# Patient Record
Sex: FEMALE | Race: WHITE | NOT HISPANIC OR LATINO
[De-identification: names, ages, dates, MRNs, and addresses within clinical notes are randomized per-mention and may not be internally consistent; named-entity substitution may affect disease eponyms.]

---

## 2016-02-01 RX ORDER — LABETALOL HCL 100 MG
1 TABLET ORAL
Qty: 0 | Refills: 0 | COMMUNITY
Start: 2016-02-01

## 2016-02-01 RX ORDER — LABETALOL HCL 100 MG
1 TABLET ORAL
Qty: 0 | Refills: 0 | DISCHARGE
Start: 2016-02-01

## 2016-02-02 RX ORDER — INSULIN LISPRO 100/ML
10 VIAL (ML) SUBCUTANEOUS
Qty: 0 | Refills: 0 | COMMUNITY
Start: 2016-02-02

## 2016-02-02 RX ORDER — INSULIN GLARGINE 100 [IU]/ML
30 INJECTION, SOLUTION SUBCUTANEOUS
Qty: 0 | Refills: 0 | COMMUNITY
Start: 2016-02-02

## 2017-01-31 ENCOUNTER — APPOINTMENT (OUTPATIENT)
Dept: HOME HEALTH SERVICES | Facility: HOME HEALTH | Age: 77
End: 2017-01-31

## 2017-01-31 VITALS
TEMPERATURE: 97 F | HEART RATE: 89 BPM | SYSTOLIC BLOOD PRESSURE: 122 MMHG | WEIGHT: 240 LBS | HEIGHT: 64 IN | OXYGEN SATURATION: 98 % | BODY MASS INDEX: 40.97 KG/M2 | RESPIRATION RATE: 18 BRPM | DIASTOLIC BLOOD PRESSURE: 68 MMHG

## 2017-02-02 DIAGNOSIS — Z60.2 PROBLEMS RELATED TO LIVING ALONE: ICD-10-CM

## 2017-02-02 SDOH — SOCIAL STABILITY - SOCIAL INSECURITY: PROBLEMS RELATED TO LIVING ALONE: Z60.2

## 2017-02-14 ENCOUNTER — APPOINTMENT (OUTPATIENT)
Dept: HOME HEALTH SERVICES | Facility: HOME HEALTH | Age: 77
End: 2017-02-14

## 2017-02-14 DIAGNOSIS — Z00.00 ENCOUNTER FOR GENERAL ADULT MEDICAL EXAMINATION W/OUT ABNORMAL FINDINGS: ICD-10-CM

## 2017-02-17 ENCOUNTER — LABORATORY RESULT (OUTPATIENT)
Age: 77
End: 2017-02-17

## 2017-02-17 LAB
25(OH)D3 SERPL-MCNC: 43.9 NG/ML
BASOPHILS # BLD AUTO: 0.02 K/UL
BASOPHILS NFR BLD AUTO: 0.2 %
CHOLEST SERPL-MCNC: 135 MG/DL
CHOLEST/HDLC SERPL: 2.9 RATIO
EOSINOPHIL # BLD AUTO: 0.16 K/UL
EOSINOPHIL NFR BLD AUTO: 1.9 %
HBA1C MFR BLD HPLC: 8.4 %
HCT VFR BLD CALC: 34.8 %
HDLC SERPL-MCNC: 46 MG/DL
HGB BLD-MCNC: 10.4 G/DL
IMM GRANULOCYTES NFR BLD AUTO: 0.1 %
LDLC SERPL CALC-MCNC: 65 MG/DL
LYMPHOCYTES # BLD AUTO: 2.12 K/UL
LYMPHOCYTES NFR BLD AUTO: 24.7 %
MAN DIFF?: NORMAL
MCHC RBC-ENTMCNC: 26.2 PG
MCHC RBC-ENTMCNC: 29.9 GM/DL
MCV RBC AUTO: 87.7 FL
MONOCYTES # BLD AUTO: 0.55 K/UL
MONOCYTES NFR BLD AUTO: 6.4 %
NEUTROPHILS # BLD AUTO: 5.71 K/UL
NEUTROPHILS NFR BLD AUTO: 66.7 %
PLATELET # BLD AUTO: 165 K/UL
RBC # BLD: 3.97 M/UL
RBC # FLD: 16 %
TRIGL SERPL-MCNC: 119 MG/DL
WBC # FLD AUTO: 8.57 K/UL

## 2017-02-24 LAB
FOLATE SERPL-MCNC: >20 NG/ML
VIT B12 SERPL-MCNC: 514 PG/ML

## 2017-03-26 ENCOUNTER — INPATIENT (INPATIENT)
Facility: HOSPITAL | Age: 77
LOS: 7 days | Discharge: NURSING, REHAB OR LONG-TERM CARE | DRG: 871 | End: 2017-04-03
Attending: HOSPITALIST | Admitting: HOSPITALIST
Payer: MEDICARE

## 2017-03-26 VITALS
DIASTOLIC BLOOD PRESSURE: 63 MMHG | SYSTOLIC BLOOD PRESSURE: 170 MMHG | RESPIRATION RATE: 20 BRPM | TEMPERATURE: 101 F | HEART RATE: 115 BPM | OXYGEN SATURATION: 97 %

## 2017-03-26 DIAGNOSIS — L03.031 CELLULITIS OF RIGHT TOE: ICD-10-CM

## 2017-03-26 DIAGNOSIS — Z98.49 CATARACT EXTRACTION STATUS, UNSPECIFIED EYE: Chronic | ICD-10-CM

## 2017-03-26 DIAGNOSIS — E11.42 TYPE 2 DIABETES MELLITUS WITH DIABETIC POLYNEUROPATHY: ICD-10-CM

## 2017-03-26 DIAGNOSIS — L03.115 CELLULITIS OF RIGHT LOWER LIMB: ICD-10-CM

## 2017-03-26 DIAGNOSIS — I10 ESSENTIAL (PRIMARY) HYPERTENSION: ICD-10-CM

## 2017-03-26 DIAGNOSIS — R50.9 FEVER, UNSPECIFIED: ICD-10-CM

## 2017-03-26 DIAGNOSIS — Z89.429 ACQUIRED ABSENCE OF OTHER TOE(S), UNSPECIFIED SIDE: Chronic | ICD-10-CM

## 2017-03-26 DIAGNOSIS — Z41.8 ENCOUNTER FOR OTHER PROCEDURES FOR PURPOSES OTHER THAN REMEDYING HEALTH STATE: ICD-10-CM

## 2017-03-26 LAB
ALBUMIN SERPL ELPH-MCNC: 3.4 G/DL — SIGNIFICANT CHANGE UP (ref 3.3–5)
ALP SERPL-CCNC: 91 U/L — SIGNIFICANT CHANGE UP (ref 40–120)
ALT FLD-CCNC: 17 U/L RC — SIGNIFICANT CHANGE UP (ref 10–45)
ANION GAP SERPL CALC-SCNC: 17 MMOL/L — SIGNIFICANT CHANGE UP (ref 5–17)
AST SERPL-CCNC: 26 U/L — SIGNIFICANT CHANGE UP (ref 10–40)
BASOPHILS # BLD AUTO: 0 K/UL — SIGNIFICANT CHANGE UP (ref 0–0.2)
BASOPHILS NFR BLD AUTO: 0.2 % — SIGNIFICANT CHANGE UP (ref 0–2)
BILIRUB SERPL-MCNC: 0.5 MG/DL — SIGNIFICANT CHANGE UP (ref 0.2–1.2)
BUN SERPL-MCNC: 23 MG/DL — SIGNIFICANT CHANGE UP (ref 7–23)
CALCIUM SERPL-MCNC: 10 MG/DL — SIGNIFICANT CHANGE UP (ref 8.4–10.5)
CHLORIDE SERPL-SCNC: 101 MMOL/L — SIGNIFICANT CHANGE UP (ref 96–108)
CO2 SERPL-SCNC: 20 MMOL/L — LOW (ref 22–31)
CREAT SERPL-MCNC: 1.09 MG/DL — SIGNIFICANT CHANGE UP (ref 0.5–1.3)
EOSINOPHIL # BLD AUTO: 0 K/UL — SIGNIFICANT CHANGE UP (ref 0–0.5)
EOSINOPHIL NFR BLD AUTO: 0.1 % — SIGNIFICANT CHANGE UP (ref 0–6)
GAS PNL BLDV: SIGNIFICANT CHANGE UP
GLUCOSE SERPL-MCNC: 197 MG/DL — HIGH (ref 70–99)
HCT VFR BLD CALC: 31.2 % — LOW (ref 34.5–45)
HGB BLD-MCNC: 10.2 G/DL — LOW (ref 11.5–15.5)
LYMPHOCYTES # BLD AUTO: 1 K/UL — SIGNIFICANT CHANGE UP (ref 1–3.3)
LYMPHOCYTES # BLD AUTO: 6.7 % — LOW (ref 13–44)
MCHC RBC-ENTMCNC: 27.4 PG — SIGNIFICANT CHANGE UP (ref 27–34)
MCHC RBC-ENTMCNC: 32.6 GM/DL — SIGNIFICANT CHANGE UP (ref 32–36)
MCV RBC AUTO: 84.1 FL — SIGNIFICANT CHANGE UP (ref 80–100)
MONOCYTES # BLD AUTO: 1.1 K/UL — HIGH (ref 0–0.9)
MONOCYTES NFR BLD AUTO: 7.4 % — SIGNIFICANT CHANGE UP (ref 2–14)
NEUTROPHILS # BLD AUTO: 12.4 K/UL — HIGH (ref 1.8–7.4)
NEUTROPHILS NFR BLD AUTO: 85.5 % — HIGH (ref 43–77)
PLATELET # BLD AUTO: 94 K/UL — LOW (ref 150–400)
POTASSIUM SERPL-MCNC: 4.8 MMOL/L — SIGNIFICANT CHANGE UP (ref 3.5–5.3)
POTASSIUM SERPL-SCNC: 4.8 MMOL/L — SIGNIFICANT CHANGE UP (ref 3.5–5.3)
PROT SERPL-MCNC: 7.5 G/DL — SIGNIFICANT CHANGE UP (ref 6–8.3)
RAPID RVP RESULT: SIGNIFICANT CHANGE UP
RBC # BLD: 3.72 M/UL — LOW (ref 3.8–5.2)
RBC # FLD: 15.1 % — HIGH (ref 10.3–14.5)
SODIUM SERPL-SCNC: 138 MMOL/L — SIGNIFICANT CHANGE UP (ref 135–145)
WBC # BLD: 14.4 K/UL — HIGH (ref 3.8–10.5)
WBC # FLD AUTO: 14.4 K/UL — HIGH (ref 3.8–10.5)

## 2017-03-26 PROCEDURE — 99285 EMERGENCY DEPT VISIT HI MDM: CPT | Mod: 25,GC

## 2017-03-26 PROCEDURE — 93971 EXTREMITY STUDY: CPT | Mod: 26

## 2017-03-26 PROCEDURE — 93010 ELECTROCARDIOGRAM REPORT: CPT

## 2017-03-26 PROCEDURE — 73620 X-RAY EXAM OF FOOT: CPT | Mod: 26,RT

## 2017-03-26 PROCEDURE — 71010: CPT | Mod: 26

## 2017-03-26 PROCEDURE — 99223 1ST HOSP IP/OBS HIGH 75: CPT | Mod: AI,GC

## 2017-03-26 RX ORDER — DEXTROSE 50 % IN WATER 50 %
25 SYRINGE (ML) INTRAVENOUS ONCE
Qty: 0 | Refills: 0 | Status: DISCONTINUED | OUTPATIENT
Start: 2017-03-26 | End: 2017-04-03

## 2017-03-26 RX ORDER — INSULIN LISPRO 100/ML
VIAL (ML) SUBCUTANEOUS
Qty: 0 | Refills: 0 | Status: DISCONTINUED | OUTPATIENT
Start: 2017-03-26 | End: 2017-04-01

## 2017-03-26 RX ORDER — ATORVASTATIN CALCIUM 80 MG/1
10 TABLET, FILM COATED ORAL AT BEDTIME
Qty: 0 | Refills: 0 | Status: DISCONTINUED | OUTPATIENT
Start: 2017-03-26 | End: 2017-04-03

## 2017-03-26 RX ORDER — SODIUM CHLORIDE 9 MG/ML
1000 INJECTION, SOLUTION INTRAVENOUS
Qty: 0 | Refills: 0 | Status: DISCONTINUED | OUTPATIENT
Start: 2017-03-26 | End: 2017-04-03

## 2017-03-26 RX ORDER — ACETAMINOPHEN 500 MG
1000 TABLET ORAL ONCE
Qty: 0 | Refills: 0 | Status: COMPLETED | OUTPATIENT
Start: 2017-03-26 | End: 2017-03-26

## 2017-03-26 RX ORDER — INSULIN LISPRO 100/ML
5 VIAL (ML) SUBCUTANEOUS
Qty: 0 | Refills: 0 | Status: DISCONTINUED | OUTPATIENT
Start: 2017-03-26 | End: 2017-03-28

## 2017-03-26 RX ORDER — PIPERACILLIN AND TAZOBACTAM 4; .5 G/20ML; G/20ML
3.38 INJECTION, POWDER, LYOPHILIZED, FOR SOLUTION INTRAVENOUS EVERY 8 HOURS
Qty: 0 | Refills: 0 | Status: DISCONTINUED | OUTPATIENT
Start: 2017-03-26 | End: 2017-03-31

## 2017-03-26 RX ORDER — GLUCAGON INJECTION, SOLUTION 0.5 MG/.1ML
1 INJECTION, SOLUTION SUBCUTANEOUS ONCE
Qty: 0 | Refills: 0 | Status: DISCONTINUED | OUTPATIENT
Start: 2017-03-26 | End: 2017-04-03

## 2017-03-26 RX ORDER — ACETAMINOPHEN 500 MG
650 TABLET ORAL EVERY 6 HOURS
Qty: 0 | Refills: 0 | Status: DISCONTINUED | OUTPATIENT
Start: 2017-03-26 | End: 2017-04-03

## 2017-03-26 RX ORDER — SODIUM CHLORIDE 9 MG/ML
1000 INJECTION INTRAMUSCULAR; INTRAVENOUS; SUBCUTANEOUS ONCE
Qty: 0 | Refills: 0 | Status: COMPLETED | OUTPATIENT
Start: 2017-03-26 | End: 2017-03-26

## 2017-03-26 RX ORDER — HEPARIN SODIUM 5000 [USP'U]/ML
5000 INJECTION INTRAVENOUS; SUBCUTANEOUS EVERY 8 HOURS
Qty: 0 | Refills: 0 | Status: DISCONTINUED | OUTPATIENT
Start: 2017-03-26 | End: 2017-04-03

## 2017-03-26 RX ORDER — DEXTROSE 50 % IN WATER 50 %
12.5 SYRINGE (ML) INTRAVENOUS ONCE
Qty: 0 | Refills: 0 | Status: DISCONTINUED | OUTPATIENT
Start: 2017-03-26 | End: 2017-04-03

## 2017-03-26 RX ORDER — INSULIN LISPRO 100/ML
VIAL (ML) SUBCUTANEOUS AT BEDTIME
Qty: 0 | Refills: 0 | Status: DISCONTINUED | OUTPATIENT
Start: 2017-03-26 | End: 2017-04-01

## 2017-03-26 RX ORDER — VANCOMYCIN HCL 1 G
1000 VIAL (EA) INTRAVENOUS ONCE
Qty: 0 | Refills: 0 | Status: COMPLETED | OUTPATIENT
Start: 2017-03-26 | End: 2017-03-26

## 2017-03-26 RX ORDER — PIPERACILLIN AND TAZOBACTAM 4; .5 G/20ML; G/20ML
3.38 INJECTION, POWDER, LYOPHILIZED, FOR SOLUTION INTRAVENOUS ONCE
Qty: 0 | Refills: 0 | Status: COMPLETED | OUTPATIENT
Start: 2017-03-26 | End: 2017-03-26

## 2017-03-26 RX ORDER — LABETALOL HCL 100 MG
200 TABLET ORAL AT BEDTIME
Qty: 0 | Refills: 0 | Status: DISCONTINUED | OUTPATIENT
Start: 2017-03-26 | End: 2017-04-01

## 2017-03-26 RX ORDER — INSULIN GLARGINE 100 [IU]/ML
15 INJECTION, SOLUTION SUBCUTANEOUS AT BEDTIME
Qty: 0 | Refills: 0 | Status: DISCONTINUED | OUTPATIENT
Start: 2017-03-26 | End: 2017-03-27

## 2017-03-26 RX ORDER — DEXTROSE 50 % IN WATER 50 %
1 SYRINGE (ML) INTRAVENOUS ONCE
Qty: 0 | Refills: 0 | Status: DISCONTINUED | OUTPATIENT
Start: 2017-03-26 | End: 2017-04-03

## 2017-03-26 RX ORDER — TIMOLOL 0.5 %
1 DROPS OPHTHALMIC (EYE) DAILY
Qty: 0 | Refills: 0 | Status: DISCONTINUED | OUTPATIENT
Start: 2017-03-26 | End: 2017-04-03

## 2017-03-26 RX ORDER — VANCOMYCIN HCL 1 G
1000 VIAL (EA) INTRAVENOUS ONCE
Qty: 0 | Refills: 0 | Status: COMPLETED | OUTPATIENT
Start: 2017-03-26 | End: 2017-03-28

## 2017-03-26 RX ADMIN — Medication 400 MILLIGRAM(S): at 18:50

## 2017-03-26 RX ADMIN — PIPERACILLIN AND TAZOBACTAM 200 GRAM(S): 4; .5 INJECTION, POWDER, LYOPHILIZED, FOR SOLUTION INTRAVENOUS at 18:50

## 2017-03-26 RX ADMIN — Medication 250 MILLIGRAM(S): at 22:25

## 2017-03-26 RX ADMIN — SODIUM CHLORIDE 1 MILLILITER(S): 9 INJECTION INTRAMUSCULAR; INTRAVENOUS; SUBCUTANEOUS at 18:51

## 2017-03-26 RX ADMIN — SODIUM CHLORIDE 1000 MILLILITER(S): 9 INJECTION INTRAMUSCULAR; INTRAVENOUS; SUBCUTANEOUS at 19:20

## 2017-03-26 NOTE — H&P ADULT. - PROBLEM SELECTOR PLAN 2
Right extremity is more swollen then the left, with more erythema and warmth.   - Duplexes ordered to rule out DVT  - Continue antibiotics as listed above

## 2017-03-26 NOTE — H&P ADULT. - NEGATIVE ENMT SYMPTOMS
no sinus symptoms/no throat pain/no nasal discharge/no hearing difficulty/no nasal congestion/no vertigo/no dry mouth/no dysphagia

## 2017-03-26 NOTE — ED ADULT NURSE NOTE - OBJECTIVE STATEMENT
Patient  is  alert  and  oriented x3.  Color is  good and  skin warm to touch.  She is  c/o  weakness and  fell.  Patient  is  febrile.  She  is  c/o  bodyaches.    Rt  foot  infection noted.

## 2017-03-26 NOTE — H&P ADULT. - NEGATIVE CARDIOVASCULAR SYMPTOMS
no chest pain/no palpitations/no dyspnea on exertion/no peripheral edema no chest pain/no dyspnea on exertion/no palpitations

## 2017-03-26 NOTE — H&P ADULT. - PROBLEM SELECTOR PLAN 3
Diabetes regimen cut in half while in patient  - Lantus 15 u QHS  - Meal coverage 5u TID  - Checking Hgb A1C  - Sliding scale    Patient has severe polyneuropathy. No LE pain  - Physical therapy consult

## 2017-03-26 NOTE — ED ADULT NURSE REASSESSMENT NOTE - NS ED NURSE REASSESS COMMENT FT1
Recvd pt at 1930 awake, alert and responsive to all stimuli.  no sob or respiratory distress noted.  pt denies any pain and is afebrile at this time.  pt admitted to medicine and is awaiting admitting bed.  will continue to monitor.

## 2017-03-26 NOTE — H&P ADULT. - RADIOLOGY RESULTS AND INTERPRETATION
Personally reviewed by me. Patient has a sub talar dislocation, not sure if old or new. There is extensive soft tissue swelling.  CXR is clear. CXR film personally reviewed by me - clear lung    Patient has a sub talar dislocation, not sure if old or new. There is extensive soft tissue swelling.

## 2017-03-26 NOTE — H&P ADULT. - HISTORY OF PRESENT ILLNESS
77 yo F with pmhx HTN, diabetes, and Diabetic neuropathy who presents after two falls and weakness. Patient had a fall yesterday and another today. Her only injury was elbow bruise. She did not hit her head. She usually walks with a walker due to severe diabetic neuropathy. She was trying to climb two steps in her home, and was unable to lift her leg high enough to make it to the next step and slipped and fell backwards both times.  She usually has good upper extremity strength, and was not able to hold the railing and bring herself up. They had to call the paramedics to help her up.      Patient does not 75 yo F with pmhx HTN, diabetes, and Diabetic neuropathy who presents after two falls and weakness. Patient had a fall yesterday and another today. Her only injury was elbow bruise. She did not hit her head. She usually walks with a walker due to severe diabetic neuropathy. She was trying to climb two steps in her home, and was unable to lift her leg high enough to make it to the next step and slipped and fell backwards both times. She denies any lightheadedness, dizziness, palpitations or flushing during these falls.  She usually has good upper extremity strength, and was not able to hold the railing and bring herself up. They had to call the paramedics to help her up.      Patient does not recall any fevers or chills at home, but has been feeling generalized weakness. She was told by her nephew she has a wound on her right foot second toe, and she was not aware of this. She has no feeling in her leg. Her toes were all 'normal' in February when the podiatrist did her nails.      Patient denies any recent URI symptoms. She denies chest pain, sob, abdominal pain, nausea, vomiting, diarrhea or constipation. She says her Iron pills make her constipated but her metformin causes her diarrhea.  Patient also denies any recent rash or swelling, however her legs are currently swollen and she was not aware of this.  Patient has poor eye sight from her glaucoma and doesn't really pay attention to her legs.     In the ED, patient was febrile, and vanc zosyn were prescribed.

## 2017-03-26 NOTE — ED PROVIDER NOTE - CONSTITUTIONAL, MLM
normal... Well appearing, well nourished, awake, alert, oriented to person, place, time/situation and in slight distress.

## 2017-03-26 NOTE — ED PROVIDER NOTE - OBJECTIVE STATEMENT
MD Nadeen,Attending:Pt seen and examined, agree with above HPI/ROS/PE. Pt with DM--no prior known DKA. R mid-toe infection. 2-3 days of fever weakness of legs--fell going up steps--no trauma. Denies other localizing sxs. Presume DKA/sepsis. Full septic workup -initiate IVFs /IVabx. xray foot. Admit medicine once labs back MD Nadeen,Attending: Pt seen and examined, agree with above HPI/ROS/PE. Pt with DM--no prior known DKA. R mid-toe infection. 2-3 days of fever weakness of legs--fell going up steps--no trauma. Denies other localizing sxs. Presume DKA/sepsis. Full septic workup -initiate IVFs /IV abx/ xray foot. Admit medicine once labs back 75 yo female with DM chronic foot wound presenting with 2 day hx of fever and weakness and inability to stand up or walk.  patient also endorses fevers, has not taken anything for her symptoms.  denies any pain.  no n/v/d.  has recently had multiple falls secondary to her weakness although denies any injuries.    PCP- Bam Senior MD,Attending: Pt seen and examined, agree with above HPI/ROS/PE. Pt with DM--no prior known DKA. R mid-toe infection. 2-3 days of fever weakness of legs--fell going up steps--no trauma. Denies other localizing sxs. Presume DKA/sepsis. Full septic workup -initiate IVFs /IV abx/ xray foot. Admit medicine once labs back

## 2017-03-26 NOTE — H&P ADULT. - MUSCULOSKELETAL
details… detailed exam ROM intact/joint warmth/joint erythema/joint swelling/normal strength/no calf tenderness no calf tenderness/joint erythema/joint swelling/ROM intact/joint warmth

## 2017-03-26 NOTE — H&P ADULT. - PROBLEM SELECTOR PLAN 4
Starting back her Labetalol for now, will add on Losartan tomorrow if BP remains stable and patient doesn't decompensate.

## 2017-03-26 NOTE — H&P ADULT. - RS GEN PE MLT RESP DETAILS PC
breath sounds equal/normal/no chest wall tenderness/good air movement/airway patent/clear to auscultation bilaterally/respirations non-labored clear to auscultation bilaterally/respirations non-labored

## 2017-03-26 NOTE — H&P ADULT. - ASSESSMENT
75 yo F with HTN, DM2, and Diabetic neuropathy, with Sepsis 2/2 cellulitis, and diabetic foot ulcer concerning for underlying osteomyelitis 75 yo F with HTN, DM2, and Diabetic neuropathy p/w fall admitted with sepsis due to cellulitis of diabetic foot ulcer concerning for underlying osteomyelitis

## 2017-03-26 NOTE — ED PROVIDER NOTE - MEDICAL DECISION MAKING DETAILS
MD Nadeen,Attending: MD Nadeen,Attending:Pt seen and examined, agree with above HPI/ROS/PE. Pt with DM--no prior known DKA. R mid-toe infection. 2-3 days of fever weakness of legs--fell going up steps--no trauma. Denies other localizing sxs. Presume DKA/sepsis. Full septic workup -initiate IVFs /IVabx. xray foot. Admit medicine once labs back

## 2017-03-26 NOTE — H&P ADULT. - GASTROINTESTINAL DETAILS
obese/soft/nontender/no masses palpable/normal/bowel sounds normal/no distention obese/soft/bowel sounds normal/no distention/nontender

## 2017-03-26 NOTE — H&P ADULT. - NEGATIVE NEUROLOGICAL SYMPTOMS
no hemiparesis/no generalized seizures/no paresthesias/no loss of sensation/no transient paralysis/no syncope/no loss of consciousness/no confusion/no focal seizures no confusion/no syncope/no loss of consciousness

## 2017-03-26 NOTE — H&P ADULT. - EXTREMITIES COMMENTS
Right leg more swollen then left, ROM intact by ankle, erythema present with some skin changes. One wound on second right toe, no drainage, no bleed.

## 2017-03-26 NOTE — H&P ADULT. - PMH
Diabetes  type 2  Diabetic ulcer of heel    Glaucoma    Hypertension    Neuropathy    PVD (peripheral vascular disease)

## 2017-03-26 NOTE — H&P ADULT. - NEUROLOGICAL DETAILS
responds to pain/alert and oriented x 3/normal strength alert and oriented x 3/normal strength/responds to verbal commands

## 2017-03-26 NOTE — H&P ADULT. - NEGATIVE MUSCULOSKELETAL SYMPTOMS
no arthritis/no myalgia/no muscle cramps/no arthralgia/no muscle weakness/no joint swelling no muscle cramps/no muscle weakness

## 2017-03-26 NOTE — H&P ADULT. - LAB RESULTS AND INTERPRETATION
Personally reviewed by me.  Leukocytosis present, with anemia, thrombocytopenia (not chronic), Negative RVP.  Bicarb is 20 and cr is at baseline.

## 2017-03-26 NOTE — H&P ADULT. - PROBLEM SELECTOR PLAN 1
Patient has history of diabetic foot ulcers, and previous amputations. Concern for osteomyelitis  - Continuing Vancomycin for MRSA coverage and Zosyn to cover anaerobes and pseudomonas  - Follow up blood cultures  - Tylenol for fevers Patient has history of diabetic foot ulcers, and previous amputations. Concern for osteomyelitis  - Continuing Vancomycin for MRSA coverage and Zosyn to cover anaerobes and pseudomonas  - Follow up blood cultures  - Checking ESR and CRP  - Tylenol for fevers  - Podiatry Consulted - will see patient with sepsis - fever and tachycardia   - Patient has history of diabetic foot ulcers, and previous amputations. Concern for osteomyelitis,  - will treat with Vancomycin for MRSA coverage and Zosyn to cover anaerobes and pseudomonas  - Follow up blood cultures  - Checking ESR and CRP  - Tylenol for fevers  - Podiatry Consulted - will see patient

## 2017-03-26 NOTE — H&P ADULT. - LYMPHATIC
posterior cervical L/supraclavicular R/supraclavicular L/anterior cervical L/posterior cervical R/anterior cervical R

## 2017-03-26 NOTE — H&P ADULT. - NEGATIVE GASTROINTESTINAL SYMPTOMS
no constipation/no abdominal pain/no vomiting/no change in bowel habits/no diarrhea/no nausea no nausea/no vomiting/no abdominal pain

## 2017-03-27 ENCOUNTER — APPOINTMENT (OUTPATIENT)
Dept: HOME HEALTH SERVICES | Facility: HOME HEALTH | Age: 77
End: 2017-03-27

## 2017-03-27 DIAGNOSIS — R60.0 LOCALIZED EDEMA: ICD-10-CM

## 2017-03-27 LAB
ANION GAP SERPL CALC-SCNC: 18 MMOL/L — HIGH (ref 5–17)
BUN SERPL-MCNC: 20 MG/DL — SIGNIFICANT CHANGE UP (ref 7–23)
CALCIUM SERPL-MCNC: 9.4 MG/DL — SIGNIFICANT CHANGE UP (ref 8.4–10.5)
CHLORIDE SERPL-SCNC: 104 MMOL/L — SIGNIFICANT CHANGE UP (ref 96–108)
CHOLEST SERPL-MCNC: 128 MG/DL — SIGNIFICANT CHANGE UP (ref 10–199)
CO2 SERPL-SCNC: 21 MMOL/L — LOW (ref 22–31)
CREAT SERPL-MCNC: 1.11 MG/DL — SIGNIFICANT CHANGE UP (ref 0.5–1.3)
CRP SERPL-MCNC: 31.77 MG/DL — HIGH (ref 0–0.4)
ERYTHROCYTE [SEDIMENTATION RATE] IN BLOOD: 108 MM/HR — HIGH (ref 0–20)
GLUCOSE SERPL-MCNC: 225 MG/DL — HIGH (ref 70–99)
GRAM STN FLD: SIGNIFICANT CHANGE UP
HCT VFR BLD CALC: 31.9 % — LOW (ref 34.5–45)
HDLC SERPL-MCNC: 42 MG/DL — SIGNIFICANT CHANGE UP (ref 40–125)
HGB BLD-MCNC: 10.2 G/DL — LOW (ref 11.5–15.5)
LIPID PNL WITH DIRECT LDL SERPL: 61 MG/DL — SIGNIFICANT CHANGE UP
MAGNESIUM SERPL-MCNC: 1.5 MG/DL — LOW (ref 1.6–2.6)
MCHC RBC-ENTMCNC: 27 PG — SIGNIFICANT CHANGE UP (ref 27–34)
MCHC RBC-ENTMCNC: 32 GM/DL — SIGNIFICANT CHANGE UP (ref 32–36)
MCV RBC AUTO: 84.4 FL — SIGNIFICANT CHANGE UP (ref 80–100)
PHOSPHATE SERPL-MCNC: 3.3 MG/DL — SIGNIFICANT CHANGE UP (ref 2.5–4.5)
PLATELET # BLD AUTO: 115 K/UL — LOW (ref 150–400)
POTASSIUM SERPL-MCNC: 4.4 MMOL/L — SIGNIFICANT CHANGE UP (ref 3.5–5.3)
POTASSIUM SERPL-SCNC: 4.4 MMOL/L — SIGNIFICANT CHANGE UP (ref 3.5–5.3)
RBC # BLD: 3.77 M/UL — LOW (ref 3.8–5.2)
RBC # FLD: 15.1 % — HIGH (ref 10.3–14.5)
SODIUM SERPL-SCNC: 143 MMOL/L — SIGNIFICANT CHANGE UP (ref 135–145)
SPECIMEN SOURCE: SIGNIFICANT CHANGE UP
TOTAL CHOLESTEROL/HDL RATIO MEASUREMENT: 3 RATIO — LOW (ref 3.3–7.1)
TRIGL SERPL-MCNC: 124 MG/DL — SIGNIFICANT CHANGE UP (ref 10–149)
TSH SERPL-MCNC: 1.01 UIU/ML — SIGNIFICANT CHANGE UP (ref 0.27–4.2)
WBC # BLD: 13.4 K/UL — HIGH (ref 3.8–10.5)
WBC # FLD AUTO: 13.4 K/UL — HIGH (ref 3.8–10.5)

## 2017-03-27 PROCEDURE — 99233 SBSQ HOSP IP/OBS HIGH 50: CPT | Mod: GC

## 2017-03-27 RX ORDER — MAGNESIUM OXIDE 400 MG ORAL TABLET 241.3 MG
400 TABLET ORAL
Qty: 0 | Refills: 0 | Status: DISCONTINUED | OUTPATIENT
Start: 2017-03-27 | End: 2017-03-27

## 2017-03-27 RX ORDER — SODIUM CHLORIDE 9 MG/ML
1000 INJECTION INTRAMUSCULAR; INTRAVENOUS; SUBCUTANEOUS
Qty: 0 | Refills: 0 | Status: DISCONTINUED | OUTPATIENT
Start: 2017-03-27 | End: 2017-03-29

## 2017-03-27 RX ORDER — POLYETHYLENE GLYCOL 3350 17 G/17G
17 POWDER, FOR SOLUTION ORAL DAILY
Qty: 0 | Refills: 0 | Status: DISCONTINUED | OUTPATIENT
Start: 2017-03-27 | End: 2017-04-03

## 2017-03-27 RX ORDER — INSULIN GLARGINE 100 [IU]/ML
20 INJECTION, SOLUTION SUBCUTANEOUS AT BEDTIME
Qty: 0 | Refills: 0 | Status: DISCONTINUED | OUTPATIENT
Start: 2017-03-27 | End: 2017-03-29

## 2017-03-27 RX ORDER — DOCUSATE SODIUM 100 MG
100 CAPSULE ORAL
Qty: 0 | Refills: 0 | Status: DISCONTINUED | OUTPATIENT
Start: 2017-03-27 | End: 2017-04-03

## 2017-03-27 RX ORDER — SENNA PLUS 8.6 MG/1
2 TABLET ORAL AT BEDTIME
Qty: 0 | Refills: 0 | Status: DISCONTINUED | OUTPATIENT
Start: 2017-03-27 | End: 2017-04-03

## 2017-03-27 RX ORDER — MAGNESIUM SULFATE 500 MG/ML
2 VIAL (ML) INJECTION ONCE
Qty: 0 | Refills: 0 | Status: COMPLETED | OUTPATIENT
Start: 2017-03-27 | End: 2017-03-27

## 2017-03-27 RX ADMIN — HEPARIN SODIUM 5000 UNIT(S): 5000 INJECTION INTRAVENOUS; SUBCUTANEOUS at 14:39

## 2017-03-27 RX ADMIN — INSULIN GLARGINE 20 UNIT(S): 100 INJECTION, SOLUTION SUBCUTANEOUS at 23:09

## 2017-03-27 RX ADMIN — Medication 5 UNIT(S): at 14:35

## 2017-03-27 RX ADMIN — SENNA PLUS 2 TABLET(S): 8.6 TABLET ORAL at 23:09

## 2017-03-27 RX ADMIN — Medication 2: at 14:35

## 2017-03-27 RX ADMIN — HEPARIN SODIUM 5000 UNIT(S): 5000 INJECTION INTRAVENOUS; SUBCUTANEOUS at 23:09

## 2017-03-27 RX ADMIN — Medication 200 MILLIGRAM(S): at 01:15

## 2017-03-27 RX ADMIN — Medication 50 GRAM(S): at 02:15

## 2017-03-27 RX ADMIN — PIPERACILLIN AND TAZOBACTAM 25 GRAM(S): 4; .5 INJECTION, POWDER, LYOPHILIZED, FOR SOLUTION INTRAVENOUS at 06:11

## 2017-03-27 RX ADMIN — Medication 650 MILLIGRAM(S): at 06:22

## 2017-03-27 RX ADMIN — HEPARIN SODIUM 5000 UNIT(S): 5000 INJECTION INTRAVENOUS; SUBCUTANEOUS at 06:11

## 2017-03-27 RX ADMIN — ATORVASTATIN CALCIUM 10 MILLIGRAM(S): 80 TABLET, FILM COATED ORAL at 23:09

## 2017-03-27 RX ADMIN — Medication 2: at 18:24

## 2017-03-27 RX ADMIN — Medication 5 UNIT(S): at 09:48

## 2017-03-27 RX ADMIN — PIPERACILLIN AND TAZOBACTAM 25 GRAM(S): 4; .5 INJECTION, POWDER, LYOPHILIZED, FOR SOLUTION INTRAVENOUS at 14:50

## 2017-03-27 RX ADMIN — ATORVASTATIN CALCIUM 10 MILLIGRAM(S): 80 TABLET, FILM COATED ORAL at 02:16

## 2017-03-27 RX ADMIN — Medication 200 MILLIGRAM(S): at 23:09

## 2017-03-27 RX ADMIN — PIPERACILLIN AND TAZOBACTAM 25 GRAM(S): 4; .5 INJECTION, POWDER, LYOPHILIZED, FOR SOLUTION INTRAVENOUS at 23:09

## 2017-03-27 RX ADMIN — Medication 5 UNIT(S): at 18:25

## 2017-03-27 RX ADMIN — Medication 650 MILLIGRAM(S): at 19:05

## 2017-03-27 RX ADMIN — Medication 1 DROP(S): at 14:39

## 2017-03-27 RX ADMIN — Medication 2: at 09:48

## 2017-03-27 RX ADMIN — SODIUM CHLORIDE 80 MILLILITER(S): 9 INJECTION INTRAMUSCULAR; INTRAVENOUS; SUBCUTANEOUS at 15:00

## 2017-03-27 RX ADMIN — Medication 1 DROP(S): at 02:15

## 2017-03-27 RX ADMIN — Medication 100 MILLIGRAM(S): at 18:24

## 2017-03-27 RX ADMIN — POLYETHYLENE GLYCOL 3350 17 GRAM(S): 17 POWDER, FOR SOLUTION ORAL at 14:39

## 2017-03-28 ENCOUNTER — TRANSCRIPTION ENCOUNTER (OUTPATIENT)
Age: 77
End: 2017-03-28

## 2017-03-28 LAB
ANION GAP SERPL CALC-SCNC: 19 MMOL/L — HIGH (ref 5–17)
BASOPHILS # BLD AUTO: 0 K/UL — SIGNIFICANT CHANGE UP (ref 0–0.2)
BASOPHILS # BLD AUTO: 0.02 K/UL — SIGNIFICANT CHANGE UP (ref 0–0.2)
BASOPHILS NFR BLD AUTO: 0.2 % — SIGNIFICANT CHANGE UP (ref 0–2)
BASOPHILS NFR BLD AUTO: 0.4 % — SIGNIFICANT CHANGE UP (ref 0–2)
BUN SERPL-MCNC: 21 MG/DL — SIGNIFICANT CHANGE UP (ref 7–23)
CALCIUM SERPL-MCNC: 8.8 MG/DL — SIGNIFICANT CHANGE UP (ref 8.4–10.5)
CHLORIDE SERPL-SCNC: 103 MMOL/L — SIGNIFICANT CHANGE UP (ref 96–108)
CO2 SERPL-SCNC: 19 MMOL/L — LOW (ref 22–31)
CREAT SERPL-MCNC: 1.28 MG/DL — SIGNIFICANT CHANGE UP (ref 0.5–1.3)
CULTURE RESULTS: NO GROWTH — SIGNIFICANT CHANGE UP
EOSINOPHIL # BLD AUTO: 0 K/UL — SIGNIFICANT CHANGE UP (ref 0–0.5)
EOSINOPHIL # BLD AUTO: 0 K/UL — SIGNIFICANT CHANGE UP (ref 0–0.5)
EOSINOPHIL NFR BLD AUTO: 0 % — SIGNIFICANT CHANGE UP (ref 0–6)
EOSINOPHIL NFR BLD AUTO: 0.4 % — SIGNIFICANT CHANGE UP (ref 0–6)
FERRITIN SERPL-MCNC: 499.1 NG/ML — HIGH (ref 15–150)
FOLATE SERPL-MCNC: >20 NG/ML — SIGNIFICANT CHANGE UP (ref 4.8–24.2)
GLUCOSE SERPL-MCNC: 247 MG/DL — HIGH (ref 70–99)
GRAM STN FLD: SIGNIFICANT CHANGE UP
HCT VFR BLD CALC: 26.7 % — LOW (ref 34.5–45)
HCT VFR BLD CALC: 27.5 % — LOW (ref 34.5–45)
HGB BLD-MCNC: 8.2 G/DL — LOW (ref 11.5–15.5)
HGB BLD-MCNC: 9 G/DL — LOW (ref 11.5–15.5)
IMM GRANULOCYTES NFR BLD AUTO: 0.1 % — SIGNIFICANT CHANGE UP (ref 0–1.5)
IRON SATN MFR SERPL: 15 UG/DL — LOW (ref 30–160)
IRON SATN MFR SERPL: 7 % — LOW (ref 14–50)
LYMPHOCYTES # BLD AUTO: 1.2 K/UL — SIGNIFICANT CHANGE UP (ref 1–3.3)
LYMPHOCYTES # BLD AUTO: 1.43 K/UL — SIGNIFICANT CHANGE UP (ref 1–3.3)
LYMPHOCYTES # BLD AUTO: 13.4 % — SIGNIFICANT CHANGE UP (ref 13–44)
LYMPHOCYTES # BLD AUTO: 14.4 % — SIGNIFICANT CHANGE UP (ref 13–44)
MAGNESIUM SERPL-MCNC: 1.7 MG/DL — SIGNIFICANT CHANGE UP (ref 1.6–2.6)
MCHC RBC-ENTMCNC: 25.6 PG — LOW (ref 27–34)
MCHC RBC-ENTMCNC: 27.6 PG — SIGNIFICANT CHANGE UP (ref 27–34)
MCHC RBC-ENTMCNC: 30.7 GM/DL — LOW (ref 32–36)
MCHC RBC-ENTMCNC: 32.6 GM/DL — SIGNIFICANT CHANGE UP (ref 32–36)
MCV RBC AUTO: 83.4 FL — SIGNIFICANT CHANGE UP (ref 80–100)
MCV RBC AUTO: 84.5 FL — SIGNIFICANT CHANGE UP (ref 80–100)
MONOCYTES # BLD AUTO: 0.6 K/UL — SIGNIFICANT CHANGE UP (ref 0–0.9)
MONOCYTES # BLD AUTO: 1 K/UL — HIGH (ref 0–0.9)
MONOCYTES NFR BLD AUTO: 10.1 % — SIGNIFICANT CHANGE UP (ref 2–14)
MONOCYTES NFR BLD AUTO: 6.7 % — SIGNIFICANT CHANGE UP (ref 2–14)
NEUTROPHILS # BLD AUTO: 6.8 K/UL — SIGNIFICANT CHANGE UP (ref 1.8–7.4)
NEUTROPHILS # BLD AUTO: 7.48 K/UL — HIGH (ref 1.8–7.4)
NEUTROPHILS NFR BLD AUTO: 75.2 % — SIGNIFICANT CHANGE UP (ref 43–77)
NEUTROPHILS NFR BLD AUTO: 79.1 % — HIGH (ref 43–77)
PHOSPHATE SERPL-MCNC: 3.3 MG/DL — SIGNIFICANT CHANGE UP (ref 2.5–4.5)
PLATELET # BLD AUTO: 147 K/UL — LOW (ref 150–400)
PLATELET # BLD AUTO: 155 K/UL — SIGNIFICANT CHANGE UP (ref 150–400)
POTASSIUM SERPL-MCNC: 4.3 MMOL/L — SIGNIFICANT CHANGE UP (ref 3.5–5.3)
POTASSIUM SERPL-SCNC: 4.3 MMOL/L — SIGNIFICANT CHANGE UP (ref 3.5–5.3)
RBC # BLD: 3.2 M/UL — LOW (ref 3.8–5.2)
RBC # BLD: 3.26 M/UL — LOW (ref 3.8–5.2)
RBC # FLD: 14.8 % — HIGH (ref 10.3–14.5)
RBC # FLD: 16.3 % — HIGH (ref 10.3–14.5)
SODIUM SERPL-SCNC: 141 MMOL/L — SIGNIFICANT CHANGE UP (ref 135–145)
SPECIMEN SOURCE: SIGNIFICANT CHANGE UP
TIBC SERPL-MCNC: 215 UG/DL — LOW (ref 220–430)
UIBC SERPL-MCNC: 200 UG/DL — SIGNIFICANT CHANGE UP (ref 110–370)
VIT B12 SERPL-MCNC: 438 PG/ML — SIGNIFICANT CHANGE UP (ref 243–894)
WBC # BLD: 8.6 K/UL — SIGNIFICANT CHANGE UP (ref 3.8–10.5)
WBC # BLD: 9.94 K/UL — SIGNIFICANT CHANGE UP (ref 3.8–10.5)
WBC # FLD AUTO: 8.6 K/UL — SIGNIFICANT CHANGE UP (ref 3.8–10.5)
WBC # FLD AUTO: 9.94 K/UL — SIGNIFICANT CHANGE UP (ref 3.8–10.5)

## 2017-03-28 PROCEDURE — 99232 SBSQ HOSP IP/OBS MODERATE 35: CPT

## 2017-03-28 PROCEDURE — 73702 CT LWR EXTREMITY W/O&W/DYE: CPT | Mod: 26,RT

## 2017-03-28 PROCEDURE — 99222 1ST HOSP IP/OBS MODERATE 55: CPT

## 2017-03-28 PROCEDURE — 99233 SBSQ HOSP IP/OBS HIGH 50: CPT | Mod: GC

## 2017-03-28 RX ORDER — VANCOMYCIN HCL 1 G
1000 VIAL (EA) INTRAVENOUS EVERY 12 HOURS
Qty: 0 | Refills: 0 | Status: DISCONTINUED | OUTPATIENT
Start: 2017-03-28 | End: 2017-03-31

## 2017-03-28 RX ORDER — LOSARTAN POTASSIUM 100 MG/1
100 TABLET, FILM COATED ORAL DAILY
Qty: 0 | Refills: 0 | Status: DISCONTINUED | OUTPATIENT
Start: 2017-03-28 | End: 2017-04-03

## 2017-03-28 RX ORDER — ACETAMINOPHEN 500 MG
650 TABLET ORAL EVERY 6 HOURS
Qty: 0 | Refills: 0 | Status: DISCONTINUED | OUTPATIENT
Start: 2017-03-28 | End: 2017-04-03

## 2017-03-28 RX ORDER — ACETAMINOPHEN 500 MG
650 TABLET ORAL ONCE
Qty: 0 | Refills: 0 | Status: COMPLETED | OUTPATIENT
Start: 2017-03-28 | End: 2017-03-28

## 2017-03-28 RX ORDER — INSULIN LISPRO 100/ML
8 VIAL (ML) SUBCUTANEOUS
Qty: 0 | Refills: 0 | Status: DISCONTINUED | OUTPATIENT
Start: 2017-03-28 | End: 2017-03-29

## 2017-03-28 RX ADMIN — SENNA PLUS 2 TABLET(S): 8.6 TABLET ORAL at 22:23

## 2017-03-28 RX ADMIN — Medication 1: at 23:59

## 2017-03-28 RX ADMIN — Medication 8 UNIT(S): at 13:18

## 2017-03-28 RX ADMIN — LOSARTAN POTASSIUM 100 MILLIGRAM(S): 100 TABLET, FILM COATED ORAL at 16:01

## 2017-03-28 RX ADMIN — SODIUM CHLORIDE 80 MILLILITER(S): 9 INJECTION INTRAMUSCULAR; INTRAVENOUS; SUBCUTANEOUS at 07:50

## 2017-03-28 RX ADMIN — Medication 200 MILLIGRAM(S): at 22:23

## 2017-03-28 RX ADMIN — PIPERACILLIN AND TAZOBACTAM 25 GRAM(S): 4; .5 INJECTION, POWDER, LYOPHILIZED, FOR SOLUTION INTRAVENOUS at 05:23

## 2017-03-28 RX ADMIN — INSULIN GLARGINE 20 UNIT(S): 100 INJECTION, SOLUTION SUBCUTANEOUS at 23:59

## 2017-03-28 RX ADMIN — Medication 3: at 13:18

## 2017-03-28 RX ADMIN — HEPARIN SODIUM 5000 UNIT(S): 5000 INJECTION INTRAVENOUS; SUBCUTANEOUS at 22:23

## 2017-03-28 RX ADMIN — Medication 250 MILLIGRAM(S): at 11:49

## 2017-03-28 RX ADMIN — Medication 5 UNIT(S): at 08:55

## 2017-03-28 RX ADMIN — Medication 650 MILLIGRAM(S): at 07:50

## 2017-03-28 RX ADMIN — Medication 650 MILLIGRAM(S): at 08:25

## 2017-03-28 RX ADMIN — Medication 650 MILLIGRAM(S): at 17:25

## 2017-03-28 RX ADMIN — HEPARIN SODIUM 5000 UNIT(S): 5000 INJECTION INTRAVENOUS; SUBCUTANEOUS at 13:19

## 2017-03-28 RX ADMIN — Medication 100 MILLIGRAM(S): at 05:23

## 2017-03-28 RX ADMIN — HEPARIN SODIUM 5000 UNIT(S): 5000 INJECTION INTRAVENOUS; SUBCUTANEOUS at 05:23

## 2017-03-28 RX ADMIN — ATORVASTATIN CALCIUM 10 MILLIGRAM(S): 80 TABLET, FILM COATED ORAL at 22:23

## 2017-03-28 RX ADMIN — Medication 3: at 08:54

## 2017-03-28 RX ADMIN — Medication 2: at 17:52

## 2017-03-28 RX ADMIN — Medication 1 DROP(S): at 11:50

## 2017-03-28 RX ADMIN — PIPERACILLIN AND TAZOBACTAM 25 GRAM(S): 4; .5 INJECTION, POWDER, LYOPHILIZED, FOR SOLUTION INTRAVENOUS at 14:26

## 2017-03-28 RX ADMIN — Medication 650 MILLIGRAM(S): at 18:00

## 2017-03-28 RX ADMIN — PIPERACILLIN AND TAZOBACTAM 25 GRAM(S): 4; .5 INJECTION, POWDER, LYOPHILIZED, FOR SOLUTION INTRAVENOUS at 22:24

## 2017-03-28 NOTE — DISCHARGE NOTE ADULT - MEDICATION SUMMARY - MEDICATIONS TO STOP TAKING
I will STOP taking the medications listed below when I get home from the hospital:    metFORMIN 1000 mg oral tablet  -- 1 tab(s) by mouth 2 times a day

## 2017-03-28 NOTE — PROVIDER CONTACT NOTE (CRITICAL VALUE NOTIFICATION) - TEST AND RESULT REPORTED:
+ Blood cultures in Aerobic bottle: Gram + Cocci in clusters
3/26 Blood cx: growth in anerobic bottle, gram positive cocci in clusters

## 2017-03-28 NOTE — PHYSICAL THERAPY INITIAL EVALUATION ADULT - TRANSFER SAFETY CONCERNS NOTED: SIT/STAND, REHAB EVAL
decreased sequencing ability/losing balance/decreased balance during turns/decreased step length/decreased weight-shifting ability

## 2017-03-28 NOTE — DISCHARGE NOTE ADULT - MEDICATION SUMMARY - MEDICATIONS TO CHANGE
I will SWITCH the dose or number of times a day I take the medications listed below when I get home from the hospital:    insulin glargine  -- 30 unit(s) subcutaneous once a day (at bedtime)    insulin lispro 100 units/mL subcutaneous solution  -- 10 unit(s) subcutaneous 3 times a day (before meals) I will SWITCH the dose or number of times a day I take the medications listed below when I get home from the hospital:    insulin glargine  -- 30 unit(s) subcutaneous once a day (at bedtime)    insulin lispro 100 units/mL subcutaneous solution  -- 10 unit(s) subcutaneous 3 times a day (before meals)    labetalol 200 mg oral tablet  -- 1 tab(s) by mouth once a day (at bedtime)

## 2017-03-28 NOTE — PHYSICAL THERAPY INITIAL EVALUATION ADULT - ADDITIONAL COMMENTS
Pt lives alone Pt lives alone in a private house with 2 steps to enter, 1 flight of stairs inside house. PTA, pt independent with all ADLs and functional activities with RW. Pt owns cane, rolling walker, shower chair, and transport wheelchair.

## 2017-03-28 NOTE — PHYSICAL THERAPY INITIAL EVALUATION ADULT - GENERAL OBSERVATIONS, REHAB EVAL
pt received semi-supine in bed, NAD, agreeable to PT session pt received semi-supine in bed, NAD, agreeable to PT session, +IV,

## 2017-03-28 NOTE — DISCHARGE NOTE ADULT - CARE PROVIDERS DIRECT ADDRESSES
[email or web address],[email or web address],[email or web address] [email or web address],DirectAddress_Unknown,DirectAddress_Unknown,DirectAddress_Unknown

## 2017-03-28 NOTE — DISCHARGE NOTE ADULT - MEDICATION SUMMARY - MEDICATIONS TO TAKE
I will START or STAY ON the medications listed below when I get home from the hospital:    acetaminophen 325 mg oral tablet  -- 2 tab(s) by mouth every 6 hours, As needed, Moderate Pain (4 - 6)  -- Indication: For Pain    losartan 100 mg oral tablet  -- 1 tab(s) by mouth once a day  -- Indication: For Essential hypertension    heparin  -- 5000 unit(s) subcutaneous every 8 hours  -- Indication: For DVT prophylaxis    insulin lispro 100 units/mL subcutaneous solution  --  subcutaneous 3 times a day (before meals); 2 Unit(s) if Glucose 151 - 200  4 Unit(s) if Glucose 201 - 250  6 Unit(s) if Glucose 251 - 300  8 Unit(s) if Glucose 301 - 350  10 Unit(s) if Glucose 351 - 400  12 Unit(s) if Glucose GREATER THAN 400  -- Indication: For Diabetic polyneuropathy associated with type 2 diabetes mellitus    insulin lispro 100 units/mL subcutaneous solution  --  subcutaneous once a day (at bedtime); 2 Unit(s) if Glucose 251 - 300  4 Unit(s) if Glucose 301 - 350  6 Unit(s) if Glucose 351 - 400  8 Unit(s) if Glucose GRAETER THAN 400  -- Indication: For Diabetic polyneuropathy associated with type 2 diabetes mellitus    insulin lispro 100 units/mL subcutaneous solution  -- 16 unit(s) subcutaneous 3 times a day (before meals)  -- adjust dose as per finger stick-  follow up by MD at rehab  -- Indication: For Diabetic polyneuropathy associated with type 2 diabetes mellitus    insulin glargine  -- 48 unit(s) subcutaneous once a day (at bedtime)  -- adjust dose as per finger stick  follow up by MD at rehab  -- Indication: For Diabetic polyneuropathy associated with type 2 diabetes mellitus    Lipitor 10 mg oral tablet  -- 1 tab(s) by mouth once a day (at bedtime)  -- Indication: For lipid lowering    benzonatate 100 mg oral capsule  -- 1 cap(s) by mouth 3 times a day, As needed, Cough  -- Indication: For Cough    labetalol 200 mg oral tablet  -- 1 tab(s) by mouth once a day (at bedtime)  -- Indication: For Essential hypertension    albuterol-ipratropium 2.5 mg-0.5 mg/3 mL inhalation solution  -- 3 milliliter(s) inhaled every 6 hours  -- Indication: For Bronchodilator    ceFAZolin  -- 2 gram(s) injectable  IVSS every 8 hours  -- through 5/8/17  -- Indication: For Bacteremia due to Gram-positive bacteria    ferrous sulfate 325 mg (65 mg elemental iron) oral tablet  -- 1 tab(s) by mouth once a day  -- Indication: For iron supplement    senna oral tablet  -- 2 tab(s) by mouth once a day (at bedtime)  -- Indication: For laxative    docusate sodium 100 mg oral capsule  -- 1 cap(s) by mouth 2 times a day  -- Indication: For stool softner    polyethylene glycol 3350 oral powder for reconstitution  -- 17 gram(s) by mouth once a day  -- Indication: For laxative    timolol hemihydrate 0.5% ophthalmic solution  -- 1 drop(s) to each affected eye once a day  -- Indication: For Eye drops-glaucoma    lactobacillus acidophilus oral capsule  -- 1 cap(s) by mouth 2 times a day  -- Indication: For Probiotic I will START or STAY ON the medications listed below when I get home from the hospital:    acetaminophen 325 mg oral tablet  -- 2 tab(s) by mouth every 6 hours, As needed, Moderate Pain (4 - 6)  -- Indication: For Pain    losartan 100 mg oral tablet  -- 1 tab(s) by mouth once a day  -- Indication: For Essential hypertension    heparin  -- 5000 unit(s) subcutaneous every 8 hours  -- Indication: For DVT prophylaxis    insulin lispro 100 units/mL subcutaneous solution  --  subcutaneous 3 times a day (before meals); 2 Unit(s) if Glucose 151 - 200  4 Unit(s) if Glucose 201 - 250  6 Unit(s) if Glucose 251 - 300  8 Unit(s) if Glucose 301 - 350  10 Unit(s) if Glucose 351 - 400  12 Unit(s) if Glucose GREATER THAN 400  -- Indication: For Diabetic polyneuropathy associated with type 2 diabetes mellitus    insulin lispro 100 units/mL subcutaneous solution  --  subcutaneous once a day (at bedtime); 2 Unit(s) if Glucose 251 - 300  4 Unit(s) if Glucose 301 - 350  6 Unit(s) if Glucose 351 - 400  8 Unit(s) if Glucose GRAETER THAN 400  -- Indication: For Diabetic polyneuropathy associated with type 2 diabetes mellitus    insulin lispro 100 units/mL subcutaneous solution  -- 16 unit(s) subcutaneous 3 times a day (before meals)  -- adjust dose as per finger stick-  follow up by MD at rehab  -- Indication: For Diabetic polyneuropathy associated with type 2 diabetes mellitus    insulin glargine  -- 48 unit(s) subcutaneous once a day (at bedtime)  -- adjust dose as per finger stick  follow up by MD at rehab  -- Indication: For Diabetic polyneuropathy associated with type 2 diabetes mellitus    Lipitor 10 mg oral tablet  -- 1 tab(s) by mouth once a day (at bedtime)  -- Indication: For lipid lowering    benzonatate 100 mg oral capsule  -- 1 cap(s) by mouth 3 times a day, As needed, Cough  -- Indication: For Cough    labetalol 200 mg oral tablet  -- 1 tab(s) by mouth 2 times a day  -- every 12 hours  -- Indication: For Essential hypertension    albuterol-ipratropium 2.5 mg-0.5 mg/3 mL inhalation solution  -- 3 milliliter(s) inhaled every 6 hours  -- Indication: For Bronchodilator    ceFAZolin  -- 2 gram(s) injectable  IVSS every 8 hours  -- through 5/8/17  -- Indication: For Bacteremia due to Gram-positive bacteria    ferrous sulfate 325 mg (65 mg elemental iron) oral tablet  -- 1 tab(s) by mouth once a day  -- Indication: For iron supplement    senna oral tablet  -- 2 tab(s) by mouth once a day (at bedtime)  -- Indication: For laxative    docusate sodium 100 mg oral capsule  -- 1 cap(s) by mouth 2 times a day  -- Indication: For stool softner    polyethylene glycol 3350 oral powder for reconstitution  -- 17 gram(s) by mouth once a day  -- Indication: For laxative    timolol hemihydrate 0.5% ophthalmic solution  -- 1 drop(s) to each affected eye once a day  -- Indication: For Eye drops-glaucoma    lactobacillus acidophilus oral capsule  -- 1 cap(s) by mouth 2 times a day  -- Indication: For Probiotic

## 2017-03-28 NOTE — PHYSICAL THERAPY INITIAL EVALUATION ADULT - GAIT DEVIATIONS NOTED, PT EVAL
anterior trunk lean, unsteady/decreased stride length/decreased gianna/decreased velocity of limb motion/decreased step length/increased time in double stance

## 2017-03-28 NOTE — PROVIDER CONTACT NOTE (CRITICAL VALUE NOTIFICATION) - RECOMMENDATIONS
SAMUEL machuca. Ordered to continue regimen of abx zosyn & vanco. Will f/u w/ ID. Will Continue to monitor.

## 2017-03-28 NOTE — PHYSICAL THERAPY INITIAL EVALUATION ADULT - PERTINENT HX OF CURRENT PROBLEM, REHAB EVAL
76yoF, pmhx below, presents after two falls and weakness. Pt reporting attempting to negotiate 2 steps at home, was unable to lift leg high enough, then pt slipped at fell backwards (both falls). As per pt, nephew noticed wound on R foot, which pt was not aware of. RLE US (-) for DVT. CXR (-). xray R foot Questionable cortical irregularity involving the distal phalanx of the second digit with soft tissue swelling. Correlate with site of ulcer/infection. 76yoF, pmhx below, presents after two falls and weakness. Pt reporting attempting to negotiate 2 steps at home, was unable to lift leg high enough, then pt slipped and fell backwards (both falls). As per pt, nephew noticed wound on R foot, which pt was not aware of. RLE US (-) for DVT. CXR (-). xray R foot Questionable cortical irregularity involving the distal phalanx of the second digit with soft tissue swelling. Correlate with site of ulcer/infection.

## 2017-03-28 NOTE — DISCHARGE NOTE ADULT - PATIENT PORTAL LINK FT
“You can access the Follow[facility] Patient Portal, offered by [facility], by registering with the following website: [email or web address]”

## 2017-03-28 NOTE — DISCHARGE NOTE ADULT - SECONDARY DIAGNOSIS.
Type 2 diabetes mellitus with other skin complication Essential hypertension PVD (peripheral vascular disease) Bacteremia due to Gram-positive bacteria Adrenal nodule

## 2017-03-28 NOTE — DISCHARGE NOTE ADULT - PROVIDER TOKENS
TOKEN:'[id number]',TOKEN:'[id number]',FREE:[LAST:[podiatry/],PHONE:[(   )    -],FAX:[(   )    -],ADDRESS:[[phone]]] TOKEN:'[id number]:'[id number]',FREE:[LAST:[podiatry/],PHONE:[(   )    -],FAX:[(   )    -],ADDRESS:[[phone]]],FREE:[LAST:[Pulmonary Clinic],PHONE:[phone]],FAX:[(   )    -]]

## 2017-03-28 NOTE — DISCHARGE NOTE ADULT - HOSPITAL COURSE
75 y/o Woman w/ PMHx of T2DM w/ neuropathy, HTN, HLD, morbid obesity, who p/w fall and weakness, and was admitted for Sepsis duet to R foot diabetic food infection. Started on Vanco and Zosyn.     Diabetic R Foot Ulcer infection  - Afebrile throughout admission, Leukocytosis resolved   - Started on Abx - Vanco/Zosyn  - Foot abscess drained and growing Corynebacterium  - R Foot Xray; IMPRESSION: Questionable cortical irregularity involving the distal phalanx of the second digit with soft tissue swelling, per radiologist.   - Seen by House ID  - Seen by podiatry, planned for tenotomy inpatient  - MRI unable to complete d/t R ankle hardware for h/o ankle Fx; CT R foot w/ and w/o Ctx ordered     GPC Bacteremia  - Maintained on Vanco IV, repeat blood cultures  - TTE ordered  - ID on board    PVD  - MUSHTAQ studies done b/l  - RLE venous duplex NEG for DVT     HTN   - resume ACEi if renal function at baseline     Worsened Normocytic Anemia - likely mixed etiologiy   - Hgb drop from 10.2 --> 8.2 on 3/28, STAT CBC and iron studies ordered 77 y/o Woman w/ PMHx of T2DM w/ neuropathy, HTN, HLD, morbid obesity, who p/w fall and weakness, and was admitted for Sepsis duet to R foot diabetic food infection.     Sepsis due to Diabetic R Foot Ulcer infection with suspicion for underlying OM  - Afebrile throughout admission, Leukocytosis resolved   - Started on Abx - Zosyn  - Foot abscess drained and growing Corynebacterium  - Blood cultures positive for MSSA  - R Foot Xray; IMPRESSION: Questionable cortical irregularity involving the distal phalanx of the second digit with soft tissue swelling, per radiologist.   - Seen by podiatry, s/p right 2-5 digit tenotomy  - MRI unable to complete d/t R ankle hardware for h/o ankle Fx; CT R foot w/ and w/o with low suspicion for underlying osteo  - To complete IV ancef till 5/8    GPC Bacteremia  - Maintained on Vanco IV, repeat blood cultures growing mssa  - TTE negative for vegetations     PVD  - MUSHTAQ studies done b/l negative  - RLE venous duplex NEG for DVT     HTN   - BP uncontrolled, optimized on regimen of norvasc and labetalol prior to discharge     Acute hypoxemic respiratory failure likely due to obstructive sleep apnea/ obesity hypoventilation syndrome  - CTA negative for PE  - LE dopplers negative for DVT  - RVP negative  - Imaging negative for pneumonia  - Encourage incentive spirometry  - Oxygenation stable off supplementation   - Will need follow up with pulmonary for outpt sleep studies, PFT's    DM2 Uncontrolled FS  - Endocrine consulted, fs optimized prior to discharge   - Pt with evidence of adrenal nodule on imaging as well, to follow up with endocrine as outpt for pending workup     Stable for dc to [facility]

## 2017-03-28 NOTE — DISCHARGE NOTE ADULT - CARE PLAN
Principal Discharge DX:	Cellulitis of right lower extremity  Secondary Diagnosis:	Type 2 diabetes mellitus with other skin complication  Secondary Diagnosis:	Essential hypertension  Secondary Diagnosis:	PVD (peripheral vascular disease)  Secondary Diagnosis:	Bacteremia due to Gram-positive bacteria Principal Discharge DX:	Cellulitis of right lower extremity  Goal:	resolution of infection  Instructions for follow-up, activity and diet:	wound care: no dressing required. apply socks bilaterally and ambulate in surgical shoes  abx: finish course of abx  weight bear: in surgical shoes as tolerated  follow up; f/u with Dr. Moura/Sonu at wound care center within 1 wk of discharge. Call [phone] to make an appointment.  Secondary Diagnosis:	Type 2 diabetes mellitus with other skin complication  Secondary Diagnosis:	Essential hypertension  Secondary Diagnosis:	PVD (peripheral vascular disease)  Secondary Diagnosis:	Bacteremia due to Gram-positive bacteria Principal Discharge DX:	Cellulitis of right lower extremity  Goal:	resolution of infection  Instructions for follow-up, activity and diet:	wound care: no dressing required. apply socks bilaterally and ambulate in surgical shoes  abx: finish course of abx  weight bear: in surgical shoes as tolerated  follow up; f/u with Dr. Moura/Sonu at wound care center within 1 wk of discharge. Call [phone] to make an appointment.  Secondary Diagnosis:	Type 2 diabetes mellitus with other skin complication  Instructions for follow-up, activity and diet:	HgA1C this admission.8.7  Make sure you get your HgA1c checked every three months..  If you take insulin, check your blood glucose before meals and at bedtime.  It's important not to skip any meals.  Keep a log of your blood glucose results and always take it with you to your doctor appointments.  Keep a list of your current medications including injectables and over the counter medications and bring this medication list with you to all your doctor appointments.  If you have not seen your opthalmologist this year call for appointment.  Check your feet daily for redness, sores, or openings. Do not self treat. If no improvement in two days call your primary care physician for an appointment.  Low blood sugar (hypoglycemia) is a blood sugar below 70mg/dl. Check your blood sugar if you feel signs/symptoms of hypoglycemia. If your blood sugar is below 70 take 15 grams of carbohydrates (ex 4 oz of apple juice, 3-4 glucosr tablets, or 4-6 oz of regular soda) wait 15 minutes and repeat blood sugar to make sure it comes up above 70.  If your blood sugar is above 70 and you are due for a meal, have a meal.  If you are not due for a meal have a snack.  This snack helps keeps your blood sugar at a safe range.  Secondary Diagnosis:	Essential hypertension  Instructions for follow-up, activity and diet:	continue blood pressure medication  follow up BP  Secondary Diagnosis:	Bacteremia due to Gram-positive bacteria  Instructions for follow-up, activity and diet:	MSSA bacteremia  continue antibiotic Ancef as prescribed through 5/8/17  follow up with infectious disease as outpatient Principal Discharge DX:	Cellulitis of right lower extremity  Goal:	resolution of infection  Instructions for follow-up, activity and diet:	wound care: no dressing required. apply socks bilaterally and ambulate in surgical shoes  abx: finish course of abx  weight bear: in surgical shoes as tolerated  follow up; f/u with Dr. Moura/Sonu at wound care center within 1 wk of discharge. Call [phone] to make an appointment.  Secondary Diagnosis:	Type 2 diabetes mellitus with other skin complication  Instructions for follow-up, activity and diet:	HgA1C this admission.8.7  Make sure you get your HgA1c checked every three months..  If you take insulin, check your blood glucose before meals and at bedtime.  It's important not to skip any meals.  Keep a log of your blood glucose results and always take it with you to your doctor appointments.  Keep a list of your current medications including injectables and over the counter medications and bring this medication list with you to all your doctor appointments.  If you have not seen your opthalmologist this year call for appointment.  Check your feet daily for redness, sores, or openings. Do not self treat. If no improvement in two days call your primary care physician for an appointment.  Low blood sugar (hypoglycemia) is a blood sugar below 70mg/dl. Check your blood sugar if you feel signs/symptoms of hypoglycemia. If your blood sugar is below 70 take 15 grams of carbohydrates (ex 4 oz of apple juice, 3-4 glucosr tablets, or 4-6 oz of regular soda) wait 15 minutes and repeat blood sugar to make sure it comes up above 70.  If your blood sugar is above 70 and you are due for a meal, have a meal.  If you are not due for a meal have a snack.  This snack helps keeps your blood sugar at a safe range.  Secondary Diagnosis:	Essential hypertension  Instructions for follow-up, activity and diet:	continue blood pressure medication  follow up BP  Secondary Diagnosis:	Bacteremia due to Gram-positive bacteria  Instructions for follow-up, activity and diet:	MSSA bacteremia  continue antibiotic Ancef as prescribed through 5/8/17  follow up with infectious disease as outpatient  Secondary Diagnosis:	Adrenal nodule  Instructions for follow-up, activity and diet:	left adrenal nodule  seen by endocrinology  follow up blood work/endocrinologist Principal Discharge DX:	Cellulitis of right lower extremity  Goal:	resolution of infection  Instructions for follow-up, activity and diet:	wound care: no dressing required. apply socks bilaterally and ambulate in surgical shoes  abx: finish course of abx  weight bear: in surgical shoes as tolerated  follow up; f/u with Dr. Moura/Sonu at wound care center within 1 wk of discharge. Call [phone] to make an appointment.  Secondary Diagnosis:	Type 2 diabetes mellitus with other skin complication  Instructions for follow-up, activity and diet:	HgA1C this admission.8.7  Make sure you get your HgA1c checked every three months..  If you take insulin, check your blood glucose before meals and at bedtime.  It's important not to skip any meals.  Keep a log of your blood glucose results and always take it with you to your doctor appointments.  Keep a list of your current medications including injectables and over the counter medications and bring this medication list with you to all your doctor appointments.  If you have not seen your opthalmologist this year call for appointment.  Check your feet daily for redness, sores, or openings. Do not self treat. If no improvement in two days call your primary care physician for an appointment.  Low blood sugar (hypoglycemia) is a blood sugar below 70mg/dl. Check your blood sugar if you feel signs/symptoms of hypoglycemia. If your blood sugar is below 70 take 15 grams of carbohydrates (ex 4 oz of apple juice, 3-4 glucosr tablets, or 4-6 oz of regular soda) wait 15 minutes and repeat blood sugar to make sure it comes up above 70.  If your blood sugar is above 70 and you are due for a meal, have a meal.  If you are not due for a meal have a snack.  This snack helps keeps your blood sugar at a safe range.  Secondary Diagnosis:	Essential hypertension  Instructions for follow-up, activity and diet:	continue blood pressure medication  follow up BP  Secondary Diagnosis:	Bacteremia due to Gram-positive bacteria  Instructions for follow-up, activity and diet:	MSSA bacteremia  continue antibiotic Ancef as prescribed through 5/8/17  follow up with infectious disease as outpatient  Secondary Diagnosis:	Adrenal nodule  Instructions for follow-up, activity and diet:	left adrenal nodule  seen by endocrinology  follow up blood work/endocrinologist Principal Discharge DX:	Cellulitis of right lower extremity  Goal:	resolution of infection  Instructions for follow-up, activity and diet:	wound care: no dressing required. apply socks bilaterally and ambulate in surgical shoes  abx: finish course of abx  weight bear: in surgical shoes as tolerated  follow up; f/u with Dr. Moura/Sonu at wound care center within 1 wk of discharge. Call [phone] to make an appointment.  Secondary Diagnosis:	Type 2 diabetes mellitus with other skin complication  Instructions for follow-up, activity and diet:	HgA1C this admission.8.7  Make sure you get your HgA1c checked every three months..  If you take insulin, check your blood glucose before meals and at bedtime.  It's important not to skip any meals.  Keep a log of your blood glucose results and always take it with you to your doctor appointments.  Keep a list of your current medications including injectables and over the counter medications and bring this medication list with you to all your doctor appointments.  If you have not seen your opthalmologist this year call for appointment.  Check your feet daily for redness, sores, or openings. Do not self treat. If no improvement in two days call your primary care physician for an appointment.  Low blood sugar (hypoglycemia) is a blood sugar below 70mg/dl. Check your blood sugar if you feel signs/symptoms of hypoglycemia. If your blood sugar is below 70 take 15 grams of carbohydrates (ex 4 oz of apple juice, 3-4 glucosr tablets, or 4-6 oz of regular soda) wait 15 minutes and repeat blood sugar to make sure it comes up above 70.  If your blood sugar is above 70 and you are due for a meal, have a meal.  If you are not due for a meal have a snack.  This snack helps keeps your blood sugar at a safe range.  Secondary Diagnosis:	Essential hypertension  Instructions for follow-up, activity and diet:	continue blood pressure medication  follow up BP  Secondary Diagnosis:	Bacteremia due to Gram-positive bacteria  Instructions for follow-up, activity and diet:	MSSA bacteremia  continue antibiotic Ancef as prescribed through 5/8/17  follow up with infectious disease as outpatient  Secondary Diagnosis:	Adrenal nodule  Instructions for follow-up, activity and diet:	left adrenal nodule  seen by endocrinology  follow up blood work/endocrinologist Principal Discharge DX:	Cellulitis of right lower extremity  Goal:	resolution of infection  Instructions for follow-up, activity and diet:	wound care: no dressing required. apply socks bilaterally and ambulate in surgical shoes  abx: finish course of abx  weight bear: in surgical shoes as tolerated  follow up; f/u with Dr. Moura/Sonu at wound care center within 1 wk of discharge. Call [phone] to make an appointment.  Secondary Diagnosis:	Type 2 diabetes mellitus with other skin complication  Instructions for follow-up, activity and diet:	HgA1C this admission.8.7  Make sure you get your HgA1c checked every three months..  If you take insulin, check your blood glucose before meals and at bedtime.  It's important not to skip any meals.  Keep a log of your blood glucose results and always take it with you to your doctor appointments.  Keep a list of your current medications including injectables and over the counter medications and bring this medication list with you to all your doctor appointments.  If you have not seen your opthalmologist this year call for appointment.  Check your feet daily for redness, sores, or openings. Do not self treat. If no improvement in two days call your primary care physician for an appointment.  Low blood sugar (hypoglycemia) is a blood sugar below 70mg/dl. Check your blood sugar if you feel signs/symptoms of hypoglycemia. If your blood sugar is below 70 take 15 grams of carbohydrates (ex 4 oz of apple juice, 3-4 glucosr tablets, or 4-6 oz of regular soda) wait 15 minutes and repeat blood sugar to make sure it comes up above 70.  If your blood sugar is above 70 and you are due for a meal, have a meal.  If you are not due for a meal have a snack.  This snack helps keeps your blood sugar at a safe range.  Secondary Diagnosis:	Essential hypertension  Instructions for follow-up, activity and diet:	continue blood pressure medication  follow up BP  Secondary Diagnosis:	Bacteremia due to Gram-positive bacteria  Instructions for follow-up, activity and diet:	MSSA bacteremia  continue antibiotic Ancef as prescribed through 5/8/17  follow up with infectious disease as outpatient  Secondary Diagnosis:	Adrenal nodule  Instructions for follow-up, activity and diet:	left adrenal nodule  seen by endocrinology  follow up blood work/endocrinologist Principal Discharge DX:	Cellulitis of right lower extremity  Goal:	resolution of infection  Instructions for follow-up, activity and diet:	wound care: no dressing required. apply socks bilaterally and ambulate in surgical shoes  abx: finish course of abx  weight bear: in surgical shoes as tolerated  follow up; f/u with Dr. Moura/Sonu at wound care center within 1 wk of discharge. Call [phone] to make an appointment.  Secondary Diagnosis:	Type 2 diabetes mellitus with other skin complication  Instructions for follow-up, activity and diet:	HgA1C this admission.8.7  Make sure you get your HgA1c checked every three months..  If you take insulin, check your blood glucose before meals and at bedtime.  It's important not to skip any meals.  Keep a log of your blood glucose results and always take it with you to your doctor appointments.  Keep a list of your current medications including injectables and over the counter medications and bring this medication list with you to all your doctor appointments.  If you have not seen your opthalmologist this year call for appointment.  Check your feet daily for redness, sores, or openings. Do not self treat. If no improvement in two days call your primary care physician for an appointment.  Low blood sugar (hypoglycemia) is a blood sugar below 70mg/dl. Check your blood sugar if you feel signs/symptoms of hypoglycemia. If your blood sugar is below 70 take 15 grams of carbohydrates (ex 4 oz of apple juice, 3-4 glucosr tablets, or 4-6 oz of regular soda) wait 15 minutes and repeat blood sugar to make sure it comes up above 70.  If your blood sugar is above 70 and you are due for a meal, have a meal.  If you are not due for a meal have a snack.  This snack helps keeps your blood sugar at a safe range.  Secondary Diagnosis:	Essential hypertension  Instructions for follow-up, activity and diet:	continue blood pressure medication  follow up BP  Secondary Diagnosis:	Bacteremia due to Gram-positive bacteria  Instructions for follow-up, activity and diet:	MSSA bacteremia  continue antibiotic Ancef as prescribed through 5/8/17  follow up with infectious disease as outpatient  Secondary Diagnosis:	Adrenal nodule  Instructions for follow-up, activity and diet:	left adrenal nodule  seen by endocrinology  follow up blood work/endocrinologist

## 2017-03-28 NOTE — DISCHARGE NOTE ADULT - CARE PROVIDER_API CALL
Bisi Rosas), Infectious Disease; Internal Medicine  [address] [place] [address]  Phone: [phone]  Fax: [phone]    Christopher Daniels (), Internal Medicine  [address] [place] [address]  Phone: [phone]  Fax: [phone]    podiatry/,   [phone]  Phone: (   )    -  Fax: (   )    - Bisi Rosas), Infectious Disease; Internal Medicine  [address] [place] [address]  Phone: [phone]  Fax: [phone]    Christopher Daniels (), Internal Medicine  [address] [place] [address]  Phone: [phone]  Fax: [phone]    podiatry/,   [phone]  Phone: (   )    -  Fax: (   )    -    [facility],   Phone: [phone]  Fax: (   )    -

## 2017-03-28 NOTE — DISCHARGE NOTE ADULT - PLAN OF CARE
resolution of infection wound care: no dressing required. apply socks bilaterally and ambulate in surgical shoes  abx: finish course of abx  weight bear: in surgical shoes as tolerated  follow up; f/u with Dr. Moura/Sonu at wound care center within 1 wk of discharge. Call [phone] to make an appointment. HgA1C this admission.8.7  Make sure you get your HgA1c checked every three months..  If you take insulin, check your blood glucose before meals and at bedtime.  It's important not to skip any meals.  Keep a log of your blood glucose results and always take it with you to your doctor appointments.  Keep a list of your current medications including injectables and over the counter medications and bring this medication list with you to all your doctor appointments.  If you have not seen your opthalmologist this year call for appointment.  Check your feet daily for redness, sores, or openings. Do not self treat. If no improvement in two days call your primary care physician for an appointment.  Low blood sugar (hypoglycemia) is a blood sugar below 70mg/dl. Check your blood sugar if you feel signs/symptoms of hypoglycemia. If your blood sugar is below 70 take 15 grams of carbohydrates (ex 4 oz of apple juice, 3-4 glucosr tablets, or 4-6 oz of regular soda) wait 15 minutes and repeat blood sugar to make sure it comes up above 70.  If your blood sugar is above 70 and you are due for a meal, have a meal.  If you are not due for a meal have a snack.  This snack helps keeps your blood sugar at a safe range. continue blood pressure medication  follow up BP MSSA bacteremia  continue antibiotic Ancef as prescribed through 5/8/17  follow up with infectious disease as outpatient left adrenal nodule  seen by endocrinology  follow up blood work/endocrinologist

## 2017-03-28 NOTE — DISCHARGE NOTE ADULT - REASON FOR ADMISSION
Non pressure chronic ulcer of foot, Dizziness and giddiness Non pressure chronic ulcer of foot, Dizziness and giddiness.

## 2017-03-29 LAB
24R-OH-CALCIDIOL SERPL-MCNC: 37.9 NG/ML — SIGNIFICANT CHANGE UP (ref 30–100)
ANION GAP SERPL CALC-SCNC: 17 MMOL/L — SIGNIFICANT CHANGE UP (ref 5–17)
BASOPHILS # BLD AUTO: 0.01 K/UL — SIGNIFICANT CHANGE UP (ref 0–0.2)
BASOPHILS NFR BLD AUTO: 0.1 % — SIGNIFICANT CHANGE UP (ref 0–2)
BUN SERPL-MCNC: 21 MG/DL — SIGNIFICANT CHANGE UP (ref 7–23)
CALCIUM SERPL-MCNC: 9 MG/DL — SIGNIFICANT CHANGE UP (ref 8.4–10.5)
CHLORIDE SERPL-SCNC: 99 MMOL/L — SIGNIFICANT CHANGE UP (ref 96–108)
CO2 SERPL-SCNC: 22 MMOL/L — SIGNIFICANT CHANGE UP (ref 22–31)
CREAT SERPL-MCNC: 1.22 MG/DL — SIGNIFICANT CHANGE UP (ref 0.5–1.3)
CULTURE RESULTS: SIGNIFICANT CHANGE UP
EOSINOPHIL # BLD AUTO: 0.02 K/UL — SIGNIFICANT CHANGE UP (ref 0–0.5)
EOSINOPHIL NFR BLD AUTO: 0.2 % — SIGNIFICANT CHANGE UP (ref 0–6)
GLUCOSE SERPL-MCNC: 311 MG/DL — HIGH (ref 70–99)
HCT VFR BLD CALC: 26.7 % — LOW (ref 34.5–45)
HGB BLD-MCNC: 8.4 G/DL — LOW (ref 11.5–15.5)
IMM GRANULOCYTES NFR BLD AUTO: 0 % — SIGNIFICANT CHANGE UP (ref 0–1.5)
LYMPHOCYTES # BLD AUTO: 1.24 K/UL — SIGNIFICANT CHANGE UP (ref 1–3.3)
LYMPHOCYTES # BLD AUTO: 13.7 % — SIGNIFICANT CHANGE UP (ref 13–44)
MAGNESIUM SERPL-MCNC: 1.7 MG/DL — SIGNIFICANT CHANGE UP (ref 1.6–2.6)
MCHC RBC-ENTMCNC: 25.8 PG — LOW (ref 27–34)
MCHC RBC-ENTMCNC: 31.5 GM/DL — LOW (ref 32–36)
MCV RBC AUTO: 81.9 FL — SIGNIFICANT CHANGE UP (ref 80–100)
MONOCYTES # BLD AUTO: 0.88 K/UL — SIGNIFICANT CHANGE UP (ref 0–0.9)
MONOCYTES NFR BLD AUTO: 9.7 % — SIGNIFICANT CHANGE UP (ref 2–14)
NEUTROPHILS # BLD AUTO: 6.93 K/UL — SIGNIFICANT CHANGE UP (ref 1.8–7.4)
NEUTROPHILS NFR BLD AUTO: 76.3 % — SIGNIFICANT CHANGE UP (ref 43–77)
PLATELET # BLD AUTO: 133 K/UL — LOW (ref 150–400)
POTASSIUM SERPL-MCNC: 4.1 MMOL/L — SIGNIFICANT CHANGE UP (ref 3.5–5.3)
POTASSIUM SERPL-SCNC: 4.1 MMOL/L — SIGNIFICANT CHANGE UP (ref 3.5–5.3)
RBC # BLD: 3.26 M/UL — LOW (ref 3.8–5.2)
RBC # FLD: 16.3 % — HIGH (ref 10.3–14.5)
SODIUM SERPL-SCNC: 138 MMOL/L — SIGNIFICANT CHANGE UP (ref 135–145)
SPECIMEN SOURCE: SIGNIFICANT CHANGE UP
VANCOMYCIN TROUGH SERPL-MCNC: 13.4 UG/ML — SIGNIFICANT CHANGE UP (ref 10–20)
WBC # BLD: 9.08 K/UL — SIGNIFICANT CHANGE UP (ref 3.8–10.5)
WBC # FLD AUTO: 9.08 K/UL — SIGNIFICANT CHANGE UP (ref 3.8–10.5)

## 2017-03-29 PROCEDURE — 93306 TTE W/DOPPLER COMPLETE: CPT | Mod: 26

## 2017-03-29 PROCEDURE — 71275 CT ANGIOGRAPHY CHEST: CPT | Mod: 26

## 2017-03-29 PROCEDURE — 99233 SBSQ HOSP IP/OBS HIGH 50: CPT | Mod: GC

## 2017-03-29 PROCEDURE — 99232 SBSQ HOSP IP/OBS MODERATE 35: CPT

## 2017-03-29 RX ORDER — IPRATROPIUM/ALBUTEROL SULFATE 18-103MCG
3 AEROSOL WITH ADAPTER (GRAM) INHALATION EVERY 6 HOURS
Qty: 0 | Refills: 0 | Status: DISCONTINUED | OUTPATIENT
Start: 2017-03-29 | End: 2017-04-03

## 2017-03-29 RX ORDER — INSULIN GLARGINE 100 [IU]/ML
25 INJECTION, SOLUTION SUBCUTANEOUS AT BEDTIME
Qty: 0 | Refills: 0 | Status: DISCONTINUED | OUTPATIENT
Start: 2017-03-29 | End: 2017-03-30

## 2017-03-29 RX ORDER — FERROUS SULFATE 325(65) MG
325 TABLET ORAL DAILY
Qty: 0 | Refills: 0 | Status: DISCONTINUED | OUTPATIENT
Start: 2017-03-29 | End: 2017-04-03

## 2017-03-29 RX ORDER — INSULIN LISPRO 100/ML
10 VIAL (ML) SUBCUTANEOUS
Qty: 0 | Refills: 0 | Status: DISCONTINUED | OUTPATIENT
Start: 2017-03-29 | End: 2017-03-30

## 2017-03-29 RX ADMIN — Medication 250 MILLIGRAM(S): at 22:41

## 2017-03-29 RX ADMIN — LOSARTAN POTASSIUM 100 MILLIGRAM(S): 100 TABLET, FILM COATED ORAL at 05:02

## 2017-03-29 RX ADMIN — Medication 1: at 21:57

## 2017-03-29 RX ADMIN — Medication 250 MILLIGRAM(S): at 12:25

## 2017-03-29 RX ADMIN — Medication 4: at 09:01

## 2017-03-29 RX ADMIN — Medication 3 MILLILITER(S): at 23:02

## 2017-03-29 RX ADMIN — Medication 3 MILLILITER(S): at 18:23

## 2017-03-29 RX ADMIN — Medication 650 MILLIGRAM(S): at 19:10

## 2017-03-29 RX ADMIN — Medication 200 MILLIGRAM(S): at 21:58

## 2017-03-29 RX ADMIN — Medication 650 MILLIGRAM(S): at 18:22

## 2017-03-29 RX ADMIN — PIPERACILLIN AND TAZOBACTAM 25 GRAM(S): 4; .5 INJECTION, POWDER, LYOPHILIZED, FOR SOLUTION INTRAVENOUS at 22:41

## 2017-03-29 RX ADMIN — Medication 8 UNIT(S): at 09:00

## 2017-03-29 RX ADMIN — Medication 3: at 12:26

## 2017-03-29 RX ADMIN — INSULIN GLARGINE 25 UNIT(S): 100 INJECTION, SOLUTION SUBCUTANEOUS at 22:41

## 2017-03-29 RX ADMIN — PIPERACILLIN AND TAZOBACTAM 25 GRAM(S): 4; .5 INJECTION, POWDER, LYOPHILIZED, FOR SOLUTION INTRAVENOUS at 05:03

## 2017-03-29 RX ADMIN — HEPARIN SODIUM 5000 UNIT(S): 5000 INJECTION INTRAVENOUS; SUBCUTANEOUS at 21:57

## 2017-03-29 RX ADMIN — HEPARIN SODIUM 5000 UNIT(S): 5000 INJECTION INTRAVENOUS; SUBCUTANEOUS at 14:42

## 2017-03-29 RX ADMIN — Medication 250 MILLIGRAM(S): at 00:00

## 2017-03-29 RX ADMIN — Medication 10 UNIT(S): at 18:21

## 2017-03-29 RX ADMIN — Medication 8 UNIT(S): at 12:26

## 2017-03-29 RX ADMIN — PIPERACILLIN AND TAZOBACTAM 25 GRAM(S): 4; .5 INJECTION, POWDER, LYOPHILIZED, FOR SOLUTION INTRAVENOUS at 14:42

## 2017-03-29 RX ADMIN — HEPARIN SODIUM 5000 UNIT(S): 5000 INJECTION INTRAVENOUS; SUBCUTANEOUS at 05:02

## 2017-03-29 RX ADMIN — Medication 100 MILLIGRAM(S): at 05:03

## 2017-03-29 RX ADMIN — Medication 325 MILLIGRAM(S): at 18:23

## 2017-03-29 RX ADMIN — Medication 3: at 18:21

## 2017-03-29 RX ADMIN — ATORVASTATIN CALCIUM 10 MILLIGRAM(S): 80 TABLET, FILM COATED ORAL at 21:57

## 2017-03-29 RX ADMIN — Medication 1 DROP(S): at 12:18

## 2017-03-30 LAB
-  AMPICILLIN/SULBACTAM: SIGNIFICANT CHANGE UP
-  CEFAZOLIN: SIGNIFICANT CHANGE UP
-  CIPROFLOXACIN: SIGNIFICANT CHANGE UP
-  CLINDAMYCIN: SIGNIFICANT CHANGE UP
-  ERYTHROMYCIN: SIGNIFICANT CHANGE UP
-  GENTAMICIN: SIGNIFICANT CHANGE UP
-  LEVOFLOXACIN: SIGNIFICANT CHANGE UP
-  MOXIFLOXACIN(AEROBIC): SIGNIFICANT CHANGE UP
-  OXACILLIN: SIGNIFICANT CHANGE UP
-  PENICILLIN: SIGNIFICANT CHANGE UP
-  RIFAMPIN: SIGNIFICANT CHANGE UP
-  TETRACYCLINE: SIGNIFICANT CHANGE UP
-  TRIMETHOPRIM/SULFAMETHOXAZOLE: SIGNIFICANT CHANGE UP
-  VANCOMYCIN: SIGNIFICANT CHANGE UP
ANION GAP SERPL CALC-SCNC: 18 MMOL/L — HIGH (ref 5–17)
BASOPHILS # BLD AUTO: 0 K/UL — SIGNIFICANT CHANGE UP (ref 0–0.2)
BASOPHILS NFR BLD AUTO: 0 % — SIGNIFICANT CHANGE UP (ref 0–2)
BUN SERPL-MCNC: 20 MG/DL — SIGNIFICANT CHANGE UP (ref 7–23)
CALCIUM SERPL-MCNC: 9.1 MG/DL — SIGNIFICANT CHANGE UP (ref 8.4–10.5)
CHLORIDE SERPL-SCNC: 97 MMOL/L — SIGNIFICANT CHANGE UP (ref 96–108)
CO2 SERPL-SCNC: 22 MMOL/L — SIGNIFICANT CHANGE UP (ref 22–31)
CREAT SERPL-MCNC: 1.25 MG/DL — SIGNIFICANT CHANGE UP (ref 0.5–1.3)
CULTURE RESULTS: SIGNIFICANT CHANGE UP
EOSINOPHIL # BLD AUTO: 0.08 K/UL — SIGNIFICANT CHANGE UP (ref 0–0.5)
EOSINOPHIL NFR BLD AUTO: 0.8 % — SIGNIFICANT CHANGE UP (ref 0–6)
GLUCOSE SERPL-MCNC: 245 MG/DL — HIGH (ref 70–99)
HCT VFR BLD CALC: 27.5 % — LOW (ref 34.5–45)
HGB BLD-MCNC: 8.6 G/DL — LOW (ref 11.5–15.5)
LYMPHOCYTES # BLD AUTO: 1.48 K/UL — SIGNIFICANT CHANGE UP (ref 1–3.3)
LYMPHOCYTES # BLD AUTO: 14.9 % — SIGNIFICANT CHANGE UP (ref 13–44)
MCHC RBC-ENTMCNC: 25.9 PG — LOW (ref 27–34)
MCHC RBC-ENTMCNC: 31.3 GM/DL — LOW (ref 32–36)
MCV RBC AUTO: 82.8 FL — SIGNIFICANT CHANGE UP (ref 80–100)
METHOD TYPE: SIGNIFICANT CHANGE UP
MONOCYTES # BLD AUTO: 0.66 K/UL — SIGNIFICANT CHANGE UP (ref 0–0.9)
MONOCYTES NFR BLD AUTO: 6.6 % — SIGNIFICANT CHANGE UP (ref 2–14)
NEUTROPHILS # BLD AUTO: 7.74 K/UL — HIGH (ref 1.8–7.4)
NEUTROPHILS NFR BLD AUTO: 76.9 % — SIGNIFICANT CHANGE UP (ref 43–77)
ORGANISM # SPEC MICROSCOPIC CNT: SIGNIFICANT CHANGE UP
ORGANISM # SPEC MICROSCOPIC CNT: SIGNIFICANT CHANGE UP
PLATELET # BLD AUTO: 183 K/UL — SIGNIFICANT CHANGE UP (ref 150–400)
POTASSIUM SERPL-MCNC: 3.7 MMOL/L — SIGNIFICANT CHANGE UP (ref 3.5–5.3)
POTASSIUM SERPL-SCNC: 3.7 MMOL/L — SIGNIFICANT CHANGE UP (ref 3.5–5.3)
RAPID RVP RESULT: SIGNIFICANT CHANGE UP
RBC # BLD: 3.32 M/UL — LOW (ref 3.8–5.2)
RBC # FLD: 16.4 % — HIGH (ref 10.3–14.5)
SODIUM SERPL-SCNC: 137 MMOL/L — SIGNIFICANT CHANGE UP (ref 135–145)
SPECIMEN SOURCE: SIGNIFICANT CHANGE UP
VANCOMYCIN TROUGH SERPL-MCNC: 20 UG/ML — SIGNIFICANT CHANGE UP (ref 10–20)
WBC # BLD: 9.96 K/UL — SIGNIFICANT CHANGE UP (ref 3.8–10.5)
WBC # FLD AUTO: 9.96 K/UL — SIGNIFICANT CHANGE UP (ref 3.8–10.5)

## 2017-03-30 PROCEDURE — 99231 SBSQ HOSP IP/OBS SF/LOW 25: CPT

## 2017-03-30 PROCEDURE — 99232 SBSQ HOSP IP/OBS MODERATE 35: CPT

## 2017-03-30 PROCEDURE — 99233 SBSQ HOSP IP/OBS HIGH 50: CPT | Mod: GC

## 2017-03-30 RX ORDER — INSULIN GLARGINE 100 [IU]/ML
28 INJECTION, SOLUTION SUBCUTANEOUS AT BEDTIME
Qty: 0 | Refills: 0 | Status: DISCONTINUED | OUTPATIENT
Start: 2017-03-30 | End: 2017-03-31

## 2017-03-30 RX ORDER — INSULIN LISPRO 100/ML
12 VIAL (ML) SUBCUTANEOUS
Qty: 0 | Refills: 0 | Status: DISCONTINUED | OUTPATIENT
Start: 2017-03-30 | End: 2017-03-31

## 2017-03-30 RX ORDER — INSULIN LISPRO 100/ML
12 VIAL (ML) SUBCUTANEOUS
Qty: 0 | Refills: 0 | Status: DISCONTINUED | OUTPATIENT
Start: 2017-03-30 | End: 2017-03-30

## 2017-03-30 RX ADMIN — Medication 650 MILLIGRAM(S): at 23:38

## 2017-03-30 RX ADMIN — Medication 325 MILLIGRAM(S): at 11:58

## 2017-03-30 RX ADMIN — Medication 3 MILLILITER(S): at 23:36

## 2017-03-30 RX ADMIN — Medication 100 MILLIGRAM(S): at 05:47

## 2017-03-30 RX ADMIN — SENNA PLUS 2 TABLET(S): 8.6 TABLET ORAL at 21:37

## 2017-03-30 RX ADMIN — PIPERACILLIN AND TAZOBACTAM 25 GRAM(S): 4; .5 INJECTION, POWDER, LYOPHILIZED, FOR SOLUTION INTRAVENOUS at 21:37

## 2017-03-30 RX ADMIN — Medication 650 MILLIGRAM(S): at 01:56

## 2017-03-30 RX ADMIN — Medication 12 UNIT(S): at 17:59

## 2017-03-30 RX ADMIN — Medication 3: at 17:59

## 2017-03-30 RX ADMIN — HEPARIN SODIUM 5000 UNIT(S): 5000 INJECTION INTRAVENOUS; SUBCUTANEOUS at 15:01

## 2017-03-30 RX ADMIN — HEPARIN SODIUM 5000 UNIT(S): 5000 INJECTION INTRAVENOUS; SUBCUTANEOUS at 05:47

## 2017-03-30 RX ADMIN — Medication 650 MILLIGRAM(S): at 05:52

## 2017-03-30 RX ADMIN — Medication 100 MILLIGRAM(S): at 18:00

## 2017-03-30 RX ADMIN — Medication 3 MILLILITER(S): at 05:47

## 2017-03-30 RX ADMIN — LOSARTAN POTASSIUM 100 MILLIGRAM(S): 100 TABLET, FILM COATED ORAL at 05:47

## 2017-03-30 RX ADMIN — Medication 650 MILLIGRAM(S): at 06:22

## 2017-03-30 RX ADMIN — Medication 650 MILLIGRAM(S): at 22:31

## 2017-03-30 RX ADMIN — HEPARIN SODIUM 5000 UNIT(S): 5000 INJECTION INTRAVENOUS; SUBCUTANEOUS at 21:37

## 2017-03-30 RX ADMIN — Medication 3: at 11:57

## 2017-03-30 RX ADMIN — INSULIN GLARGINE 28 UNIT(S): 100 INJECTION, SOLUTION SUBCUTANEOUS at 21:36

## 2017-03-30 RX ADMIN — Medication 1 DROP(S): at 11:59

## 2017-03-30 RX ADMIN — ATORVASTATIN CALCIUM 10 MILLIGRAM(S): 80 TABLET, FILM COATED ORAL at 21:36

## 2017-03-30 RX ADMIN — Medication 650 MILLIGRAM(S): at 14:00

## 2017-03-30 RX ADMIN — Medication 12 UNIT(S): at 11:57

## 2017-03-30 RX ADMIN — Medication 3 MILLILITER(S): at 18:00

## 2017-03-30 RX ADMIN — Medication 3 MILLILITER(S): at 12:01

## 2017-03-30 RX ADMIN — PIPERACILLIN AND TAZOBACTAM 25 GRAM(S): 4; .5 INJECTION, POWDER, LYOPHILIZED, FOR SOLUTION INTRAVENOUS at 05:47

## 2017-03-30 RX ADMIN — PIPERACILLIN AND TAZOBACTAM 25 GRAM(S): 4; .5 INJECTION, POWDER, LYOPHILIZED, FOR SOLUTION INTRAVENOUS at 15:01

## 2017-03-30 RX ADMIN — Medication 200 MILLIGRAM(S): at 21:36

## 2017-03-30 RX ADMIN — Medication 250 MILLIGRAM(S): at 12:01

## 2017-03-30 RX ADMIN — Medication 650 MILLIGRAM(S): at 00:40

## 2017-03-30 RX ADMIN — Medication 10 UNIT(S): at 08:58

## 2017-03-30 RX ADMIN — Medication 2: at 08:58

## 2017-03-31 LAB
INR BLD: 1.1 RATIO — SIGNIFICANT CHANGE UP (ref 0.88–1.16)
PROTHROM AB SERPL-ACNC: 11.9 SEC — SIGNIFICANT CHANGE UP (ref 9.8–12.7)

## 2017-03-31 PROCEDURE — 99232 SBSQ HOSP IP/OBS MODERATE 35: CPT

## 2017-03-31 PROCEDURE — 99223 1ST HOSP IP/OBS HIGH 75: CPT

## 2017-03-31 PROCEDURE — 99233 SBSQ HOSP IP/OBS HIGH 50: CPT | Mod: GC

## 2017-03-31 PROCEDURE — 93923 UPR/LXTR ART STDY 3+ LVLS: CPT | Mod: 26

## 2017-03-31 RX ORDER — INSULIN GLARGINE 100 [IU]/ML
42 INJECTION, SOLUTION SUBCUTANEOUS AT BEDTIME
Qty: 0 | Refills: 0 | Status: DISCONTINUED | OUTPATIENT
Start: 2017-03-31 | End: 2017-04-01

## 2017-03-31 RX ORDER — CEFAZOLIN SODIUM 1 G
VIAL (EA) INJECTION
Qty: 0 | Refills: 0 | Status: DISCONTINUED | OUTPATIENT
Start: 2017-03-31 | End: 2017-04-03

## 2017-03-31 RX ORDER — CEFAZOLIN SODIUM 1 G
2000 VIAL (EA) INJECTION ONCE
Qty: 0 | Refills: 0 | Status: COMPLETED | OUTPATIENT
Start: 2017-03-31 | End: 2017-03-31

## 2017-03-31 RX ORDER — INSULIN GLARGINE 100 [IU]/ML
33 INJECTION, SOLUTION SUBCUTANEOUS AT BEDTIME
Qty: 0 | Refills: 0 | Status: DISCONTINUED | OUTPATIENT
Start: 2017-03-31 | End: 2017-03-31

## 2017-03-31 RX ORDER — CEFAZOLIN SODIUM 1 G
2000 VIAL (EA) INJECTION EVERY 8 HOURS
Qty: 0 | Refills: 0 | Status: DISCONTINUED | OUTPATIENT
Start: 2017-03-31 | End: 2017-04-03

## 2017-03-31 RX ORDER — DEXAMETHASONE 0.5 MG/5ML
1 ELIXIR ORAL
Qty: 0 | Refills: 0 | Status: DISCONTINUED | OUTPATIENT
Start: 2017-03-31 | End: 2017-04-01

## 2017-03-31 RX ORDER — INSULIN LISPRO 100/ML
14 VIAL (ML) SUBCUTANEOUS
Qty: 0 | Refills: 0 | Status: DISCONTINUED | OUTPATIENT
Start: 2017-03-31 | End: 2017-04-01

## 2017-03-31 RX ORDER — LACTOBACILLUS ACIDOPHILUS 100MM CELL
1 CAPSULE ORAL
Qty: 0 | Refills: 0 | Status: DISCONTINUED | OUTPATIENT
Start: 2017-03-31 | End: 2017-04-03

## 2017-03-31 RX ADMIN — Medication 325 MILLIGRAM(S): at 12:39

## 2017-03-31 RX ADMIN — Medication 650 MILLIGRAM(S): at 05:15

## 2017-03-31 RX ADMIN — Medication 1 DROP(S): at 12:39

## 2017-03-31 RX ADMIN — Medication 100 MILLIGRAM(S): at 14:46

## 2017-03-31 RX ADMIN — Medication 3 MILLILITER(S): at 12:40

## 2017-03-31 RX ADMIN — Medication 3: at 12:58

## 2017-03-31 RX ADMIN — Medication 100 MILLIGRAM(S): at 21:03

## 2017-03-31 RX ADMIN — Medication 14 UNIT(S): at 12:58

## 2017-03-31 RX ADMIN — INSULIN GLARGINE 42 UNIT(S): 100 INJECTION, SOLUTION SUBCUTANEOUS at 21:52

## 2017-03-31 RX ADMIN — Medication 1 TABLET(S): at 18:47

## 2017-03-31 RX ADMIN — Medication 650 MILLIGRAM(S): at 18:48

## 2017-03-31 RX ADMIN — HEPARIN SODIUM 5000 UNIT(S): 5000 INJECTION INTRAVENOUS; SUBCUTANEOUS at 14:35

## 2017-03-31 RX ADMIN — Medication 1 MILLIGRAM(S): at 21:01

## 2017-03-31 RX ADMIN — PIPERACILLIN AND TAZOBACTAM 25 GRAM(S): 4; .5 INJECTION, POWDER, LYOPHILIZED, FOR SOLUTION INTRAVENOUS at 05:18

## 2017-03-31 RX ADMIN — Medication 3 MILLILITER(S): at 05:25

## 2017-03-31 RX ADMIN — HEPARIN SODIUM 5000 UNIT(S): 5000 INJECTION INTRAVENOUS; SUBCUTANEOUS at 21:01

## 2017-03-31 RX ADMIN — Medication 3: at 08:43

## 2017-03-31 RX ADMIN — Medication 100 MILLIGRAM(S): at 04:23

## 2017-03-31 RX ADMIN — Medication 3 MILLILITER(S): at 23:39

## 2017-03-31 RX ADMIN — SENNA PLUS 2 TABLET(S): 8.6 TABLET ORAL at 21:01

## 2017-03-31 RX ADMIN — ATORVASTATIN CALCIUM 10 MILLIGRAM(S): 80 TABLET, FILM COATED ORAL at 21:01

## 2017-03-31 RX ADMIN — Medication 100 MILLIGRAM(S): at 05:17

## 2017-03-31 RX ADMIN — Medication 12 UNIT(S): at 08:43

## 2017-03-31 RX ADMIN — Medication 14 UNIT(S): at 17:49

## 2017-03-31 RX ADMIN — Medication 100 MILLIGRAM(S): at 17:52

## 2017-03-31 RX ADMIN — Medication 650 MILLIGRAM(S): at 04:31

## 2017-03-31 RX ADMIN — Medication 100 MILLIGRAM(S): at 14:41

## 2017-03-31 RX ADMIN — Medication 250 MILLIGRAM(S): at 01:53

## 2017-03-31 RX ADMIN — Medication 4: at 17:49

## 2017-03-31 RX ADMIN — LOSARTAN POTASSIUM 100 MILLIGRAM(S): 100 TABLET, FILM COATED ORAL at 05:17

## 2017-03-31 RX ADMIN — Medication 200 MILLIGRAM(S): at 21:01

## 2017-03-31 RX ADMIN — Medication 650 MILLIGRAM(S): at 19:18

## 2017-03-31 RX ADMIN — Medication 3 MILLILITER(S): at 17:52

## 2017-03-31 RX ADMIN — HEPARIN SODIUM 5000 UNIT(S): 5000 INJECTION INTRAVENOUS; SUBCUTANEOUS at 05:17

## 2017-03-31 RX ADMIN — Medication 1: at 21:51

## 2017-04-01 LAB
ACTH SER-ACNC: 12 PG/ML — SIGNIFICANT CHANGE UP (ref 0–46)
ANION GAP SERPL CALC-SCNC: 16 MMOL/L — SIGNIFICANT CHANGE UP (ref 5–17)
BUN SERPL-MCNC: 20 MG/DL — SIGNIFICANT CHANGE UP (ref 7–23)
CALCIUM SERPL-MCNC: 9.6 MG/DL — SIGNIFICANT CHANGE UP (ref 8.4–10.5)
CHLORIDE SERPL-SCNC: 98 MMOL/L — SIGNIFICANT CHANGE UP (ref 96–108)
CO2 SERPL-SCNC: 25 MMOL/L — SIGNIFICANT CHANGE UP (ref 22–31)
CORTIS AM PEAK SERPL-MCNC: 24.6 UG/DL — HIGH (ref 6–18.4)
CREAT SERPL-MCNC: 1.3 MG/DL — SIGNIFICANT CHANGE UP (ref 0.5–1.3)
CULTURE RESULTS: SIGNIFICANT CHANGE UP
GLUCOSE SERPL-MCNC: 285 MG/DL — HIGH (ref 70–99)
HBA1C BLD-MCNC: 8.7 % — HIGH (ref 4–5.6)
HCT VFR BLD CALC: 30.1 % — LOW (ref 34.5–45)
HGB BLD-MCNC: 9.5 G/DL — LOW (ref 11.5–15.5)
MCHC RBC-ENTMCNC: 26.2 PG — LOW (ref 27–34)
MCHC RBC-ENTMCNC: 31.6 GM/DL — LOW (ref 32–36)
MCV RBC AUTO: 83.1 FL — SIGNIFICANT CHANGE UP (ref 80–100)
PLATELET # BLD AUTO: 227 K/UL — SIGNIFICANT CHANGE UP (ref 150–400)
POTASSIUM SERPL-MCNC: 3.7 MMOL/L — SIGNIFICANT CHANGE UP (ref 3.5–5.3)
POTASSIUM SERPL-SCNC: 3.7 MMOL/L — SIGNIFICANT CHANGE UP (ref 3.5–5.3)
RBC # BLD: 3.62 M/UL — LOW (ref 3.8–5.2)
RBC # FLD: 16.1 % — HIGH (ref 10.3–14.5)
SODIUM SERPL-SCNC: 139 MMOL/L — SIGNIFICANT CHANGE UP (ref 135–145)
SPECIMEN SOURCE: SIGNIFICANT CHANGE UP
WBC # BLD: 12.06 K/UL — HIGH (ref 3.8–10.5)
WBC # FLD AUTO: 12.06 K/UL — HIGH (ref 3.8–10.5)

## 2017-04-01 PROCEDURE — 99233 SBSQ HOSP IP/OBS HIGH 50: CPT

## 2017-04-01 PROCEDURE — 71010: CPT | Mod: 26

## 2017-04-01 RX ORDER — INSULIN LISPRO 100/ML
VIAL (ML) SUBCUTANEOUS AT BEDTIME
Qty: 0 | Refills: 0 | Status: DISCONTINUED | OUTPATIENT
Start: 2017-04-01 | End: 2017-04-03

## 2017-04-01 RX ORDER — INSULIN LISPRO 100/ML
16 VIAL (ML) SUBCUTANEOUS
Qty: 0 | Refills: 0 | Status: DISCONTINUED | OUTPATIENT
Start: 2017-04-01 | End: 2017-04-03

## 2017-04-01 RX ORDER — INSULIN LISPRO 100/ML
VIAL (ML) SUBCUTANEOUS
Qty: 0 | Refills: 0 | Status: DISCONTINUED | OUTPATIENT
Start: 2017-04-01 | End: 2017-04-03

## 2017-04-01 RX ORDER — INSULIN GLARGINE 100 [IU]/ML
48 INJECTION, SOLUTION SUBCUTANEOUS AT BEDTIME
Qty: 0 | Refills: 0 | Status: DISCONTINUED | OUTPATIENT
Start: 2017-04-01 | End: 2017-04-03

## 2017-04-01 RX ORDER — LABETALOL HCL 100 MG
200 TABLET ORAL EVERY 12 HOURS
Qty: 0 | Refills: 0 | Status: DISCONTINUED | OUTPATIENT
Start: 2017-04-01 | End: 2017-04-03

## 2017-04-01 RX ADMIN — Medication 3: at 09:28

## 2017-04-01 RX ADMIN — Medication 1 TABLET(S): at 09:29

## 2017-04-01 RX ADMIN — Medication 100 MILLIGRAM(S): at 23:01

## 2017-04-01 RX ADMIN — Medication 100 MILLIGRAM(S): at 14:33

## 2017-04-01 RX ADMIN — Medication 3 MILLILITER(S): at 18:11

## 2017-04-01 RX ADMIN — Medication 650 MILLIGRAM(S): at 05:50

## 2017-04-01 RX ADMIN — Medication 100 MILLIGRAM(S): at 05:45

## 2017-04-01 RX ADMIN — Medication 3: at 13:00

## 2017-04-01 RX ADMIN — SENNA PLUS 2 TABLET(S): 8.6 TABLET ORAL at 22:51

## 2017-04-01 RX ADMIN — Medication 1 DROP(S): at 14:34

## 2017-04-01 RX ADMIN — Medication 16 UNIT(S): at 18:07

## 2017-04-01 RX ADMIN — Medication 1 TABLET(S): at 18:10

## 2017-04-01 RX ADMIN — Medication 14 UNIT(S): at 09:29

## 2017-04-01 RX ADMIN — Medication 1 TABLET(S): at 13:02

## 2017-04-01 RX ADMIN — ATORVASTATIN CALCIUM 10 MILLIGRAM(S): 80 TABLET, FILM COATED ORAL at 22:51

## 2017-04-01 RX ADMIN — Medication 3 MILLILITER(S): at 05:49

## 2017-04-01 RX ADMIN — INSULIN GLARGINE 48 UNIT(S): 100 INJECTION, SOLUTION SUBCUTANEOUS at 22:51

## 2017-04-01 RX ADMIN — HEPARIN SODIUM 5000 UNIT(S): 5000 INJECTION INTRAVENOUS; SUBCUTANEOUS at 22:51

## 2017-04-01 RX ADMIN — HEPARIN SODIUM 5000 UNIT(S): 5000 INJECTION INTRAVENOUS; SUBCUTANEOUS at 05:45

## 2017-04-01 RX ADMIN — LOSARTAN POTASSIUM 100 MILLIGRAM(S): 100 TABLET, FILM COATED ORAL at 05:45

## 2017-04-01 RX ADMIN — Medication 100 MILLIGRAM(S): at 18:12

## 2017-04-01 RX ADMIN — Medication 3 MILLILITER(S): at 13:02

## 2017-04-01 RX ADMIN — Medication 14 UNIT(S): at 13:01

## 2017-04-01 RX ADMIN — Medication 4: at 18:07

## 2017-04-01 RX ADMIN — Medication 3 MILLILITER(S): at 23:04

## 2017-04-01 RX ADMIN — Medication 325 MILLIGRAM(S): at 14:34

## 2017-04-01 RX ADMIN — Medication 200 MILLIGRAM(S): at 18:12

## 2017-04-01 RX ADMIN — Medication 100 MILLIGRAM(S): at 14:35

## 2017-04-01 RX ADMIN — HEPARIN SODIUM 5000 UNIT(S): 5000 INJECTION INTRAVENOUS; SUBCUTANEOUS at 14:34

## 2017-04-01 NOTE — PROVIDER CONTACT NOTE (OTHER) - ACTION/TREATMENT ORDERED:
AM Losartan 100mg PO already given. Continue to monitor.
no orders provided. continue to monitor.
BC drawn yesterday. no new orders at this time

## 2017-04-01 NOTE — PROVIDER CONTACT NOTE (OTHER) - ASSESSMENT
Patient not in any distress. All other VSS
pt axox4.VSS. pt saturating well on 2L NC. pt has faint radial pulses. pt has doppler completed on bilateral extremities due to poor pulses in lower extremities.
A&O x4. asymptomatic

## 2017-04-02 LAB
CULTURE RESULTS: SIGNIFICANT CHANGE UP
CULTURE RESULTS: SIGNIFICANT CHANGE UP
HCT VFR BLD CALC: 27.9 % — LOW (ref 34.5–45)
HGB BLD-MCNC: 8.5 G/DL — LOW (ref 11.5–15.5)
MCHC RBC-ENTMCNC: 25.3 PG — LOW (ref 27–34)
MCHC RBC-ENTMCNC: 30.5 GM/DL — LOW (ref 32–36)
MCV RBC AUTO: 83 FL — SIGNIFICANT CHANGE UP (ref 80–100)
PLATELET # BLD AUTO: 246 K/UL — SIGNIFICANT CHANGE UP (ref 150–400)
RBC # BLD: 3.36 M/UL — LOW (ref 3.8–5.2)
RBC # FLD: 16.3 % — HIGH (ref 10.3–14.5)
SPECIMEN SOURCE: SIGNIFICANT CHANGE UP
SPECIMEN SOURCE: SIGNIFICANT CHANGE UP
WBC # BLD: 11.17 K/UL — HIGH (ref 3.8–10.5)
WBC # FLD AUTO: 11.17 K/UL — HIGH (ref 3.8–10.5)

## 2017-04-02 PROCEDURE — 99233 SBSQ HOSP IP/OBS HIGH 50: CPT

## 2017-04-02 RX ADMIN — Medication 100 MILLIGRAM(S): at 05:46

## 2017-04-02 RX ADMIN — Medication 3 MILLILITER(S): at 11:11

## 2017-04-02 RX ADMIN — Medication 100 MILLIGRAM(S): at 14:41

## 2017-04-02 RX ADMIN — HEPARIN SODIUM 5000 UNIT(S): 5000 INJECTION INTRAVENOUS; SUBCUTANEOUS at 05:46

## 2017-04-02 RX ADMIN — Medication 200 MILLIGRAM(S): at 17:24

## 2017-04-02 RX ADMIN — Medication 16 UNIT(S): at 11:55

## 2017-04-02 RX ADMIN — Medication 100 MILLIGRAM(S): at 17:24

## 2017-04-02 RX ADMIN — POLYETHYLENE GLYCOL 3350 17 GRAM(S): 17 POWDER, FOR SOLUTION ORAL at 11:12

## 2017-04-02 RX ADMIN — Medication 3 MILLILITER(S): at 23:59

## 2017-04-02 RX ADMIN — Medication 2: at 22:00

## 2017-04-02 RX ADMIN — Medication 3 MILLILITER(S): at 17:24

## 2017-04-02 RX ADMIN — HEPARIN SODIUM 5000 UNIT(S): 5000 INJECTION INTRAVENOUS; SUBCUTANEOUS at 14:41

## 2017-04-02 RX ADMIN — Medication 16 UNIT(S): at 07:48

## 2017-04-02 RX ADMIN — Medication 1 DROP(S): at 11:11

## 2017-04-02 RX ADMIN — Medication 2: at 17:25

## 2017-04-02 RX ADMIN — ATORVASTATIN CALCIUM 10 MILLIGRAM(S): 80 TABLET, FILM COATED ORAL at 22:02

## 2017-04-02 RX ADMIN — Medication 16 UNIT(S): at 17:25

## 2017-04-02 RX ADMIN — SENNA PLUS 2 TABLET(S): 8.6 TABLET ORAL at 22:02

## 2017-04-02 RX ADMIN — Medication 325 MILLIGRAM(S): at 11:11

## 2017-04-02 RX ADMIN — Medication 1 TABLET(S): at 17:24

## 2017-04-02 RX ADMIN — Medication 100 MILLIGRAM(S): at 22:02

## 2017-04-02 RX ADMIN — Medication 2: at 11:55

## 2017-04-02 RX ADMIN — INSULIN GLARGINE 48 UNIT(S): 100 INJECTION, SOLUTION SUBCUTANEOUS at 22:02

## 2017-04-02 RX ADMIN — Medication 3 MILLILITER(S): at 05:46

## 2017-04-02 RX ADMIN — Medication 2: at 07:48

## 2017-04-02 RX ADMIN — Medication 1 TABLET(S): at 09:13

## 2017-04-02 RX ADMIN — Medication 1 TABLET(S): at 11:55

## 2017-04-02 RX ADMIN — HEPARIN SODIUM 5000 UNIT(S): 5000 INJECTION INTRAVENOUS; SUBCUTANEOUS at 22:02

## 2017-04-02 RX ADMIN — LOSARTAN POTASSIUM 100 MILLIGRAM(S): 100 TABLET, FILM COATED ORAL at 05:47

## 2017-04-02 RX ADMIN — Medication 200 MILLIGRAM(S): at 05:46

## 2017-04-03 ENCOUNTER — APPOINTMENT (OUTPATIENT)
Dept: HOME HEALTH SERVICES | Facility: HOME HEALTH | Age: 77
End: 2017-04-03

## 2017-04-03 VITALS — WEIGHT: 260.81 LBS

## 2017-04-03 LAB
ALDOST SERPL-MCNC: <3 NG/DL — SIGNIFICANT CHANGE UP
ANION GAP SERPL CALC-SCNC: 20 MMOL/L — HIGH (ref 5–17)
BUN SERPL-MCNC: 17 MG/DL — SIGNIFICANT CHANGE UP (ref 7–23)
CALCIUM SERPL-MCNC: 9.4 MG/DL — SIGNIFICANT CHANGE UP (ref 8.4–10.5)
CHLORIDE SERPL-SCNC: 100 MMOL/L — SIGNIFICANT CHANGE UP (ref 96–108)
CO2 SERPL-SCNC: 25 MMOL/L — SIGNIFICANT CHANGE UP (ref 22–31)
CREAT SERPL-MCNC: 1.02 MG/DL — SIGNIFICANT CHANGE UP (ref 0.5–1.3)
GLUCOSE SERPL-MCNC: 130 MG/DL — HIGH (ref 70–99)
HCT VFR BLD CALC: 27.6 % — LOW (ref 34.5–45)
HGB BLD-MCNC: 8.4 G/DL — LOW (ref 11.5–15.5)
MCHC RBC-ENTMCNC: 25.7 PG — LOW (ref 27–34)
MCHC RBC-ENTMCNC: 30.4 GM/DL — LOW (ref 32–36)
MCV RBC AUTO: 84.4 FL — SIGNIFICANT CHANGE UP (ref 80–100)
PLATELET # BLD AUTO: 269 K/UL — SIGNIFICANT CHANGE UP (ref 150–400)
POTASSIUM SERPL-MCNC: 3.3 MMOL/L — LOW (ref 3.5–5.3)
POTASSIUM SERPL-SCNC: 3.3 MMOL/L — LOW (ref 3.5–5.3)
RBC # BLD: 3.27 M/UL — LOW (ref 3.8–5.2)
RBC # FLD: 16.5 % — HIGH (ref 10.3–14.5)
SODIUM SERPL-SCNC: 145 MMOL/L — SIGNIFICANT CHANGE UP (ref 135–145)
WBC # BLD: 12.2 K/UL — HIGH (ref 3.8–10.5)
WBC # FLD AUTO: 12.2 K/UL — HIGH (ref 3.8–10.5)

## 2017-04-03 PROCEDURE — 82024 ASSAY OF ACTH: CPT

## 2017-04-03 PROCEDURE — 71275 CT ANGIOGRAPHY CHEST: CPT

## 2017-04-03 PROCEDURE — 82435 ASSAY OF BLOOD CHLORIDE: CPT

## 2017-04-03 PROCEDURE — 99232 SBSQ HOSP IP/OBS MODERATE 35: CPT

## 2017-04-03 PROCEDURE — 80048 BASIC METABOLIC PNL TOTAL CA: CPT

## 2017-04-03 PROCEDURE — 87070 CULTURE OTHR SPECIMN AEROBIC: CPT

## 2017-04-03 PROCEDURE — 82746 ASSAY OF FOLIC ACID SERUM: CPT

## 2017-04-03 PROCEDURE — 93005 ELECTROCARDIOGRAM TRACING: CPT

## 2017-04-03 PROCEDURE — 82088 ASSAY OF ALDOSTERONE: CPT

## 2017-04-03 PROCEDURE — 82803 BLOOD GASES ANY COMBINATION: CPT

## 2017-04-03 PROCEDURE — 97116 GAIT TRAINING THERAPY: CPT

## 2017-04-03 PROCEDURE — 83036 HEMOGLOBIN GLYCOSYLATED A1C: CPT

## 2017-04-03 PROCEDURE — C1751: CPT

## 2017-04-03 PROCEDURE — 80202 ASSAY OF VANCOMYCIN: CPT

## 2017-04-03 PROCEDURE — 73620 X-RAY EXAM OF FOOT: CPT

## 2017-04-03 PROCEDURE — 96374 THER/PROPH/DIAG INJ IV PUSH: CPT

## 2017-04-03 PROCEDURE — 85652 RBC SED RATE AUTOMATED: CPT

## 2017-04-03 PROCEDURE — 84100 ASSAY OF PHOSPHORUS: CPT

## 2017-04-03 PROCEDURE — 87040 BLOOD CULTURE FOR BACTERIA: CPT

## 2017-04-03 PROCEDURE — 93923 UPR/LXTR ART STDY 3+ LVLS: CPT

## 2017-04-03 PROCEDURE — 83735 ASSAY OF MAGNESIUM: CPT

## 2017-04-03 PROCEDURE — 83550 IRON BINDING TEST: CPT

## 2017-04-03 PROCEDURE — 82607 VITAMIN B-12: CPT

## 2017-04-03 PROCEDURE — 84244 ASSAY OF RENIN: CPT

## 2017-04-03 PROCEDURE — 82947 ASSAY GLUCOSE BLOOD QUANT: CPT

## 2017-04-03 PROCEDURE — 80053 COMPREHEN METABOLIC PANEL: CPT

## 2017-04-03 PROCEDURE — C8929: CPT

## 2017-04-03 PROCEDURE — 84132 ASSAY OF SERUM POTASSIUM: CPT

## 2017-04-03 PROCEDURE — 83605 ASSAY OF LACTIC ACID: CPT

## 2017-04-03 PROCEDURE — 99285 EMERGENCY DEPT VISIT HI MDM: CPT | Mod: 25

## 2017-04-03 PROCEDURE — 97110 THERAPEUTIC EXERCISES: CPT

## 2017-04-03 PROCEDURE — 83835 ASSAY OF METANEPHRINES: CPT

## 2017-04-03 PROCEDURE — 93971 EXTREMITY STUDY: CPT

## 2017-04-03 PROCEDURE — 94640 AIRWAY INHALATION TREATMENT: CPT

## 2017-04-03 PROCEDURE — 87205 SMEAR GRAM STAIN: CPT

## 2017-04-03 PROCEDURE — 99239 HOSP IP/OBS DSCHRG MGMT >30: CPT

## 2017-04-03 PROCEDURE — 81001 URINALYSIS AUTO W/SCOPE: CPT

## 2017-04-03 PROCEDURE — 96372 THER/PROPH/DIAG INJ SC/IM: CPT | Mod: XU

## 2017-04-03 PROCEDURE — 87486 CHLMYD PNEUM DNA AMP PROBE: CPT

## 2017-04-03 PROCEDURE — 82330 ASSAY OF CALCIUM: CPT

## 2017-04-03 PROCEDURE — 73702 CT LWR EXTREMITY W/O&W/DYE: CPT

## 2017-04-03 PROCEDURE — 71045 X-RAY EXAM CHEST 1 VIEW: CPT

## 2017-04-03 PROCEDURE — 80299 QUANTITATIVE ASSAY DRUG: CPT

## 2017-04-03 PROCEDURE — 84295 ASSAY OF SERUM SODIUM: CPT

## 2017-04-03 PROCEDURE — 85027 COMPLETE CBC AUTOMATED: CPT

## 2017-04-03 PROCEDURE — 96375 TX/PRO/DX INJ NEW DRUG ADDON: CPT

## 2017-04-03 PROCEDURE — 97163 PT EVAL HIGH COMPLEX 45 MIN: CPT

## 2017-04-03 PROCEDURE — 87186 SC STD MICRODIL/AGAR DIL: CPT

## 2017-04-03 PROCEDURE — 82728 ASSAY OF FERRITIN: CPT

## 2017-04-03 PROCEDURE — 87633 RESP VIRUS 12-25 TARGETS: CPT

## 2017-04-03 PROCEDURE — 86140 C-REACTIVE PROTEIN: CPT

## 2017-04-03 PROCEDURE — 80061 LIPID PANEL: CPT

## 2017-04-03 PROCEDURE — 84443 ASSAY THYROID STIM HORMONE: CPT

## 2017-04-03 PROCEDURE — 87086 URINE CULTURE/COLONY COUNT: CPT

## 2017-04-03 PROCEDURE — 82533 TOTAL CORTISOL: CPT

## 2017-04-03 PROCEDURE — 87798 DETECT AGENT NOS DNA AMP: CPT

## 2017-04-03 PROCEDURE — 85610 PROTHROMBIN TIME: CPT

## 2017-04-03 PROCEDURE — 36569 INSJ PICC 5 YR+ W/O IMAGING: CPT

## 2017-04-03 PROCEDURE — 82306 VITAMIN D 25 HYDROXY: CPT

## 2017-04-03 PROCEDURE — 85014 HEMATOCRIT: CPT

## 2017-04-03 PROCEDURE — 87581 M.PNEUMON DNA AMP PROBE: CPT

## 2017-04-03 RX ORDER — POTASSIUM CHLORIDE 20 MEQ
40 PACKET (EA) ORAL ONCE
Qty: 0 | Refills: 0 | Status: COMPLETED | OUTPATIENT
Start: 2017-04-03 | End: 2017-04-03

## 2017-04-03 RX ORDER — METFORMIN HYDROCHLORIDE 850 MG/1
1 TABLET ORAL
Qty: 0 | Refills: 0 | COMMUNITY

## 2017-04-03 RX ORDER — INSULIN LISPRO 100/ML
0 VIAL (ML) SUBCUTANEOUS
Qty: 0 | Refills: 0 | DISCHARGE
Start: 2017-04-03

## 2017-04-03 RX ORDER — LACTOBACILLUS ACIDOPHILUS 100MM CELL
0 CAPSULE ORAL
Qty: 0 | Refills: 0 | COMMUNITY
Start: 2017-04-03

## 2017-04-03 RX ORDER — ACETAMINOPHEN 500 MG
2 TABLET ORAL
Qty: 0 | Refills: 0 | DISCHARGE
Start: 2017-04-03

## 2017-04-03 RX ORDER — INSULIN LISPRO 100/ML
16 VIAL (ML) SUBCUTANEOUS
Qty: 0 | Refills: 0 | DISCHARGE
Start: 2017-04-03

## 2017-04-03 RX ORDER — INSULIN GLARGINE 100 [IU]/ML
48 INJECTION, SOLUTION SUBCUTANEOUS
Qty: 0 | Refills: 0 | DISCHARGE
Start: 2017-04-03

## 2017-04-03 RX ORDER — DOCUSATE SODIUM 100 MG
1 CAPSULE ORAL
Qty: 0 | Refills: 0 | DISCHARGE
Start: 2017-04-03

## 2017-04-03 RX ORDER — CEFAZOLIN SODIUM 1 G
2 VIAL (EA) INJECTION
Qty: 0 | Refills: 0 | DISCHARGE
Start: 2017-04-03

## 2017-04-03 RX ORDER — LACTOBACILLUS ACIDOPHILUS 100MM CELL
1 CAPSULE ORAL
Qty: 0 | Refills: 0 | DISCHARGE
Start: 2017-04-03

## 2017-04-03 RX ORDER — POLYETHYLENE GLYCOL 3350 17 G/17G
17 POWDER, FOR SOLUTION ORAL
Qty: 0 | Refills: 0 | DISCHARGE
Start: 2017-04-03

## 2017-04-03 RX ORDER — SENNA PLUS 8.6 MG/1
2 TABLET ORAL
Qty: 0 | Refills: 0 | DISCHARGE
Start: 2017-04-03

## 2017-04-03 RX ORDER — HEPARIN SODIUM 5000 [USP'U]/ML
5000 INJECTION INTRAVENOUS; SUBCUTANEOUS
Qty: 0 | Refills: 0 | DISCHARGE
Start: 2017-04-03

## 2017-04-03 RX ORDER — IPRATROPIUM/ALBUTEROL SULFATE 18-103MCG
3 AEROSOL WITH ADAPTER (GRAM) INHALATION
Qty: 0 | Refills: 0 | DISCHARGE
Start: 2017-04-03

## 2017-04-03 RX ORDER — HEPARIN SODIUM 5000 [USP'U]/ML
0 INJECTION INTRAVENOUS; SUBCUTANEOUS
Qty: 0 | Refills: 0 | COMMUNITY
Start: 2017-04-03

## 2017-04-03 RX ADMIN — Medication 1 TABLET(S): at 08:45

## 2017-04-03 RX ADMIN — Medication 3 MILLILITER(S): at 12:02

## 2017-04-03 RX ADMIN — HEPARIN SODIUM 5000 UNIT(S): 5000 INJECTION INTRAVENOUS; SUBCUTANEOUS at 05:32

## 2017-04-03 RX ADMIN — Medication 2: at 12:51

## 2017-04-03 RX ADMIN — Medication 16 UNIT(S): at 12:51

## 2017-04-03 RX ADMIN — LOSARTAN POTASSIUM 100 MILLIGRAM(S): 100 TABLET, FILM COATED ORAL at 05:32

## 2017-04-03 RX ADMIN — Medication 2: at 08:45

## 2017-04-03 RX ADMIN — Medication 1 TABLET(S): at 12:02

## 2017-04-03 RX ADMIN — Medication 40 MILLIEQUIVALENT(S): at 14:11

## 2017-04-03 RX ADMIN — POLYETHYLENE GLYCOL 3350 17 GRAM(S): 17 POWDER, FOR SOLUTION ORAL at 12:02

## 2017-04-03 RX ADMIN — Medication 100 MILLIGRAM(S): at 12:59

## 2017-04-03 RX ADMIN — Medication 100 MILLIGRAM(S): at 05:32

## 2017-04-03 RX ADMIN — Medication 325 MILLIGRAM(S): at 12:01

## 2017-04-03 RX ADMIN — Medication 3 MILLILITER(S): at 05:31

## 2017-04-03 RX ADMIN — Medication 1 DROP(S): at 12:02

## 2017-04-03 RX ADMIN — Medication 200 MILLIGRAM(S): at 05:32

## 2017-04-03 RX ADMIN — Medication 16 UNIT(S): at 08:46

## 2017-04-03 NOTE — DIETITIAN INITIAL EVALUATION ADULT. - OTHER INFO
nutrtion consult for elevated HgbA1c 8.7% and BMI >40. Pt visited at bedside, states she is awre of her rise in HgbA1C attributing it to "Holiday "  and that she has been "good" lately and her MD will recheck it next month. pt does her own cooking and also orders in food. Pt has limited mobility, using a walker to ambulate/limited activity level. nutrition consult for elevated HgbA1c 8.7% and BMI >40. Pt visited at bedside, states she is aware of her rise in HgbA1C attributing it to "Holiday eating "  and that she has been "good" lately and her MD will recheck HgbA1C next month. Pt does her own cooking and also orders in food. Pt has limited mobility, using a walker to ambulate/limited activity level.  Pt checks her fingersticks in the morning, and takes insulin 2x/day. pt states her current marlyn  261 lbs is her "normal"  weight. Discussed portion control and low fat food choices to reduce calories and  promote weight loss as a means of lowering her HgbA1c.  Pt is aware of carbohydrate food sources.

## 2017-04-03 NOTE — DIETITIAN INITIAL EVALUATION ADULT. - ENERGY NEEDS
IBW=  120 lbs +/- 10% used for protein estimation  Chart review: pt admitted with sepsis due to cellulitis of Diabetic foot ulcer concerning for underlying osteomyelitis; diabetic neuropathy; s/p PICC line placed for antibiotic treatment

## 2017-04-03 NOTE — DIETITIAN INITIAL EVALUATION ADULT. - PROBLEM SELECTOR PLAN 1
with sepsis - fever and tachycardia   - Patient has history of diabetic foot ulcers, and previous amputations. Concern for osteomyelitis,  - will treat with Vancomycin for MRSA coverage and Zosyn to cover anaerobes and pseudomonas  - Follow up blood cultures  - Checking ESR and CRP  - Tylenol for fevers  - Podiatry Consulted - will see patient

## 2017-04-03 NOTE — DIETITIAN INITIAL EVALUATION ADULT. - NS AS NUTRI INTERV ED CONTENT
consistent carbohydrate diet without snacks/Purpose of the nutrition education/Nutrition relationship to health/disease/Recommended modifications consistent carbohydrate diet without snacks, portion control, smaller meals/Nutrition relationship to health/disease/Recommended modifications/Purpose of the nutrition education

## 2017-04-05 LAB
METANEPHRINE, PL: <10 PG/ML — SIGNIFICANT CHANGE UP (ref 0–62)
NORMETANEPHRINE, PL: 36 PG/ML — SIGNIFICANT CHANGE UP (ref 0–145)

## 2017-04-07 LAB — RENIN PLAS-CCNC: 2.92 NG/ML/HR — SIGNIFICANT CHANGE UP (ref 0.17–5.38)

## 2017-04-11 LAB — DEXAMETHASONE SERPL-MCNC: SIGNIFICANT CHANGE UP

## 2017-04-25 ENCOUNTER — OTHER (OUTPATIENT)
Age: 77
End: 2017-04-25

## 2017-05-11 ENCOUNTER — OTHER (OUTPATIENT)
Age: 77
End: 2017-05-11

## 2017-05-12 ENCOUNTER — APPOINTMENT (OUTPATIENT)
Dept: HOME HEALTH SERVICES | Facility: HOME HEALTH | Age: 77
End: 2017-05-12

## 2017-05-12 VITALS
TEMPERATURE: 97 F | BODY MASS INDEX: 39.27 KG/M2 | RESPIRATION RATE: 18 BRPM | DIASTOLIC BLOOD PRESSURE: 60 MMHG | SYSTOLIC BLOOD PRESSURE: 132 MMHG | WEIGHT: 230 LBS | HEART RATE: 86 BPM | HEIGHT: 64 IN | OXYGEN SATURATION: 99 %

## 2017-05-12 VITALS — HEART RATE: 86 BPM | TEMPERATURE: 97 F | RESPIRATION RATE: 18 BRPM | OXYGEN SATURATION: 99 %

## 2017-08-02 ENCOUNTER — APPOINTMENT (OUTPATIENT)
Dept: HOME HEALTH SERVICES | Facility: HOME HEALTH | Age: 77
End: 2017-08-02
Payer: MEDICARE

## 2017-08-02 VITALS
TEMPERATURE: 97 F | WEIGHT: 220 LBS | RESPIRATION RATE: 18 BRPM | HEIGHT: 64 IN | HEART RATE: 75 BPM | SYSTOLIC BLOOD PRESSURE: 152 MMHG | BODY MASS INDEX: 37.56 KG/M2 | OXYGEN SATURATION: 96 % | DIASTOLIC BLOOD PRESSURE: 60 MMHG

## 2017-08-02 PROCEDURE — G0439: CPT

## 2017-08-02 PROCEDURE — 99350 HOME/RES VST EST HIGH MDM 60: CPT | Mod: 25

## 2017-08-03 LAB
BASOPHILS # BLD AUTO: 0.02 K/UL
BASOPHILS NFR BLD AUTO: 0.2 %
EOSINOPHIL # BLD AUTO: 0.14 K/UL
EOSINOPHIL NFR BLD AUTO: 1.6 %
HCT VFR BLD CALC: 32.7 %
HGB BLD-MCNC: 9.9 G/DL
IMM GRANULOCYTES NFR BLD AUTO: 0.1 %
LYMPHOCYTES # BLD AUTO: 2.79 K/UL
LYMPHOCYTES NFR BLD AUTO: 31.6 %
MAN DIFF?: NORMAL
MCHC RBC-ENTMCNC: 25.9 PG
MCHC RBC-ENTMCNC: 30.3 GM/DL
MCV RBC AUTO: 85.6 FL
MONOCYTES # BLD AUTO: 0.59 K/UL
MONOCYTES NFR BLD AUTO: 6.7 %
NEUTROPHILS # BLD AUTO: 5.27 K/UL
NEUTROPHILS NFR BLD AUTO: 59.8 %
PLATELET # BLD AUTO: 284 K/UL
RBC # BLD: 3.82 M/UL
RBC # FLD: 16.6 %
WBC # FLD AUTO: 8.82 K/UL

## 2017-08-04 LAB
25(OH)D3 SERPL-MCNC: 47.3 NG/ML
ALBUMIN SERPL ELPH-MCNC: 4 G/DL
ALP BLD-CCNC: 85 U/L
ALT SERPL-CCNC: 18 U/L
ANION GAP SERPL CALC-SCNC: 20 MMOL/L
AST SERPL-CCNC: 17 U/L
BILIRUB SERPL-MCNC: 0.2 MG/DL
BUN SERPL-MCNC: 33 MG/DL
CALCIUM SERPL-MCNC: 10.3 MG/DL
CHLORIDE SERPL-SCNC: 103 MMOL/L
CHOLEST SERPL-MCNC: 138 MG/DL
CHOLEST/HDLC SERPL: 3 RATIO
CO2 SERPL-SCNC: 21 MMOL/L
CREAT SERPL-MCNC: 1.12 MG/DL
GLUCOSE SERPL-MCNC: 192 MG/DL
HBA1C MFR BLD HPLC: 8.2 %
HDLC SERPL-MCNC: 46 MG/DL
LDLC SERPL CALC-MCNC: 64 MG/DL
PHOSPHATE SERPL-MCNC: 4.2 MG/DL
POTASSIUM SERPL-SCNC: 5.3 MMOL/L
PROT SERPL-MCNC: 6.9 G/DL
SODIUM SERPL-SCNC: 144 MMOL/L
TRIGL SERPL-MCNC: 138 MG/DL
TSH SERPL-ACNC: 0.49 UIU/ML
URATE SERPL-MCNC: 6 MG/DL

## 2017-08-07 ENCOUNTER — RX RENEWAL (OUTPATIENT)
Age: 77
End: 2017-08-07

## 2017-08-07 ENCOUNTER — MEDICATION RENEWAL (OUTPATIENT)
Age: 77
End: 2017-08-07

## 2017-08-29 ENCOUNTER — RX RENEWAL (OUTPATIENT)
Age: 77
End: 2017-08-29

## 2017-08-29 ENCOUNTER — MEDICATION RENEWAL (OUTPATIENT)
Age: 77
End: 2017-08-29

## 2017-09-21 ENCOUNTER — OTHER (OUTPATIENT)
Age: 77
End: 2017-09-21

## 2017-10-13 ENCOUNTER — MEDICATION RENEWAL (OUTPATIENT)
Age: 77
End: 2017-10-13

## 2017-10-19 ENCOUNTER — OTHER (OUTPATIENT)
Age: 77
End: 2017-10-19

## 2017-11-03 ENCOUNTER — APPOINTMENT (OUTPATIENT)
Dept: HOME HEALTH SERVICES | Facility: HOME HEALTH | Age: 77
End: 2017-11-03
Payer: MEDICARE

## 2017-11-03 VITALS
WEIGHT: 220 LBS | SYSTOLIC BLOOD PRESSURE: 132 MMHG | TEMPERATURE: 97.5 F | OXYGEN SATURATION: 98 % | BODY MASS INDEX: 37.56 KG/M2 | HEIGHT: 64 IN | HEART RATE: 74 BPM | DIASTOLIC BLOOD PRESSURE: 60 MMHG

## 2017-11-03 PROCEDURE — 99350 HOME/RES VST EST HIGH MDM 60: CPT | Mod: 25

## 2017-11-03 PROCEDURE — 90686 IIV4 VACC NO PRSV 0.5 ML IM: CPT

## 2017-11-03 PROCEDURE — G0008: CPT

## 2017-11-10 LAB
25(OH)D3 SERPL-MCNC: 57.1 NG/ML
ALBUMIN SERPL ELPH-MCNC: 4.1 G/DL
ALP BLD-CCNC: 87 U/L
ALT SERPL-CCNC: 16 U/L
ANION GAP SERPL CALC-SCNC: 14 MMOL/L
AST SERPL-CCNC: 14 U/L
BASOPHILS # BLD AUTO: 0.02 K/UL
BASOPHILS NFR BLD AUTO: 0.2 %
BILIRUB SERPL-MCNC: 0.2 MG/DL
BUN SERPL-MCNC: 25 MG/DL
CALCIUM SERPL-MCNC: 9.9 MG/DL
CHLORIDE SERPL-SCNC: 104 MMOL/L
CHOLEST SERPL-MCNC: 145 MG/DL
CHOLEST/HDLC SERPL: 2.7 RATIO
CO2 SERPL-SCNC: 24 MMOL/L
CREAT SERPL-MCNC: 1.05 MG/DL
EOSINOPHIL # BLD AUTO: 0.29 K/UL
EOSINOPHIL NFR BLD AUTO: 3 %
FOLATE SERPL-MCNC: >20 NG/ML
GLUCOSE SERPL-MCNC: 224 MG/DL
HBA1C MFR BLD HPLC: 8.2 %
HCT VFR BLD CALC: 34.9 %
HDLC SERPL-MCNC: 54 MG/DL
HGB BLD-MCNC: 11 G/DL
IMM GRANULOCYTES NFR BLD AUTO: 0.1 %
LDLC SERPL CALC-MCNC: 61 MG/DL
LYMPHOCYTES # BLD AUTO: 2.3 K/UL
LYMPHOCYTES NFR BLD AUTO: 23.8 %
MAN DIFF?: NORMAL
MCHC RBC-ENTMCNC: 27.1 PG
MCHC RBC-ENTMCNC: 31.5 GM/DL
MCV RBC AUTO: 86 FL
MONOCYTES # BLD AUTO: 0.57 K/UL
MONOCYTES NFR BLD AUTO: 5.9 %
NEUTROPHILS # BLD AUTO: 6.49 K/UL
NEUTROPHILS NFR BLD AUTO: 67 %
PLATELET # BLD AUTO: 164 K/UL
POTASSIUM SERPL-SCNC: 4.9 MMOL/L
PROT SERPL-MCNC: 7.3 G/DL
RBC # BLD: 4.06 M/UL
RBC # FLD: 15.3 %
SODIUM SERPL-SCNC: 142 MMOL/L
TRIGL SERPL-MCNC: 150 MG/DL
TSH SERPL-ACNC: 2.42 UIU/ML
URATE SERPL-MCNC: 5.7 MG/DL
VIT B12 SERPL-MCNC: 680 PG/ML
WBC # FLD AUTO: 9.68 K/UL

## 2017-12-13 ENCOUNTER — APPOINTMENT (OUTPATIENT)
Dept: HOME HEALTH SERVICES | Facility: HOME HEALTH | Age: 77
End: 2017-12-13

## 2017-12-15 ENCOUNTER — APPOINTMENT (OUTPATIENT)
Dept: HOME HEALTH SERVICES | Facility: HOME HEALTH | Age: 77
End: 2017-12-15

## 2018-01-23 ENCOUNTER — APPOINTMENT (OUTPATIENT)
Dept: HOME HEALTH SERVICES | Facility: HOME HEALTH | Age: 78
End: 2018-01-23
Payer: MEDICARE

## 2018-01-23 VITALS
TEMPERATURE: 97.5 F | DIASTOLIC BLOOD PRESSURE: 62 MMHG | OXYGEN SATURATION: 98 % | RESPIRATION RATE: 18 BRPM | HEART RATE: 77 BPM | SYSTOLIC BLOOD PRESSURE: 118 MMHG

## 2018-01-23 PROCEDURE — 99349 HOME/RES VST EST MOD MDM 40: CPT

## 2018-01-24 ENCOUNTER — LABORATORY RESULT (OUTPATIENT)
Age: 78
End: 2018-01-24

## 2018-01-25 LAB
25(OH)D3 SERPL-MCNC: 42.7 NG/ML
ALBUMIN SERPL ELPH-MCNC: 3.8 G/DL
ALP BLD-CCNC: 77 U/L
ALT SERPL-CCNC: 17 U/L
ANION GAP SERPL CALC-SCNC: 14 MMOL/L
AST SERPL-CCNC: 20 U/L
BASOPHILS # BLD AUTO: 0.02 K/UL
BASOPHILS NFR BLD AUTO: 0.2 %
BILIRUB SERPL-MCNC: 0.3 MG/DL
BUN SERPL-MCNC: 26 MG/DL
CALCIUM SERPL-MCNC: 9.6 MG/DL
CHLORIDE SERPL-SCNC: 106 MMOL/L
CHOLEST SERPL-MCNC: 118 MG/DL
CHOLEST/HDLC SERPL: 2.9 RATIO
CO2 SERPL-SCNC: 24 MMOL/L
CREAT SERPL-MCNC: 1.23 MG/DL
EOSINOPHIL # BLD AUTO: 0.13 K/UL
EOSINOPHIL NFR BLD AUTO: 1.5 %
FOLATE SERPL-MCNC: >20 NG/ML
GLUCOSE SERPL-MCNC: 139 MG/DL
HBA1C MFR BLD HPLC: 8.3 %
HCT VFR BLD CALC: 33.3 %
HDLC SERPL-MCNC: 41 MG/DL
HGB BLD-MCNC: 10.1 G/DL
IMM GRANULOCYTES NFR BLD AUTO: 0.1 %
LDLC SERPL CALC-MCNC: 58 MG/DL
LYMPHOCYTES # BLD AUTO: 2.07 K/UL
LYMPHOCYTES NFR BLD AUTO: 24.3 %
MAN DIFF?: NORMAL
MCHC RBC-ENTMCNC: 27.2 PG
MCHC RBC-ENTMCNC: 30.3 GM/DL
MCV RBC AUTO: 89.8 FL
MONOCYTES # BLD AUTO: 0.59 K/UL
MONOCYTES NFR BLD AUTO: 6.9 %
NEUTROPHILS # BLD AUTO: 5.71 K/UL
NEUTROPHILS NFR BLD AUTO: 67 %
PLATELET # BLD AUTO: 182 K/UL
POTASSIUM SERPL-SCNC: 5.4 MMOL/L
PROT SERPL-MCNC: 6.5 G/DL
RBC # BLD: 3.71 M/UL
RBC # FLD: 15.6 %
SODIUM SERPL-SCNC: 144 MMOL/L
TRIGL SERPL-MCNC: 97 MG/DL
TSH SERPL-ACNC: 2.53 UIU/ML
VIT B12 SERPL-MCNC: 553 PG/ML
WBC # FLD AUTO: 8.53 K/UL

## 2018-03-19 ENCOUNTER — APPOINTMENT (OUTPATIENT)
Dept: HOME HEALTH SERVICES | Facility: HOME HEALTH | Age: 78
End: 2018-03-19
Payer: MEDICARE

## 2018-03-19 VITALS
OXYGEN SATURATION: 98 % | HEART RATE: 71 BPM | DIASTOLIC BLOOD PRESSURE: 62 MMHG | SYSTOLIC BLOOD PRESSURE: 122 MMHG | WEIGHT: 225 LBS | TEMPERATURE: 97.9 F | RESPIRATION RATE: 18 BRPM | BODY MASS INDEX: 38.41 KG/M2 | HEIGHT: 64 IN

## 2018-03-19 PROCEDURE — 99349 HOME/RES VST EST MOD MDM 40: CPT

## 2018-05-25 ENCOUNTER — LABORATORY RESULT (OUTPATIENT)
Age: 78
End: 2018-05-25

## 2018-05-29 ENCOUNTER — APPOINTMENT (OUTPATIENT)
Dept: HOME HEALTH SERVICES | Facility: HOME HEALTH | Age: 78
End: 2018-05-29
Payer: MEDICARE

## 2018-05-29 VITALS
RESPIRATION RATE: 16 BRPM | OXYGEN SATURATION: 98 % | HEART RATE: 74 BPM | SYSTOLIC BLOOD PRESSURE: 138 MMHG | TEMPERATURE: 98 F | DIASTOLIC BLOOD PRESSURE: 60 MMHG | WEIGHT: 220 LBS | BODY MASS INDEX: 37.56 KG/M2 | HEIGHT: 64 IN

## 2018-05-29 LAB
25(OH)D3 SERPL-MCNC: 47 NG/ML
ALBUMIN SERPL ELPH-MCNC: 4.1 G/DL
ALP BLD-CCNC: 91 U/L
ALT SERPL-CCNC: 15 U/L
ANION GAP SERPL CALC-SCNC: 16 MMOL/L
AST SERPL-CCNC: 16 U/L
BASOPHILS # BLD AUTO: 0.02 K/UL
BASOPHILS NFR BLD AUTO: 0.2 %
BILIRUB SERPL-MCNC: 0.3 MG/DL
BUN SERPL-MCNC: 33 MG/DL
CALCIUM SERPL-MCNC: 9.6 MG/DL
CHLORIDE SERPL-SCNC: 105 MMOL/L
CHOLEST SERPL-MCNC: 138 MG/DL
CHOLEST/HDLC SERPL: 3.2 RATIO
CO2 SERPL-SCNC: 24 MMOL/L
CREAT SERPL-MCNC: 1.41 MG/DL
EOSINOPHIL # BLD AUTO: 0.2 K/UL
EOSINOPHIL NFR BLD AUTO: 2.3 %
FOLATE SERPL-MCNC: >20 NG/ML
GLUCOSE SERPL-MCNC: 99 MG/DL
HBA1C MFR BLD HPLC: 8.6 %
HCT VFR BLD CALC: 36 %
HDLC SERPL-MCNC: 43 MG/DL
HGB BLD-MCNC: 10.9 G/DL
IMM GRANULOCYTES NFR BLD AUTO: 0.1 %
LDLC SERPL CALC-MCNC: 68 MG/DL
LYMPHOCYTES # BLD AUTO: 2.57 K/UL
LYMPHOCYTES NFR BLD AUTO: 30.1 %
MAN DIFF?: NORMAL
MCHC RBC-ENTMCNC: 27.3 PG
MCHC RBC-ENTMCNC: 30.3 GM/DL
MCV RBC AUTO: 90 FL
MONOCYTES # BLD AUTO: 0.42 K/UL
MONOCYTES NFR BLD AUTO: 4.9 %
NEUTROPHILS # BLD AUTO: 5.32 K/UL
NEUTROPHILS NFR BLD AUTO: 62.4 %
PLATELET # BLD AUTO: 209 K/UL
POTASSIUM SERPL-SCNC: 5.3 MMOL/L
PROT SERPL-MCNC: 6.9 G/DL
RBC # BLD: 4 M/UL
RBC # FLD: 14.9 %
SODIUM SERPL-SCNC: 145 MMOL/L
TRIGL SERPL-MCNC: 133 MG/DL
TSH SERPL-ACNC: 3.32 UIU/ML
VIT B12 SERPL-MCNC: >2000 PG/ML
WBC # FLD AUTO: 8.54 K/UL

## 2018-05-29 PROCEDURE — 99349 HOME/RES VST EST MOD MDM 40: CPT | Mod: 25

## 2018-05-29 PROCEDURE — 99497 ADVNCD CARE PLAN 30 MIN: CPT

## 2018-07-09 ENCOUNTER — MEDICATION RENEWAL (OUTPATIENT)
Age: 78
End: 2018-07-09

## 2018-07-20 ENCOUNTER — OTHER (OUTPATIENT)
Age: 78
End: 2018-07-20

## 2018-08-07 ENCOUNTER — APPOINTMENT (OUTPATIENT)
Dept: HOME HEALTH SERVICES | Facility: HOME HEALTH | Age: 78
End: 2018-08-07
Payer: MEDICARE

## 2018-08-07 VITALS
OXYGEN SATURATION: 97 % | RESPIRATION RATE: 16 BRPM | DIASTOLIC BLOOD PRESSURE: 70 MMHG | TEMPERATURE: 98.7 F | WEIGHT: 220 LBS | HEART RATE: 76 BPM | BODY MASS INDEX: 37.56 KG/M2 | SYSTOLIC BLOOD PRESSURE: 165 MMHG | HEIGHT: 64 IN

## 2018-08-07 PROCEDURE — 99349 HOME/RES VST EST MOD MDM 40: CPT

## 2018-08-09 ENCOUNTER — RX RENEWAL (OUTPATIENT)
Age: 78
End: 2018-08-09

## 2018-08-14 LAB
ALBUMIN SERPL ELPH-MCNC: 4.3 G/DL
ALP BLD-CCNC: 79 U/L
ALT SERPL-CCNC: 11 U/L
ANION GAP SERPL CALC-SCNC: 16 MMOL/L
AST SERPL-CCNC: 13 U/L
BASOPHILS # BLD AUTO: 0.02 K/UL
BASOPHILS NFR BLD AUTO: 0.3 %
BILIRUB SERPL-MCNC: 0.3 MG/DL
BUN SERPL-MCNC: 30 MG/DL
CALCIUM SERPL-MCNC: 10 MG/DL
CHLORIDE SERPL-SCNC: 106 MMOL/L
CHOLEST SERPL-MCNC: 108 MG/DL
CHOLEST/HDLC SERPL: 2.9 RATIO
CO2 SERPL-SCNC: 21 MMOL/L
CREAT SERPL-MCNC: 1.35 MG/DL
EOSINOPHIL # BLD AUTO: 0.15 K/UL
EOSINOPHIL NFR BLD AUTO: 2.1 %
HBA1C MFR BLD HPLC: 8 %
HCT VFR BLD CALC: 34.3 %
HDLC SERPL-MCNC: 37 MG/DL
HGB BLD-MCNC: 10.8 G/DL
IMM GRANULOCYTES NFR BLD AUTO: 0.1 %
LDLC SERPL CALC-MCNC: 41 MG/DL
LYMPHOCYTES # BLD AUTO: 2.15 K/UL
LYMPHOCYTES NFR BLD AUTO: 30 %
MAN DIFF?: NORMAL
MCHC RBC-ENTMCNC: 28.5 PG
MCHC RBC-ENTMCNC: 31.5 GM/DL
MCV RBC AUTO: 90.5 FL
MONOCYTES # BLD AUTO: 0.4 K/UL
MONOCYTES NFR BLD AUTO: 5.6 %
NEUTROPHILS # BLD AUTO: 4.43 K/UL
NEUTROPHILS NFR BLD AUTO: 61.9 %
PLATELET # BLD AUTO: 210 K/UL
POTASSIUM SERPL-SCNC: 6 MMOL/L
PROT SERPL-MCNC: 6.6 G/DL
RBC # BLD: 3.79 M/UL
RBC # FLD: 15.5 %
SODIUM SERPL-SCNC: 143 MMOL/L
TRIGL SERPL-MCNC: 148 MG/DL
URATE SERPL-MCNC: 7 MG/DL
WBC # FLD AUTO: 7.16 K/UL

## 2018-08-15 LAB
25(OH)D3 SERPL-MCNC: 46 NG/ML
FOLATE SERPL-MCNC: >20 NG/ML
TSH SERPL-ACNC: 2.03 UIU/ML
VIT B12 SERPL-MCNC: >2000 PG/ML

## 2018-09-13 ENCOUNTER — OTHER (OUTPATIENT)
Age: 78
End: 2018-09-13

## 2018-10-04 ENCOUNTER — RX RENEWAL (OUTPATIENT)
Age: 78
End: 2018-10-04

## 2018-10-12 ENCOUNTER — APPOINTMENT (OUTPATIENT)
Dept: HOME HEALTH SERVICES | Facility: HOME HEALTH | Age: 78
End: 2018-10-12
Payer: MEDICARE

## 2018-10-12 VITALS
OXYGEN SATURATION: 96 % | RESPIRATION RATE: 16 BRPM | HEIGHT: 64 IN | DIASTOLIC BLOOD PRESSURE: 60 MMHG | SYSTOLIC BLOOD PRESSURE: 118 MMHG | BODY MASS INDEX: 37.56 KG/M2 | HEART RATE: 79 BPM | WEIGHT: 220 LBS | TEMPERATURE: 98.3 F

## 2018-10-12 PROCEDURE — G0008: CPT

## 2018-10-12 PROCEDURE — 99349 HOME/RES VST EST MOD MDM 40: CPT | Mod: 25

## 2018-10-12 PROCEDURE — 90662 IIV NO PRSV INCREASED AG IM: CPT

## 2018-10-17 LAB
25(OH)D3 SERPL-MCNC: 30.6 NG/ML
ALBUMIN SERPL ELPH-MCNC: 4.2 G/DL
ALP BLD-CCNC: 74 U/L
ALT SERPL-CCNC: 13 U/L
ANION GAP SERPL CALC-SCNC: 14 MMOL/L
AST SERPL-CCNC: 12 U/L
BASOPHILS # BLD AUTO: 0.01 K/UL
BASOPHILS NFR BLD AUTO: 0.1 %
BILIRUB SERPL-MCNC: <0.2 MG/DL
BUN SERPL-MCNC: 42 MG/DL
CALCIUM SERPL-MCNC: 9.9 MG/DL
CHLORIDE SERPL-SCNC: 106 MMOL/L
CHOLEST SERPL-MCNC: 119 MG/DL
CHOLEST/HDLC SERPL: 2.8 RATIO
CO2 SERPL-SCNC: 20 MMOL/L
CREAT SERPL-MCNC: 1.35 MG/DL
EOSINOPHIL # BLD AUTO: 0.12 K/UL
EOSINOPHIL NFR BLD AUTO: 1.2 %
FOLATE SERPL-MCNC: >20 NG/ML
HBA1C MFR BLD HPLC: 7.5 %
HCT VFR BLD CALC: 32.2 %
HDLC SERPL-MCNC: 42 MG/DL
HGB BLD-MCNC: 10 G/DL
IMM GRANULOCYTES NFR BLD AUTO: 0.1 %
LDLC SERPL CALC-MCNC: 46 MG/DL
LYMPHOCYTES # BLD AUTO: 1.55 K/UL
LYMPHOCYTES NFR BLD AUTO: 15.8 %
MAN DIFF?: NORMAL
MCHC RBC-ENTMCNC: 29 PG
MCHC RBC-ENTMCNC: 31.1 GM/DL
MCV RBC AUTO: 93.3 FL
MONOCYTES # BLD AUTO: 0.52 K/UL
MONOCYTES NFR BLD AUTO: 5.3 %
NEUTROPHILS # BLD AUTO: 7.6 K/UL
NEUTROPHILS NFR BLD AUTO: 77.5 %
PLATELET # BLD AUTO: 186 K/UL
POTASSIUM SERPL-SCNC: 5.6 MMOL/L
PROT SERPL-MCNC: 6.9 G/DL
RBC # BLD: 3.45 M/UL
RBC # FLD: 14.9 %
SODIUM SERPL-SCNC: 140 MMOL/L
TRIGL SERPL-MCNC: 153 MG/DL
TSH SERPL-ACNC: 1.44 UIU/ML
VIT B12 SERPL-MCNC: >2000 PG/ML
WBC # FLD AUTO: 9.81 K/UL

## 2018-10-24 ENCOUNTER — OTHER (OUTPATIENT)
Age: 78
End: 2018-10-24

## 2018-10-26 NOTE — ED ADULT NURSE NOTE - NS ED NURSE LEVEL OF CONSCIOUSNESS ORIENTATION
AST 19 09/23/2018    ALT 41 09/23/2018     HgBA1c:    Lab Results   Component Value Date    LABA1C 5.2 09/22/2018     FLP:    Lab Results   Component Value Date    TRIG 78 09/22/2018    HDL 38 09/22/2018    LDLCALC 51 09/22/2018    LABVLDL 16 09/22/2018       MRI Brain:  Acute infarct involving the left superior cerebellar hemispheres.     Chronic infarcts involving the right cerebellar hemisphere. MRA:  1.  No significant abnormality . 2. Estimated stenosis by NASCET criteria is is not hemodynamically  significant    Echo: Moderately dilated left atrium. Agitated saline injected for shunt evaluation. There is evidence of a large extra-cardiac shunt (heavy saline contrast  shunting that is delayed suggesting an origin between the pulmonary  arteries and the pulmonary veins). Overall ejection fraction is normal .  I personally reviewed the patient's lab and imaging studies at this time.     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   Following at [facility] and planning closure soon   Remains on Eliuis without issues    Neurologically with an NIHSS 0     Patient Active Problem List   Diagnosis    Acute ischemic stroke [facility])    Asymptomatic bilateral carotid artery stenosis       Plan:     Follow at [facility] for cardiac    Will follow with me after vascular surgery in March    Call with continued issues     Russel Specking  2:12 PM  10/26/2018    I spent 45 minutes with the patient, with 50% or more counseling them on their diagnosis and treatment options Oriented - self; Oriented - place; Oriented - time

## 2018-11-28 ENCOUNTER — RX RENEWAL (OUTPATIENT)
Age: 78
End: 2018-11-28

## 2018-12-09 PROBLEM — Z87.828 HISTORY OF BURNS: Status: RESOLVED | Noted: 2017-08-02 | Resolved: 2018-12-09

## 2018-12-10 ENCOUNTER — APPOINTMENT (OUTPATIENT)
Dept: HOME HEALTH SERVICES | Facility: HOME HEALTH | Age: 78
End: 2018-12-10
Payer: MEDICARE

## 2018-12-10 VITALS
HEART RATE: 83 BPM | RESPIRATION RATE: 14 BRPM | SYSTOLIC BLOOD PRESSURE: 123 MMHG | DIASTOLIC BLOOD PRESSURE: 54 MMHG | TEMPERATURE: 97.7 F | OXYGEN SATURATION: 98 %

## 2018-12-10 DIAGNOSIS — Z87.828 PERSONAL HISTORY OF OTHER (HEALED) PHYSICAL INJURY AND TRAUMA: ICD-10-CM

## 2018-12-10 DIAGNOSIS — Z23 ENCOUNTER FOR IMMUNIZATION: ICD-10-CM

## 2018-12-10 PROCEDURE — 99350 HOME/RES VST EST HIGH MDM 60: CPT

## 2018-12-10 RX ORDER — SILVER SULFADIAZINE 10 MG/G
1 CREAM TOPICAL DAILY
Qty: 1 | Refills: 0 | Status: DISCONTINUED | COMMUNITY
Start: 2017-08-02 | End: 2018-12-10

## 2018-12-14 RX ORDER — LIRAGLUTIDE 6 MG/ML
18 INJECTION SUBCUTANEOUS
Qty: 1 | Refills: 0 | Status: DISCONTINUED | COMMUNITY
Start: 2018-12-10 | End: 2018-12-14

## 2019-01-24 ENCOUNTER — APPOINTMENT (OUTPATIENT)
Dept: HOME HEALTH SERVICES | Facility: HOME HEALTH | Age: 79
End: 2019-01-24

## 2019-04-24 ENCOUNTER — LABORATORY RESULT (OUTPATIENT)
Age: 79
End: 2019-04-24

## 2019-04-24 ENCOUNTER — APPOINTMENT (OUTPATIENT)
Dept: HOME HEALTH SERVICES | Facility: HOME HEALTH | Age: 79
End: 2019-04-24
Payer: MEDICARE

## 2019-04-24 VITALS
HEART RATE: 79 BPM | TEMPERATURE: 96 F | SYSTOLIC BLOOD PRESSURE: 124 MMHG | RESPIRATION RATE: 14 BRPM | HEIGHT: 64 IN | BODY MASS INDEX: 37.56 KG/M2 | DIASTOLIC BLOOD PRESSURE: 82 MMHG | WEIGHT: 220 LBS | OXYGEN SATURATION: 99 %

## 2019-04-24 DIAGNOSIS — E78.5 HYPERLIPIDEMIA, UNSPECIFIED: ICD-10-CM

## 2019-04-24 PROCEDURE — 99350 HOME/RES VST EST HIGH MDM 60: CPT | Mod: 25

## 2019-04-24 PROCEDURE — 99497 ADVNCD CARE PLAN 30 MIN: CPT

## 2019-04-24 RX ORDER — COLLAGENASE SANTYL 250 [ARB'U]/G
250 OINTMENT TOPICAL
Qty: 30 | Refills: 0 | Status: DISCONTINUED | COMMUNITY
Start: 2017-12-16 | End: 2019-04-24

## 2019-04-24 RX ORDER — ELECTROLYTES/DEXTROSE
31G X 8 MM SOLUTION, ORAL ORAL
Qty: 1 | Refills: 2 | Status: DISCONTINUED | COMMUNITY
Start: 2018-12-10 | End: 2019-04-24

## 2019-04-25 ENCOUNTER — TRANSCRIPTION ENCOUNTER (OUTPATIENT)
Age: 79
End: 2019-04-25

## 2019-04-25 ENCOUNTER — LABORATORY RESULT (OUTPATIENT)
Age: 79
End: 2019-04-25

## 2019-04-26 ENCOUNTER — MOBILE ON CALL (OUTPATIENT)
Age: 79
End: 2019-04-26

## 2019-04-30 LAB
ALBUMIN SERPL ELPH-MCNC: 3.9 G/DL
ANION GAP SERPL CALC-SCNC: 16 MMOL/L
BUN SERPL-MCNC: 56 MG/DL
CALCIUM SERPL-MCNC: 8.9 MG/DL
CHLORIDE SERPL-SCNC: 108 MMOL/L
CO2 SERPL-SCNC: 18 MMOL/L
CREAT SERPL-MCNC: 2.64 MG/DL
GLUCOSE SERPL-MCNC: 113 MG/DL
PHOSPHATE SERPL-MCNC: 4.2 MG/DL
POTASSIUM SERPL-SCNC: 5.2 MMOL/L
POTASSIUM SERPL-SCNC: 5.4 MMOL/L
SODIUM SERPL-SCNC: 141 MMOL/L

## 2019-05-01 LAB
ALBUMIN SERPL ELPH-MCNC: 4.2 G/DL
ANION GAP SERPL CALC-SCNC: 14 MMOL/L
BASOPHILS # BLD AUTO: 0 K/UL
BASOPHILS NFR BLD AUTO: 0 %
BUN SERPL-MCNC: 44 MG/DL
CALCIUM SERPL-MCNC: 10.1 MG/DL
CHLORIDE SERPL-SCNC: 109 MMOL/L
CHOLEST SERPL-MCNC: 114 MG/DL
CHOLEST/HDLC SERPL: 2.9 RATIO
CO2 SERPL-SCNC: 17 MMOL/L
CREAT SERPL-MCNC: 1.98 MG/DL
EOSINOPHIL # BLD AUTO: 0.18 K/UL
EOSINOPHIL NFR BLD AUTO: 1.8 %
ESTIMATED AVERAGE GLUCOSE: 126 MG/DL
GLUCOSE SERPL-MCNC: 84 MG/DL
HBA1C MFR BLD HPLC: 6 %
HCT VFR BLD CALC: 34.5 %
HDLC SERPL-MCNC: 39 MG/DL
HGB BLD-MCNC: 9.8 G/DL
LDLC SERPL CALC-MCNC: 49 MG/DL
LYMPHOCYTES # BLD AUTO: 2.04 K/UL
LYMPHOCYTES NFR BLD AUTO: 20.2 %
MAN DIFF?: NORMAL
MCHC RBC-ENTMCNC: 26.8 PG
MCHC RBC-ENTMCNC: 28.4 GM/DL
MCV RBC AUTO: 94.3 FL
MONOCYTES # BLD AUTO: 0.26 K/UL
MONOCYTES NFR BLD AUTO: 2.6 %
NEUTROPHILS # BLD AUTO: 7.63 K/UL
NEUTROPHILS NFR BLD AUTO: 75.4 %
PHOSPHATE SERPL-MCNC: 4.6 MG/DL
PLATELET # BLD AUTO: 194 K/UL
POTASSIUM SERPL-SCNC: 6.4 MMOL/L
RBC # BLD: 3.66 M/UL
RBC # FLD: 14.9 %
SODIUM SERPL-SCNC: 140 MMOL/L
TRIGL SERPL-MCNC: 130 MG/DL
WBC # FLD AUTO: 10.12 K/UL

## 2019-05-02 ENCOUNTER — MEDICATION RENEWAL (OUTPATIENT)
Age: 79
End: 2019-05-02

## 2019-05-02 LAB
ALBUMIN SERPL ELPH-MCNC: 4 G/DL
ALP BLD-CCNC: 93 U/L
ALT SERPL-CCNC: 15 U/L
ANION GAP SERPL CALC-SCNC: 11 MMOL/L
AST SERPL-CCNC: 15 U/L
BILIRUB SERPL-MCNC: 0.2 MG/DL
BUN SERPL-MCNC: 51 MG/DL
CALCIUM SERPL-MCNC: 9.2 MG/DL
CHLORIDE SERPL-SCNC: 108 MMOL/L
CO2 SERPL-SCNC: 18 MMOL/L
CREAT SERPL-MCNC: 2.41 MG/DL
GLUCOSE SERPL-MCNC: 72 MG/DL
POTASSIUM SERPL-SCNC: 5.2 MMOL/L
PROT SERPL-MCNC: 6.4 G/DL
SODIUM SERPL-SCNC: 137 MMOL/L

## 2019-05-03 ENCOUNTER — TRANSCRIPTION ENCOUNTER (OUTPATIENT)
Age: 79
End: 2019-05-03

## 2019-05-03 LAB
C DIFF TOX GENS STL QL NAA+PROBE: NORMAL
CDIFF BY PCR: NOT DETECTED

## 2019-05-05 LAB — BACTERIA STL CULT: NORMAL

## 2019-05-08 LAB
ALBUMIN SERPL ELPH-MCNC: 3.6 G/DL
ALP BLD-CCNC: 93 U/L
ALT SERPL-CCNC: 11 U/L
ANION GAP SERPL CALC-SCNC: 16 MMOL/L
AST SERPL-CCNC: 15 U/L
BILIRUB SERPL-MCNC: <0.2 MG/DL
BUN SERPL-MCNC: 44 MG/DL
CALCIUM SERPL-MCNC: 9.1 MG/DL
CHLORIDE SERPL-SCNC: 106 MMOL/L
CO2 SERPL-SCNC: 16 MMOL/L
CREAT SERPL-MCNC: 2.52 MG/DL
GLUCOSE SERPL-MCNC: 260 MG/DL
POTASSIUM SERPL-SCNC: 4.6 MMOL/L
PROT SERPL-MCNC: 6.4 G/DL
SODIUM SERPL-SCNC: 138 MMOL/L

## 2019-05-15 ENCOUNTER — MEDICATION RENEWAL (OUTPATIENT)
Age: 79
End: 2019-05-15

## 2019-05-15 LAB
ALBUMIN SERPL ELPH-MCNC: 3.7 G/DL
ALP BLD-CCNC: 80 U/L
ALT SERPL-CCNC: 18 U/L
ANION GAP SERPL CALC-SCNC: 13 MMOL/L
AST SERPL-CCNC: 14 U/L
BILIRUB SERPL-MCNC: 0.2 MG/DL
BUN SERPL-MCNC: 35 MG/DL
CALCIUM SERPL-MCNC: 9 MG/DL
CHLORIDE SERPL-SCNC: 109 MMOL/L
CO2 SERPL-SCNC: 21 MMOL/L
CREAT SERPL-MCNC: 1.89 MG/DL
ESTIMATED AVERAGE GLUCOSE: 143 MG/DL
GLUCOSE SERPL-MCNC: 133 MG/DL
HBA1C MFR BLD HPLC: 6.6 %
POTASSIUM SERPL-SCNC: 4.9 MMOL/L
PROT SERPL-MCNC: 6.2 G/DL
SODIUM SERPL-SCNC: 143 MMOL/L

## 2019-05-29 ENCOUNTER — RESULT REVIEW (OUTPATIENT)
Age: 79
End: 2019-05-29

## 2019-05-29 LAB
ANION GAP SERPL CALC-SCNC: 13 MMOL/L
BUN SERPL-MCNC: 33 MG/DL
CALCIUM SERPL-MCNC: 8.8 MG/DL
CHLORIDE SERPL-SCNC: 106 MMOL/L
CO2 SERPL-SCNC: 24 MMOL/L
CREAT SERPL-MCNC: 1.53 MG/DL
GLUCOSE SERPL-MCNC: 145 MG/DL
POTASSIUM SERPL-SCNC: 4.3 MMOL/L
SODIUM SERPL-SCNC: 142 MMOL/L

## 2019-06-03 ENCOUNTER — APPOINTMENT (OUTPATIENT)
Dept: HOME HEALTH SERVICES | Facility: HOME HEALTH | Age: 79
End: 2019-06-03

## 2019-06-19 LAB
ANION GAP SERPL CALC-SCNC: 12 MMOL/L
BUN SERPL-MCNC: 31 MG/DL
CALCIUM SERPL-MCNC: 9.4 MG/DL
CHLORIDE SERPL-SCNC: 110 MMOL/L
CO2 SERPL-SCNC: 24 MMOL/L
CREAT SERPL-MCNC: 1.5 MG/DL
ESTIMATED AVERAGE GLUCOSE: 157 MG/DL
GLUCOSE SERPL-MCNC: 37 MG/DL
HBA1C MFR BLD HPLC: 7.1 %
POTASSIUM SERPL-SCNC: 4.9 MMOL/L
SODIUM SERPL-SCNC: 146 MMOL/L

## 2019-06-20 ENCOUNTER — MEDICATION RENEWAL (OUTPATIENT)
Age: 79
End: 2019-06-20

## 2019-06-24 ENCOUNTER — MEDICATION RENEWAL (OUTPATIENT)
Age: 79
End: 2019-06-24

## 2019-08-07 ENCOUNTER — RX RENEWAL (OUTPATIENT)
Age: 79
End: 2019-08-07

## 2019-08-22 ENCOUNTER — APPOINTMENT (OUTPATIENT)
Dept: HOME HEALTH SERVICES | Facility: HOME HEALTH | Age: 79
End: 2019-08-22
Payer: MEDICARE

## 2019-08-22 VITALS
RESPIRATION RATE: 14 BRPM | SYSTOLIC BLOOD PRESSURE: 144 MMHG | TEMPERATURE: 96.9 F | OXYGEN SATURATION: 95 % | DIASTOLIC BLOOD PRESSURE: 60 MMHG | HEART RATE: 66 BPM

## 2019-08-22 PROCEDURE — 99350 HOME/RES VST EST HIGH MDM 60: CPT

## 2019-08-22 RX ORDER — METRONIDAZOLE 500 MG/1
500 TABLET ORAL
Qty: 21 | Refills: 0 | Status: DISCONTINUED | COMMUNITY
Start: 2019-04-30 | End: 2019-08-22

## 2019-08-22 RX ORDER — CIPROFLOXACIN HYDROCHLORIDE 250 MG/1
250 TABLET, FILM COATED ORAL DAILY
Qty: 14 | Refills: 0 | Status: DISCONTINUED | COMMUNITY
Start: 2019-04-30 | End: 2019-08-22

## 2019-08-23 LAB
ANION GAP SERPL CALC-SCNC: 15 MMOL/L
BUN SERPL-MCNC: 33 MG/DL
CALCIUM SERPL-MCNC: 9.5 MG/DL
CHLORIDE SERPL-SCNC: 111 MMOL/L
CO2 SERPL-SCNC: 20 MMOL/L
CREAT SERPL-MCNC: 1.45 MG/DL
GLUCOSE SERPL-MCNC: 178 MG/DL
POTASSIUM SERPL-SCNC: 4.8 MMOL/L
SODIUM SERPL-SCNC: 146 MMOL/L

## 2019-08-29 ENCOUNTER — CLINICAL ADVICE (OUTPATIENT)
Age: 79
End: 2019-08-29

## 2019-08-29 ENCOUNTER — LABORATORY RESULT (OUTPATIENT)
Age: 79
End: 2019-08-29

## 2019-09-06 ENCOUNTER — APPOINTMENT (OUTPATIENT)
Dept: HOME HEALTH SERVICES | Facility: HOME HEALTH | Age: 79
End: 2019-09-06

## 2019-10-10 ENCOUNTER — APPOINTMENT (OUTPATIENT)
Dept: HOME HEALTH SERVICES | Facility: HOME HEALTH | Age: 79
End: 2019-10-10
Payer: MEDICARE

## 2019-10-10 VITALS
OXYGEN SATURATION: 96 % | SYSTOLIC BLOOD PRESSURE: 126 MMHG | HEART RATE: 75 BPM | DIASTOLIC BLOOD PRESSURE: 72 MMHG | TEMPERATURE: 97.4 F | RESPIRATION RATE: 14 BRPM

## 2019-10-10 PROCEDURE — 99350 HOME/RES VST EST HIGH MDM 60: CPT | Mod: 25

## 2019-10-10 PROCEDURE — G0008: CPT

## 2019-10-10 PROCEDURE — 90686 IIV4 VACC NO PRSV 0.5 ML IM: CPT

## 2019-10-11 ENCOUNTER — RX RENEWAL (OUTPATIENT)
Age: 79
End: 2019-10-11

## 2019-10-29 ENCOUNTER — RX RENEWAL (OUTPATIENT)
Age: 79
End: 2019-10-29

## 2019-12-04 ENCOUNTER — APPOINTMENT (OUTPATIENT)
Dept: HOME HEALTH SERVICES | Facility: HOME HEALTH | Age: 79
End: 2019-12-04

## 2019-12-05 ENCOUNTER — APPOINTMENT (OUTPATIENT)
Dept: HOME HEALTH SERVICES | Facility: HOME HEALTH | Age: 79
End: 2019-12-05

## 2019-12-17 ENCOUNTER — LABORATORY RESULT (OUTPATIENT)
Age: 79
End: 2019-12-17

## 2019-12-18 LAB
ALBUMIN SERPL ELPH-MCNC: 3.8 G/DL
ALP BLD-CCNC: 107 U/L
ALT SERPL-CCNC: 14 U/L
ANION GAP SERPL CALC-SCNC: 18 MMOL/L
AST SERPL-CCNC: 17 U/L
BASOPHILS # BLD AUTO: 0.04 K/UL
BASOPHILS NFR BLD AUTO: 0.4 %
BILIRUB SERPL-MCNC: 0.2 MG/DL
BUN SERPL-MCNC: 31 MG/DL
CALCIUM SERPL-MCNC: 9.3 MG/DL
CHLORIDE SERPL-SCNC: 107 MMOL/L
CO2 SERPL-SCNC: 17 MMOL/L
CREAT SERPL-MCNC: 1.54 MG/DL
EOSINOPHIL # BLD AUTO: 0.18 K/UL
EOSINOPHIL NFR BLD AUTO: 1.8 %
HCT VFR BLD CALC: 34.3 %
HGB BLD-MCNC: 10.5 G/DL
IMM GRANULOCYTES NFR BLD AUTO: 0.1 %
LYMPHOCYTES # BLD AUTO: 1.75 K/UL
LYMPHOCYTES NFR BLD AUTO: 17.6 %
MAN DIFF?: NORMAL
MCHC RBC-ENTMCNC: 27.9 PG
MCHC RBC-ENTMCNC: 30.6 GM/DL
MCV RBC AUTO: 91 FL
MONOCYTES # BLD AUTO: 0.53 K/UL
MONOCYTES NFR BLD AUTO: 5.3 %
NEUTROPHILS # BLD AUTO: 7.45 K/UL
NEUTROPHILS NFR BLD AUTO: 74.8 %
PLATELET # BLD AUTO: NORMAL K/UL
POTASSIUM SERPL-SCNC: 5.2 MMOL/L
PROT SERPL-MCNC: 6.2 G/DL
RBC # BLD: 3.77 M/UL
RBC # FLD: 14.9 %
SODIUM SERPL-SCNC: 142 MMOL/L
WBC # FLD AUTO: 9.96 K/UL

## 2020-01-22 ENCOUNTER — APPOINTMENT (OUTPATIENT)
Dept: HOME HEALTH SERVICES | Facility: HOME HEALTH | Age: 80
End: 2020-01-22
Payer: MEDICARE

## 2020-01-22 VITALS
RESPIRATION RATE: 14 BRPM | TEMPERATURE: 97 F | SYSTOLIC BLOOD PRESSURE: 142 MMHG | HEART RATE: 68 BPM | OXYGEN SATURATION: 92 % | DIASTOLIC BLOOD PRESSURE: 78 MMHG

## 2020-01-22 DIAGNOSIS — H40.9 UNSPECIFIED GLAUCOMA: ICD-10-CM

## 2020-01-22 DIAGNOSIS — E66.9 OBESITY, UNSPECIFIED: ICD-10-CM

## 2020-01-22 PROCEDURE — 99350 HOME/RES VST EST HIGH MDM 60: CPT

## 2020-01-23 ENCOUNTER — LABORATORY RESULT (OUTPATIENT)
Age: 80
End: 2020-01-23

## 2020-01-31 ENCOUNTER — LABORATORY RESULT (OUTPATIENT)
Age: 80
End: 2020-01-31

## 2020-02-12 ENCOUNTER — APPOINTMENT (OUTPATIENT)
Dept: HOME HEALTH SERVICES | Facility: HOME HEALTH | Age: 80
End: 2020-02-12

## 2020-03-05 DIAGNOSIS — R26.2 DIFFICULTY IN WALKING, NOT ELSEWHERE CLASSIFIED: ICD-10-CM

## 2020-03-06 LAB
ANION GAP SERPL CALC-SCNC: 16 MMOL/L
BUN SERPL-MCNC: 36 MG/DL
CALCIUM SERPL-MCNC: 9.5 MG/DL
CHLORIDE SERPL-SCNC: 105 MMOL/L
CO2 SERPL-SCNC: 23 MMOL/L
CREAT SERPL-MCNC: 1.59 MG/DL
ESTIMATED AVERAGE GLUCOSE: 232 MG/DL
GLUCOSE SERPL-MCNC: 126 MG/DL
HBA1C MFR BLD HPLC: 9.7 %
POTASSIUM SERPL-SCNC: 5.1 MMOL/L
POTASSIUM SERPL-SCNC: 5.2 MMOL/L
SODIUM SERPL-SCNC: 144 MMOL/L
TSH SERPL-ACNC: 2.91 UIU/ML

## 2020-04-15 ENCOUNTER — APPOINTMENT (OUTPATIENT)
Dept: HOME HEALTH SERVICES | Facility: HOME HEALTH | Age: 80
End: 2020-04-15

## 2020-04-15 DIAGNOSIS — R09.82 POSTNASAL DRIP: ICD-10-CM

## 2020-05-04 ENCOUNTER — APPOINTMENT (OUTPATIENT)
Dept: HOME HEALTH SERVICES | Facility: HOME HEALTH | Age: 80
End: 2020-05-04

## 2020-05-20 ENCOUNTER — RX RENEWAL (OUTPATIENT)
Age: 80
End: 2020-05-20

## 2020-06-25 ENCOUNTER — RX RENEWAL (OUTPATIENT)
Age: 80
End: 2020-06-25

## 2020-07-15 ENCOUNTER — APPOINTMENT (OUTPATIENT)
Dept: HOME HEALTH SERVICES | Facility: HOME HEALTH | Age: 80
End: 2020-07-15

## 2020-07-15 ENCOUNTER — RX RENEWAL (OUTPATIENT)
Age: 80
End: 2020-07-15

## 2020-07-30 ENCOUNTER — APPOINTMENT (OUTPATIENT)
Dept: HOME HEALTH SERVICES | Facility: HOME HEALTH | Age: 80
End: 2020-07-30

## 2020-09-02 ENCOUNTER — APPOINTMENT (OUTPATIENT)
Dept: HOME HEALTH SERVICES | Facility: HOME HEALTH | Age: 80
End: 2020-09-02

## 2020-09-10 ENCOUNTER — RX RENEWAL (OUTPATIENT)
Age: 80
End: 2020-09-10

## 2020-09-16 ENCOUNTER — INPATIENT (INPATIENT)
Facility: HOSPITAL | Age: 80
LOS: 8 days | Discharge: NURSING, REHAB OR LONG-TERM CARE | End: 2020-09-25
Attending: HOSPITALIST | Admitting: HOSPITALIST
Payer: MEDICARE

## 2020-09-16 VITALS
DIASTOLIC BLOOD PRESSURE: 68 MMHG | TEMPERATURE: 98 F | OXYGEN SATURATION: 100 % | HEART RATE: 80 BPM | RESPIRATION RATE: 16 BRPM | SYSTOLIC BLOOD PRESSURE: 178 MMHG | HEIGHT: 64 IN

## 2020-09-16 DIAGNOSIS — Z02.9 ENCOUNTER FOR ADMINISTRATIVE EXAMINATIONS, UNSPECIFIED: ICD-10-CM

## 2020-09-16 DIAGNOSIS — I73.9 PERIPHERAL VASCULAR DISEASE, UNSPECIFIED: ICD-10-CM

## 2020-09-16 DIAGNOSIS — I10 ESSENTIAL (PRIMARY) HYPERTENSION: ICD-10-CM

## 2020-09-16 DIAGNOSIS — Z89.429 ACQUIRED ABSENCE OF OTHER TOE(S), UNSPECIFIED SIDE: Chronic | ICD-10-CM

## 2020-09-16 DIAGNOSIS — H40.9 UNSPECIFIED GLAUCOMA: ICD-10-CM

## 2020-09-16 DIAGNOSIS — E11.69 TYPE 2 DIABETES MELLITUS WITH OTHER SPECIFIED COMPLICATION: ICD-10-CM

## 2020-09-16 DIAGNOSIS — Z29.9 ENCOUNTER FOR PROPHYLACTIC MEASURES, UNSPECIFIED: ICD-10-CM

## 2020-09-16 DIAGNOSIS — Z98.49 CATARACT EXTRACTION STATUS, UNSPECIFIED EYE: Chronic | ICD-10-CM

## 2020-09-16 DIAGNOSIS — N17.9 ACUTE KIDNEY FAILURE, UNSPECIFIED: ICD-10-CM

## 2020-09-16 DIAGNOSIS — E11.621 TYPE 2 DIABETES MELLITUS WITH FOOT ULCER: ICD-10-CM

## 2020-09-16 DIAGNOSIS — L97.509 NON-PRESSURE CHRONIC ULCER OF OTHER PART OF UNSPECIFIED FOOT WITH UNSPECIFIED SEVERITY: ICD-10-CM

## 2020-09-16 DIAGNOSIS — R09.89 OTHER SPECIFIED SYMPTOMS AND SIGNS INVOLVING THE CIRCULATORY AND RESPIRATORY SYSTEMS: ICD-10-CM

## 2020-09-16 DIAGNOSIS — E78.00 PURE HYPERCHOLESTEROLEMIA, UNSPECIFIED: ICD-10-CM

## 2020-09-16 LAB
ALBUMIN SERPL ELPH-MCNC: 3.5 G/DL — SIGNIFICANT CHANGE UP (ref 3.3–5)
ALP SERPL-CCNC: 123 U/L — HIGH (ref 40–120)
ALT FLD-CCNC: 17 U/L — SIGNIFICANT CHANGE UP (ref 4–33)
ANION GAP SERPL CALC-SCNC: 10 MMO/L — SIGNIFICANT CHANGE UP (ref 7–14)
ANION GAP SERPL CALC-SCNC: 11 MMO/L — SIGNIFICANT CHANGE UP (ref 7–14)
APTT BLD: 21.2 SEC — LOW (ref 27–36.3)
AST SERPL-CCNC: 26 U/L — SIGNIFICANT CHANGE UP (ref 4–32)
BASE EXCESS BLDV CALC-SCNC: -0.1 MMOL/L — SIGNIFICANT CHANGE UP
BASE EXCESS BLDV CALC-SCNC: -0.2 MMOL/L — SIGNIFICANT CHANGE UP
BASOPHILS # BLD AUTO: 0.05 K/UL — SIGNIFICANT CHANGE UP (ref 0–0.2)
BASOPHILS NFR BLD AUTO: 0.4 % — SIGNIFICANT CHANGE UP (ref 0–2)
BILIRUB SERPL-MCNC: 0.3 MG/DL — SIGNIFICANT CHANGE UP (ref 0.2–1.2)
BLD GP AB SCN SERPL QL: NEGATIVE — SIGNIFICANT CHANGE UP
BLOOD GAS VENOUS - CREATININE: 1.49 MG/DL — HIGH (ref 0.5–1.3)
BLOOD GAS VENOUS - CREATININE: SIGNIFICANT CHANGE UP MG/DL (ref 0.5–1.3)
BUN SERPL-MCNC: 31 MG/DL — HIGH (ref 7–23)
BUN SERPL-MCNC: 33 MG/DL — HIGH (ref 7–23)
CALCIUM SERPL-MCNC: 9.4 MG/DL — SIGNIFICANT CHANGE UP (ref 8.4–10.5)
CALCIUM SERPL-MCNC: 9.7 MG/DL — SIGNIFICANT CHANGE UP (ref 8.4–10.5)
CHLORIDE BLDV-SCNC: 107 MMOL/L — SIGNIFICANT CHANGE UP (ref 96–108)
CHLORIDE BLDV-SCNC: 109 MMOL/L — HIGH (ref 96–108)
CHLORIDE SERPL-SCNC: 103 MMOL/L — SIGNIFICANT CHANGE UP (ref 98–107)
CHLORIDE SERPL-SCNC: 104 MMOL/L — SIGNIFICANT CHANGE UP (ref 98–107)
CO2 SERPL-SCNC: 21 MMOL/L — LOW (ref 22–31)
CO2 SERPL-SCNC: 22 MMOL/L — SIGNIFICANT CHANGE UP (ref 22–31)
CREAT SERPL-MCNC: 1.4 MG/DL — HIGH (ref 0.5–1.3)
CREAT SERPL-MCNC: 1.42 MG/DL — HIGH (ref 0.5–1.3)
CRP SERPL-MCNC: 80.9 MG/L — HIGH
EOSINOPHIL # BLD AUTO: 0.15 K/UL — SIGNIFICANT CHANGE UP (ref 0–0.5)
EOSINOPHIL NFR BLD AUTO: 1.3 % — SIGNIFICANT CHANGE UP (ref 0–6)
GAS PNL BLDV: 131 MMOL/L — LOW (ref 136–146)
GAS PNL BLDV: 139 MMOL/L — SIGNIFICANT CHANGE UP (ref 136–146)
GLUCOSE BLDC GLUCOMTR-MCNC: 150 MG/DL — HIGH (ref 70–99)
GLUCOSE BLDC GLUCOMTR-MCNC: 169 MG/DL — HIGH (ref 70–99)
GLUCOSE BLDC GLUCOMTR-MCNC: 205 MG/DL — HIGH (ref 70–99)
GLUCOSE BLDC GLUCOMTR-MCNC: 258 MG/DL — HIGH (ref 70–99)
GLUCOSE BLDV-MCNC: 282 MG/DL — HIGH (ref 70–99)
GLUCOSE BLDV-MCNC: 319 MG/DL — HIGH (ref 70–99)
GLUCOSE SERPL-MCNC: 301 MG/DL — HIGH (ref 70–99)
GLUCOSE SERPL-MCNC: 307 MG/DL — HIGH (ref 70–99)
HCO3 BLDV-SCNC: 23 MMOL/L — SIGNIFICANT CHANGE UP (ref 20–27)
HCO3 BLDV-SCNC: 23 MMOL/L — SIGNIFICANT CHANGE UP (ref 20–27)
HCT VFR BLD CALC: 37.7 % — SIGNIFICANT CHANGE UP (ref 34.5–45)
HCT VFR BLDV CALC: 33 % — LOW (ref 34.5–45)
HCT VFR BLDV CALC: 34.1 % — LOW (ref 34.5–45)
HGB BLD-MCNC: 11.3 G/DL — LOW (ref 11.5–15.5)
HGB BLDV-MCNC: 10.7 G/DL — LOW (ref 11.5–15.5)
HGB BLDV-MCNC: 11 G/DL — LOW (ref 11.5–15.5)
IMM GRANULOCYTES NFR BLD AUTO: 0.6 % — SIGNIFICANT CHANGE UP (ref 0–1.5)
INR BLD: 1.12 — SIGNIFICANT CHANGE UP (ref 0.88–1.16)
LACTATE BLDV-MCNC: 1.6 MMOL/L — SIGNIFICANT CHANGE UP (ref 0.5–2)
LACTATE BLDV-MCNC: 2.5 MMOL/L — HIGH (ref 0.5–2)
LYMPHOCYTES # BLD AUTO: 1.6 K/UL — SIGNIFICANT CHANGE UP (ref 1–3.3)
LYMPHOCYTES # BLD AUTO: 13.4 % — SIGNIFICANT CHANGE UP (ref 13–44)
MCHC RBC-ENTMCNC: 27.8 PG — SIGNIFICANT CHANGE UP (ref 27–34)
MCHC RBC-ENTMCNC: 30 % — LOW (ref 32–36)
MCV RBC AUTO: 92.6 FL — SIGNIFICANT CHANGE UP (ref 80–100)
MONOCYTES # BLD AUTO: 0.57 K/UL — SIGNIFICANT CHANGE UP (ref 0–0.9)
MONOCYTES NFR BLD AUTO: 4.8 % — SIGNIFICANT CHANGE UP (ref 2–14)
NEUTROPHILS # BLD AUTO: 9.53 K/UL — HIGH (ref 1.8–7.4)
NEUTROPHILS NFR BLD AUTO: 79.5 % — HIGH (ref 43–77)
NRBC # FLD: 0 K/UL — SIGNIFICANT CHANGE UP (ref 0–0)
PCO2 BLDV: 51 MMHG — SIGNIFICANT CHANGE UP (ref 41–51)
PCO2 BLDV: 59 MMHG — HIGH (ref 41–51)
PH BLDV: 7.27 PH — LOW (ref 7.32–7.43)
PH BLDV: 7.32 PH — SIGNIFICANT CHANGE UP (ref 7.32–7.43)
PLATELET # BLD AUTO: 223 K/UL — SIGNIFICANT CHANGE UP (ref 150–400)
PMV BLD: 11 FL — SIGNIFICANT CHANGE UP (ref 7–13)
PO2 BLDV: 26 MMHG — LOW (ref 35–40)
PO2 BLDV: 37 MMHG — SIGNIFICANT CHANGE UP (ref 35–40)
POTASSIUM BLDV-SCNC: 4.8 MMOL/L — HIGH (ref 3.4–4.5)
POTASSIUM BLDV-SCNC: 5.4 MMOL/L — HIGH (ref 3.4–4.5)
POTASSIUM SERPL-MCNC: 5.4 MMOL/L — HIGH (ref 3.5–5.3)
POTASSIUM SERPL-MCNC: 6.8 MMOL/L — CRITICAL HIGH (ref 3.5–5.3)
POTASSIUM SERPL-SCNC: 5.4 MMOL/L — HIGH (ref 3.5–5.3)
POTASSIUM SERPL-SCNC: 6.8 MMOL/L — CRITICAL HIGH (ref 3.5–5.3)
PROT SERPL-MCNC: 7.8 G/DL — SIGNIFICANT CHANGE UP (ref 6–8.3)
PROTHROM AB SERPL-ACNC: 12.8 SEC — SIGNIFICANT CHANGE UP (ref 10.6–13.6)
RBC # BLD: 4.07 M/UL — SIGNIFICANT CHANGE UP (ref 3.8–5.2)
RBC # FLD: 13.9 % — SIGNIFICANT CHANGE UP (ref 10.3–14.5)
RH IG SCN BLD-IMP: POSITIVE — SIGNIFICANT CHANGE UP
SAO2 % BLDV: 41.7 % — LOW (ref 60–85)
SAO2 % BLDV: 61.7 % — SIGNIFICANT CHANGE UP (ref 60–85)
SARS-COV-2 RNA SPEC QL NAA+PROBE: SIGNIFICANT CHANGE UP
SODIUM SERPL-SCNC: 135 MMOL/L — SIGNIFICANT CHANGE UP (ref 135–145)
SODIUM SERPL-SCNC: 136 MMOL/L — SIGNIFICANT CHANGE UP (ref 135–145)
WBC # BLD: 11.97 K/UL — HIGH (ref 3.8–10.5)
WBC # FLD AUTO: 11.97 K/UL — HIGH (ref 3.8–10.5)

## 2020-09-16 PROCEDURE — 93010 ELECTROCARDIOGRAM REPORT: CPT

## 2020-09-16 PROCEDURE — 99285 EMERGENCY DEPT VISIT HI MDM: CPT

## 2020-09-16 PROCEDURE — 71045 X-RAY EXAM CHEST 1 VIEW: CPT | Mod: 26

## 2020-09-16 PROCEDURE — 99223 1ST HOSP IP/OBS HIGH 75: CPT

## 2020-09-16 PROCEDURE — 73600 X-RAY EXAM OF ANKLE: CPT | Mod: 26,LT

## 2020-09-16 PROCEDURE — 73630 X-RAY EXAM OF FOOT: CPT | Mod: 26,LT

## 2020-09-16 RX ORDER — DEXTROSE 50 % IN WATER 50 %
15 SYRINGE (ML) INTRAVENOUS ONCE
Refills: 0 | Status: DISCONTINUED | OUTPATIENT
Start: 2020-09-16 | End: 2020-09-25

## 2020-09-16 RX ORDER — PIPERACILLIN AND TAZOBACTAM 4; .5 G/20ML; G/20ML
3.38 INJECTION, POWDER, LYOPHILIZED, FOR SOLUTION INTRAVENOUS ONCE
Refills: 0 | Status: COMPLETED | OUTPATIENT
Start: 2020-09-16 | End: 2020-09-16

## 2020-09-16 RX ORDER — INSULIN LISPRO 100/ML
VIAL (ML) SUBCUTANEOUS AT BEDTIME
Refills: 0 | Status: DISCONTINUED | OUTPATIENT
Start: 2020-09-16 | End: 2020-09-25

## 2020-09-16 RX ORDER — HEPARIN SODIUM 5000 [USP'U]/ML
5000 INJECTION INTRAVENOUS; SUBCUTANEOUS EVERY 8 HOURS
Refills: 0 | Status: DISCONTINUED | OUTPATIENT
Start: 2020-09-16 | End: 2020-09-25

## 2020-09-16 RX ORDER — SODIUM CHLORIDE 9 MG/ML
1000 INJECTION, SOLUTION INTRAVENOUS
Refills: 0 | Status: DISCONTINUED | OUTPATIENT
Start: 2020-09-16 | End: 2020-09-25

## 2020-09-16 RX ORDER — LABETALOL HCL 100 MG
200 TABLET ORAL ONCE
Refills: 0 | Status: COMPLETED | OUTPATIENT
Start: 2020-09-16 | End: 2020-09-16

## 2020-09-16 RX ORDER — LABETALOL HCL 100 MG
200 TABLET ORAL
Refills: 0 | Status: DISCONTINUED | OUTPATIENT
Start: 2020-09-17 | End: 2020-09-25

## 2020-09-16 RX ORDER — ASPIRIN/CALCIUM CARB/MAGNESIUM 324 MG
325 TABLET ORAL DAILY
Refills: 0 | Status: DISCONTINUED | OUTPATIENT
Start: 2020-09-16 | End: 2020-09-25

## 2020-09-16 RX ORDER — SODIUM CHLORIDE 9 MG/ML
1000 INJECTION, SOLUTION INTRAVENOUS
Refills: 0 | Status: DISCONTINUED | OUTPATIENT
Start: 2020-09-16 | End: 2020-09-18

## 2020-09-16 RX ORDER — VANCOMYCIN HCL 1 G
1000 VIAL (EA) INTRAVENOUS ONCE
Refills: 0 | Status: COMPLETED | OUTPATIENT
Start: 2020-09-16 | End: 2020-09-16

## 2020-09-16 RX ORDER — INSULIN LISPRO 100/ML
8 VIAL (ML) SUBCUTANEOUS ONCE
Refills: 0 | Status: COMPLETED | OUTPATIENT
Start: 2020-09-16 | End: 2020-09-16

## 2020-09-16 RX ORDER — FERROUS SULFATE 325(65) MG
1 TABLET ORAL
Qty: 0 | Refills: 0 | DISCHARGE

## 2020-09-16 RX ORDER — PIPERACILLIN AND TAZOBACTAM 4; .5 G/20ML; G/20ML
3.38 INJECTION, POWDER, LYOPHILIZED, FOR SOLUTION INTRAVENOUS EVERY 8 HOURS
Refills: 0 | Status: DISCONTINUED | OUTPATIENT
Start: 2020-09-16 | End: 2020-09-23

## 2020-09-16 RX ORDER — DEXTROSE 50 % IN WATER 50 %
12.5 SYRINGE (ML) INTRAVENOUS ONCE
Refills: 0 | Status: DISCONTINUED | OUTPATIENT
Start: 2020-09-16 | End: 2020-09-25

## 2020-09-16 RX ORDER — INSULIN GLARGINE 100 [IU]/ML
35 INJECTION, SOLUTION SUBCUTANEOUS AT BEDTIME
Refills: 0 | Status: DISCONTINUED | OUTPATIENT
Start: 2020-09-17 | End: 2020-09-17

## 2020-09-16 RX ORDER — AMLODIPINE BESYLATE 2.5 MG/1
5 TABLET ORAL ONCE
Refills: 0 | Status: COMPLETED | OUTPATIENT
Start: 2020-09-16 | End: 2020-09-16

## 2020-09-16 RX ORDER — DEXTROSE 50 % IN WATER 50 %
25 SYRINGE (ML) INTRAVENOUS ONCE
Refills: 0 | Status: DISCONTINUED | OUTPATIENT
Start: 2020-09-16 | End: 2020-09-25

## 2020-09-16 RX ORDER — INSULIN LISPRO 100/ML
VIAL (ML) SUBCUTANEOUS
Refills: 0 | Status: DISCONTINUED | OUTPATIENT
Start: 2020-09-16 | End: 2020-09-20

## 2020-09-16 RX ORDER — SODIUM CHLORIDE 9 MG/ML
1000 INJECTION, SOLUTION INTRAVENOUS ONCE
Refills: 0 | Status: COMPLETED | OUTPATIENT
Start: 2020-09-16 | End: 2020-09-16

## 2020-09-16 RX ORDER — COLLAGENASE CLOSTRIDIUM HIST. 250 UNIT/G
1 OINTMENT (GRAM) TOPICAL DAILY
Refills: 0 | Status: DISCONTINUED | OUTPATIENT
Start: 2020-09-16 | End: 2020-09-25

## 2020-09-16 RX ORDER — HUMAN INSULIN 100 [IU]/ML
40 INJECTION, SUSPENSION SUBCUTANEOUS
Qty: 0 | Refills: 0 | DISCHARGE

## 2020-09-16 RX ORDER — INSULIN HUMAN 100 [IU]/ML
2 INJECTION, SOLUTION SUBCUTANEOUS ONCE
Refills: 0 | Status: DISCONTINUED | OUTPATIENT
Start: 2020-09-16 | End: 2020-09-16

## 2020-09-16 RX ORDER — VANCOMYCIN HCL 1 G
1500 VIAL (EA) INTRAVENOUS EVERY 24 HOURS
Refills: 0 | Status: DISCONTINUED | OUTPATIENT
Start: 2020-09-16 | End: 2020-09-16

## 2020-09-16 RX ORDER — GLUCAGON INJECTION, SOLUTION 0.5 MG/.1ML
1 INJECTION, SOLUTION SUBCUTANEOUS ONCE
Refills: 0 | Status: DISCONTINUED | OUTPATIENT
Start: 2020-09-16 | End: 2020-09-25

## 2020-09-16 RX ORDER — INSULIN GLARGINE 100 [IU]/ML
35 INJECTION, SOLUTION SUBCUTANEOUS ONCE
Refills: 0 | Status: COMPLETED | OUTPATIENT
Start: 2020-09-16 | End: 2020-09-16

## 2020-09-16 RX ORDER — TIMOLOL 0.5 %
1 DROPS OPHTHALMIC (EYE)
Refills: 0 | Status: DISCONTINUED | OUTPATIENT
Start: 2020-09-16 | End: 2020-09-25

## 2020-09-16 RX ORDER — INSULIN LISPRO 100/ML
5 VIAL (ML) SUBCUTANEOUS
Refills: 0 | Status: DISCONTINUED | OUTPATIENT
Start: 2020-09-16 | End: 2020-09-17

## 2020-09-16 RX ORDER — LACTOBACILLUS ACIDOPHILUS 100MM CELL
1 CAPSULE ORAL EVERY 12 HOURS
Refills: 0 | Status: DISCONTINUED | OUTPATIENT
Start: 2020-09-16 | End: 2020-09-25

## 2020-09-16 RX ORDER — AMLODIPINE BESYLATE 2.5 MG/1
5 TABLET ORAL DAILY
Refills: 0 | Status: DISCONTINUED | OUTPATIENT
Start: 2020-09-17 | End: 2020-09-25

## 2020-09-16 RX ORDER — VANCOMYCIN HCL 1 G
1500 VIAL (EA) INTRAVENOUS EVERY 24 HOURS
Refills: 0 | Status: DISCONTINUED | OUTPATIENT
Start: 2020-09-16 | End: 2020-09-22

## 2020-09-16 RX ADMIN — Medication 8 UNIT(S): at 18:20

## 2020-09-16 RX ADMIN — HEPARIN SODIUM 5000 UNIT(S): 5000 INJECTION INTRAVENOUS; SUBCUTANEOUS at 21:37

## 2020-09-16 RX ADMIN — SODIUM CHLORIDE 75 MILLILITER(S): 9 INJECTION, SOLUTION INTRAVENOUS at 23:42

## 2020-09-16 RX ADMIN — PIPERACILLIN AND TAZOBACTAM 200 GRAM(S): 4; .5 INJECTION, POWDER, LYOPHILIZED, FOR SOLUTION INTRAVENOUS at 15:06

## 2020-09-16 RX ADMIN — SODIUM CHLORIDE 75 MILLILITER(S): 9 INJECTION, SOLUTION INTRAVENOUS at 20:56

## 2020-09-16 RX ADMIN — Medication 1000 MILLIGRAM(S): at 17:12

## 2020-09-16 RX ADMIN — Medication 1 DROP(S): at 23:43

## 2020-09-16 RX ADMIN — Medication 200 MILLIGRAM(S): at 21:37

## 2020-09-16 RX ADMIN — Medication 325 MILLIGRAM(S): at 23:42

## 2020-09-16 RX ADMIN — PIPERACILLIN AND TAZOBACTAM 3.38 GRAM(S): 4; .5 INJECTION, POWDER, LYOPHILIZED, FOR SOLUTION INTRAVENOUS at 15:35

## 2020-09-16 RX ADMIN — SODIUM CHLORIDE 1000 MILLILITER(S): 9 INJECTION, SOLUTION INTRAVENOUS at 16:13

## 2020-09-16 RX ADMIN — SODIUM CHLORIDE 1000 MILLILITER(S): 9 INJECTION, SOLUTION INTRAVENOUS at 17:13

## 2020-09-16 RX ADMIN — AMLODIPINE BESYLATE 5 MILLIGRAM(S): 2.5 TABLET ORAL at 21:37

## 2020-09-16 RX ADMIN — Medication 250 MILLIGRAM(S): at 15:50

## 2020-09-16 NOTE — ED PROVIDER NOTE - OBJECTIVE STATEMENT
80 yo F with IDDM, diabetic neuropathy, HTN, HLD presents with chronic ulcer of LT 1st toe and LT heel with now worsening redness and swelling. Sent in by Dr. Meeks to be admitted to Dr. Ruiz for surgery (podiatry) and IV antibiotics. Has been taking Levaquin for past 2 days. Denies fever, chills, chest pain, shortness of breath, cough, abd pain, nausea, vomiting, diarrhea.

## 2020-09-16 NOTE — H&P ADULT - PROBLEM SELECTOR PLAN 1
Podiatry f/u, Vascular consult was called tonight: Pt will need Cardiology Clearance and Echo as well, MET < 4 in case of surgery, HX of PVD, HTN, DM,   Left foot hallux plantar and dorsal wound that communicate, 5cc of pus expressed. Dorsal wound tracks 2cm proximally. Dorsal and plantar hallux wounds probe to bone, LF swelling to level of mid tibia, w/ extending erythema to the level of the midfoot. Possible Wound etiology secondary to neuropathy.     S/P bedside I&D of LF hallux wound: incision to dorsal hallux wound made along proximal tracking revealing 5cc of pus, no malodor.   LFXR: hallux suspicious for OM  -MRI left foot ordered  -MUSHTAQ/PVR ordered  IV Zosyn, IV Vanco, f/u CBC, CMP, Cultures, Fall/aspiration precaution,   Bacid,

## 2020-09-16 NOTE — H&P ADULT - NSICDXFAMILYHX_GEN_ALL_CORE_FT
FAMILY HISTORY:  No pertinent family history in first degree relatives     FAMILY HISTORY:  Family history of CHF (congestive heart failure)  Family hx of lung cancer

## 2020-09-16 NOTE — ED ADULT NURSE NOTE - OBJECTIVE STATEMENT
Pt A/Ox4 states she has had an infection on her LT foot, states she was seen by her PMD who did an Xray and told her to come to the ED, Denies pain to the foot, noted redness and swelling to the foot and ankle, denies f/c. Pt appears well otherwise, breathing even and unlabored, skin warm and dry. PIV inserted with aseptic technique, blood drawn, labeled at bedside with 2 pt identifiers and sent to lab. Pt aware of POC, NAD. Medicated per order.

## 2020-09-16 NOTE — H&P ADULT - PROBLEM SELECTOR PLAN 4
ENDO consult: Email sent, Start SQ Lantus 35 units QHS, Humalog 5 units Before meals TID, Hold if pt is NPO, Hgba1c, FS, sliding scale, TSH, free T4, UA, Urine Culture, pt is on NPH BID at home,

## 2020-09-16 NOTE — ED PROVIDER NOTE - PROGRESS NOTE DETAILS
Resident Sarah Lanza: Discussed the case with podiatry - agrees to come examine the patient. Resident Sarah: podiatry performed bedside I&D earlier in the evening - sent wound cx. Also, ordered Mri and Duplex.  Discussed case with Dr. Garcia - agrees with admission

## 2020-09-16 NOTE — H&P ADULT - PROBLEM SELECTOR PLAN 8
DVT Prophylaxis: SQ Heparin,    Hep A,B,C  profile, Iron studies, Ferritin, Vit B12, folate, UA, Urine Culture,

## 2020-09-16 NOTE — H&P ADULT - NSICDXPASTMEDICALHX_GEN_ALL_CORE_FT
PAST MEDICAL HISTORY:  Diabetes type 2    Diabetic ulcer of heel     Glaucoma     Hypertension     Neuropathy     PVD (peripheral vascular disease)      PAST MEDICAL HISTORY:  Diabetes type 2    Diabetic ulcer of heel     Glaucoma     Hypertension     Neuropathy     PVD (peripheral vascular disease)     PVD (peripheral vascular disease)      PAST MEDICAL HISTORY:  Diabetes type 2    Diabetic ulcer of heel     Glaucoma     Hypertension     Neuropathy     Obesity     PVD (peripheral vascular disease)     PVD (peripheral vascular disease)

## 2020-09-16 NOTE — H&P ADULT - EXTREMITIES COMMENTS
As per Podiatry:  Left foot hallux plantar and dorsal wound that communicate, 5cc of pus expressed. Dorsal wound tracks 2cm proximally. Dorsal and plantar hallux wounds probe to bone, LF swelling to level of mid tibia, w/ extending erythema to the level of the midfoot.

## 2020-09-16 NOTE — ED ADULT NURSE NOTE - NSIMPLEMENTINTERV_GEN_ALL_ED
Implemented All Fall Risk Interventions:  [place] to call system. Call bell, personal items and telephone within reach. Instruct patient to call for assistance. Room bathroom lighting operational. Non-slip footwear when patient is off stretcher. Physically safe environment: no spills, clutter or unnecessary equipment. Stretcher in lowest position, wheels locked, appropriate side rails in place. Provide visual cue, wrist band, yellow gown, etc. Monitor gait and stability. Monitor for mental status changes and reorient to person, place, and time. Review medications for side effects contributing to fall risk. Reinforce activity limits and safety measures with patient and family.

## 2020-09-16 NOTE — CONSULT NOTE ADULT - ASSESSMENT
Pt is 79y F who presents w/ LF wound  -pt was seen and evaluated  -Left foot hallux plantar and dorsal wound that communicate, 5cc of pus expressed. Dorsal wound tracks 2cm proximally. Dorsal and plantar hallux wounds probe to bone, LF swelling to level of mid tibia, w/ extending erythema to the level of the midfoot. Wound etiology secondary to neuropathy.   -S/P bedside I&D of LF hallux wound: incision to dorsal hallux wound made along proximal tracking revealing 5cc of pus, no malodor.   -LFXR: hallux suspicious for OM  -MRI left foot ordered  -MUSHTAQ/PVR ordered  -Rec Vasc consult  -Please document medical clearance/ optimization for left foot surgery under light sedation and local  -Please take COVID  -Rec admission for IV abx under medicine  -Discussed w/ attending

## 2020-09-16 NOTE — H&P ADULT - ASSESSMENT
80 y/o Female  with HX of DM type 2,  diabetic neuropathy, HTN, HLD, PVD,  Obesity, presents with chronic ulcer of LT 1st toe and LT heel with now worsening redness and swelling. Sent in by Dr. Meeks to be admitted to Dr. Ruiz for surgery (podiatry) and IV antibiotics. Has been taking Levaquin for past 2 days. Denies fever, chills, chest pain, shortness of breath, cough, abd pain, nausea, vomiting, diarrhea. Pt was seen by Podiatry tonight:  As per Podiatry : Left foot hallux plantar and dorsal wound that communicate, 5cc of pus expressed. Dorsal wound tracks 2cm proximally. Dorsal and plantar hallux wounds probe to bone, LF swelling to level of mid tibia, w/ extending erythema to the level of the midfoot. S/P bedside I&D of LF hallux wound: incision to dorsal hallux wound made along proximal tracking revealing 5cc of pus, no malodor. 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    Diffuse soft tissue swelling particularly around hallux. In addition, shallow plantar heel soft tissue ulceration. No tracking gas collections beyond these regions.    Focally eroded and indistinct appearing lateral cortex of the hallux distal phalanx onthe oblique view suspicious for osteomyelitis. No additional suspected areas of osteomyelitis particularly in the heel region subjacent to the aforementioned superficial soft tissue ulceration.    No fractures or dislocations.    Tarsometatarsal alignment maintained without evidence for a Lisfranc injury.        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 Preserved remaining joint spaces and no joint margin erosions.    Plantar calcaneal enthesophyte.    Generalized osteopenia otherwise no discrete lytic or blastic lesions.    Posterior tibial distribution vascular calcifications.

## 2020-09-16 NOTE — H&P ADULT - PROBLEM SELECTOR PLAN 3
Vascular consult called tonight, MUSHTAQ/PVR, on ASA,    Venous Doppler legs R/O DVT as well, Legs edema,

## 2020-09-16 NOTE — ED PROVIDER NOTE - ATTENDING CONTRIBUTION TO CARE
Attending Statement: I have personally seen and examined this patient. I have fully participated in the care of this patient. I have reviewed all pertinent clinical information, including history physical exam, plan and the Resident's note and agree except as noted  78yo F  pmhx HTN, diabetes, and Diabetic neuropathy sent in for admission for " infection of bone of left toe" States she has been on po abx (Levaquin) x 2 days, had xray at home a day ago, called today told infection is down to bone.  no fever/chills no trauma. Visiting rn wound care x 3 a week for ulcer of left heal and big toe. Plan IV abx for osteomyelitis of left big toe, podiatry aware, labs, xray ordered, plan admit  Vital signs noted. anxious nontoxic female no work of breathing.Ao3 left foot +erythematous foot to ankle with ulcer on the tip of left toe and ulcer on the left heal w discharge.

## 2020-09-16 NOTE — ED PROVIDER NOTE - CLINICAL SUMMARY MEDICAL DECISION MAKING FREE TEXT BOX
Acute on chronic worsening of ulcers of LT foot. Sepsis workup and coag, t&s, xray of foot and ankle to eval for osteo. Will start on antibiotics empirically. Podiatry is aware of the patient. Will likely need amputation.

## 2020-09-16 NOTE — H&P ADULT - HISTORY OF PRESENT ILLNESS
80 y/o Female  with HX of DM type 2,  diabetic neuropathy, HTN, HLD, PVD,  presents with chronic ulcer of LT 1st toe and LT heel with now worsening redness and swelling. Sent in by Dr. Meeks to be admitted to Dr. Ruiz for surgery (podiatry) and IV antibiotics. Has been taking Levaquin for past 2 days. Denies fever, chills, chest pain, shortness of breath, cough, abd pain, nausea, vomiting, diarrhea.      Vascular: non palpable pulses b/l, sensation diminished to level of ankle    Left foot hallux plantar and dorsal wound that communicate, 5cc of pus expressed. Dorsal wound tracks 2cm proximally. Dorsal and plantar hallux wounds probe to bone, LF swelling to level of mid tibia, w/ extending erythema to the level of the midfoot. Wound etiology secondary to neuropathy.     S/P bedside I&D of LF hallux wound: incision to dorsal hallux wound made along proximal tracking revealing 5cc of pus, no malodor.     -Left foot hallux plantar and dorsal wound that communicate, 5cc of pus expressed. Dorsal wound tracks 2cm proximally. Dorsal and plantar hallux wounds probe to bone, LF swelling to level of mid tibia, w/ extending erythema to the level of the midfoot. Wound etiology secondary to neuropathy.   -S/P bedside I&D of LF hallux wound: incision to dorsal hallux wound made along proximal tracking revealing 5cc of pus, no malodor.   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    Diffuse soft tissue swelling particularly around hallux. In addition, shallow plantar heel soft tissue ulceration. No tracking gas collections beyond these regions.    Focally eroded and indistinct appearing lateral cortex of the hallux distal phalanx onthe oblique view suspicious for osteomyelitis. No additional suspected areas of osteomyelitis particularly in the heel region subjacent to the aforementioned superficial soft tissue ulceration.    No fractures or dislocations.    Tarsometatarsal alignment maintained without evidence for a Lisfranc injury.    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 Preserved remaining joint spaces and no joint margin erosions.    Plantar calcaneal enthesophyte.    Generalized osteopenia otherwise no discrete lytic or blastic lesions.    Posterior tibial distribution vascular calcifications.         80 y/o Female  with HX of DM type 2,  diabetic neuropathy, HTN, HLD, PVD,  Obesity, presents with chronic ulcer of LT 1st toe and LT heel with now worsening redness and swelling. Sent in by Dr. Meeks to be admitted to Dr. Ruiz for surgery (podiatry) and IV antibiotics. Has been taking Levaquin for past 2 days. Denies fever, chills, chest pain, shortness of breath, cough, abd pain, nausea, vomiting, diarrhea. Pt was seen by Podiatry tonight:  As per Podiatry : Left foot hallux plantar and dorsal wound that communicate, 5cc of pus expressed. Dorsal wound tracks 2cm proximally. Dorsal and plantar hallux wounds probe to bone, LF swelling to level of mid tibia, w/ extending erythema to the level of the midfoot. S/P bedside I&D of LF hallux wound: incision to dorsal hallux wound made along proximal tracking revealing 5cc of pus, no malodor. 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    Diffuse soft tissue swelling particularly around hallux. In addition, shallow plantar heel soft tissue ulceration. No tracking gas collections beyond these regions.    Focally eroded and indistinct appearing lateral cortex of the hallux distal phalanx onthe oblique view suspicious for osteomyelitis. No additional suspected areas of osteomyelitis particularly in the heel region subjacent to the aforementioned superficial soft tissue ulceration.    No fractures or dislocations.    Tarsometatarsal alignment maintained without evidence for a Lisfranc injury.    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 Preserved remaining joint spaces and no joint margin erosions.    Plantar calcaneal enthesophyte.    Generalized osteopenia otherwise no discrete lytic or blastic lesions.    Posterior tibial distribution vascular calcifications.         80 y/o Female  with HX of DM type 2,  diabetic neuropathy, HTN, HLD, PVD,  Obesity, presents with chronic ulcer of LT 1st toe and LT heel with now worsening redness and swelling. Sent in by Dr. Meeks to be admitted to Dr. Ruiz for surgery (podiatry) and IV antibiotics. Has been taking Levaquin for past 2 days. Denies fever, chills, chest pain, shortness of breath, cough, abd pain, nausea, vomiting, diarrhea. Pt was seen by Podiatry tonight:  As per Podiatry : Left foot hallux plantar and dorsal wound that communicate, 5cc of pus expressed. Dorsal wound tracks 2cm proximally. Dorsal and plantar hallux wounds probe to bone, LF swelling to level of mid tibia, w/ extending erythema to the level of the midfoot. S/P bedside I&D of LF hallux wound: incision to dorsal hallux wound made along proximal tracking revealing 5cc of pus, no malodor. 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    Diffuse soft tissue swelling particularly around hallux. In addition, shallow plantar heel soft tissue ulceration. No tracking gas collections beyond these regions.    Focally eroded and indistinct appearing lateral cortex of the hallux distal phalanx onthe oblique view suspicious for osteomyelitis. No additional suspected areas of osteomyelitis particularly in the heel region subjacent to the aforementioned superficial soft tissue ulceration.    No fractures or dislocations.    Tarsometatarsal alignment maintained without evidence for a Lisfranc injury.        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 Preserved remaining joint spaces and no joint margin erosions.    Plantar calcaneal enthesophyte.    Generalized osteopenia otherwise no discrete lytic or blastic lesions.    Posterior tibial distribution vascular calcifications.      Labs: Glucose 307, CRP 80.9, COVID 19 PCR Neg., PT 12.8, INR 1.12, PTT 21.2, WBC 11.97, Hgb 11.3, platelet 223, Lactate 1.6, K+ 4.8, Na 135, BUN 31, creatinine 1.42,    Vitals: Tem 98.2, HR 84, /78, RR 20, 100% RA, ,

## 2020-09-16 NOTE — CONSULT NOTE ADULT - SUBJECTIVE AND OBJECTIVE BOX
Podiatry pager #: [phone]    Patient is a 79y old  Female who presents with a chief complaint of LF wound.    HPI:    80 yo F with IDDM, diabetic neuropathy, HTN, HLD presents with chronic ulcer of LT 1st toe and LT heel with now worsening redness and swelling. Sent in by Dr. Meeks to be admitted to Dr. Ruiz for surgery (podiatry) and IV antibiotics. Has been taking Levaquin for past 2 days. Denies fever, chills, chest pain, shortness of breath, cough, abd pain, nausea, vomiting, diarrhea.  PAST MEDICAL & SURGICAL HISTORY:  Glaucoma    Hypertension    Diabetic ulcer of heel    Neuropathy    PVD (peripheral vascular disease)    Diabetes  type 2    History of cataract surgery    Toe amputation status        MEDICATIONS  (STANDING):  insulin regular  human recombinant. 2 Unit(s) SubCutaneous once    MEDICATIONS  (PRN):      Allergies    No Known Allergies    Intolerances        VITALS:    Vital Signs Last 24 Hrs  T(C): 36.6 (16 Sep 2020 12:42), Max: 36.6 (16 Sep 2020 12:42)  T(F): 97.8 (16 Sep 2020 12:42), Max: 97.8 (16 Sep 2020 12:42)  HR: 80 (16 Sep 2020 12:42) (80 - 80)  BP: 178/68 (16 Sep 2020 12:42) (178/68 - 178/68)  BP(mean): --  RR: 16 (16 Sep 2020 12:42) (16 - 16)  SpO2: 100% (16 Sep 2020 12:42) (100% - 100%)    LABS:                          11.3   11.97 )-----------( 223      ( 16 Sep 2020 14:36 )             37.7       09-16    136  |  104  |  33<H>  ----------------------------<  301<H>  6.8<HH>   |  22  |  1.40<H>    Ca    9.7      16 Sep 2020 15:00    TPro  7.8  /  Alb  3.5  /  TBili  0.3  /  DBili  x   /  AST  26  /  ALT  17  /  AlkPhos  123<H>  09-16      CAPILLARY BLOOD GLUCOSE      POCT Blood Glucose.: 349 mg/dL (16 Sep 2020 12:40)      PT/INR - ( 16 Sep 2020 15:00 )   PT: 12.8 SEC;   INR: 1.12          PTT - ( 16 Sep 2020 15:00 )  PTT:21.2 SEC    LOWER EXTREMITY PHYSICAL EXAM:    Vascular: non palpable pulses b/l, sensation diminished to level of ankle    Left foot hallux plantar and dorsal wound that communicate, 5cc of pus expressed. Dorsal wound tracks 2cm proximally. Dorsal and plantar hallux wounds probe to bone, LF swelling to level of mid tibia, w/ extending erythema to the level of the midfoot. Wound etiology secondary to neuropathy.     S/P bedside I&D of LF hallux wound: incision to dorsal hallux wound made along proximal tracking revealing 5cc of pus, no malodor.     RADIOLOGY & ADDITIONAL STUDIES:    < from: Xray Foot AP + Lateral + Oblique, Left (09.16.20 @ 15:34) >    EXAM:  RAD FOOT MIN 3 VIEWS LT      EXAM:  RAD ANKLE 2 VIEWS LEFT        PROCEDURE DATE:  Sep 16 2020         INTERPRETATION:  CLINICAL INDICATION: left ankle and foot swelling    EXAM:  Frontal, oblique, and lateral left ankle and foot from 962-2020 at 1533. Compared to prior study from 1/28/2016.    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    Diffuse soft tissue swelling particularly around hallux. In addition, shallow plantar heel soft tissue ulceration. No tracking gas collections beyond these regions.    Focally eroded and indistinct appearing lateral cortex of the hallux distal phalanx onthe oblique view suspicious for osteomyelitis. No additional suspected areas of osteomyelitis particularly in the heel region subjacent to the aforementioned superficial soft tissue ulceration.    No fractures or dislocations.    Tarsometatarsal alignment maintained without evidence for a Lisfranc injury.    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 Preserved remaining joint spaces and no joint margin erosions.    Plantar calcaneal enthesophyte.    Generalized osteopenia otherwise no discrete lytic or blastic lesions.    Posterior tibial distribution vascular calcifications.        < end of copied text >

## 2020-09-17 DIAGNOSIS — I10 ESSENTIAL (PRIMARY) HYPERTENSION: ICD-10-CM

## 2020-09-17 DIAGNOSIS — E78.5 HYPERLIPIDEMIA, UNSPECIFIED: ICD-10-CM

## 2020-09-17 DIAGNOSIS — E11.65 TYPE 2 DIABETES MELLITUS WITH HYPERGLYCEMIA: ICD-10-CM

## 2020-09-17 LAB
ALBUMIN SERPL ELPH-MCNC: 3.2 G/DL — LOW (ref 3.3–5)
ALP SERPL-CCNC: 106 U/L — SIGNIFICANT CHANGE UP (ref 40–120)
ALT FLD-CCNC: 12 U/L — SIGNIFICANT CHANGE UP (ref 4–33)
ANION GAP SERPL CALC-SCNC: 12 MMO/L — SIGNIFICANT CHANGE UP (ref 7–14)
APPEARANCE UR: CLEAR — SIGNIFICANT CHANGE UP
APTT BLD: 26.4 SEC — LOW (ref 27–36.3)
AST SERPL-CCNC: 9 U/L — SIGNIFICANT CHANGE UP (ref 4–32)
BACTERIA # UR AUTO: NEGATIVE — SIGNIFICANT CHANGE UP
BASOPHILS # BLD AUTO: 0.03 K/UL — SIGNIFICANT CHANGE UP (ref 0–0.2)
BASOPHILS NFR BLD AUTO: 0.3 % — SIGNIFICANT CHANGE UP (ref 0–2)
BILIRUB SERPL-MCNC: 0.3 MG/DL — SIGNIFICANT CHANGE UP (ref 0.2–1.2)
BILIRUB UR-MCNC: NEGATIVE — SIGNIFICANT CHANGE UP
BLOOD UR QL VISUAL: SIGNIFICANT CHANGE UP
BUN SERPL-MCNC: 25 MG/DL — HIGH (ref 7–23)
CALCIUM SERPL-MCNC: 9.3 MG/DL — SIGNIFICANT CHANGE UP (ref 8.4–10.5)
CHLORIDE SERPL-SCNC: 104 MMOL/L — SIGNIFICANT CHANGE UP (ref 98–107)
CHOLEST SERPL-MCNC: 98 MG/DL — LOW (ref 120–199)
CO2 SERPL-SCNC: 24 MMOL/L — SIGNIFICANT CHANGE UP (ref 22–31)
COLOR SPEC: SIGNIFICANT CHANGE UP
CREAT SERPL-MCNC: 1.57 MG/DL — HIGH (ref 0.5–1.3)
EOSINOPHIL # BLD AUTO: 0.2 K/UL — SIGNIFICANT CHANGE UP (ref 0–0.5)
EOSINOPHIL NFR BLD AUTO: 1.8 % — SIGNIFICANT CHANGE UP (ref 0–6)
ERYTHROCYTE [SEDIMENTATION RATE] IN BLOOD: 114 MM/HR — HIGH (ref 4–25)
FERRITIN SERPL-MCNC: 340.1 NG/ML — HIGH (ref 15–150)
FOLATE SERPL-MCNC: 13.8 NG/ML — SIGNIFICANT CHANGE UP (ref 4.7–20)
GLUCOSE BLDC GLUCOMTR-MCNC: 133 MG/DL — HIGH (ref 70–99)
GLUCOSE BLDC GLUCOMTR-MCNC: 144 MG/DL — HIGH (ref 70–99)
GLUCOSE BLDC GLUCOMTR-MCNC: 162 MG/DL — HIGH (ref 70–99)
GLUCOSE BLDC GLUCOMTR-MCNC: 195 MG/DL — HIGH (ref 70–99)
GLUCOSE SERPL-MCNC: 116 MG/DL — HIGH (ref 70–99)
GLUCOSE UR-MCNC: 300 — HIGH
HAV IGM SER-ACNC: NONREACTIVE — SIGNIFICANT CHANGE UP
HBA1C BLD-MCNC: 9.3 % — HIGH (ref 4–5.6)
HBV CORE IGM SER-ACNC: NONREACTIVE — SIGNIFICANT CHANGE UP
HBV SURFACE AG SER-ACNC: NONREACTIVE — SIGNIFICANT CHANGE UP
HCT VFR BLD CALC: 30.6 % — LOW (ref 34.5–45)
HCV AB S/CO SERPL IA: 0.16 S/CO — SIGNIFICANT CHANGE UP (ref 0–0.99)
HCV AB SERPL-IMP: SIGNIFICANT CHANGE UP
HDLC SERPL-MCNC: 35 MG/DL — LOW (ref 45–65)
HGB BLD-MCNC: 9.6 G/DL — LOW (ref 11.5–15.5)
HYALINE CASTS # UR AUTO: NEGATIVE — SIGNIFICANT CHANGE UP
IMM GRANULOCYTES NFR BLD AUTO: 0.4 % — SIGNIFICANT CHANGE UP (ref 0–1.5)
INR BLD: 1.2 — HIGH (ref 0.88–1.16)
IRON SATN MFR SERPL: 195 UG/DL — SIGNIFICANT CHANGE UP (ref 140–530)
IRON SATN MFR SERPL: 31 UG/DL — SIGNIFICANT CHANGE UP (ref 30–160)
KETONES UR-MCNC: NEGATIVE — SIGNIFICANT CHANGE UP
LEUKOCYTE ESTERASE UR-ACNC: NEGATIVE — SIGNIFICANT CHANGE UP
LIPID PNL WITH DIRECT LDL SERPL: 46 MG/DL — SIGNIFICANT CHANGE UP
LYMPHOCYTES # BLD AUTO: 18.7 % — SIGNIFICANT CHANGE UP (ref 13–44)
LYMPHOCYTES # BLD AUTO: 2.03 K/UL — SIGNIFICANT CHANGE UP (ref 1–3.3)
MAGNESIUM SERPL-MCNC: 1.7 MG/DL — SIGNIFICANT CHANGE UP (ref 1.6–2.6)
MCHC RBC-ENTMCNC: 27.7 PG — SIGNIFICANT CHANGE UP (ref 27–34)
MCHC RBC-ENTMCNC: 31.4 % — LOW (ref 32–36)
MCV RBC AUTO: 88.4 FL — SIGNIFICANT CHANGE UP (ref 80–100)
MONOCYTES # BLD AUTO: 0.64 K/UL — SIGNIFICANT CHANGE UP (ref 0–0.9)
MONOCYTES NFR BLD AUTO: 5.9 % — SIGNIFICANT CHANGE UP (ref 2–14)
NEUTROPHILS # BLD AUTO: 7.93 K/UL — HIGH (ref 1.8–7.4)
NEUTROPHILS NFR BLD AUTO: 72.9 % — SIGNIFICANT CHANGE UP (ref 43–77)
NITRITE UR-MCNC: NEGATIVE — SIGNIFICANT CHANGE UP
NRBC # FLD: 0 K/UL — SIGNIFICANT CHANGE UP (ref 0–0)
PH UR: 6 — SIGNIFICANT CHANGE UP (ref 5–8)
PHOSPHATE SERPL-MCNC: 2.8 MG/DL — SIGNIFICANT CHANGE UP (ref 2.5–4.5)
PLATELET # BLD AUTO: 228 K/UL — SIGNIFICANT CHANGE UP (ref 150–400)
PMV BLD: 10.5 FL — SIGNIFICANT CHANGE UP (ref 7–13)
POTASSIUM SERPL-MCNC: 4.4 MMOL/L — SIGNIFICANT CHANGE UP (ref 3.5–5.3)
POTASSIUM SERPL-SCNC: 4.4 MMOL/L — SIGNIFICANT CHANGE UP (ref 3.5–5.3)
PROT SERPL-MCNC: 6.4 G/DL — SIGNIFICANT CHANGE UP (ref 6–8.3)
PROT UR-MCNC: 200 — HIGH
PROTHROM AB SERPL-ACNC: 13.6 SEC — SIGNIFICANT CHANGE UP (ref 10.6–13.6)
RBC # BLD: 3.46 M/UL — LOW (ref 3.8–5.2)
RBC # FLD: 13.5 % — SIGNIFICANT CHANGE UP (ref 10.3–14.5)
RBC CASTS # UR COMP ASSIST: HIGH (ref 0–?)
SODIUM SERPL-SCNC: 140 MMOL/L — SIGNIFICANT CHANGE UP (ref 135–145)
SP GR SPEC: 1.02 — SIGNIFICANT CHANGE UP (ref 1–1.04)
SQUAMOUS # UR AUTO: SIGNIFICANT CHANGE UP
TRIGL SERPL-MCNC: 138 MG/DL — SIGNIFICANT CHANGE UP (ref 10–149)
TSH SERPL-MCNC: 2.29 UIU/ML — SIGNIFICANT CHANGE UP (ref 0.27–4.2)
UIBC SERPL-MCNC: 163.6 UG/DL — SIGNIFICANT CHANGE UP (ref 110–370)
UROBILINOGEN FLD QL: NORMAL — SIGNIFICANT CHANGE UP
VIT B12 SERPL-MCNC: 965 PG/ML — HIGH (ref 200–900)
WBC # BLD: 10.87 K/UL — HIGH (ref 3.8–10.5)
WBC # FLD AUTO: 10.87 K/UL — HIGH (ref 3.8–10.5)
WBC UR QL: SIGNIFICANT CHANGE UP (ref 0–?)

## 2020-09-17 PROCEDURE — 99232 SBSQ HOSP IP/OBS MODERATE 35: CPT | Mod: GC

## 2020-09-17 PROCEDURE — 93923 UPR/LXTR ART STDY 3+ LVLS: CPT | Mod: 26

## 2020-09-17 PROCEDURE — 93970 EXTREMITY STUDY: CPT | Mod: 26

## 2020-09-17 PROCEDURE — 99223 1ST HOSP IP/OBS HIGH 75: CPT

## 2020-09-17 PROCEDURE — 93306 TTE W/DOPPLER COMPLETE: CPT | Mod: 26

## 2020-09-17 PROCEDURE — 99233 SBSQ HOSP IP/OBS HIGH 50: CPT

## 2020-09-17 RX ORDER — INFLUENZA VIRUS VACCINE 15; 15; 15; 15 UG/.5ML; UG/.5ML; UG/.5ML; UG/.5ML
0.5 SUSPENSION INTRAMUSCULAR ONCE
Refills: 0 | Status: DISCONTINUED | OUTPATIENT
Start: 2020-09-17 | End: 2020-09-25

## 2020-09-17 RX ORDER — INSULIN GLARGINE 100 [IU]/ML
24 INJECTION, SOLUTION SUBCUTANEOUS AT BEDTIME
Refills: 0 | Status: DISCONTINUED | OUTPATIENT
Start: 2020-09-17 | End: 2020-09-19

## 2020-09-17 RX ORDER — INSULIN LISPRO 100/ML
5 VIAL (ML) SUBCUTANEOUS
Refills: 0 | Status: COMPLETED | OUTPATIENT
Start: 2020-09-17 | End: 2020-09-17

## 2020-09-17 RX ORDER — INSULIN LISPRO 100/ML
8 VIAL (ML) SUBCUTANEOUS
Refills: 0 | Status: DISCONTINUED | OUTPATIENT
Start: 2020-09-18 | End: 2020-09-19

## 2020-09-17 RX ADMIN — Medication 1 DROP(S): at 17:28

## 2020-09-17 RX ADMIN — Medication 200 MILLIGRAM(S): at 17:27

## 2020-09-17 RX ADMIN — HEPARIN SODIUM 5000 UNIT(S): 5000 INJECTION INTRAVENOUS; SUBCUTANEOUS at 06:38

## 2020-09-17 RX ADMIN — PIPERACILLIN AND TAZOBACTAM 25 GRAM(S): 4; .5 INJECTION, POWDER, LYOPHILIZED, FOR SOLUTION INTRAVENOUS at 06:39

## 2020-09-17 RX ADMIN — Medication 5 UNIT(S): at 09:38

## 2020-09-17 RX ADMIN — SODIUM CHLORIDE 75 MILLILITER(S): 9 INJECTION, SOLUTION INTRAVENOUS at 17:56

## 2020-09-17 RX ADMIN — PIPERACILLIN AND TAZOBACTAM 25 GRAM(S): 4; .5 INJECTION, POWDER, LYOPHILIZED, FOR SOLUTION INTRAVENOUS at 00:34

## 2020-09-17 RX ADMIN — Medication 325 MILLIGRAM(S): at 12:56

## 2020-09-17 RX ADMIN — Medication 1 TABLET(S): at 17:27

## 2020-09-17 RX ADMIN — Medication 1 DROP(S): at 06:40

## 2020-09-17 RX ADMIN — INSULIN GLARGINE 35 UNIT(S): 100 INJECTION, SOLUTION SUBCUTANEOUS at 00:12

## 2020-09-17 RX ADMIN — Medication 5 UNIT(S): at 12:50

## 2020-09-17 RX ADMIN — PIPERACILLIN AND TAZOBACTAM 25 GRAM(S): 4; .5 INJECTION, POWDER, LYOPHILIZED, FOR SOLUTION INTRAVENOUS at 21:40

## 2020-09-17 RX ADMIN — INSULIN GLARGINE 24 UNIT(S): 100 INJECTION, SOLUTION SUBCUTANEOUS at 23:14

## 2020-09-17 RX ADMIN — Medication 300 MILLIGRAM(S): at 17:27

## 2020-09-17 RX ADMIN — Medication 1 APPLICATION(S): at 12:58

## 2020-09-17 RX ADMIN — Medication 1 TABLET(S): at 06:39

## 2020-09-17 RX ADMIN — HEPARIN SODIUM 5000 UNIT(S): 5000 INJECTION INTRAVENOUS; SUBCUTANEOUS at 12:56

## 2020-09-17 RX ADMIN — AMLODIPINE BESYLATE 5 MILLIGRAM(S): 2.5 TABLET ORAL at 06:38

## 2020-09-17 RX ADMIN — PIPERACILLIN AND TAZOBACTAM 25 GRAM(S): 4; .5 INJECTION, POWDER, LYOPHILIZED, FOR SOLUTION INTRAVENOUS at 14:54

## 2020-09-17 RX ADMIN — HEPARIN SODIUM 5000 UNIT(S): 5000 INJECTION INTRAVENOUS; SUBCUTANEOUS at 21:39

## 2020-09-17 RX ADMIN — Medication 200 MILLIGRAM(S): at 06:39

## 2020-09-17 RX ADMIN — Medication 5 UNIT(S): at 17:27

## 2020-09-17 NOTE — PROGRESS NOTE ADULT - PROBLEM SELECTOR PLAN 1
Podiatry f/u, Vascular consult was called tonight: Pt will need Cardiology Clearance and Echo as well, MET < 4 in case of surgery, HX of PVD, HTN, DM,   Left foot hallux plantar and dorsal wound that communicate, 5cc of pus expressed. Dorsal wound tracks 2cm proximally. Dorsal and plantar hallux wounds probe to bone, LF swelling to level of mid tibia, w/ extending erythema to the level of the midfoot. Possible Wound etiology secondary to neuropathy.     S/P bedside I&D of LF hallux wound: incision to dorsal hallux wound made along proximal tracking revealing 5cc of pus, no malodor.   LFXR: hallux suspicious for OM  -MRI left foot ordered  -MUSHTAQ/PVR ordered  IV Zosyn, IV Vanco, f/u CBC, CMP, Cultures, Fall/aspiration precaution,   Bacid, Podiatry f/u, Vascular  sx consult appreciated, will f/u MUSHTAQ/PVR Pt will need Cardiology Clearance requested    Echo noted, Mod MS, grossly normal LV fn   , MET < 4   Left foot hallux plantar and dorsal wound that communicate, 5cc of pus expressed. Dorsal wound tracks 2cm proximally. Dorsal and plantar hallux wounds probe to bone, LF swelling to level of mid tibia, w/ extending erythema to the level of the midfoot. Possible Wound etiology secondary to neuropathy.   S/P bedside I&D of LF hallux wound: incision to dorsal hallux wound made along proximal tracking revealing 5cc of pus, no malodor.   LFXR: hallux suspicious for OM  -MRI left foot ordered  -MUSHTAQ/PVR ordered  IV Zosyn, IV Vanco, f/u CBC, CMP, Cultures, Fall/aspiration precaution,   Bacid,

## 2020-09-17 NOTE — CONSULT NOTE ADULT - SUBJECTIVE AND OBJECTIVE BOX
80 y/o Female  with HX of DM type 2,  diabetic neuropathy, HTN, HLD, PVD,  Obesity, presents with chronic ulcer of LT 1st toe and LT heel with now worsening redness and swelling. Sent in by Dr. Meeks to be admitted to Dr. Ruiz for surgery (podiatry) and IV antibiotics. Has been taking Levaquin for past 2 days. Denies fever, chills, chest pain, shortness of breath, cough, abd pain, nausea, vomiting, diarrhea. Pt was seen by Podiatry tonight:  As per Podiatry : Left foot hallux plantar and dorsal wound that communicate, 5cc of pus expressed. Dorsal wound tracks 2cm proximally. Dorsal and plantar hallux wounds probe to bone, LF swelling to level of mid tibia, w/ extending erythema to the level of the midfoot. S/P bedside I&D of LF hallux wound: incision to dorsal hallux wound made along proximal tracking revealing 5cc of pus, no malodor. LFXR: hallux suspicious for OM, pt awake, A+O x 3, no distress, pt uses walker to ambulate, no cough, no HA, no dizziness, no dysuria, no diarrhea, NO CP, NO SOB, Elevated Blood pressure , Hyperglycemia and Possible ARABELLA as per Lab noted, Pt S/P IV Vanco, IV Zosyn, Humalog 8 units SQ x 1 and IVF LR x one Lit, given by ER, pt S/P SQ Lantus 35 units x 1 tonight as well with Labetalol 200 mg PO x 1 tonight.    Patient states she had 2 angiograms of the LLE in the past by Dr. Rich with possible stents (patient is unsure). The most recent one was 2 years ago.    PAST MEDICAL HISTORY:  Diabetes type 2    Diabetic ulcer of heel     Glaucoma     Hypertension     Neuropathy     Obesity     PVD (peripheral vascular disease)      PAST SURGICAL HISTORY:  History of cataract surgery     Toe amputation status    ankle fracture s/p fixation  LLE angiogram x 2    FAMILY HISTORY:  Family history of CHF (congestive heart failure)  Family hx of lung cancer.  Social history: patient does not smoke or drink alcohol    T(C): 36.8 (09-16-20 @ 22:55), Max: 36.8 (09-16-20 @ 18:57)  HR: 90 (09-16-20 @ 22:55) (80 - 98)  BP: 159/58 (09-16-20 @ 22:55) (159/58 - 191/78)  RR: 18 (09-16-20 @ 22:55) (16 - 20)  SpO2: 99% (09-16-20 @ 22:55) (98% - 100%)  Wt(kg): --    General: no acute distress, pleasant, alert and oriented x 4  HEENT: NC/AT  Vasc: Doppler AT and PT signals bilaterally; left foot bandaged and wrapped (patient refusing takedown of dressing at this time) blanching erythema extending up to USP up knee  RLE s/p 1st toe amputation    LABS:                        11.3   11.97 )-----------( 223      ( 16 Sep 2020 14:36 )             37.7     16 Sep 2020 16:55    135    |  103    |  31     ----------------------------<  307    5.4     |  21     |  1.42     Ca    9.4        16 Sep 2020 16:55    TPro  7.8    /  Alb  3.5    /  TBili  0.3    /  DBili  x      /  AST  26     /  ALT  17     /  AlkPhos  123    16 Sep 2020 15:00    PT/INR - ( 16 Sep 2020 15:00 )   PT: 12.8 SEC;   INR: 1.12          PTT - ( 16 Sep 2020 15:00 )  PTT:21.2 SEC    Imaging:  IMPRESSION:  Redemonstrated 3rd toe amputation defect through MTP joint level with rounded and smoothly corticated metatarsal head stump margin and preserved overlying soft tissue coverage.    Diffuse soft tissue swelling particularly around hallux. In addition, shallow plantar heel soft tissue ulceration. No tracking gas collections beyond these regions.    Focally eroded and indistinct appearing lateral cortex of the hallux distal phalanx on the oblique view suspicious for osteomyelitis. No additional suspected areas of osteomyelitis particularly in the heel region subjacent to the aforementioned superficial soft tissue ulceration.    No fractures or dislocations.    Tarsometatarsal alignment maintained without evidence for a Lisfranc injury.    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 Preserved remaining joint spaces and no joint margin erosions.    Plantar calcaneal enthesophyte.    Generalized osteopenia otherwise no discrete lytic or blastic lesions.    Posterior tibial distribution vascular calcifications.

## 2020-09-17 NOTE — CONSULT NOTE ADULT - PROBLEM SELECTOR RECOMMENDATION 9
- adjust Lantus to 24 units qhs  - can give Humalog 5 units for dinner today and then adjust Humalog to 8 units before meals tomorrow  - c/w low correction scale qac and qhs  - consistent carb diet  - check FS qac and qhs  - RD consult  - will follow  - for discharge: ideally would dc her home on basal bolus insulin pens - send script for Lantus and Humalog pens to [facility] or patient's preferred pharmacy to verify insurance coverage. She can follow up in our office if she wishes - [phone] - [address] [facility], [address], [facility]

## 2020-09-17 NOTE — CONSULT NOTE ADULT - SUBJECTIVE AND OBJECTIVE BOX
Patient seen and evaluated at bedside    Chief Complaint:    HPI:   80 y/o Female  with HX of DM type 2,  diabetic neuropathy, HTN, HLD, PVD,  Obesity, presents with chronic ulcer of LT 1st toe and LT heel with now worsening redness and swelling. Sent in by Dr. Meeks to be admitted to Dr. Ruiz for surgery (podiatry) and IV antibiotics. Has been taking Levaquin for past 2 days. Denies fever, chills, chest pain, shortness of breath, cough, abd pain, nausea, vomiting, diarrhea. Pt was seen by Podiatry tonight:  As per Podiatry : Left foot hallux plantar and dorsal wound that communicate, 5cc of pus expressed. Dorsal wound tracks 2cm proximally. Dorsal and plantar hallux wounds probe to bone, LF swelling to level of mid tibia, w/ extending erythema to the level of the midfoot. S/P bedside I&D of LF hallux wound: incision to dorsal hallux wound made along proximal tracking revealing 5cc of pus, no malodor. LFXR: hallux suspicious for OM, pt awake, A+O x 3, no distress, pt uses walker to ambulate, no cough, no HA, no dizziness, no dysuria, no diarrhea, NO CP, NO SOB, Elevated Blood pressure , Hyperglycemia and Possible ARABELLA as per Lab noted, Pt S/P IV Vanco, IV Zosyn, Humalog 8 units SQ x 1 and IVF LR x one Lit, given by ER, pt S/P SQ Lantus 35 units x 1 tonight as well with Labetalol 200 mg PO x 1 tonight, Vascular consult was called by me tonight,       < from: Xray Foot AP + Lateral + Oblique, Left (20 @ 15:34) >  IMPRESSION:  Redemonstrated 3rd toe amputation defect through MTP joint level with rounded and smoothly corticated metatarsal head stump margin and preserved overlying soft tissue coverage.    Diffuse soft tissue swelling particularly around hallux. In addition, shallow plantar heel soft tissue ulceration. No tracking gas collections beyond these regions.    Focally eroded and indistinct appearing lateral cortex of the hallux distal phalanx onthe oblique view suspicious for osteomyelitis. No additional suspected areas of osteomyelitis particularly in the heel region subjacent to the aforementioned superficial soft tissue ulceration.    No fractures or dislocations.    Tarsometatarsal alignment maintained without evidence for a Lisfranc injury.        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 Preserved remaining joint spaces and no joint margin erosions.    Plantar calcaneal enthesophyte.    Generalized osteopenia otherwise no discrete lytic or blastic lesions.    Posterior tibial distribution vascular calcifications.      Labs: Glucose 307, CRP 80.9, COVID 19 PCR Neg., PT 12.8, INR 1.12, PTT 21.2, WBC 11.97, Hgb 11.3, platelet 223, Lactate 1.6, K+ 4.8, Na 135, BUN 31, creatinine 1.42,    Vitals: Tem 98.2, HR 84, /78, RR 20, 100% RA, ,        (16 Sep 2020 19:52)      PMHx:   Obesity    PVD (peripheral vascular disease)    Glaucoma    Hypertension    Diabetic ulcer of heel    Neuropathy    PVD (peripheral vascular disease)    Diabetes        PSHx:   History of cataract surgery    Toe amputation status        Allergies:  No Known Allergies      Home Meds:    Current Medications:   amLODIPine   Tablet 5 milliGRAM(s) Oral daily  aspirin enteric coated 325 milliGRAM(s) Oral daily  collagenase Ointment 1 Application(s) Topical daily  dextrose 40% Gel 15 Gram(s) Oral once PRN  dextrose 5%. 1000 milliLiter(s) IV Continuous <Continuous>  dextrose 50% Injectable 12.5 Gram(s) IV Push once  dextrose 50% Injectable 25 Gram(s) IV Push once  dextrose 50% Injectable 25 Gram(s) IV Push once  glucagon  Injectable 1 milliGRAM(s) IntraMuscular once PRN  heparin   Injectable 5000 Unit(s) SubCutaneous every 8 hours  influenza   Vaccine 0.5 milliLiter(s) IntraMuscular once  insulin glargine Injectable (LANTUS) 35 Unit(s) SubCutaneous at bedtime  insulin lispro (HumaLOG) corrective regimen sliding scale   SubCutaneous three times a day before meals  insulin lispro (HumaLOG) corrective regimen sliding scale   SubCutaneous at bedtime  insulin lispro Injectable (HumaLOG) 5 Unit(s) SubCutaneous three times a day before meals  labetalol 200 milliGRAM(s) Oral two times a day  lactated ringers. 1000 milliLiter(s) IV Continuous <Continuous>  lactobacillus acidophilus 1 Tablet(s) Oral every 12 hours  piperacillin/tazobactam IVPB.. 3.375 Gram(s) IV Intermittent every 8 hours  timolol 0.5% Solution 1 Drop(s) Both EYES two times a day  vancomycin  IVPB 1500 milliGRAM(s) IV Intermittent every 24 hours      FAMILY HISTORY:  Family hx of lung cancer    Family history of CHF (congestive heart failure)        Social History:  Smoking History:  Alcohol Use:  Drug Use:    REVIEW OF SYSTEMS:  Constitutional:     [ ] negative [ ] fevers [ ] chills [ ] weight loss [ ] weight gain  HEENT:                  [ ] negative [ ] dry eyes [ ] eye irritation [ ] postnasal drip [ ] nasal congestion  CV:                         [ ] negative  [ ] chest pain [ ] orthopnea [ ] palpitations [ ] murmur  Resp:                     [ ] negative [ ] cough [ ] shortness of breath [ ] dyspnea [ ] wheezing [ ] sputum [ ]hemoptysis  GI:                          [ ] negative [ ] nausea [ ] vomiting [ ] diarrhea [ ] constipation [ ] abd pain [ ] dysphagia   :                        [ ] negative [ ] dysuria [ ] nocturia [ ] hematuria [ ] increased urinary frequency  Musculoskeletal: [ ] negative [ ] back pain [ ] myalgias [ ] arthralgias [ ] fracture  Skin:                       [ ] negative [ ] rash [ ] itch  Neurological:        [ ] negative [ ] headache [ ] dizziness [ ] syncope [ ] weakness [ ] numbness  Psychiatric:           [ ] negative [ ] anxiety [ ] depression  Endocrine:            [ ] negative [ ] diabetes [ ] thyroid problem  Heme/Lymph:      [ ] negative [ ] anemia [ ] bleeding problem  Allergic/Immune: [ ] negative [ ] itchy eyes [ ] nasal discharge [ ] hives [ ] angioedema    [ ] All other systems negative  [ ] Unable to assess ROS due to      Physical Exam:  T(F): 98.1 (), Max: 98.8 ()  HR: 86 () (80 - 98)  BP: 100/72 () (100/72 - 191/78)  RR: 18 ()  SpO2: 99% ()  GENERAL: No acute distress, well-developed  HEAD:  Atraumatic, Normocephalic  ENT: EOMI, PERRLA, conjunctiva and sclera clear, Neck supple, No JVD, moist mucosa  CHEST/LUNG: Clear to auscultation bilaterally; No wheeze, equal breath sounds bilaterally   BACK: No spinal tenderness  HEART: Regular rate and rhythm; No murmurs, rubs, or gallops  ABDOMEN: Soft, Nontender, Nondistended; Bowel sounds present  EXTREMITIES:  No clubbing, cyanosis, or edema  PSYCH: Nl behavior, nl affect  NEUROLOGY: AAOx3, non-focal, cranial nerves intact  SKIN: Normal color, No rashes or lesions  LINES:    Cardiovascular Diagnostic Testing:    ECG: Personally reviewed:    Echo: Personally reviewed:    Stress Testing:    Cath:    Imaging:    CXR: Personally reviewed    Labs: Personally reviewed                        9.6    10.87 )-----------( 228      ( 17 Sep 2020 05:52 )             30.6         140  |  104  |  25<H>  ----------------------------<  116<H>  4.4   |  24  |  1.57<H>    Ca    9.3      17 Sep 2020 05:52  Phos  2.8       Mg     1.7         TPro  6.4  /  Alb  3.2<L>  /  TBili  0.3  /  DBili  x   /  AST  9   /  ALT  12  /  AlkPhos  106      PT/INR - ( 17 Sep 2020 05:52 )   PT: 13.6 SEC;   INR: 1.20          PTT - ( 17 Sep 2020 05:52 )  PTT:26.4 SEC    Total Cholesterol: 98  LDL: 46  HDL: 35  T      Thyroid Stimulating Hormone, Serum: 2.29 uIU/mL ( @ 05:52)   Patient seen and evaluated at bedside    Chief Complaint:    HPI:   80 y/o Female  with HX of DM type 2,  diabetic neuropathy, HTN, HLD, PVD,  obesity and cardiology c/s for pre op clearance for R toe amputation for R osteo. Patient denies any cardiac history in the past- no chest pain, no MIs/HF/arrhytmias. States she has a limited ET given her foot pain but is not limited because of SOB or chest pain. States she can walk across her home without chest pain or SOB and has not noted any changes in her exercise tolerance over the last year.      < from: Xray Foot AP + Lateral + Oblique, Left (20 @ 15:34) >  IMPRESSION:  Redemonstrated 3rd toe amputation defect through MTP joint level with rounded and smoothly corticated metatarsal head stump margin and preserved overlying soft tissue coverage.    Diffuse soft tissue swelling particularly around hallux. In addition, shallow plantar heel soft tissue ulceration. No tracking gas collections beyond these regions.    Focally eroded and indistinct appearing lateral cortex of the hallux distal phalanx onthe oblique view suspicious for osteomyelitis. No additional suspected areas of osteomyelitis particularly in the heel region subjacent to the aforementioned superficial soft tissue ulceration.    No fractures or dislocations.    Tarsometatarsal alignment maintained without evidence for a Lisfranc injury.        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 Preserved remaining joint spaces and no joint margin erosions.    Plantar calcaneal enthesophyte.    Generalized osteopenia otherwise no discrete lytic or blastic lesions.    Posterior tibial distribution vascular calcifications.      Labs: Glucose 307, CRP 80.9, COVID 19 PCR Neg., PT 12.8, INR 1.12, PTT 21.2, WBC 11.97, Hgb 11.3, platelet 223, Lactate 1.6, K+ 4.8, Na 135, BUN 31, creatinine 1.42,    Vitals: Tem 98.2, HR 84, /78, RR 20, 100% RA, ,        (16 Sep 2020 19:52)      PMHx:   Obesity    PVD (peripheral vascular disease)    Glaucoma    Hypertension    Diabetic ulcer of heel    Neuropathy    PVD (peripheral vascular disease)    Diabetes        PSHx:   History of cataract surgery    Toe amputation status        Allergies:  No Known Allergies      Home Meds:    Current Medications:   amLODIPine   Tablet 5 milliGRAM(s) Oral daily  aspirin enteric coated 325 milliGRAM(s) Oral daily  collagenase Ointment 1 Application(s) Topical daily  dextrose 40% Gel 15 Gram(s) Oral once PRN  dextrose 5%. 1000 milliLiter(s) IV Continuous <Continuous>  dextrose 50% Injectable 12.5 Gram(s) IV Push once  dextrose 50% Injectable 25 Gram(s) IV Push once  dextrose 50% Injectable 25 Gram(s) IV Push once  glucagon  Injectable 1 milliGRAM(s) IntraMuscular once PRN  heparin   Injectable 5000 Unit(s) SubCutaneous every 8 hours  influenza   Vaccine 0.5 milliLiter(s) IntraMuscular once  insulin glargine Injectable (LANTUS) 35 Unit(s) SubCutaneous at bedtime  insulin lispro (HumaLOG) corrective regimen sliding scale   SubCutaneous three times a day before meals  insulin lispro (HumaLOG) corrective regimen sliding scale   SubCutaneous at bedtime  insulin lispro Injectable (HumaLOG) 5 Unit(s) SubCutaneous three times a day before meals  labetalol 200 milliGRAM(s) Oral two times a day  lactated ringers. 1000 milliLiter(s) IV Continuous <Continuous>  lactobacillus acidophilus 1 Tablet(s) Oral every 12 hours  piperacillin/tazobactam IVPB.. 3.375 Gram(s) IV Intermittent every 8 hours  timolol 0.5% Solution 1 Drop(s) Both EYES two times a day  vancomycin  IVPB 1500 milliGRAM(s) IV Intermittent every 24 hours      FAMILY HISTORY:  Family hx of lung cancer    Family history of CHF (congestive heart failure)        Physical Exam:  T(F): 98.1 (-), Max: 98.8 ()  HR: 86 () (80 - 98)  BP: 100/72 (-) (100/72 - 191/78)  RR: 18 (-)  SpO2: 99% (-)  GENERAL: No acute distress, well-developed  CHEST/LUNG: Clear to auscultation bilaterally;   HEART: Regular rate and rhythm; No murmurs, rubs, or gallops  EXTREMITIES:  redness, erythema mild edema over the L foot. Trace edema over RLE      Cardiovascular Diagnostic Testing:    ECG: Personally reviewed:    Echo: Personally reviewed:    Stress Testing:    Cath:    Imaging:    CXR: Personally reviewed    Labs: Personally reviewed                        9.6    10.87 )-----------( 228      ( 17 Sep 2020 05:52 )             30.6         140  |  104  |  25<H>  ----------------------------<  116<H>  4.4   |  24  |  1.57<H>    Ca    9.3      17 Sep 2020 05:52  Phos  2.8       Mg     1.7         TPro  6.4  /  Alb  3.2<L>  /  TBili  0.3  /  DBili  x   /  AST  9   /  ALT  12  /  AlkPhos  106      PT/INR - ( 17 Sep 2020 05:52 )   PT: 13.6 SEC;   INR: 1.20          PTT - ( 17 Sep 2020 05:52 )  PTT:26.4 SEC    Total Cholesterol: 98  LDL: 46  HDL: 35  T      Thyroid Stimulating Hormone, Serum: 2.29 uIU/mL ( @ 05:52)    TTE  1. Mitral annular calcification and calcified mitral  leaflets with decreased diastolic opening. Mean transmitral  valve gradient equals 5 mm Hg, consistent with moderate  mitral stenosis.  2. Aortic valve not well visualized.  3. Moderately dilated left atrium.  LA volume index = 46  cc/m2.  4. Endocardium not well visualized; grossly normal left  ventricular systolic function. Endocardial visualization  enhanced with intravenous injection of echo contrast  (Definity).  5. The right ventricle is not well visualized.

## 2020-09-17 NOTE — CONSULT NOTE ADULT - ASSESSMENT
80 y/o Female  with HX of DM type 2,  diabetic neuropathy, HTN, HLD, PVD,  obesity and cardiology c/s for pre op clearance for R toe amputation for osteo planned for 9/18.    TTE with moderate mitral stenosis and grossly normal LV function. Although her Mets is difficult to assess given limited capacity due to foot pain (not SOB/chest pain), patient is low-intermediate risk for a low risk procedure and patient has had prior toe amputations without cardiac complications.   No further inpatient cardiac testing is required prior to amputation.   RCRI 1 (diabetes on insulin) suggests a 3.9%, 30 day risk of cardiac complications.

## 2020-09-17 NOTE — CONSULT NOTE ADULT - SUBJECTIVE AND OBJECTIVE BOX
HPI:  Patient is a 79 year old woman with PMH of HTN, HLDm DM type 2, diabetic neuropathy, PVD, Obesity, presents with chronic ulcer of LT 1st toe and LT heel, now awaiting resection next week. Consult called for management of uncontrolled DM2, HbA1c is 9.3%. Patient seen at bedside. States that she was diagnosed with DM2 over 30 years ago. Managed by PCP, does not have an endocrinologist. She is currently on Novolin N via syringes and vials, takes 40 units before breakfast and 30 units before dinner, adherent to therapy. Check FS at most once daily, in the AM, and notes that her FS are in the 150's, sometimes higher. No hypoglycemia. She has neuropathy. Last saw optho in 2018, has retinopathy and has received laser. Has CKD stage 3b. She does not know why she was never switched to the newer insulins. She has insurance.       PAST MEDICAL & SURGICAL HISTORY:  Obesity    PVD (peripheral vascular disease)    Glaucoma    Hypertension    Diabetic ulcer of heel    Neuropathy    PVD (peripheral vascular disease)    Diabetes  type 2    History of cataract surgery    Toe amputation status      FAMILY HISTORY:  Family hx of lung cancer    Family history of CHF (congestive heart failure)    no family history of DM    Social History:  no cigarette use  no alcohol use    Outpatient Medications:  · 	lactobacillus acidophilus oral capsule: Last Dose Taken:  , 1 cap(s) orally 2 times a day  · 	labetalol 200 mg oral tablet: Last Dose Taken:  , 1 tab(s) orally 2 times a day  · 	losartan 100 mg oral tablet: Last Dose Taken:  , 1 tab(s) orally once a day  · 	timolol hemihydrate 0.5% ophthalmic solution: Last Dose Taken:  , 1 drop(s) to each affected eye once a day  · 	Lipitor 10 mg oral tablet: Last Dose Taken:  , 1 tab(s) orally once a day (at bedtime)  · 	Ecotrin 325 mg oral delayed release tablet: Last Dose Taken:  , 1 tab(s) orally once a day  · 	NovoLIN N 100 units/mL subcutaneous suspension: Last Dose Taken:  , 30 unit(s) subcutaneous once a day (at bedtime)  · 	NovoLIN N 100 units/mL subcutaneous suspension: Last Dose Taken:  , 40 unit(s) subcutaneous once a day, QAM    MEDICATIONS  (STANDING):  amLODIPine   Tablet 5 milliGRAM(s) Oral daily  aspirin enteric coated 325 milliGRAM(s) Oral daily  collagenase Ointment 1 Application(s) Topical daily  dextrose 5%. 1000 milliLiter(s) (50 mL/Hr) IV Continuous <Continuous>  dextrose 50% Injectable 12.5 Gram(s) IV Push once  dextrose 50% Injectable 25 Gram(s) IV Push once  dextrose 50% Injectable 25 Gram(s) IV Push once  heparin   Injectable 5000 Unit(s) SubCutaneous every 8 hours  influenza   Vaccine 0.5 milliLiter(s) IntraMuscular once  insulin glargine Injectable (LANTUS) 35 Unit(s) SubCutaneous at bedtime  insulin lispro (HumaLOG) corrective regimen sliding scale   SubCutaneous three times a day before meals  insulin lispro (HumaLOG) corrective regimen sliding scale   SubCutaneous at bedtime  insulin lispro Injectable (HumaLOG) 5 Unit(s) SubCutaneous three times a day before meals  labetalol 200 milliGRAM(s) Oral two times a day  lactated ringers. 1000 milliLiter(s) (75 mL/Hr) IV Continuous <Continuous>  lactobacillus acidophilus 1 Tablet(s) Oral every 12 hours  piperacillin/tazobactam IVPB.. 3.375 Gram(s) IV Intermittent every 8 hours  timolol 0.5% Solution 1 Drop(s) Both EYES two times a day  vancomycin  IVPB 1500 milliGRAM(s) IV Intermittent every 24 hours    MEDICATIONS  (PRN):  dextrose 40% Gel 15 Gram(s) Oral once PRN Blood Glucose LESS THAN 70 milliGRAM(s)/deciliter  glucagon  Injectable 1 milliGRAM(s) IntraMuscular once PRN Glucose LESS THAN 70 milligrams/deciliter      Allergies  No Known Allergies    Review of Systems:  Constitutional: No fever  Eyes: + blurry vision  Neuro: No tremors  HEENT: No pain  Cardiovascular: No chest pain, palpitations  Respiratory: No SOB, no cough  GI: No nausea, vomiting, abdominal pain  : No dysuria  Skin: no rash  Psych: no depression  Endocrine: + polyuria, no polydipsia    ALL OTHER SYSTEMS REVIEWED AND NEGATIVE    PHYSICAL EXAM:  VITALS: T(C): 36.7 (09-17-20 @ 10:00)  T(F): 98.1 (09-17-20 @ 10:00), Max: 98.8 (09-17-20 @ 06:34)  HR: 86 (09-17-20 @ 10:00) (83 - 98)  BP: 100/72 (09-17-20 @ 10:00) (100/72 - 191/78)  RR:  (18 - 20)  SpO2:  (97% - 100%)  Wt(kg): --  GENERAL: NAD, well-developed  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, anicteric  HEENT:  Atraumatic, Normocephalic  THYROID: Normal size, no palpable nodules  RESPIRATORY: Clear to auscultation bilaterally; No rales, rhonchi, wheezing  CARDIOVASCULAR: Regular rate and rhythm; No murmurs; no peripheral edema  GI: Soft, nontender, non distended, normal bowel sounds  SKIN: Dry, intact, No ulcer noted on right foot; left foot bandaged  MUSCULOSKELETAL: s/p right great toe amputation   PSYCH: Alert and oriented x 3, reactive affect, euthymic mood       POCT Blood Glucose.: 122 mg/dL (09-17-20 @ 12:11) H 5   POCT Blood Glucose.: 96 mg/dL (09-17-20 @ 09:36) H 5  POCT Blood Glucose.: 99 mg/dL (09-17-20 @ 08:23)  POCT Blood Glucose.: 133 mg/dL (09-17-20 @ 03:39)  POCT Blood Glucose.: 162 mg/dL (09-17-20 @ 00:09) L 35  POCT Blood Glucose.: 169 mg/dL (09-16-20 @ 22:45)  POCT Blood Glucose.: 150 mg/dL (09-16-20 @ 22:15)  POCT Blood Glucose.: 205 mg/dL (09-16-20 @ 20:10)  POCT Blood Glucose.: 258 mg/dL (09-16-20 @ 17:46) H 8  POCT Blood Glucose.: 349 mg/dL (09-16-20 @ 12:40)                          9.6    10.87 )-----------( 228      ( 17 Sep 2020 05:52 )             30.6       09-17    140  |  104  |  25<H>  ----------------------------<  116<H>  4.4   |  24  |  1.57<H>    EGFR if : 36  EGFR if non : 31    Ca    9.3      09-17  Mg     1.7     09-17  Phos  2.8     09-17    TPro  6.4  /  Alb  3.2<L>  /  TBili  0.3  /  DBili  x   /  AST  9   /  ALT  12  /  AlkPhos  106  09-17      Thyroid Function Tests:  09-17 @ 05:52 TSH 2.29 FreeT4 1.10 T3 -- Anti TPO -- Anti Thyroglobulin Ab -- TSI --      09-17 Chol 98<L> LDL 46 HDL 35<L> Trig 138

## 2020-09-17 NOTE — PATIENT PROFILE ADULT - VISION (WITH CORRECTIVE LENSES IF THE PATIENT USUALLY WEARS THEM):
has glaucoma and retinopathy/Partially impaired: cannot see medication labels or newsprint, but can see obstacles in path, and the surrounding layout; can count fingers at arm's length

## 2020-09-17 NOTE — PROGRESS NOTE ADULT - PROBLEM SELECTOR PLAN 4
ENDO consult: Email sent, Start SQ Lantus 35 units QHS, Humalog 5 units Before meals TID, Hold if pt is NPO, Hgba1c, FS, sliding scale, TSH, free T4, UA, Urine Culture, pt is on NPH BID at home, ENDO consult: Email sent, Start SQ Lantus 35 units QHS, Humalog 5 units Before meals TID, Hold if pt is NPO,   Hgba1c 9.3   c/w , FS, sliding scale,   TSH, free T4 wnl , UA, Urine  noted with proteinuria and glucosuria   f/u urine Culture,   pt is on NPH BID at home,  diabetic education   Nutrition eval 10-Nov-2019 19:55

## 2020-09-17 NOTE — PROGRESS NOTE ADULT - PROBLEM SELECTOR PLAN 5
Hold ACEI / ARB, + ARABELLA, on Labetalol, ASA, Start Norvasc 5 mg PO QD from tonight, ECHO, Hold ACEI / ARB, + ARABELLA, on Labetalol, ASA, Start Norvasc 5 mg PO QD from tonight, ECHO noted

## 2020-09-17 NOTE — PATIENT PROFILE ADULT - ABILITY TO HEAR (WITH HEARING AID OR HEARING APPLIANCE IF NORMALLY USED):
left ear decrease in hearing x 2 days/Mildly to Moderately Impaired: difficulty hearing in some environments or speaker may need to increase volume or speak distinctly

## 2020-09-17 NOTE — PROGRESS NOTE ADULT - PROBLEM SELECTOR PLAN 3
Vascular consult called tonight, MUSHTAQ/PVR, on ASA,    Venous Doppler legs R/O DVT as well, Legs edema, Vascular consult  appreciated   -f/u  MUSHTAQ/PVR, on ASA,  Venous Doppler legs negative for DVT as well,

## 2020-09-17 NOTE — PROGRESS NOTE ADULT - PROBLEM SELECTOR PLAN 2
F/U BMP, UA, Urine Culture, IVF LR @ 75 cc/hr,  HOLD ACEI/ARB , BMP, UA, noted  f/u Urine Culture,   IVF LR @ 75 cc/hr,  HOLD ACEI/ARB ,

## 2020-09-17 NOTE — CONSULT NOTE ADULT - ASSESSMENT
79 year old woman with PVD presents with left toe ulcer     Recommendations:  -bilateral MUSHTAQ/PVR  -f/u podiatry plan re amputation of left 1st toe  -obtain records from Dr. Rich regarding prior procedures    -LATONYA Archuleta, R3  C Team Surgery #[id number] 79 year old woman with PVD presents with left toe ulcer     Recommendations:  -bilateral MUSHTAQ/PVR  -f/u podiatry plan re amputation of left 1st toe  -obtain records from Dr. Rich regarding prior procedures    -d/w Dr. Rangel, Vascular   -LATONYA Archuleta, R3  C Team Surgery #[id number]

## 2020-09-17 NOTE — PROGRESS NOTE ADULT - SUBJECTIVE AND OBJECTIVE BOX
Patient is a 79y old  Female who presents with a chief complaint of diabetic foot ulcer, Left first toe, and left heel , R/O Osteomyelitis, (17 Sep 2020 01:28)      SUBJECTIVE / OVERNIGHT EVENTS:    MEDICATIONS  (STANDING):  amLODIPine   Tablet 5 milliGRAM(s) Oral daily  aspirin enteric coated 325 milliGRAM(s) Oral daily  collagenase Ointment 1 Application(s) Topical daily  dextrose 5%. 1000 milliLiter(s) (50 mL/Hr) IV Continuous <Continuous>  dextrose 50% Injectable 12.5 Gram(s) IV Push once  dextrose 50% Injectable 25 Gram(s) IV Push once  dextrose 50% Injectable 25 Gram(s) IV Push once  heparin   Injectable 5000 Unit(s) SubCutaneous every 8 hours  influenza   Vaccine 0.5 milliLiter(s) IntraMuscular once  insulin glargine Injectable (LANTUS) 35 Unit(s) SubCutaneous at bedtime  insulin lispro (HumaLOG) corrective regimen sliding scale   SubCutaneous three times a day before meals  insulin lispro (HumaLOG) corrective regimen sliding scale   SubCutaneous at bedtime  insulin lispro Injectable (HumaLOG) 5 Unit(s) SubCutaneous three times a day before meals  labetalol 200 milliGRAM(s) Oral two times a day  lactated ringers. 1000 milliLiter(s) (75 mL/Hr) IV Continuous <Continuous>  lactobacillus acidophilus 1 Tablet(s) Oral every 12 hours  piperacillin/tazobactam IVPB.. 3.375 Gram(s) IV Intermittent every 8 hours  timolol 0.5% Solution 1 Drop(s) Both EYES two times a day  vancomycin  IVPB 1500 milliGRAM(s) IV Intermittent every 24 hours    MEDICATIONS  (PRN):  dextrose 40% Gel 15 Gram(s) Oral once PRN Blood Glucose LESS THAN 70 milliGRAM(s)/deciliter  glucagon  Injectable 1 milliGRAM(s) IntraMuscular once PRN Glucose LESS THAN 70 milligrams/deciliter      Vital Signs Last 24 Hrs  T(C): 36.7 (17 Sep 2020 10:00), Max: 37.1 (17 Sep 2020 06:34)  T(F): 98.1 (17 Sep 2020 10:00), Max: 98.8 (17 Sep 2020 06:34)  HR: 86 (17 Sep 2020 10:00) (80 - 98)  BP: 100/72 (17 Sep 2020 10:00) (100/72 - 191/78)  BP(mean): --  RR: 18 (17 Sep 2020 10:00) (16 - 20)  SpO2: 99% (17 Sep 2020 10:00) (97% - 100%)  CAPILLARY BLOOD GLUCOSE      POCT Blood Glucose.: 96 mg/dL (17 Sep 2020 09:36)  POCT Blood Glucose.: 99 mg/dL (17 Sep 2020 08:23)  POCT Blood Glucose.: 133 mg/dL (17 Sep 2020 03:39)  POCT Blood Glucose.: 162 mg/dL (17 Sep 2020 00:09)  POCT Blood Glucose.: 169 mg/dL (16 Sep 2020 22:45)  POCT Blood Glucose.: 150 mg/dL (16 Sep 2020 22:15)  POCT Blood Glucose.: 205 mg/dL (16 Sep 2020 20:10)  POCT Blood Glucose.: 258 mg/dL (16 Sep 2020 17:46)  POCT Blood Glucose.: 349 mg/dL (16 Sep 2020 12:40)    I&O's Summary    16 Sep 2020 07:01  -  17 Sep 2020 07:00  --------------------------------------------------------  IN: 0 mL / OUT: 400 mL / NET: -400 mL        PHYSICAL EXAM:  GENERAL: NAD, well-developed  HEAD:  Atraumatic, Normocephalic  EYES: EOMI, PERRLA, conjunctiva and sclera clear  NECK: Supple, No JVD  CHEST/LUNG: Clear to auscultation bilaterally; No wheeze  HEART: Regular rate and rhythm; No murmurs, rubs, or gallops  ABDOMEN: Soft, Nontender, Nondistended; Bowel sounds present  EXTREMITIES:  2+ Peripheral Pulses, No clubbing, cyanosis, or edema  PSYCH: AAOx3  NEUROLOGY: non-focal  SKIN: No rashes or lesions    LABS:                        9.6    10.87 )-----------( 228      ( 17 Sep 2020 05:52 )             30.6     09-    140  |  104  |  25<H>  ----------------------------<  116<H>  4.4   |  24  |  1.57<H>    Ca    9.3      17 Sep 2020 05:52  Phos  2.8       Mg     1.7         TPro  6.4  /  Alb  3.2<L>  /  TBili  0.3  /  DBili  x   /  AST  9   /  ALT  12  /  AlkPhos  106      PT/INR - ( 17 Sep 2020 05:52 )   PT: 13.6 SEC;   INR: 1.20          PTT - ( 17 Sep 2020 05:52 )  PTT:26.4 SEC      Urinalysis Basic - ( 17 Sep 2020 01:27 )    Color: LIGHT YELLOW / Appearance: CLEAR / S.016 / pH: 6.0  Gluc: 300 / Ketone: NEGATIVE  / Bili: NEGATIVE / Urobili: NORMAL   Blood: SMALL / Protein: 200 / Nitrite: NEGATIVE   Leuk Esterase: NEGATIVE / RBC: 6-10 / WBC 0-2   Sq Epi: OCC / Non Sq Epi: x / Bacteria: NEGATIVE        RADIOLOGY & ADDITIONAL TESTS:    Imaging Personally Reviewed:    Consultant(s) Notes Reviewed:      Care Discussed with Consultants/Other Providers:   Patient is a 79y old  Female who presents with a chief complaint of diabetic foot ulcer, Left first toe, and left heel , R/O Osteomyelitis, (17 Sep 2020 01:28)      SUBJECTIVE / OVERNIGHT EVENTS: patient seen and examined by bedside      MEDICATIONS  (STANDING):  amLODIPine   Tablet 5 milliGRAM(s) Oral daily  aspirin enteric coated 325 milliGRAM(s) Oral daily  collagenase Ointment 1 Application(s) Topical daily  dextrose 5%. 1000 milliLiter(s) (50 mL/Hr) IV Continuous <Continuous>  dextrose 50% Injectable 12.5 Gram(s) IV Push once  dextrose 50% Injectable 25 Gram(s) IV Push once  dextrose 50% Injectable 25 Gram(s) IV Push once  heparin   Injectable 5000 Unit(s) SubCutaneous every 8 hours  influenza   Vaccine 0.5 milliLiter(s) IntraMuscular once  insulin glargine Injectable (LANTUS) 35 Unit(s) SubCutaneous at bedtime  insulin lispro (HumaLOG) corrective regimen sliding scale   SubCutaneous three times a day before meals  insulin lispro (HumaLOG) corrective regimen sliding scale   SubCutaneous at bedtime  insulin lispro Injectable (HumaLOG) 5 Unit(s) SubCutaneous three times a day before meals  labetalol 200 milliGRAM(s) Oral two times a day  lactated ringers. 1000 milliLiter(s) (75 mL/Hr) IV Continuous <Continuous>  lactobacillus acidophilus 1 Tablet(s) Oral every 12 hours  piperacillin/tazobactam IVPB.. 3.375 Gram(s) IV Intermittent every 8 hours  timolol 0.5% Solution 1 Drop(s) Both EYES two times a day  vancomycin  IVPB 1500 milliGRAM(s) IV Intermittent every 24 hours    MEDICATIONS  (PRN):  dextrose 40% Gel 15 Gram(s) Oral once PRN Blood Glucose LESS THAN 70 milliGRAM(s)/deciliter  glucagon  Injectable 1 milliGRAM(s) IntraMuscular once PRN Glucose LESS THAN 70 milligrams/deciliter      Vital Signs Last 24 Hrs  T(C): 36.7 (17 Sep 2020 10:00), Max: 37.1 (17 Sep 2020 06:34)  T(F): 98.1 (17 Sep 2020 10:00), Max: 98.8 (17 Sep 2020 06:34)  HR: 86 (17 Sep 2020 10:00) (80 - 98)  BP: 100/72 (17 Sep 2020 10:00) (100/72 - 191/78)  BP(mean): --  RR: 18 (17 Sep 2020 10:00) (16 - 20)  SpO2: 99% (17 Sep 2020 10:00) (97% - 100%)  CAPILLARY BLOOD GLUCOSE      POCT Blood Glucose.: 96 mg/dL (17 Sep 2020 09:36)  POCT Blood Glucose.: 99 mg/dL (17 Sep 2020 08:23)  POCT Blood Glucose.: 133 mg/dL (17 Sep 2020 03:39)  POCT Blood Glucose.: 162 mg/dL (17 Sep 2020 00:09)  POCT Blood Glucose.: 169 mg/dL (16 Sep 2020 22:45)  POCT Blood Glucose.: 150 mg/dL (16 Sep 2020 22:15)  POCT Blood Glucose.: 205 mg/dL (16 Sep 2020 20:10)  POCT Blood Glucose.: 258 mg/dL (16 Sep 2020 17:46)  POCT Blood Glucose.: 349 mg/dL (16 Sep 2020 12:40)    I&O's Summary    16 Sep 2020 07:01  -  17 Sep 2020 07:00  --------------------------------------------------------  IN: 0 mL / OUT: 400 mL / NET: -400 mL        PHYSICAL EXAM:  GENERAL: NAD, well-developed  HEAD:  Atraumatic, Normocephalic  EYES: EOMI, PERRLA, conjunctiva and sclera clear  NECK: Supple, No JVD  CHEST/LUNG: Clear to auscultation bilaterally; No wheeze  HEART: Regular rate and rhythm; No murmurs, rubs, or gallops  ABDOMEN: Soft, Nontender, Nondistended; Bowel sounds present  EXTREMITIES:  2+ Peripheral Pulses, No clubbing, cyanosis, or edema  PSYCH: AAOx3  NEUROLOGY: non-focal  SKIN: No rashes or lesions    LABS:                        9.6    10.87 )-----------( 228      ( 17 Sep 2020 05:52 )             30.6     09-17    140  |  104  |  25<H>  ----------------------------<  116<H>  4.4   |  24  |  1.57<H>    Ca    9.3      17 Sep 2020 05:52  Phos  2.8       Mg     1.7         TPro  6.4  /  Alb  3.2<L>  /  TBili  0.3  /  DBili  x   /  AST  9   /  ALT  12  /  AlkPhos  106      PT/INR - ( 17 Sep 2020 05:52 )   PT: 13.6 SEC;   INR: 1.20          PTT - ( 17 Sep 2020 05:52 )  PTT:26.4 SEC      Urinalysis Basic - ( 17 Sep 2020 01:27 )    Color: LIGHT YELLOW / Appearance: CLEAR / S.016 / pH: 6.0  Gluc: 300 / Ketone: NEGATIVE  / Bili: NEGATIVE / Urobili: NORMAL   Blood: SMALL / Protein: 200 / Nitrite: NEGATIVE   Leuk Esterase: NEGATIVE / RBC: 6-10 / WBC 0-2   Sq Epi: OCC / Non Sq Epi: x / Bacteria: NEGATIVE        RADIOLOGY & ADDITIONAL TESTS:  < from: TTE with Doppler (w/Cont) (20 @ 10:51) >  ------------------------------------------------------------------------  CONCLUSIONS:  1. Mitral annular calcification and calcified mitral  leaflets with decreased diastolic opening. Mean transmitral  valve gradient equals 5 mm Hg, consistent with moderate  mitral stenosis.  2. Aortic valve not well visualized.  3. Moderately dilated left atrium.  LA volume index = 46  cc/m2.  4. Endocardium not well visualized; grossly normal left  ventricular systolic function. Endocardial visualization  enhanced with intravenous injection of echo contrast  (Definity).  5. The right ventricle is not well visualized.    < end of copied text >    Imaging Personally Reviewed:    Consultant(s) Notes Reviewed:  podiatry, vas sx,     Care Discussed with Consultants/Other Providers:   Patient is a 79y old  Female who presents with a chief complaint of diabetic foot ulcer, Left first toe, and left heel , R/O Osteomyelitis, (17 Sep 2020 01:28)      SUBJECTIVE / OVERNIGHT EVENTS: patient seen and examined by bedside, denies headache, dizziness, SOB, CP, Palpitations , N/V/D, abdominal pain  pain in foot controlled       MEDICATIONS  (STANDING):  amLODIPine   Tablet 5 milliGRAM(s) Oral daily  aspirin enteric coated 325 milliGRAM(s) Oral daily  collagenase Ointment 1 Application(s) Topical daily  dextrose 5%. 1000 milliLiter(s) (50 mL/Hr) IV Continuous <Continuous>  dextrose 50% Injectable 12.5 Gram(s) IV Push once  dextrose 50% Injectable 25 Gram(s) IV Push once  dextrose 50% Injectable 25 Gram(s) IV Push once  heparin   Injectable 5000 Unit(s) SubCutaneous every 8 hours  influenza   Vaccine 0.5 milliLiter(s) IntraMuscular once  insulin glargine Injectable (LANTUS) 35 Unit(s) SubCutaneous at bedtime  insulin lispro (HumaLOG) corrective regimen sliding scale   SubCutaneous three times a day before meals  insulin lispro (HumaLOG) corrective regimen sliding scale   SubCutaneous at bedtime  insulin lispro Injectable (HumaLOG) 5 Unit(s) SubCutaneous three times a day before meals  labetalol 200 milliGRAM(s) Oral two times a day  lactated ringers. 1000 milliLiter(s) (75 mL/Hr) IV Continuous <Continuous>  lactobacillus acidophilus 1 Tablet(s) Oral every 12 hours  piperacillin/tazobactam IVPB.. 3.375 Gram(s) IV Intermittent every 8 hours  timolol 0.5% Solution 1 Drop(s) Both EYES two times a day  vancomycin  IVPB 1500 milliGRAM(s) IV Intermittent every 24 hours    MEDICATIONS  (PRN):  dextrose 40% Gel 15 Gram(s) Oral once PRN Blood Glucose LESS THAN 70 milliGRAM(s)/deciliter  glucagon  Injectable 1 milliGRAM(s) IntraMuscular once PRN Glucose LESS THAN 70 milligrams/deciliter      Vital Signs Last 24 Hrs  T(C): 36.7 (17 Sep 2020 10:00), Max: 37.1 (17 Sep 2020 06:34)  T(F): 98.1 (17 Sep 2020 10:00), Max: 98.8 (17 Sep 2020 06:34)  HR: 86 (17 Sep 2020 10:00) (80 - 98)  BP: 100/72 (17 Sep 2020 10:00) (100/72 - 191/78)  BP(mean): --  RR: 18 (17 Sep 2020 10:00) (16 - 20)  SpO2: 99% (17 Sep 2020 10:00) (97% - 100%)  CAPILLARY BLOOD GLUCOSE      POCT Blood Glucose.: 96 mg/dL (17 Sep 2020 09:36)  POCT Blood Glucose.: 99 mg/dL (17 Sep 2020 08:23)  POCT Blood Glucose.: 133 mg/dL (17 Sep 2020 03:39)  POCT Blood Glucose.: 162 mg/dL (17 Sep 2020 00:09)  POCT Blood Glucose.: 169 mg/dL (16 Sep 2020 22:45)  POCT Blood Glucose.: 150 mg/dL (16 Sep 2020 22:15)  POCT Blood Glucose.: 205 mg/dL (16 Sep 2020 20:10)  POCT Blood Glucose.: 258 mg/dL (16 Sep 2020 17:46)  POCT Blood Glucose.: 349 mg/dL (16 Sep 2020 12:40)    I&O's Summary    16 Sep 2020 07:01  -  17 Sep 2020 07:00  --------------------------------------------------------  IN: 0 mL / OUT: 400 mL / NET: -400 mL        PHYSICAL EXAM:  GENERAL: NAD, obese   HEAD:  Atraumatic, Normocephalic  EYES: EOMI, PERRLA, conjunctiva and sclera clear  CHEST/LUNG: Clear to auscultation bilaterally; No wheeze  HEART: Regular rate and rhythm;   ABDOMEN: Soft, Nontender, Nondistended; Bowel sounds present  EXTREMITIES:  diminished Peripheral Pulses, b/l  erythema+ , B/L pedal edema , erythema in RUE around IV site , slightly nodular     PSYCH: AAOx3  NEUROLOGY: non-focal  SKIN: left foot ulcer , with dressing , c/d/i     LABS:                        9.6    10.87 )-----------( 228      ( 17 Sep 2020 05:52 )             30.6     09-17    140  |  104  |  25<H>  ----------------------------<  116<H>  4.4   |  24  |  1.57<H>    Ca    9.3      17 Sep 2020 05:52  Phos  2.8       Mg     1.7         TPro  6.4  /  Alb  3.2<L>  /  TBili  0.3  /  DBili  x   /  AST  9   /  ALT  12  /  AlkPhos  106      PT/INR - ( 17 Sep 2020 05:52 )   PT: 13.6 SEC;   INR: 1.20          PTT - ( 17 Sep 2020 05:52 )  PTT:26.4 SEC      Urinalysis Basic - ( 17 Sep 2020 01:27 )    Color: LIGHT YELLOW / Appearance: CLEAR / S.016 / pH: 6.0  Gluc: 300 / Ketone: NEGATIVE  / Bili: NEGATIVE / Urobili: NORMAL   Blood: SMALL / Protein: 200 / Nitrite: NEGATIVE   Leuk Esterase: NEGATIVE / RBC: 6-10 / WBC 0-2   Sq Epi: OCC / Non Sq Epi: x / Bacteria: NEGATIVE        RADIOLOGY & ADDITIONAL TESTS:  < from: TTE with Doppler (w/Cont) (20 @ 10:51) >  ------------------------------------------------------------------------  CONCLUSIONS:  1. Mitral annular calcification and calcified mitral  leaflets with decreased diastolic opening. Mean transmitral  valve gradient equals 5 mm Hg, consistent with moderate  mitral stenosis.  2. Aortic valve not well visualized.  3. Moderately dilated left atrium.  LA volume index = 46  cc/m2.  4. Endocardium not well visualized; grossly normal left  ventricular systolic function. Endocardial visualization  enhanced with intravenous injection of echo contrast  (Definity).  5. The right ventricle is not well visualized.    < end of copied text >    Imaging Personally Reviewed:    Consultant(s) Notes Reviewed:  podiatry, vas sx,     Care Discussed with Consultants/Other Providers:

## 2020-09-17 NOTE — PROGRESS NOTE ADULT - ASSESSMENT
80 y/o Female  with HX of DM type 2,  diabetic neuropathy, HTN, HLD, PVD,  Obesity, presents with chronic ulcer of LT 1st toe and LT heel with now worsening redness and swelling. Sent in by Dr. Meeks to be admitted to Dr. Ruiz for surgery (podiatry) and IV antibiotics. Has been taking Levaquin for past 2 days. Denies fever, chills, chest pain, shortness of breath, cough, abd pain, nausea, vomiting, diarrhea. Pt was seen by Podiatry tonight:  As per Podiatry : Left foot hallux plantar and dorsal wound that communicate, 5cc of pus expressed. Dorsal wound tracks 2cm proximally. Dorsal and plantar hallux wounds probe to bone, LF swelling to level of mid tibia, w/ extending erythema to the level of the midfoot. S/P bedside I&D of LF hallux wound: incision to dorsal hallux wound made along proximal tracking revealing 5cc of pus, no malodor. 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    Diffuse soft tissue swelling particularly around hallux. In addition, shallow plantar heel soft tissue ulceration. No tracking gas collections beyond these regions.    Focally eroded and indistinct appearing lateral cortex of the hallux distal phalanx onthe oblique view suspicious for osteomyelitis. No additional suspected areas of osteomyelitis particularly in the heel region subjacent to the aforementioned superficial soft tissue ulceration.    No fractures or dislocations.    Tarsometatarsal alignment maintained without evidence for a Lisfranc injury.        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 Preserved remaining joint spaces and no joint margin erosions.    Plantar calcaneal enthesophyte.    Generalized osteopenia otherwise no discrete lytic or blastic lesions.    Posterior tibial distribution vascular calcifications.         78 y/o Female  with HX of DM type 2,  diabetic neuropathy, HTN, HLD, PVD,  Obesity, presents with chronic ulcer of LT 1st toe and LT heel with now worsening redness and swelling. Sent in by Dr. Meeks to be admitted to Dr. Ruiz for surgery (podiatry) and IV antibiotics. Has been taking Levaquin for past 2 days. Denies fever, chills, chest pain, shortness of breath, cough, abd pain, nausea, vomiting, diarrhea. Pt was seen by Podiatry tonight:  As per Podiatry : Left foot hallux plantar and dorsal wound that communicate, 5cc of pus expressed. Dorsal wound tracks 2cm proximally. Dorsal and plantar hallux wounds probe to bone, LF swelling to level of mid tibia, w/ extending erythema to the level of the midfoot. S/P bedside I&D of LF hallux wound: incision to dorsal hallux wound made along proximal tracking revealing 5cc of pus, no malodor. 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    Diffuse soft tissue swelling particularly around hallux. In addition, shallow plantar heel soft tissue ulceration. No tracking gas collections beyond these regions.    Focally eroded and indistinct appearing lateral cortex of the hallux distal phalanx onthe oblique view suspicious for osteomyelitis. No additional suspected areas of osteomyelitis particularly in the heel region subjacent to the aforementioned superficial soft tissue ulceration.    No fractures or dislocations.    Tarsometatarsal alignment maintained without evidence for a Lisfranc injury.        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 Preserved remaining joint spaces and no joint margin erosions.    Plantar calcaneal enthesophyte.    Generalized osteopenia otherwise no discrete lytic or blastic lesions.    Posterior tibial distribution vascular calcifications.

## 2020-09-17 NOTE — CONSULT NOTE ADULT - ATTENDING COMMENTS
Full consult note as above; discussed with surgery resident and vascular fellow.   She has DM and has a gangrenous wound of her 1st toe. She will need a toe amputation. She has  palpable femoral and popliteal pulses. The pedal pulses were not palpable. A PVR study noted mild tibial artery PAD. The transmetatarsal PVR waveform is minimally decreased. The toe pressure cannot be measured due to it being gangrenous. She has a slightly elevated creatinine which would make it more complicated to do an angiogram. There is no clear indication that she will need an angiogram. There is probably enough blood supply to heal the foot surgery.
The patient's care was discussed with the consulting cardiology fellow.  I independently evaluated the patient.    She is a 79-year-old woman with multiple cardiovascular risk factors and peripheral vascular disease who is undergoing right toe amputation for the management of osteomyelitis. We are asked to provide perioperative risk assessment. The patient has no known cardiac disease. She has no history of chest pain or complaints of exertional dyspnea, but she is unable to do much activity related to her peripheral vascular disease. She is comfortable-appearing on examination, and there is no signs of decompensated heart failure noted.    ECG demonstrates sinus rhythm with first degree AV delay, left axis and poor R wave progression. Echocardiography demonstrates grossly normal LV function and moderate mitral stenosis.    I agree with the assessment and risk assessment noted above.   The patient's 30-day risk of death, MI or cardiac arrest as calculated by the Revised Cardiac Risk Index is 6%.  The patient's 30-day risk of MI or cardiac arrest as calculated by the [facility] (STEPHEN) Perioperative Risk Calculator is 2.7%.  These risks should be considered with the benefits of the proposed operation in shared decision-making discussions with the patient regarding whether to proceed. There is no evidence that additional perioperative cardiac testing will improve surgical outcomes or reduce risk.   No additional cardiac testing is recommended at this time.    Jozef Pineda MD  Cardiology  [phone]
Osmin Ralph MD   Pager # [phone]  On evenings and weekends, please call the office at [phone] or page endocrine fellow on call. Please note that this patient may be followed by different provider tomorrow. If no answer, contact the office.

## 2020-09-17 NOTE — PROGRESS NOTE ADULT - ASSESSMENT
Pt is 79y F who presents w/ LF wound  -pt was seen and evaluated  -Left foot hallux plantar and dorsal wound that communicate, no pus expressed. Dorsal wound tracks 2cm proximally. Dorsal and plantar hallux wounds probe to bone, LF swelling to level of mid tibia, w/ extending erythema to the level of the midfoot. Wound etiology secondary to neuropathy.   -LFXR: hallux suspicious for OM  -MRI left foot pending   -MUSHTAQ/PVR pending  -Booked for Left foot partial 1st ray resection with Dr. Ruiz on Wednesday 9/23 at 3pm, pending [facility] recs  -Please document medical clearance/ optimization for left foot surgery under light sedation and local  -Seen w/ attending

## 2020-09-17 NOTE — PROGRESS NOTE ADULT - PROBLEM SELECTOR PLAN 8
DVT Prophylaxis: SQ Heparin,    Hep A,B,C  profile, Iron studies, Ferritin, Vit B12, folate, UA, Urine Culture, DVT Prophylaxis: SQ Heparin, DVT Prophylaxis: SQ Heparin,  RUE erythema around IV site ? thrombophlebitis , cold compresses , will continue to monitor , change IV site

## 2020-09-17 NOTE — PROGRESS NOTE ADULT - SUBJECTIVE AND OBJECTIVE BOX
Podiatry pager #: [phone] ([place])/ [phone] ([facility])    Patient is a 79y old  Female who presents with a chief complaint of diabetic foot ulcer, Left first toe, and left heel , R/O Osteomyelitis, (17 Sep 2020 10:46)       INTERVAL HPI/OVERNIGHT EVENTS:  Patient seen and evaluated at bedside.  Pt is resting comfortable in NAD. Denies N/V/F/C.     Allergies    No Known Allergies    Intolerances        Vital Signs Last 24 Hrs  T(C): 36.7 (17 Sep 2020 10:00), Max: 37.1 (17 Sep 2020 06:34)  T(F): 98.1 (17 Sep 2020 10:00), Max: 98.8 (17 Sep 2020 06:34)  HR: 86 (17 Sep 2020 10:00) (80 - 98)  BP: 100/72 (17 Sep 2020 10:00) (100/72 - 191/78)  BP(mean): --  RR: 18 (17 Sep 2020 10:00) (16 - 20)  SpO2: 99% (17 Sep 2020 10:00) (97% - 100%)    LABS:                        9.6    10.87 )-----------( 228      ( 17 Sep 2020 05:52 )             30.6     09-17    140  |  104  |  25<H>  ----------------------------<  116<H>  4.4   |  24  |  1.57<H>    Ca    9.3      17 Sep 2020 05:52  Phos  2.8       Mg     1.7     17    TPro  6.4  /  Alb  3.2<L>  /  TBili  0.3  /  DBili  x   /  AST  9   /  ALT  12  /  AlkPhos  106  09-17    PT/INR - ( 17 Sep 2020 05:52 )   PT: 13.6 SEC;   INR: 1.20          PTT - ( 17 Sep 2020 05:52 )  PTT:26.4 SEC  Urinalysis Basic - ( 17 Sep 2020 01:27 )    Color: LIGHT YELLOW / Appearance: CLEAR / S.016 / pH: 6.0  Gluc: 300 / Ketone: NEGATIVE  / Bili: NEGATIVE / Urobili: NORMAL   Blood: SMALL / Protein: 200 / Nitrite: NEGATIVE   Leuk Esterase: NEGATIVE / RBC: 6-10 / WBC 0-2   Sq Epi: OCC / Non Sq Epi: x / Bacteria: NEGATIVE      CAPILLARY BLOOD GLUCOSE      POCT Blood Glucose.: 96 mg/dL (17 Sep 2020 09:36)  POCT Blood Glucose.: 99 mg/dL (17 Sep 2020 08:23)  POCT Blood Glucose.: 133 mg/dL (17 Sep 2020 03:39)  POCT Blood Glucose.: 162 mg/dL (17 Sep 2020 00:09)  POCT Blood Glucose.: 169 mg/dL (16 Sep 2020 22:45)  POCT Blood Glucose.: 150 mg/dL (16 Sep 2020 22:15)  POCT Blood Glucose.: 205 mg/dL (16 Sep 2020 20:10)  POCT Blood Glucose.: 258 mg/dL (16 Sep 2020 17:46)  POCT Blood Glucose.: 349 mg/dL (16 Sep 2020 12:40)      Lower Extremity Physical Exam:  Vasc: DP/PT 0/4 b/l   Left foot hallux plantar and dorsal wound that communicate, 5cc of pus expressed. Dorsal wound tracks 2cm proximally. Dorsal and plantar hallux wounds probe to bone, LF swelling to level of mid tibia, w/ extending erythema to the level of the midfoot. Wound etiology secondary to neuropathy.     S/P bedside I&D of LF hallux wound: incision to dorsal hallux wound made along proximal tracking revealing 5cc of pus, no malodor.     RADIOLOGY & ADDITIONAL TESTS:

## 2020-09-17 NOTE — CONSULT NOTE ADULT - REASON FOR ADMISSION
diabetic foot ulcer, Left first toe, and left heel , R/O Osteomyelitis,

## 2020-09-17 NOTE — CONSULT NOTE ADULT - ASSESSMENT
79 year old woman with PMH of HTN, HLD, DM type 2, diabetic neuropathy, PVD, Obesity, presents with chronic ulcer of LT 1st toe and LT heel, now awaiting amputation next week, also with hyperglycemia in setting of uncontrolled DM2. BG goal 100-180 mg/dL. High risk patient with high level decision making.

## 2020-09-18 LAB
-  AMPICILLIN/SULBACTAM: SIGNIFICANT CHANGE UP
-  CEFAZOLIN: SIGNIFICANT CHANGE UP
-  CLINDAMYCIN: SIGNIFICANT CHANGE UP
-  ERYTHROMYCIN: SIGNIFICANT CHANGE UP
-  GENTAMICIN: SIGNIFICANT CHANGE UP
-  OXACILLIN: SIGNIFICANT CHANGE UP
-  PENICILLIN: SIGNIFICANT CHANGE UP
-  RIFAMPIN: SIGNIFICANT CHANGE UP
-  TETRACYCLINE: SIGNIFICANT CHANGE UP
-  TRIMETHOPRIM/SULFAMETHOXAZOLE: SIGNIFICANT CHANGE UP
-  VANCOMYCIN: SIGNIFICANT CHANGE UP
ANION GAP SERPL CALC-SCNC: 15 MMO/L — HIGH (ref 7–14)
BUN SERPL-MCNC: 21 MG/DL — SIGNIFICANT CHANGE UP (ref 7–23)
CALCIUM SERPL-MCNC: 9.1 MG/DL — SIGNIFICANT CHANGE UP (ref 8.4–10.5)
CHLORIDE SERPL-SCNC: 101 MMOL/L — SIGNIFICANT CHANGE UP (ref 98–107)
CO2 SERPL-SCNC: 24 MMOL/L — SIGNIFICANT CHANGE UP (ref 22–31)
CREAT SERPL-MCNC: 1.67 MG/DL — HIGH (ref 0.5–1.3)
CULTURE RESULTS: SIGNIFICANT CHANGE UP
GLUCOSE BLDC GLUCOMTR-MCNC: 175 MG/DL — HIGH (ref 70–99)
GLUCOSE BLDC GLUCOMTR-MCNC: 183 MG/DL — HIGH (ref 70–99)
GLUCOSE BLDC GLUCOMTR-MCNC: 235 MG/DL — HIGH (ref 70–99)
GLUCOSE BLDC GLUCOMTR-MCNC: 241 MG/DL — HIGH (ref 70–99)
GLUCOSE SERPL-MCNC: 173 MG/DL — HIGH (ref 70–99)
HCT VFR BLD CALC: 32.8 % — LOW (ref 34.5–45)
HGB BLD-MCNC: 9.7 G/DL — LOW (ref 11.5–15.5)
MAGNESIUM SERPL-MCNC: 1.5 MG/DL — LOW (ref 1.6–2.6)
MCHC RBC-ENTMCNC: 27.4 PG — SIGNIFICANT CHANGE UP (ref 27–34)
MCHC RBC-ENTMCNC: 29.6 % — LOW (ref 32–36)
MCV RBC AUTO: 92.7 FL — SIGNIFICANT CHANGE UP (ref 80–100)
METHOD TYPE: SIGNIFICANT CHANGE UP
NRBC # FLD: 0 K/UL — SIGNIFICANT CHANGE UP (ref 0–0)
PHOSPHATE SERPL-MCNC: 3 MG/DL — SIGNIFICANT CHANGE UP (ref 2.5–4.5)
PLATELET # BLD AUTO: 241 K/UL — SIGNIFICANT CHANGE UP (ref 150–400)
PMV BLD: 10.3 FL — SIGNIFICANT CHANGE UP (ref 7–13)
POTASSIUM SERPL-MCNC: 4.5 MMOL/L — SIGNIFICANT CHANGE UP (ref 3.5–5.3)
POTASSIUM SERPL-SCNC: 4.5 MMOL/L — SIGNIFICANT CHANGE UP (ref 3.5–5.3)
RBC # BLD: 3.54 M/UL — LOW (ref 3.8–5.2)
RBC # FLD: 13.4 % — SIGNIFICANT CHANGE UP (ref 10.3–14.5)
SODIUM SERPL-SCNC: 140 MMOL/L — SIGNIFICANT CHANGE UP (ref 135–145)
SPECIMEN SOURCE: SIGNIFICANT CHANGE UP
WBC # BLD: 10.51 K/UL — HIGH (ref 3.8–10.5)
WBC # FLD AUTO: 10.51 K/UL — HIGH (ref 3.8–10.5)

## 2020-09-18 PROCEDURE — 99232 SBSQ HOSP IP/OBS MODERATE 35: CPT

## 2020-09-18 PROCEDURE — 99233 SBSQ HOSP IP/OBS HIGH 50: CPT

## 2020-09-18 RX ORDER — MAGNESIUM OXIDE 400 MG ORAL TABLET 241.3 MG
400 TABLET ORAL ONCE
Refills: 0 | Status: COMPLETED | OUTPATIENT
Start: 2020-09-18 | End: 2020-09-18

## 2020-09-18 RX ORDER — POLYETHYLENE GLYCOL 3350 17 G/17G
17 POWDER, FOR SOLUTION ORAL DAILY
Refills: 0 | Status: DISCONTINUED | OUTPATIENT
Start: 2020-09-18 | End: 2020-09-25

## 2020-09-18 RX ORDER — SENNA PLUS 8.6 MG/1
1 TABLET ORAL DAILY
Refills: 0 | Status: DISCONTINUED | OUTPATIENT
Start: 2020-09-18 | End: 2020-09-25

## 2020-09-18 RX ORDER — SODIUM CHLORIDE 9 MG/ML
1000 INJECTION INTRAMUSCULAR; INTRAVENOUS; SUBCUTANEOUS
Refills: 0 | Status: DISCONTINUED | OUTPATIENT
Start: 2020-09-18 | End: 2020-09-21

## 2020-09-18 RX ADMIN — HEPARIN SODIUM 5000 UNIT(S): 5000 INJECTION INTRAVENOUS; SUBCUTANEOUS at 13:30

## 2020-09-18 RX ADMIN — Medication 1 TABLET(S): at 17:27

## 2020-09-18 RX ADMIN — PIPERACILLIN AND TAZOBACTAM 25 GRAM(S): 4; .5 INJECTION, POWDER, LYOPHILIZED, FOR SOLUTION INTRAVENOUS at 06:37

## 2020-09-18 RX ADMIN — Medication 8 UNIT(S): at 17:28

## 2020-09-18 RX ADMIN — Medication 1: at 12:30

## 2020-09-18 RX ADMIN — Medication 1 TABLET(S): at 06:36

## 2020-09-18 RX ADMIN — Medication 200 MILLIGRAM(S): at 06:36

## 2020-09-18 RX ADMIN — Medication 1 DROP(S): at 06:37

## 2020-09-18 RX ADMIN — MAGNESIUM OXIDE 400 MG ORAL TABLET 400 MILLIGRAM(S): 241.3 TABLET ORAL at 08:57

## 2020-09-18 RX ADMIN — Medication 8 UNIT(S): at 12:30

## 2020-09-18 RX ADMIN — SENNA PLUS 1 TABLET(S): 8.6 TABLET ORAL at 06:35

## 2020-09-18 RX ADMIN — Medication 10 MILLIGRAM(S): at 17:28

## 2020-09-18 RX ADMIN — SODIUM CHLORIDE 75 MILLILITER(S): 9 INJECTION, SOLUTION INTRAVENOUS at 06:33

## 2020-09-18 RX ADMIN — Medication 325 MILLIGRAM(S): at 13:30

## 2020-09-18 RX ADMIN — AMLODIPINE BESYLATE 5 MILLIGRAM(S): 2.5 TABLET ORAL at 06:33

## 2020-09-18 RX ADMIN — PIPERACILLIN AND TAZOBACTAM 25 GRAM(S): 4; .5 INJECTION, POWDER, LYOPHILIZED, FOR SOLUTION INTRAVENOUS at 21:32

## 2020-09-18 RX ADMIN — Medication 2: at 17:28

## 2020-09-18 RX ADMIN — Medication 200 MILLIGRAM(S): at 17:27

## 2020-09-18 RX ADMIN — HEPARIN SODIUM 5000 UNIT(S): 5000 INJECTION INTRAVENOUS; SUBCUTANEOUS at 21:32

## 2020-09-18 RX ADMIN — HEPARIN SODIUM 5000 UNIT(S): 5000 INJECTION INTRAVENOUS; SUBCUTANEOUS at 06:35

## 2020-09-18 RX ADMIN — SODIUM CHLORIDE 75 MILLILITER(S): 9 INJECTION INTRAMUSCULAR; INTRAVENOUS; SUBCUTANEOUS at 17:27

## 2020-09-18 RX ADMIN — Medication 1 DROP(S): at 17:27

## 2020-09-18 RX ADMIN — INSULIN GLARGINE 24 UNIT(S): 100 INJECTION, SOLUTION SUBCUTANEOUS at 21:55

## 2020-09-18 RX ADMIN — Medication 300 MILLIGRAM(S): at 18:24

## 2020-09-18 RX ADMIN — Medication 8 UNIT(S): at 08:50

## 2020-09-18 RX ADMIN — PIPERACILLIN AND TAZOBACTAM 25 GRAM(S): 4; .5 INJECTION, POWDER, LYOPHILIZED, FOR SOLUTION INTRAVENOUS at 13:30

## 2020-09-18 RX ADMIN — Medication 1: at 08:51

## 2020-09-18 NOTE — PROGRESS NOTE ADULT - PROBLEM SELECTOR PLAN 4
ENDO consult: Email sent, Start SQ Lantus 35 units QHS, Humalog 5 units Before meals TID, Hold if pt is NPO,   Hgba1c 9.3   c/w , FS, sliding scale,   TSH, free T4 wnl , UA, Urine  noted with proteinuria and glucosuria   f/u urine Culture,   pt is on NPH BID at home,  diabetic education   Nutrition eval ENDO consult apprecaited    c/w  SQ Lantus 24  units QHS, Humalog 8 units Before meals TID, Hold if pt is NPO,   Hgba1c 9.3   c/w , FS, sliding scale,   TSH, free T4 wnl , UA, Urine  noted with proteinuria and glucosuria   f/u urine Culture,   pt is on NPH BID at home,  diabetic education   Nutrition eval

## 2020-09-18 NOTE — PROGRESS NOTE ADULT - ASSESSMENT
79 year old woman with PMH of HTN, HLD, DM type 2, diabetic neuropathy, PVD, Obesity, presents with chronic ulcer of LT 1st toe and LT heel, now awaiting amputation next week, also with hyperglycemia in setting of uncontrolled DM2. BG goal 100-180 mg/dL.

## 2020-09-18 NOTE — PROGRESS NOTE ADULT - ASSESSMENT
80 y/o Female  with HX of DM type 2,  diabetic neuropathy, HTN, HLD, PVD,  Obesity, presents with chronic ulcer of LT 1st toe and LT heel with now worsening redness and swelling. Sent in by Dr. Meeks to be admitted to Dr. Ruiz for surgery (podiatry) and IV antibiotics. Has been taking Levaquin for past 2 days. Denies fever, chills, chest pain, shortness of breath, cough, abd pain, nausea, vomiting, diarrhea. Pt was seen by Podiatry tonight:  As per Podiatry : Left foot hallux plantar and dorsal wound that communicate, 5cc of pus expressed. Dorsal wound tracks 2cm proximally. Dorsal and plantar hallux wounds probe to bone, LF swelling to level of mid tibia, w/ extending erythema to the level of the midfoot. S/P bedside I&D of LF hallux wound: incision to dorsal hallux wound made along proximal tracking revealing 5cc of pus, no malodor. 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    Diffuse soft tissue swelling particularly around hallux. In addition, shallow plantar heel soft tissue ulceration. No tracking gas collections beyond these regions.    Focally eroded and indistinct appearing lateral cortex of the hallux distal phalanx onthe oblique view suspicious for osteomyelitis. No additional suspected areas of osteomyelitis particularly in the heel region subjacent to the aforementioned superficial soft tissue ulceration.    No fractures or dislocations.    Tarsometatarsal alignment maintained without evidence for a Lisfranc injury.        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 Preserved remaining joint spaces and no joint margin erosions.    Plantar calcaneal enthesophyte.    Generalized osteopenia otherwise no discrete lytic or blastic lesions.    Posterior tibial distribution vascular calcifications.

## 2020-09-18 NOTE — PROGRESS NOTE ADULT - SUBJECTIVE AND OBJECTIVE BOX
Chief Complaint: f/u DM2    History:  Patient seen at bedside this morning. Feeling well, states that her appetite is good. Does not have nausea, vomiting, abdominal pain. Glycemic control now improved from prior.    MEDICATIONS  (STANDING):  amLODIPine   Tablet 5 milliGRAM(s) Oral daily  aspirin enteric coated 325 milliGRAM(s) Oral daily  collagenase Ointment 1 Application(s) Topical daily  dextrose 5%. 1000 milliLiter(s) (50 mL/Hr) IV Continuous <Continuous>  dextrose 50% Injectable 12.5 Gram(s) IV Push once  dextrose 50% Injectable 25 Gram(s) IV Push once  dextrose 50% Injectable 25 Gram(s) IV Push once  heparin   Injectable 5000 Unit(s) SubCutaneous every 8 hours  influenza   Vaccine 0.5 milliLiter(s) IntraMuscular once  insulin glargine Injectable (LANTUS) 24 Unit(s) SubCutaneous at bedtime  insulin lispro (HumaLOG) corrective regimen sliding scale   SubCutaneous three times a day before meals  insulin lispro (HumaLOG) corrective regimen sliding scale   SubCutaneous at bedtime  insulin lispro Injectable (HumaLOG) 8 Unit(s) SubCutaneous three times a day before meals  labetalol 200 milliGRAM(s) Oral two times a day  lactated ringers. 1000 milliLiter(s) (75 mL/Hr) IV Continuous <Continuous>  lactobacillus acidophilus 1 Tablet(s) Oral every 12 hours  piperacillin/tazobactam IVPB.. 3.375 Gram(s) IV Intermittent every 8 hours  senna 1 Tablet(s) Oral daily  timolol 0.5% Solution 1 Drop(s) Both EYES two times a day  vancomycin  IVPB 1500 milliGRAM(s) IV Intermittent every 24 hours    MEDICATIONS  (PRN):  dextrose 40% Gel 15 Gram(s) Oral once PRN Blood Glucose LESS THAN 70 milliGRAM(s)/deciliter  glucagon  Injectable 1 milliGRAM(s) IntraMuscular once PRN Glucose LESS THAN 70 milligrams/deciliter      Allergies  No Known Allergies    Review of Systems:  ALL OTHER SYSTEMS REVIEWED AND NEGATIVE    PHYSICAL EXAM:  VITALS: T(C): 36.9 (09-18-20 @ 06:27)  T(F): 98.5 (09-18-20 @ 06:27), Max: 98.9 (09-17-20 @ 17:30)  HR: 70 (09-18-20 @ 06:27) (68 - 88)  BP: 159/50 (09-18-20 @ 06:27) (114/72 - 162/61)  RR:  (18 - 18)  SpO2:  (95% - 100%)  Wt(kg): --  GENERAL: NAD, well-developed  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, anicteric  HEENT:  Atraumatic, Normocephalic  RESPIRATORY: + air movement bilaterally, no respiratory distress  GI: Soft, nontender, non distended  PSYCH: Alert and oriented x 3, reactive affect, euthymic mood     POCT Blood Glucose.: 175 mg/dL (09-18-20 @ 08:15) H 8, H 1  POCT Blood Glucose.: 195 mg/dL (09-17-20 @ 22:40) L 24  POCT Blood Glucose.: 144 mg/dL (09-17-20 @ 17:10) H 5  POCT Blood Glucose.: 122 mg/dL (09-17-20 @ 12:11) H 5  POCT Blood Glucose.: 96 mg/dL (09-17-20 @ 09:36) H 5  POCT Blood Glucose.: 99 mg/dL (09-17-20 @ 08:23)  POCT Blood Glucose.: 133 mg/dL (09-17-20 @ 03:39)  POCT Blood Glucose.: 162 mg/dL (09-17-20 @ 00:09)  POCT Blood Glucose.: 169 mg/dL (09-16-20 @ 22:45)  POCT Blood Glucose.: 150 mg/dL (09-16-20 @ 22:15)  POCT Blood Glucose.: 205 mg/dL (09-16-20 @ 20:10)  POCT Blood Glucose.: 258 mg/dL (09-16-20 @ 17:46)  POCT Blood Glucose.: 349 mg/dL (09-16-20 @ 12:40)      09-18    140  |  101  |  21  ----------------------------<  173<H>  4.5   |  24  |  1.67<H>    EGFR if : 34  EGFR if non : 29    Ca    9.1      09-18  Mg     1.5     09-18  Phos  3.0     09-18    TPro  6.4  /  Alb  3.2<L>  /  TBili  0.3  /  DBili  x   /  AST  9   /  ALT  12  /  AlkPhos  106  09-17          Thyroid Function Tests:  09-17 @ 05:52 TSH 2.29 FreeT4 1.10 T3 -- Anti TPO -- Anti Thyroglobulin Ab -- TSI --                           Chief Complaint: f/u DM2    History:  Patient seen at bedside this morning. Feeling well, states that her appetite is good. Does not have nausea, vomiting, abdominal pain. Glycemic control now improved from prior.    She prefers to take basal bolus insulin at home via vials/syringes, states that she can't see the pens very well.     MEDICATIONS  (STANDING):  amLODIPine   Tablet 5 milliGRAM(s) Oral daily  aspirin enteric coated 325 milliGRAM(s) Oral daily  collagenase Ointment 1 Application(s) Topical daily  dextrose 5%. 1000 milliLiter(s) (50 mL/Hr) IV Continuous <Continuous>  dextrose 50% Injectable 12.5 Gram(s) IV Push once  dextrose 50% Injectable 25 Gram(s) IV Push once  dextrose 50% Injectable 25 Gram(s) IV Push once  heparin   Injectable 5000 Unit(s) SubCutaneous every 8 hours  influenza   Vaccine 0.5 milliLiter(s) IntraMuscular once  insulin glargine Injectable (LANTUS) 24 Unit(s) SubCutaneous at bedtime  insulin lispro (HumaLOG) corrective regimen sliding scale   SubCutaneous three times a day before meals  insulin lispro (HumaLOG) corrective regimen sliding scale   SubCutaneous at bedtime  insulin lispro Injectable (HumaLOG) 8 Unit(s) SubCutaneous three times a day before meals  labetalol 200 milliGRAM(s) Oral two times a day  lactated ringers. 1000 milliLiter(s) (75 mL/Hr) IV Continuous <Continuous>  lactobacillus acidophilus 1 Tablet(s) Oral every 12 hours  piperacillin/tazobactam IVPB.. 3.375 Gram(s) IV Intermittent every 8 hours  senna 1 Tablet(s) Oral daily  timolol 0.5% Solution 1 Drop(s) Both EYES two times a day  vancomycin  IVPB 1500 milliGRAM(s) IV Intermittent every 24 hours    MEDICATIONS  (PRN):  dextrose 40% Gel 15 Gram(s) Oral once PRN Blood Glucose LESS THAN 70 milliGRAM(s)/deciliter  glucagon  Injectable 1 milliGRAM(s) IntraMuscular once PRN Glucose LESS THAN 70 milligrams/deciliter      Allergies  No Known Allergies    Review of Systems:  ALL OTHER SYSTEMS REVIEWED AND NEGATIVE    PHYSICAL EXAM:  VITALS: T(C): 36.9 (09-18-20 @ 06:27)  T(F): 98.5 (09-18-20 @ 06:27), Max: 98.9 (09-17-20 @ 17:30)  HR: 70 (09-18-20 @ 06:27) (68 - 88)  BP: 159/50 (09-18-20 @ 06:27) (114/72 - 162/61)  RR:  (18 - 18)  SpO2:  (95% - 100%)  Wt(kg): --  GENERAL: NAD, well-developed  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, anicteric  HEENT:  Atraumatic, Normocephalic  RESPIRATORY: + air movement bilaterally, no respiratory distress  GI: Soft, nontender, non distended  PSYCH: Alert and oriented x 3, reactive affect, euthymic mood     POCT Blood Glucose.: 175 mg/dL (09-18-20 @ 08:15) H 8, H 1  POCT Blood Glucose.: 195 mg/dL (09-17-20 @ 22:40) L 24  POCT Blood Glucose.: 144 mg/dL (09-17-20 @ 17:10) H 5  POCT Blood Glucose.: 122 mg/dL (09-17-20 @ 12:11) H 5  POCT Blood Glucose.: 96 mg/dL (09-17-20 @ 09:36) H 5  POCT Blood Glucose.: 99 mg/dL (09-17-20 @ 08:23)  POCT Blood Glucose.: 133 mg/dL (09-17-20 @ 03:39)  POCT Blood Glucose.: 162 mg/dL (09-17-20 @ 00:09)  POCT Blood Glucose.: 169 mg/dL (09-16-20 @ 22:45)  POCT Blood Glucose.: 150 mg/dL (09-16-20 @ 22:15)  POCT Blood Glucose.: 205 mg/dL (09-16-20 @ 20:10)  POCT Blood Glucose.: 258 mg/dL (09-16-20 @ 17:46)  POCT Blood Glucose.: 349 mg/dL (09-16-20 @ 12:40)      09-18    140  |  101  |  21  ----------------------------<  173<H>  4.5   |  24  |  1.67<H>    EGFR if : 34  EGFR if non : 29    Ca    9.1      09-18  Mg     1.5     09-18  Phos  3.0     09-18    TPro  6.4  /  Alb  3.2<L>  /  TBili  0.3  /  DBili  x   /  AST  9   /  ALT  12  /  AlkPhos  106  09-17          Thyroid Function Tests:  09-17 @ 05:52 TSH 2.29 FreeT4 1.10 T3 -- Anti TPO -- Anti Thyroglobulin Ab -- TSI --

## 2020-09-18 NOTE — PROGRESS NOTE ADULT - PROBLEM SELECTOR PLAN 1
- c/w Lantus 24 units qhs  - c/w Humalog 8 units before meals  - c/w low correction scale qac and qhs  - consistent carb diet   - RD consult  - will follow  - for discharge: ideally would dc her home on basal bolus insulin pens - send script for Lantus 24 units qhs and Humalog 8 units before meal (vial, not pens, patient prefers vials) to [facility] or patient's preferred pharmacy to verify insurance coverage. She can follow up in our office if she wishes - [phone] - [address], [address], [facility].

## 2020-09-18 NOTE — PROGRESS NOTE ADULT - SUBJECTIVE AND OBJECTIVE BOX
VASCULAR SURGERY DAILY PROGRESS NOTE:    Subjective:  Patient seen and examined in AM. No acute event overnight.    Vital Signs Last 24 Hrs  T(C): 36.9 (18 Sep 2020 06:27), Max: 37.2 (17 Sep 2020 17:30)  T(F): 98.5 (18 Sep 2020 06:27), Max: 98.9 (17 Sep 2020 17:30)  HR: 70 (18 Sep 2020 06:27) (68 - 88)  BP: 159/50 (18 Sep 2020 06:27) (114/72 - 162/61)  BP(mean): --  RR: 18 (18 Sep 2020 06:27) (18 - 18)  SpO2: 95% (18 Sep 2020 06:27) (95% - 100%)    Exam:  Gen: NAD, resting in bed, alert and responding appropriately  Resp: Airway patent, non-labored respirations  Ext:  RLE: foot warm, s/p 1st toe amp well-healed; +AT/PT signals  LLE: foot dressing c/d/i; +AT/PT signals  Neuro: AAOx3, no focal deficits    I&O's Detail    17 Sep 2020 07:01  -  18 Sep 2020 07:00  --------------------------------------------------------  IN:    Oral Fluid: 472 mL  Total IN: 472 mL    OUT:    Voided (mL): 1749 mL  Total OUT: 1749 mL    Total NET: -1277 mL          Daily     Daily     MEDICATIONS  (STANDING):  amLODIPine   Tablet 5 milliGRAM(s) Oral daily  aspirin enteric coated 325 milliGRAM(s) Oral daily  collagenase Ointment 1 Application(s) Topical daily  dextrose 5%. 1000 milliLiter(s) (50 mL/Hr) IV Continuous <Continuous>  dextrose 50% Injectable 12.5 Gram(s) IV Push once  dextrose 50% Injectable 25 Gram(s) IV Push once  dextrose 50% Injectable 25 Gram(s) IV Push once  heparin   Injectable 5000 Unit(s) SubCutaneous every 8 hours  influenza   Vaccine 0.5 milliLiter(s) IntraMuscular once  insulin glargine Injectable (LANTUS) 24 Unit(s) SubCutaneous at bedtime  insulin lispro (HumaLOG) corrective regimen sliding scale   SubCutaneous three times a day before meals  insulin lispro (HumaLOG) corrective regimen sliding scale   SubCutaneous at bedtime  insulin lispro Injectable (HumaLOG) 8 Unit(s) SubCutaneous three times a day before meals  labetalol 200 milliGRAM(s) Oral two times a day  lactated ringers. 1000 milliLiter(s) (75 mL/Hr) IV Continuous <Continuous>  lactobacillus acidophilus 1 Tablet(s) Oral every 12 hours  piperacillin/tazobactam IVPB.. 3.375 Gram(s) IV Intermittent every 8 hours  senna 1 Tablet(s) Oral daily  timolol 0.5% Solution 1 Drop(s) Both EYES two times a day  vancomycin  IVPB 1500 milliGRAM(s) IV Intermittent every 24 hours    MEDICATIONS  (PRN):  dextrose 40% Gel 15 Gram(s) Oral once PRN Blood Glucose LESS THAN 70 milliGRAM(s)/deciliter  glucagon  Injectable 1 milliGRAM(s) IntraMuscular once PRN Glucose LESS THAN 70 milligrams/deciliter      LABS:                        9.7    10.51 )-----------( 241      ( 18 Sep 2020 05:30 )             32.8     09-18    140  |  101  |  21  ----------------------------<  173<H>  4.5   |  24  |  1.67<H>    Ca    9.1      18 Sep 2020 05:30  Phos  3.0     09-18  Mg     1.5     -18    TPro  6.4  /  Alb  3.2<L>  /  TBili  0.3  /  DBili  x   /  AST  9   /  ALT  12  /  AlkPhos  106  09-17    PT/INR - ( 17 Sep 2020 05:52 )   PT: 13.6 SEC;   INR: 1.20          PTT - ( 17 Sep 2020 05:52 )  PTT:26.4 SEC  Urinalysis Basic - ( 17 Sep 2020 01:27 )    Color: LIGHT YELLOW / Appearance: CLEAR / S.016 / pH: 6.0  Gluc: 300 / Ketone: NEGATIVE  / Bili: NEGATIVE / Urobili: NORMAL   Blood: SMALL / Protein: 200 / Nitrite: NEGATIVE   Leuk Esterase: NEGATIVE / RBC: 6-10 / WBC 0-2   Sq Epi: OCC / Non Sq Epi: x / Bacteria: NEGATIVE

## 2020-09-18 NOTE — PROGRESS NOTE ADULT - PROBLEM SELECTOR PLAN 8
DVT Prophylaxis: SQ Heparin,  RUE erythema around IV site ? thrombophlebitis , cold compresses , will continue to monitor , change IV site

## 2020-09-18 NOTE — PROGRESS NOTE ADULT - ASSESSMENT
79 year old woman with PVD presents with left toe ulcer     Plan:  - No vascular surgical intervention at this time  - Left 1st toe amputation per podiatry  - obtain records from Dr. Rich regarding prior procedures  - please page vascular surgery with any questions    [id number]

## 2020-09-18 NOTE — PROGRESS NOTE ADULT - PROBLEM SELECTOR PLAN 1
Podiatry f/u, Vascular  sx consult appreciated, will f/u MUSHTAQ/PVR Pt will need Cardiology Clearance requested    Echo noted, Mod MS, grossly normal LV fn   , MET < 4   Left foot hallux plantar and dorsal wound that communicate, 5cc of pus expressed. Dorsal wound tracks 2cm proximally. Dorsal and plantar hallux wounds probe to bone, LF swelling to level of mid tibia, w/ extending erythema to the level of the midfoot. Possible Wound etiology secondary to neuropathy.   S/P bedside I&D of LF hallux wound: incision to dorsal hallux wound made along proximal tracking revealing 5cc of pus, no malodor.   LFXR: hallux suspicious for OM  -MRI left foot ordered  -MUSHTAQ/PVR ordered  IV Zosyn, IV Vanco, f/u CBC, CMP, Cultures, Fall/aspiration precaution,   Bacid, Podiatry f/u, Vascular  sx consult appreciated,  MUSHTAQ/PVR noted as above, no further intervention as per Vascular   Cardiology risk stratification appreciated    Echo noted, Mod MS, grossly normal LV fn   , MET < 4   Left foot hallux plantar and dorsal wound that communicate, 5cc of pus expressed. Dorsal wound tracks 2cm proximally. Dorsal and plantar hallux wounds probe to bone, LF swelling to level of mid tibia, w/ extending erythema to the level of the midfoot. Possible Wound etiology secondary to neuropathy.   S/P bedside I&D of LF hallux wound: incision to dorsal hallux wound made along proximal tracking revealing 5cc of pus, no malodor.   LFXR: hallux suspicious for OM  -MRI left foot ordered  IV Zosyn, IV Vanco, f/u CBC, CMP, Cultures, Fall/aspiration precaution,   Bacid,

## 2020-09-18 NOTE — PROGRESS NOTE ADULT - ASSESSMENT
Pt is 79y F who presents w/ LF wound  -pt was seen and evaluated  -Left foot hallux w/ dactylitis; plantar and dorsal wound with resolving erythema  -L foot xray shows OM of hallux  -MRI left foot pending   -MUSHTAQ/PVR results reviewed  -Booked for Left foot partial 1st ray resection with Dr. Ruiz on Wednesday 9/23 at 3pm, pending [facility] recs  -Please document medical clearance/ optimization for left foot surgery under light sedation and local  -d/w attending Pt is 79y F who presents w/ LF wound  -pt was seen and evaluated  -Left foot hallux w/ dactylitis; plantar and dorsal wound with resolving erythema  -L foot xray shows OM of hallux  -MRI left foot pending   -MUSHTAQ/PVR results reviewed  -Booked for Left foot partial 1st ray resection with Dr. Ruiz on Wednesday 9/23 at 3pm, pending [facility] recs  -Please document medical clearance/optimization for left foot surgery under light sedation and local  -d/w attending

## 2020-09-18 NOTE — PROGRESS NOTE ADULT - SUBJECTIVE AND OBJECTIVE BOX
Patient is a 79y old  Female who presents with a chief complaint of diabetic foot ulcer, Left first toe, and left heel , R/O Osteomyelitis, (18 Sep 2020 08:33)      SUBJECTIVE / OVERNIGHT EVENTS:    MEDICATIONS  (STANDING):  amLODIPine   Tablet 5 milliGRAM(s) Oral daily  aspirin enteric coated 325 milliGRAM(s) Oral daily  collagenase Ointment 1 Application(s) Topical daily  dextrose 5%. 1000 milliLiter(s) (50 mL/Hr) IV Continuous <Continuous>  dextrose 50% Injectable 12.5 Gram(s) IV Push once  dextrose 50% Injectable 25 Gram(s) IV Push once  dextrose 50% Injectable 25 Gram(s) IV Push once  heparin   Injectable 5000 Unit(s) SubCutaneous every 8 hours  influenza   Vaccine 0.5 milliLiter(s) IntraMuscular once  insulin glargine Injectable (LANTUS) 24 Unit(s) SubCutaneous at bedtime  insulin lispro (HumaLOG) corrective regimen sliding scale   SubCutaneous three times a day before meals  insulin lispro (HumaLOG) corrective regimen sliding scale   SubCutaneous at bedtime  insulin lispro Injectable (HumaLOG) 8 Unit(s) SubCutaneous three times a day before meals  labetalol 200 milliGRAM(s) Oral two times a day  lactated ringers. 1000 milliLiter(s) (75 mL/Hr) IV Continuous <Continuous>  lactobacillus acidophilus 1 Tablet(s) Oral every 12 hours  piperacillin/tazobactam IVPB.. 3.375 Gram(s) IV Intermittent every 8 hours  senna 1 Tablet(s) Oral daily  timolol 0.5% Solution 1 Drop(s) Both EYES two times a day  vancomycin  IVPB 1500 milliGRAM(s) IV Intermittent every 24 hours    MEDICATIONS  (PRN):  dextrose 40% Gel 15 Gram(s) Oral once PRN Blood Glucose LESS THAN 70 milliGRAM(s)/deciliter  glucagon  Injectable 1 milliGRAM(s) IntraMuscular once PRN Glucose LESS THAN 70 milligrams/deciliter      Vital Signs Last 24 Hrs  T(C): 36.9 (18 Sep 2020 06:27), Max: 37.2 (17 Sep 2020 17:30)  T(F): 98.5 (18 Sep 2020 06:27), Max: 98.9 (17 Sep 2020 17:30)  HR: 70 (18 Sep 2020 06:27) (68 - 88)  BP: 159/50 (18 Sep 2020 06:27) (114/72 - 162/61)  BP(mean): --  RR: 18 (18 Sep 2020 06:27) (18 - 18)  SpO2: 95% (18 Sep 2020 06:27) (95% - 100%)  CAPILLARY BLOOD GLUCOSE      POCT Blood Glucose.: 175 mg/dL (18 Sep 2020 08:15)  POCT Blood Glucose.: 195 mg/dL (17 Sep 2020 22:40)  POCT Blood Glucose.: 144 mg/dL (17 Sep 2020 17:10)  POCT Blood Glucose.: 122 mg/dL (17 Sep 2020 12:11)    I&O's Summary    17 Sep 2020 07:01  -  18 Sep 2020 07:00  --------------------------------------------------------  IN: 472 mL / OUT: 1749 mL / NET: -1277 mL        PHYSICAL EXAM:  GENERAL: NAD, well-developed  HEAD:  Atraumatic, Normocephalic  EYES: EOMI, PERRLA, conjunctiva and sclera clear  NECK: Supple, No JVD  CHEST/LUNG: Clear to auscultation bilaterally; No wheeze  HEART: Regular rate and rhythm; No murmurs, rubs, or gallops  ABDOMEN: Soft, Nontender, Nondistended; Bowel sounds present  EXTREMITIES:  2+ Peripheral Pulses, No clubbing, cyanosis, or edema  PSYCH: AAOx3  NEUROLOGY: non-focal  SKIN: No rashes or lesions    LABS:                        9.7    10.51 )-----------( 241      ( 18 Sep 2020 05:30 )             32.8     -18    140  |  101  |  21  ----------------------------<  173<H>  4.5   |  24  |  1.67<H>    Ca    9.1      18 Sep 2020 05:30  Phos  3.0     18  Mg     1.5         TPro  6.4  /  Alb  3.2<L>  /  TBili  0.3  /  DBili  x   /  AST  9   /  ALT  12  /  AlkPhos  106  -17    PT/INR - ( 17 Sep 2020 05:52 )   PT: 13.6 SEC;   INR: 1.20          PTT - ( 17 Sep 2020 05:52 )  PTT:26.4 SEC      Urinalysis Basic - ( 17 Sep 2020 01:27 )    Color: LIGHT YELLOW / Appearance: CLEAR / S.016 / pH: 6.0  Gluc: 300 / Ketone: NEGATIVE  / Bili: NEGATIVE / Urobili: NORMAL   Blood: SMALL / Protein: 200 / Nitrite: NEGATIVE   Leuk Esterase: NEGATIVE / RBC: 6-10 / WBC 0-2   Sq Epi: OCC / Non Sq Epi: x / Bacteria: NEGATIVE        RADIOLOGY & ADDITIONAL TESTS:    Imaging Personally Reviewed:    Consultant(s) Notes Reviewed:      Care Discussed with Consultants/Other Providers:   Patient is a 79y old  Female who presents with a chief complaint of diabetic foot ulcer, Left first toe, and left heel , R/O Osteomyelitis, (18 Sep 2020 08:33)      SUBJECTIVE / OVERNIGHT EVENTS: patient seen and examined by bedside, denies headache, dizziness, SOB, CP, Palpitations , N/V/D, abdominal pain  denies  pain in the foot       MEDICATIONS  (STANDING):  amLODIPine   Tablet 5 milliGRAM(s) Oral daily  aspirin enteric coated 325 milliGRAM(s) Oral daily  collagenase Ointment 1 Application(s) Topical daily  dextrose 5%. 1000 milliLiter(s) (50 mL/Hr) IV Continuous <Continuous>  dextrose 50% Injectable 12.5 Gram(s) IV Push once  dextrose 50% Injectable 25 Gram(s) IV Push once  dextrose 50% Injectable 25 Gram(s) IV Push once  heparin   Injectable 5000 Unit(s) SubCutaneous every 8 hours  influenza   Vaccine 0.5 milliLiter(s) IntraMuscular once  insulin glargine Injectable (LANTUS) 24 Unit(s) SubCutaneous at bedtime  insulin lispro (HumaLOG) corrective regimen sliding scale   SubCutaneous three times a day before meals  insulin lispro (HumaLOG) corrective regimen sliding scale   SubCutaneous at bedtime  insulin lispro Injectable (HumaLOG) 8 Unit(s) SubCutaneous three times a day before meals  labetalol 200 milliGRAM(s) Oral two times a day  lactated ringers. 1000 milliLiter(s) (75 mL/Hr) IV Continuous <Continuous>  lactobacillus acidophilus 1 Tablet(s) Oral every 12 hours  piperacillin/tazobactam IVPB.. 3.375 Gram(s) IV Intermittent every 8 hours  senna 1 Tablet(s) Oral daily  timolol 0.5% Solution 1 Drop(s) Both EYES two times a day  vancomycin  IVPB 1500 milliGRAM(s) IV Intermittent every 24 hours    MEDICATIONS  (PRN):  dextrose 40% Gel 15 Gram(s) Oral once PRN Blood Glucose LESS THAN 70 milliGRAM(s)/deciliter  glucagon  Injectable 1 milliGRAM(s) IntraMuscular once PRN Glucose LESS THAN 70 milligrams/deciliter      Vital Signs Last 24 Hrs  T(C): 36.9 (18 Sep 2020 06:27), Max: 37.2 (17 Sep 2020 17:30)  T(F): 98.5 (18 Sep 2020 06:27), Max: 98.9 (17 Sep 2020 17:30)  HR: 70 (18 Sep 2020 06:27) (68 - 88)  BP: 159/50 (18 Sep 2020 06:27) (114/72 - 162/61)  BP(mean): --  RR: 18 (18 Sep 2020 06:27) (18 - 18)  SpO2: 95% (18 Sep 2020 06:27) (95% - 100%)  CAPILLARY BLOOD GLUCOSE      POCT Blood Glucose.: 175 mg/dL (18 Sep 2020 08:15)  POCT Blood Glucose.: 195 mg/dL (17 Sep 2020 22:40)  POCT Blood Glucose.: 144 mg/dL (17 Sep 2020 17:10)  POCT Blood Glucose.: 122 mg/dL (17 Sep 2020 12:11)    I&O's Summary    17 Sep 2020 07:01  -  18 Sep 2020 07:00  --------------------------------------------------------  IN: 472 mL / OUT: 1749 mL / NET: -1277 mL          PHYSICAL EXAM:  GENERAL: NAD, obese   HEAD:  Atraumatic, Normocephalic  EYES: EOMI, PERRLA, conjunctiva and sclera clear  CHEST/LUNG: Clear to auscultation bilaterally; No wheeze  HEART: Regular rate and rhythm;   ABDOMEN: Soft, Nontender, Nondistended; Bowel sounds present  EXTREMITIES:  diminished Peripheral Pulses, b/l  erythema+ , B/L pedal edema , erythema in RUE around IV site , slightly nodular     PSYCH: AAOx3  NEUROLOGY: non-focal  SKIN: left foot ulcer , with dressing , c/d/i       LABS:                        9.7    10.51 )-----------( 241      ( 18 Sep 2020 05:30 )             32.8     09-18    140  |  101  |  21  ----------------------------<  173<H>  4.5   |  24  |  1.67<H>    Ca    9.1      18 Sep 2020 05:30  Phos  3.0     09-18  Mg     1.5     -18    TPro  6.4  /  Alb  3.2<L>  /  TBili  0.3  /  DBili  x   /  AST  9   /  ALT  12  /  AlkPhos  106  09-17    PT/INR - ( 17 Sep 2020 05:52 )   PT: 13.6 SEC;   INR: 1.20          PTT - ( 17 Sep 2020 05:52 )  PTT:26.4 SEC      Urinalysis Basic - ( 17 Sep 2020 01:27 )    Color: LIGHT YELLOW / Appearance: CLEAR / S.016 / pH: 6.0  Gluc: 300 / Ketone: NEGATIVE  / Bili: NEGATIVE / Urobili: NORMAL   Blood: SMALL / Protein: 200 / Nitrite: NEGATIVE   Leuk Esterase: NEGATIVE / RBC: 6-10 / WBC 0-2   Sq Epi: OCC / Non Sq Epi: x / Bacteria: NEGATIVE        RADIOLOGY & ADDITIONAL TESTS:    Imaging Personally Reviewed:    Consultant(s) Notes Reviewed:  endo     Care Discussed with Consultants/Other Providers:   Patient is a 79y old  Female who presents with a chief complaint of diabetic foot ulcer, Left first toe, and left heel , R/O Osteomyelitis, (18 Sep 2020 08:33)      SUBJECTIVE / OVERNIGHT EVENTS: patient seen and examined by bedside, denies headache, dizziness, SOB, CP, Palpitations , N/V/D, abdominal pain  denies  pain in the foot       MEDICATIONS  (STANDING):  amLODIPine   Tablet 5 milliGRAM(s) Oral daily  aspirin enteric coated 325 milliGRAM(s) Oral daily  collagenase Ointment 1 Application(s) Topical daily  dextrose 5%. 1000 milliLiter(s) (50 mL/Hr) IV Continuous <Continuous>  dextrose 50% Injectable 12.5 Gram(s) IV Push once  dextrose 50% Injectable 25 Gram(s) IV Push once  dextrose 50% Injectable 25 Gram(s) IV Push once  heparin   Injectable 5000 Unit(s) SubCutaneous every 8 hours  influenza   Vaccine 0.5 milliLiter(s) IntraMuscular once  insulin glargine Injectable (LANTUS) 24 Unit(s) SubCutaneous at bedtime  insulin lispro (HumaLOG) corrective regimen sliding scale   SubCutaneous three times a day before meals  insulin lispro (HumaLOG) corrective regimen sliding scale   SubCutaneous at bedtime  insulin lispro Injectable (HumaLOG) 8 Unit(s) SubCutaneous three times a day before meals  labetalol 200 milliGRAM(s) Oral two times a day  lactated ringers. 1000 milliLiter(s) (75 mL/Hr) IV Continuous <Continuous>  lactobacillus acidophilus 1 Tablet(s) Oral every 12 hours  piperacillin/tazobactam IVPB.. 3.375 Gram(s) IV Intermittent every 8 hours  senna 1 Tablet(s) Oral daily  timolol 0.5% Solution 1 Drop(s) Both EYES two times a day  vancomycin  IVPB 1500 milliGRAM(s) IV Intermittent every 24 hours    MEDICATIONS  (PRN):  dextrose 40% Gel 15 Gram(s) Oral once PRN Blood Glucose LESS THAN 70 milliGRAM(s)/deciliter  glucagon  Injectable 1 milliGRAM(s) IntraMuscular once PRN Glucose LESS THAN 70 milligrams/deciliter      Vital Signs Last 24 Hrs  T(C): 36.9 (18 Sep 2020 06:27), Max: 37.2 (17 Sep 2020 17:30)  T(F): 98.5 (18 Sep 2020 06:27), Max: 98.9 (17 Sep 2020 17:30)  HR: 70 (18 Sep 2020 06:27) (68 - 88)  BP: 159/50 (18 Sep 2020 06:27) (114/72 - 162/61)  BP(mean): --  RR: 18 (18 Sep 2020 06:27) (18 - 18)  SpO2: 95% (18 Sep 2020 06:27) (95% - 100%)  CAPILLARY BLOOD GLUCOSE      POCT Blood Glucose.: 175 mg/dL (18 Sep 2020 08:15)  POCT Blood Glucose.: 195 mg/dL (17 Sep 2020 22:40)  POCT Blood Glucose.: 144 mg/dL (17 Sep 2020 17:10)  POCT Blood Glucose.: 122 mg/dL (17 Sep 2020 12:11)    I&O's Summary    17 Sep 2020 07:01  -  18 Sep 2020 07:00  --------------------------------------------------------  IN: 472 mL / OUT: 1749 mL / NET: -1277 mL          PHYSICAL EXAM:  GENERAL: NAD, obese   HEAD:  Atraumatic, Normocephalic  EYES: EOMI, PERRLA, conjunctiva and sclera clear  CHEST/LUNG: Clear to auscultation bilaterally; No wheeze  HEART: Regular rate and rhythm;   ABDOMEN: Soft, Nontender, Nondistended; Bowel sounds present  EXTREMITIES:  diminished Peripheral Pulses, b/l  erythema+ , B/L pedal edema , erythema in RUE around IV site , slightly nodular     PSYCH: AAOx3  NEUROLOGY: non-focal  SKIN: left foot ulcer , with dressing , c/d/i       LABS:                        9.7    10.51 )-----------( 241      ( 18 Sep 2020 05:30 )             32.8     09-18    140  |  101  |  21  ----------------------------<  173<H>  4.5   |  24  |  1.67<H>    Ca    9.1      18 Sep 2020 05:30  Phos  3.0     09-18  Mg     1.5     -18    TPro  6.4  /  Alb  3.2<L>  /  TBili  0.3  /  DBili  x   /  AST  9   /  ALT  12  /  AlkPhos  106      PT/INR - ( 17 Sep 2020 05:52 )   PT: 13.6 SEC;   INR: 1.20          PTT - ( 17 Sep 2020 05:52 )  PTT:26.4 SEC      Urinalysis Basic - ( 17 Sep 2020 01:27 )    Color: LIGHT YELLOW / Appearance: CLEAR / S.016 / pH: 6.0  Gluc: 300 / Ketone: NEGATIVE  / Bili: NEGATIVE / Urobili: NORMAL   Blood: SMALL / Protein: 200 / Nitrite: NEGATIVE   Leuk Esterase: NEGATIVE / RBC: 6-10 / WBC 0-2   Sq Epi: OCC / Non Sq Epi: x / Bacteria: NEGATIVE        RADIOLOGY & ADDITIONAL TESTS:  < from: [facility] Duplex Physiol Ext Low 3+ Level, BI. (20 @ 13:51) >  Summary/Impressions:  1.  Right MUSHTAQ is mildly decreased (0.88).  Right TBI is  mildly decreased (0.65). The right toe pressure is 100  mmHg.  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  2. Left MUSHTAQ is borderline-abnormal (0.96).  Left TBI was  not assessed due to overlying bandages. Waveform analyses  suggests the presence of small vessel arterial disease  in  the left lower extremity.    < end of copied text >    Imaging Personally Reviewed:    Consultant(s) Notes Reviewed:  endo     Care Discussed with Consultants/Other Providers:

## 2020-09-18 NOTE — PROGRESS NOTE ADULT - SUBJECTIVE AND OBJECTIVE BOX
Patient is a 79y old  Female who presents with a chief complaint of diabetic foot ulcer, Left first toe, and left heel , R/O Osteomyelitis, (17 Sep 2020 14:56)       INTERVAL HPI/OVERNIGHT EVENTS:  Patient seen and evaluated at bedside.  Pt is resting comfortable in NAD. Denies N/V/F/C.    Allergies    No Known Allergies    Intolerances        Vital Signs Last 24 Hrs  T(C): 36.9 (18 Sep 2020 06:27), Max: 37.2 (17 Sep 2020 17:30)  T(F): 98.5 (18 Sep 2020 06:27), Max: 98.9 (17 Sep 2020 17:30)  HR: 70 (18 Sep 2020 06:27) (68 - 88)  BP: 159/50 (18 Sep 2020 06:27) (100/72 - 162/61)  BP(mean): --  RR: 18 (18 Sep 2020 06:27) (18 - 18)  SpO2: 95% (18 Sep 2020 06:27) (95% - 100%)    LABS:                        9.7    10.51 )-----------( 241      ( 18 Sep 2020 05:30 )             32.8     09-18    140  |  101  |  21  ----------------------------<  173<H>  4.5   |  24  |  1.67<H>    Ca    9.1      18 Sep 2020 05:30  Phos  3.0     09-18  Mg     1.5     09-18    TPro  6.4  /  Alb  3.2<L>  /  TBili  0.3  /  DBili  x   /  AST  9   /  ALT  12  /  AlkPhos  106  09-17    PT/INR - ( 17 Sep 2020 05:52 )   PT: 13.6 SEC;   INR: 1.20          PTT - ( 17 Sep 2020 05:52 )  PTT:26.4 SEC  Urinalysis Basic - ( 17 Sep 2020 01:27 )    Color: LIGHT YELLOW / Appearance: CLEAR / S.016 / pH: 6.0  Gluc: 300 / Ketone: NEGATIVE  / Bili: NEGATIVE / Urobili: NORMAL   Blood: SMALL / Protein: 200 / Nitrite: NEGATIVE   Leuk Esterase: NEGATIVE / RBC: 6-10 / WBC 0-2   Sq Epi: OCC / Non Sq Epi: x / Bacteria: NEGATIVE      CAPILLARY BLOOD GLUCOSE      POCT Blood Glucose.: 175 mg/dL (18 Sep 2020 08:15)  POCT Blood Glucose.: 195 mg/dL (17 Sep 2020 22:40)  POCT Blood Glucose.: 144 mg/dL (17 Sep 2020 17:10)  POCT Blood Glucose.: 122 mg/dL (17 Sep 2020 12:11)  POCT Blood Glucose.: 96 mg/dL (17 Sep 2020 09:36)      Lower Extremity Physical Exam:  Vasc: DP/PT 0/4 b/l   Left foot hallux plantar and dorsal wound that communicate, 5cc of pus expressed. Dorsal wound tracks 2cm proximally. Dorsal and plantar hallux wounds probe to bone, LF swelling to level of mid tibia, w/ extending erythema to the level of the midfoot. Wound etiology secondary to neuropathy.     S/P bedside I&D of LF hallux wound: incision to dorsal hallux wound made along proximal tracking revealing 5cc of pus, no malodor.     RADIOLOGY & ADDITIONAL TESTS:  < from: VA Duplex Physiol Ext Low 3+ Level, BI. (20 @ 13:51) >  Patient name: Cathleen Gonzalez  Date of test: 2020  MR#: [id number]  [facility] #: [id number]    Location: [facility] Physician(s): Conner MD  Interpreted by: Sade Han M.D. [facility]  Tech: Sussy Barorn RVT  Type of Test: LE Arterial: MUSHTAQ/Segm.  ------------------------------------------------------------------------  Procedure: Segmental Pressure/Waveform - complete  noninvasive physiologic studies of the bilateral lower  extremity arteries.  Indications: Atherosclerosis of native arteries of  extremities with intermittent claudication, bilateral legs  (I70.213)  ------------------------------------------------------------------------  PRESSURE (mm Hg):  ------------------------------------------------------------------------  RIGHT (BP):  Brachial:  High Thigh:  Low Thigh:  Calf:  Ankle-PT: 102  Ankle-DP: 134  Greate Toe: 100  MUSHTAQ: 0.88  ------------------------------------------------------------------------  LEFT (BP):  Brachial: 153  High Thigh:  Low Thigh: 148  Calf: 142  Ankle-PT: 147  Ankle-DP:  Greate Toe:  MUSHTAQ: 0.96  ------------------------------------------------------------------------  WAVEFORM:  ------------------------------------------------------------------------  RIGHT:  CFA:  Popliteal:  Ankle-PT:  Ankle-DP:  ------------------------------------------------------------------------  LEFT:  CFA:  Popliteal:  Ankle-PT:  Ankle-DP:  ------------------------------------------------------------------------  PSA/AVF:  ------------------------------------------------------------------------  RIGHT:  Pseudoaneurysm:  A-V fistula:  TBI: 0.65  ------------------------------------------------------------------------  LEFT:  Pseudoaneurysm:  A-V fistula:  TBI:  ------------------------------------------------------------------------  Right Findings: The ankle-brachial index (MUSHTAQ) on the  right is mildly decreased (0.88).  The toe-brachial index (TBI) on the right is mildly  decreased (0.65).  The right toe pressure is 100 mmHg.  Pulse volume recording (PVR) waveforms appear triphasic  with normal in the right lower extremity.There is a  significant decrease in segmental pressures between the  calf and metatarsal/digit segments, suggestive of mild  infrapopliteal arterial disease.  Left Findings: The ankle-brachial index (MUSHTAQ) on the left  is borderline-abnormal (0.96).  The toe-brachial index (TBI) on the left was not able to  be assessed due to overlying bandages.    Waveform analyses suggests the presence of small vessel  arterial disease  in the left lower extremity.  ------------------------------------------------------------------------  Summary/Impressions:  1.  Right MUSHTAQ is mildly decreased (0.88).  Right TBI is  mildly decreased (0.65). The right toe pressure is 100  mmHg.  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  2. Left MUSHTAQ is borderline-abnormal (0.96).  Left TBI was  not assessed due to overlying bandages. Waveform analyses  suggests the presence of small vessel arterial disease  in  the left lower extremity.  ------------------------------------------------------------------------  Confirmed on  2020 - 4:53 PM by TRISHA Prieto  By signing this report, the attending physician certifies  that he or she has personally supervised and interpreted  the vascular study and has reviewed and or edited and  agrees with the written comments contained within the  report.    < end of copied text >

## 2020-09-18 NOTE — PROGRESS NOTE ADULT - PROBLEM SELECTOR PLAN 3
Vascular consult  appreciated   -f/u  MUSHTAQ/PVR, on ASA,  Venous Doppler legs negative for DVT as well, Vascular consult  appreciated   -  MUSHTAQ/PVR noted , on ASA,  Venous Doppler legs negative for DVT as well,   no further intervention as per vascular

## 2020-09-18 NOTE — PROGRESS NOTE ADULT - ATTENDING COMMENTS
Osmin Ralph MD   Pager # [phone]  On evenings and weekends, please call the office at [phone] or page endocrine fellow on call. Please note that this patient may be followed by different provider tomorrow. If no answer, contact the office.

## 2020-09-19 LAB
ANION GAP SERPL CALC-SCNC: 13 MMO/L — SIGNIFICANT CHANGE UP (ref 7–14)
BUN SERPL-MCNC: 20 MG/DL — SIGNIFICANT CHANGE UP (ref 7–23)
CALCIUM SERPL-MCNC: 9.1 MG/DL — SIGNIFICANT CHANGE UP (ref 8.4–10.5)
CHLORIDE SERPL-SCNC: 101 MMOL/L — SIGNIFICANT CHANGE UP (ref 98–107)
CO2 SERPL-SCNC: 24 MMOL/L — SIGNIFICANT CHANGE UP (ref 22–31)
CREAT SERPL-MCNC: 1.57 MG/DL — HIGH (ref 0.5–1.3)
GLUCOSE BLDC GLUCOMTR-MCNC: 208 MG/DL — HIGH (ref 70–99)
GLUCOSE BLDC GLUCOMTR-MCNC: 223 MG/DL — HIGH (ref 70–99)
GLUCOSE BLDC GLUCOMTR-MCNC: 243 MG/DL — HIGH (ref 70–99)
GLUCOSE BLDC GLUCOMTR-MCNC: 265 MG/DL — HIGH (ref 70–99)
GLUCOSE SERPL-MCNC: 237 MG/DL — HIGH (ref 70–99)
HCT VFR BLD CALC: 32.2 % — LOW (ref 34.5–45)
HGB BLD-MCNC: 9.8 G/DL — LOW (ref 11.5–15.5)
MAGNESIUM SERPL-MCNC: 1.5 MG/DL — LOW (ref 1.6–2.6)
MCHC RBC-ENTMCNC: 26.9 PG — LOW (ref 27–34)
MCHC RBC-ENTMCNC: 30.4 % — LOW (ref 32–36)
MCV RBC AUTO: 88.5 FL — SIGNIFICANT CHANGE UP (ref 80–100)
NRBC # FLD: 0 K/UL — SIGNIFICANT CHANGE UP (ref 0–0)
PHOSPHATE SERPL-MCNC: 2.9 MG/DL — SIGNIFICANT CHANGE UP (ref 2.5–4.5)
PLATELET # BLD AUTO: 288 K/UL — SIGNIFICANT CHANGE UP (ref 150–400)
PMV BLD: 9.3 FL — SIGNIFICANT CHANGE UP (ref 7–13)
POTASSIUM SERPL-MCNC: 4.5 MMOL/L — SIGNIFICANT CHANGE UP (ref 3.5–5.3)
POTASSIUM SERPL-SCNC: 4.5 MMOL/L — SIGNIFICANT CHANGE UP (ref 3.5–5.3)
RBC # BLD: 3.64 M/UL — LOW (ref 3.8–5.2)
RBC # FLD: 13.4 % — SIGNIFICANT CHANGE UP (ref 10.3–14.5)
SODIUM SERPL-SCNC: 138 MMOL/L — SIGNIFICANT CHANGE UP (ref 135–145)
VANCOMYCIN TROUGH SERPL-MCNC: 13.4 UG/ML — SIGNIFICANT CHANGE UP (ref 10–20)
WBC # BLD: 10.63 K/UL — HIGH (ref 3.8–10.5)
WBC # FLD AUTO: 10.63 K/UL — HIGH (ref 3.8–10.5)

## 2020-09-19 PROCEDURE — 99232 SBSQ HOSP IP/OBS MODERATE 35: CPT

## 2020-09-19 RX ORDER — INSULIN LISPRO 100/ML
10 VIAL (ML) SUBCUTANEOUS
Refills: 0 | Status: DISCONTINUED | OUTPATIENT
Start: 2020-09-19 | End: 2020-09-21

## 2020-09-19 RX ORDER — INSULIN GLARGINE 100 [IU]/ML
28 INJECTION, SOLUTION SUBCUTANEOUS AT BEDTIME
Refills: 0 | Status: DISCONTINUED | OUTPATIENT
Start: 2020-09-19 | End: 2020-09-20

## 2020-09-19 RX ADMIN — Medication 200 MILLIGRAM(S): at 18:10

## 2020-09-19 RX ADMIN — Medication 3: at 12:48

## 2020-09-19 RX ADMIN — PIPERACILLIN AND TAZOBACTAM 25 GRAM(S): 4; .5 INJECTION, POWDER, LYOPHILIZED, FOR SOLUTION INTRAVENOUS at 13:23

## 2020-09-19 RX ADMIN — Medication 1 DROP(S): at 06:11

## 2020-09-19 RX ADMIN — Medication 200 MILLIGRAM(S): at 06:11

## 2020-09-19 RX ADMIN — PIPERACILLIN AND TAZOBACTAM 25 GRAM(S): 4; .5 INJECTION, POWDER, LYOPHILIZED, FOR SOLUTION INTRAVENOUS at 06:11

## 2020-09-19 RX ADMIN — Medication 1 TABLET(S): at 18:10

## 2020-09-19 RX ADMIN — HEPARIN SODIUM 5000 UNIT(S): 5000 INJECTION INTRAVENOUS; SUBCUTANEOUS at 13:24

## 2020-09-19 RX ADMIN — Medication 325 MILLIGRAM(S): at 13:23

## 2020-09-19 RX ADMIN — Medication 1 TABLET(S): at 06:11

## 2020-09-19 RX ADMIN — SODIUM CHLORIDE 75 MILLILITER(S): 9 INJECTION INTRAMUSCULAR; INTRAVENOUS; SUBCUTANEOUS at 18:33

## 2020-09-19 RX ADMIN — AMLODIPINE BESYLATE 5 MILLIGRAM(S): 2.5 TABLET ORAL at 06:11

## 2020-09-19 RX ADMIN — Medication 2: at 08:34

## 2020-09-19 RX ADMIN — Medication 1 DROP(S): at 18:04

## 2020-09-19 RX ADMIN — Medication 8 UNIT(S): at 08:34

## 2020-09-19 RX ADMIN — Medication 1 APPLICATION(S): at 12:49

## 2020-09-19 RX ADMIN — Medication 300 MILLIGRAM(S): at 18:33

## 2020-09-19 RX ADMIN — SENNA PLUS 1 TABLET(S): 8.6 TABLET ORAL at 12:51

## 2020-09-19 RX ADMIN — INSULIN GLARGINE 28 UNIT(S): 100 INJECTION, SOLUTION SUBCUTANEOUS at 22:34

## 2020-09-19 RX ADMIN — Medication 2: at 18:03

## 2020-09-19 RX ADMIN — HEPARIN SODIUM 5000 UNIT(S): 5000 INJECTION INTRAVENOUS; SUBCUTANEOUS at 06:10

## 2020-09-19 RX ADMIN — Medication 10 UNIT(S): at 18:02

## 2020-09-19 RX ADMIN — SODIUM CHLORIDE 75 MILLILITER(S): 9 INJECTION INTRAMUSCULAR; INTRAVENOUS; SUBCUTANEOUS at 06:11

## 2020-09-19 RX ADMIN — Medication 8 UNIT(S): at 12:48

## 2020-09-19 RX ADMIN — HEPARIN SODIUM 5000 UNIT(S): 5000 INJECTION INTRAVENOUS; SUBCUTANEOUS at 21:56

## 2020-09-19 RX ADMIN — PIPERACILLIN AND TAZOBACTAM 25 GRAM(S): 4; .5 INJECTION, POWDER, LYOPHILIZED, FOR SOLUTION INTRAVENOUS at 21:56

## 2020-09-19 NOTE — DIETITIAN INITIAL EVALUATION ADULT. - PERTINENT MEDS FT
MEDICATIONS  (STANDING):  amLODIPine   Tablet 5 milliGRAM(s) Oral daily  aspirin enteric coated 325 milliGRAM(s) Oral daily  collagenase Ointment 1 Application(s) Topical daily  dextrose 5%. 1000 milliLiter(s) (50 mL/Hr) IV Continuous <Continuous>  dextrose 50% Injectable 12.5 Gram(s) IV Push once  dextrose 50% Injectable 25 Gram(s) IV Push once  dextrose 50% Injectable 25 Gram(s) IV Push once  heparin   Injectable 5000 Unit(s) SubCutaneous every 8 hours  influenza   Vaccine 0.5 milliLiter(s) IntraMuscular once  insulin glargine Injectable (LANTUS) 24 Unit(s) SubCutaneous at bedtime  insulin lispro (HumaLOG) corrective regimen sliding scale   SubCutaneous three times a day before meals  insulin lispro (HumaLOG) corrective regimen sliding scale   SubCutaneous at bedtime  insulin lispro Injectable (HumaLOG) 8 Unit(s) SubCutaneous three times a day before meals  labetalol 200 milliGRAM(s) Oral two times a day  lactobacillus acidophilus 1 Tablet(s) Oral every 12 hours  piperacillin/tazobactam IVPB.. 3.375 Gram(s) IV Intermittent every 8 hours  senna 1 Tablet(s) Oral daily  sodium chloride 0.9%. 1000 milliLiter(s) (75 mL/Hr) IV Continuous <Continuous>  timolol 0.5% Solution 1 Drop(s) Both EYES two times a day  vancomycin  IVPB 1500 milliGRAM(s) IV Intermittent every 24 hours    MEDICATIONS  (PRN):  bisacodyl Suppository 10 milliGRAM(s) Rectal daily PRN Constipation  dextrose 40% Gel 15 Gram(s) Oral once PRN Blood Glucose LESS THAN 70 milliGRAM(s)/deciliter  glucagon  Injectable 1 milliGRAM(s) IntraMuscular once PRN Glucose LESS THAN 70 milligrams/deciliter  polyethylene glycol 3350 17 Gram(s) Oral daily PRN Constipation 4

## 2020-09-19 NOTE — PROGRESS NOTE ADULT - SUBJECTIVE AND OBJECTIVE BOX
Chief Complaint: DM 2 with hyperglycemia    History: Patient seen at bedside at lunchtime, tolerating meal well. Patient reports she is feeling ok, denies n/v, denies s/s of hypoglycemia.    Patient reporting that she takes insulin by vial and syringe at home (Novolin N, dosing BID). States that she has difficulty seeing; does not use corrective lenses, but she is able to use syringes at home. Patient shown insulin pen at bedside - states she cannot see numbers on dial. Explained basal/bolus plan (vial and syringe OR pen), patient reporting she is not sure if she can do 4x per day injections.    MEDICATIONS  (STANDING):  amLODIPine   Tablet 5 milliGRAM(s) Oral daily  aspirin enteric coated 325 milliGRAM(s) Oral daily  collagenase Ointment 1 Application(s) Topical daily  dextrose 5%. 1000 milliLiter(s) (50 mL/Hr) IV Continuous <Continuous>  dextrose 50% Injectable 12.5 Gram(s) IV Push once  dextrose 50% Injectable 25 Gram(s) IV Push once  dextrose 50% Injectable 25 Gram(s) IV Push once  heparin   Injectable 5000 Unit(s) SubCutaneous every 8 hours  influenza   Vaccine 0.5 milliLiter(s) IntraMuscular once  insulin glargine Injectable (LANTUS) 28 Unit(s) SubCutaneous at bedtime  insulin lispro (HumaLOG) corrective regimen sliding scale   SubCutaneous three times a day before meals  insulin lispro (HumaLOG) corrective regimen sliding scale   SubCutaneous at bedtime  insulin lispro Injectable (HumaLOG) 10 Unit(s) SubCutaneous three times a day before meals  labetalol 200 milliGRAM(s) Oral two times a day  lactobacillus acidophilus 1 Tablet(s) Oral every 12 hours  piperacillin/tazobactam IVPB.. 3.375 Gram(s) IV Intermittent every 8 hours  senna 1 Tablet(s) Oral daily  sodium chloride 0.9%. 1000 milliLiter(s) (75 mL/Hr) IV Continuous <Continuous>  timolol 0.5% Solution 1 Drop(s) Both EYES two times a day  vancomycin  IVPB 1500 milliGRAM(s) IV Intermittent every 24 hours    MEDICATIONS  (PRN):  bisacodyl Suppository 10 milliGRAM(s) Rectal daily PRN Constipation  dextrose 40% Gel 15 Gram(s) Oral once PRN Blood Glucose LESS THAN 70 milliGRAM(s)/deciliter  glucagon  Injectable 1 milliGRAM(s) IntraMuscular once PRN Glucose LESS THAN 70 milligrams/deciliter  polyethylene glycol 3350 17 Gram(s) Oral daily PRN Constipation    No Known Allergies    Review of Systems:  Eyes: +blurry vision  Cardiovascular: No chest pain  Respiratory: No SOB  GI: No nausea, vomiting    PHYSICAL EXAM:  VITALS: T(C): 37.2 (09-19-20 @ 18:12)  T(F): 99 (09-19-20 @ 18:12), Max: 99.1 (09-19-20 @ 13:52)  HR: 76 (09-19-20 @ 18:12) (70 - 80)  BP: 153/52 (09-19-20 @ 18:12) (150/60 - 161/71)  RR:  (17 - 18)  SpO2:  (97% - 98%)  Wt(kg): --  GENERAL: NAD  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, no lid lag, anicteric  HEENT:  Atraumatic, Normocephalic, moist mucous membranes  RESPIRATORY: unlabored respirations     CAPILLARY BLOOD GLUCOSE    POCT Blood Glucose.: 243 mg/dL (19 Sep 2020 17:36)  POCT Blood Glucose.: 265 mg/dL (19 Sep 2020 11:59)  POCT Blood Glucose.: 223 mg/dL (19 Sep 2020 08:23)  POCT Blood Glucose.: 235 mg/dL (18 Sep 2020 21:37)      09-19    138  |  101  |  20  ----------------------------<  237<H>  4.5   |  24  |  1.57<H>    EGFR if : 36  EGFR if non : 31    Ca    9.1      09-19  Mg     1.5     09-19  Phos  2.9     09-19    TPro  6.4  /  Alb  3.2<L>  /  TBili  0.3  /  DBili  x   /  AST  9   /  ALT  12  /  AlkPhos  106  09-17      Thyroid Function Tests:  09-17 @ 05:52 TSH 2.29 FreeT4 1.10 T3 -- Anti TPO -- Anti Thyroglobulin Ab -- TSI --      A1C with Estimated Average Glucose: 9.3 % (09-17-20 @ 05:52)

## 2020-09-19 NOTE — DIETITIAN INITIAL EVALUATION ADULT. - ADD RECOMMEND
1. Reiterated therapeutic diet guidelines as able. 2. Monitor weights, labs, BM's, skin integrity, p.o. intake. 3. RD remains available, re-consult as needed.

## 2020-09-19 NOTE — PROGRESS NOTE ADULT - ASSESSMENT
79 year old woman with PMH of HTN, HLD, DM type 2, diabetic neuropathy, PVD, Obesity, presents with chronic ulcer of LT 1st toe and LT heel, now awaiting amputation, also with hyperglycemia in setting of uncontrolled DM2.

## 2020-09-19 NOTE — PROGRESS NOTE ADULT - ASSESSMENT
78 y/o Female  with HX of DM type 2,  diabetic neuropathy, HTN, HLD, PVD,  Obesity, presents with chronic ulcer of LT 1st toe and LT heel with now worsening redness and swelling. Sent in by Dr. Meeks to be admitted to Dr. Ruiz for surgery (podiatry) and IV antibiotics. Has been taking Levaquin for past 2 days. Denies fever, chills, chest pain, shortness of breath, cough, abd pain, nausea, vomiting, diarrhea. Pt was seen by Podiatry tonight:  As per Podiatry : Left foot hallux plantar and dorsal wound that communicate, 5cc of pus expressed. Dorsal wound tracks 2cm proximally. Dorsal and plantar hallux wounds probe to bone, LF swelling to level of mid tibia, w/ extending erythema to the level of the midfoot. S/P bedside I&D of LF hallux wound: incision to dorsal hallux wound made along proximal tracking revealing 5cc of pus, no malodor. 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    Diffuse soft tissue swelling particularly around hallux. In addition, shallow plantar heel soft tissue ulceration. No tracking gas collections beyond these regions.    Focally eroded and indistinct appearing lateral cortex of the hallux distal phalanx onthe oblique view suspicious for osteomyelitis. No additional suspected areas of osteomyelitis particularly in the heel region subjacent to the aforementioned superficial soft tissue ulceration.    No fractures or dislocations.    Tarsometatarsal alignment maintained without evidence for a Lisfranc injury.        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 Preserved remaining joint spaces and no joint margin erosions.    Plantar calcaneal enthesophyte.    Generalized osteopenia otherwise no discrete lytic or blastic lesions.    Posterior tibial distribution vascular calcifications.

## 2020-09-19 NOTE — PROGRESS NOTE ADULT - SUBJECTIVE AND OBJECTIVE BOX
Patient is a 79y old  Female who presents with a chief complaint of diabetic foot ulcer, Left first toe, and left heel , R/O Osteomyelitis, (18 Sep 2020 12:41)      SUBJECTIVE / OVERNIGHT EVENTS:    MEDICATIONS  (STANDING):  amLODIPine   Tablet 5 milliGRAM(s) Oral daily  aspirin enteric coated 325 milliGRAM(s) Oral daily  collagenase Ointment 1 Application(s) Topical daily  dextrose 5%. 1000 milliLiter(s) (50 mL/Hr) IV Continuous <Continuous>  dextrose 50% Injectable 12.5 Gram(s) IV Push once  dextrose 50% Injectable 25 Gram(s) IV Push once  dextrose 50% Injectable 25 Gram(s) IV Push once  heparin   Injectable 5000 Unit(s) SubCutaneous every 8 hours  influenza   Vaccine 0.5 milliLiter(s) IntraMuscular once  insulin glargine Injectable (LANTUS) 24 Unit(s) SubCutaneous at bedtime  insulin lispro (HumaLOG) corrective regimen sliding scale   SubCutaneous three times a day before meals  insulin lispro (HumaLOG) corrective regimen sliding scale   SubCutaneous at bedtime  insulin lispro Injectable (HumaLOG) 8 Unit(s) SubCutaneous three times a day before meals  labetalol 200 milliGRAM(s) Oral two times a day  lactobacillus acidophilus 1 Tablet(s) Oral every 12 hours  piperacillin/tazobactam IVPB.. 3.375 Gram(s) IV Intermittent every 8 hours  senna 1 Tablet(s) Oral daily  sodium chloride 0.9%. 1000 milliLiter(s) (75 mL/Hr) IV Continuous <Continuous>  timolol 0.5% Solution 1 Drop(s) Both EYES two times a day  vancomycin  IVPB 1500 milliGRAM(s) IV Intermittent every 24 hours    MEDICATIONS  (PRN):  bisacodyl Suppository 10 milliGRAM(s) Rectal daily PRN Constipation  dextrose 40% Gel 15 Gram(s) Oral once PRN Blood Glucose LESS THAN 70 milliGRAM(s)/deciliter  glucagon  Injectable 1 milliGRAM(s) IntraMuscular once PRN Glucose LESS THAN 70 milligrams/deciliter  polyethylene glycol 3350 17 Gram(s) Oral daily PRN Constipation      Vital Signs Last 24 Hrs  T(C): 36.8 (19 Sep 2020 06:10), Max: 36.9 (18 Sep 2020 14:46)  T(F): 98.3 (19 Sep 2020 06:10), Max: 98.5 (18 Sep 2020 14:46)  HR: 75 (19 Sep 2020 06:10) (60 - 80)  BP: 159/58 (19 Sep 2020 06:10) (122/58 - 161/71)  BP(mean): --  RR: 18 (19 Sep 2020 06:10) (17 - 19)  SpO2: 97% (19 Sep 2020 06:10) (97% - 100%)  CAPILLARY BLOOD GLUCOSE      POCT Blood Glucose.: 223 mg/dL (19 Sep 2020 08:23)  POCT Blood Glucose.: 235 mg/dL (18 Sep 2020 21:37)  POCT Blood Glucose.: 241 mg/dL (18 Sep 2020 17:26)  POCT Blood Glucose.: 183 mg/dL (18 Sep 2020 12:22)    I&O's Summary    18 Sep 2020 07:01  -  19 Sep 2020 07:00  --------------------------------------------------------  IN: 0 mL / OUT: 1200 mL / NET: -1200 mL        PHYSICAL EXAM:  GENERAL: NAD, well-developed  HEAD:  Atraumatic, Normocephalic  EYES: EOMI, PERRLA, conjunctiva and sclera clear  NECK: Supple, No JVD  CHEST/LUNG: Clear to auscultation bilaterally; No wheeze  HEART: Regular rate and rhythm; No murmurs, rubs, or gallops  ABDOMEN: Soft, Nontender, Nondistended; Bowel sounds present  EXTREMITIES:  2+ Peripheral Pulses, No clubbing, cyanosis, or edema  PSYCH: AAOx3  NEUROLOGY: non-focal  SKIN: No rashes or lesions    LABS:                        9.8    10.63 )-----------( 288      ( 19 Sep 2020 06:57 )             32.2     09-19    138  |  101  |  20  ----------------------------<  237<H>  4.5   |  24  |  1.57<H>    Ca    9.1      19 Sep 2020 06:57  Phos  2.9     09-19  Mg     1.5     09-19                RADIOLOGY & ADDITIONAL TESTS:    Imaging Personally Reviewed:    Consultant(s) Notes Reviewed:      Care Discussed with Consultants/Other Providers:   Patient is a 79y old  Female who presents with a chief complaint of diabetic foot ulcer, Left first toe, and left heel , R/O Osteomyelitis, (18 Sep 2020 12:41)      SUBJECTIVE / OVERNIGHT EVENTS: patient seen and examined by bedside, denies headache, dizziness, SOB, CP, Palpitations , N/V/D, abdominal pain  denies pain in the foot       MEDICATIONS  (STANDING):  amLODIPine   Tablet 5 milliGRAM(s) Oral daily  aspirin enteric coated 325 milliGRAM(s) Oral daily  collagenase Ointment 1 Application(s) Topical daily  dextrose 5%. 1000 milliLiter(s) (50 mL/Hr) IV Continuous <Continuous>  dextrose 50% Injectable 12.5 Gram(s) IV Push once  dextrose 50% Injectable 25 Gram(s) IV Push once  dextrose 50% Injectable 25 Gram(s) IV Push once  heparin   Injectable 5000 Unit(s) SubCutaneous every 8 hours  influenza   Vaccine 0.5 milliLiter(s) IntraMuscular once  insulin glargine Injectable (LANTUS) 24 Unit(s) SubCutaneous at bedtime  insulin lispro (HumaLOG) corrective regimen sliding scale   SubCutaneous three times a day before meals  insulin lispro (HumaLOG) corrective regimen sliding scale   SubCutaneous at bedtime  insulin lispro Injectable (HumaLOG) 8 Unit(s) SubCutaneous three times a day before meals  labetalol 200 milliGRAM(s) Oral two times a day  lactobacillus acidophilus 1 Tablet(s) Oral every 12 hours  piperacillin/tazobactam IVPB.. 3.375 Gram(s) IV Intermittent every 8 hours  senna 1 Tablet(s) Oral daily  sodium chloride 0.9%. 1000 milliLiter(s) (75 mL/Hr) IV Continuous <Continuous>  timolol 0.5% Solution 1 Drop(s) Both EYES two times a day  vancomycin  IVPB 1500 milliGRAM(s) IV Intermittent every 24 hours    MEDICATIONS  (PRN):  bisacodyl Suppository 10 milliGRAM(s) Rectal daily PRN Constipation  dextrose 40% Gel 15 Gram(s) Oral once PRN Blood Glucose LESS THAN 70 milliGRAM(s)/deciliter  glucagon  Injectable 1 milliGRAM(s) IntraMuscular once PRN Glucose LESS THAN 70 milligrams/deciliter  polyethylene glycol 3350 17 Gram(s) Oral daily PRN Constipation      Vital Signs Last 24 Hrs  T(C): 36.8 (19 Sep 2020 06:10), Max: 36.9 (18 Sep 2020 14:46)  T(F): 98.3 (19 Sep 2020 06:10), Max: 98.5 (18 Sep 2020 14:46)  HR: 75 (19 Sep 2020 06:10) (60 - 80)  BP: 159/58 (19 Sep 2020 06:10) (122/58 - 161/71)  BP(mean): --  RR: 18 (19 Sep 2020 06:10) (17 - 19)  SpO2: 97% (19 Sep 2020 06:10) (97% - 100%)  CAPILLARY BLOOD GLUCOSE      POCT Blood Glucose.: 223 mg/dL (19 Sep 2020 08:23)  POCT Blood Glucose.: 235 mg/dL (18 Sep 2020 21:37)  POCT Blood Glucose.: 241 mg/dL (18 Sep 2020 17:26)  POCT Blood Glucose.: 183 mg/dL (18 Sep 2020 12:22)    I&O's Summary    18 Sep 2020 07:01  -  19 Sep 2020 07:00  --------------------------------------------------------  IN: 0 mL / OUT: 1200 mL / NET: -1200 mL      PHYSICAL EXAM:  GENERAL: NAD, obese   HEAD:  Atraumatic, Normocephalic  EYES: EOMI, PERRLA, conjunctiva and sclera clear  CHEST/LUNG: Clear to auscultation bilaterally; No wheeze  HEART: Regular rate and rhythm;   ABDOMEN: Soft, Nontender, Nondistended; Bowel sounds present  EXTREMITIES:  diminished Peripheral Pulses, b/l  erythema+ , B/L pedal edema , erythema in RUE around IV site , slightly nodular     PSYCH: AAOx3  NEUROLOGY: non-focal  SKIN: left foot ulcer , with dressing , c/d/i         LABS:                        9.8    10.63 )-----------( 288      ( 19 Sep 2020 06:57 )             32.2     09-19    138  |  101  |  20  ----------------------------<  237<H>  4.5   |  24  |  1.57<H>    Ca    9.1      19 Sep 2020 06:57  Phos  2.9     09-19  Mg     1.5     09-19                RADIOLOGY & ADDITIONAL TESTS:    Imaging Personally Reviewed:    Consultant(s) Notes Reviewed:      Care Discussed with Consultants/Other Providers:

## 2020-09-19 NOTE — PROGRESS NOTE ADULT - PROBLEM SELECTOR PLAN 2
BMP, UA, noted  f/u Urine Culture,   IVF LR @ 75 cc/hr,  HOLD ACEI/ARB ,  will  check Renal US BMP, UA, noted  f/u Urine Culture,   IVF LR @ 75 cc/hr,  HOLD ACEI/ARB ,   bladder scan wnl  cr trending down, likely close to her baseline, will continue to monitor, avoid nephrotoxics

## 2020-09-19 NOTE — PROGRESS NOTE ADULT - PROBLEM SELECTOR PLAN 3
Vascular consult  appreciated   -  MUSHTAQ/PVR noted , on ASA,  Venous Doppler legs negative for DVT as well,   no further intervention as per vascular

## 2020-09-19 NOTE — DIETITIAN INITIAL EVALUATION ADULT. - OTHER INFO
79 year old woman with PMH of HTN, HLD, DM type 2, diabetic neuropathy, PVD, Obesity, presents with chronic ulcer of LT 1st toe and LT heel, now awaiting amputation next week, also with hyperglycemia in setting of uncontrolled DM2. BG goal 100-180 mg/dL. PTA patient with good appetite intake, with good PO in house as well, consuming % of meals. Patient reported that she has been intentionally cutting back on her PO intake in efforts to reduce her body weight. Patient stated that she has been educated on DM diet in the past, but has not been adherent and eating more sugar than usual. Patient denies any nausea/vomiting/diarrhea or difficulty chewing and swallowing. NKFA or intolerances reported. + Constipation in house, on bowel regimen. Last BM 9/18. Patient endorses a small weight loss of 12lbs. UBW 269lbs. Current weight (9/16/20) 249.6lb. Noted 20lb weight loss, patient with 2+L/R leg edema at this time. Provided extensive verbal & printed nutrition education re: therapeutic diet.  Discussed carbohydrate food sources, consistent [fiber dense] carbohydrate intake & carb. counting, hypoglycemia prevention/management,&  nutrition label reading.  Discouraged consumption of concentrated sweetened beverages.  Also encouraged weight reduction, adequate protein for wound healing, heart healthy food choices, self glucose monitoring and emphasized endocrinology f/u.

## 2020-09-19 NOTE — PROGRESS NOTE ADULT - PROBLEM SELECTOR PLAN 5
Hold ACEI / ARB, + ARABELLA, on Labetalol, ASA, Start Norvasc 5 mg PO QD from tonight, ECHO noted   will continue to monitor BP

## 2020-09-19 NOTE — PROGRESS NOTE ADULT - PROBLEM SELECTOR PLAN 1
Podiatry f/u, Vascular  sx consult appreciated,  MUSHTAQ/PVR noted as above, no further intervention as per Vascular   Cardiology risk stratification appreciated    Echo noted, Mod MS, grossly normal LV fn   , MET < 4   Left foot hallux plantar and dorsal wound that communicate, 5cc of pus expressed. Dorsal wound tracks 2cm proximally. Dorsal and plantar hallux wounds probe to bone, LF swelling to level of mid tibia, w/ extending erythema to the level of the midfoot. Possible Wound etiology secondary to neuropathy.   S/P bedside I&D of LF hallux wound: incision to dorsal hallux wound made along proximal tracking revealing 5cc of pus, no malodor.   LFXR: hallux suspicious for OM  -MRI left foot ordered  IV Zosyn, IV Vanco, f/u CBC, CMP, Cultures, Fall/aspiration precaution,   Bacid, Podiatry f/u, Vascular  sx consult appreciated,  MUSHTAQ/PVR noted as above, no further intervention as per Vascular   Cardiology risk stratification appreciated    Echo noted, Mod MS, grossly normal LV fn   , MET < 4   Left foot hallux plantar and dorsal wound that communicate, 5cc of pus expressed. Dorsal wound tracks 2cm proximally. Dorsal and plantar hallux wounds probe to bone, LF swelling to level of mid tibia, w/ extending erythema to the level of the midfoot. Possible Wound etiology secondary to neuropathy.   S/P bedside I&D of LF hallux wound: incision to dorsal hallux wound made along proximal tracking revealing 5cc of pus, no malodor.   LFXR: hallux suspicious for OM  -MRI left foot ordered  IV Zosyn, IV Vanco, f/u CBC, CMP, Cultures, Fall/aspiration precaution,   Bacid,  f/u vanco trough

## 2020-09-19 NOTE — DIETITIAN INITIAL EVALUATION ADULT. - PERTINENT LABORATORY DATA
09-19 Na138 mmol/L Glu 237 mg/dL<H> K+ 4.5 mmol/L Cr  1.57 mg/dL<H> BUN 20 mg/dL 09-19 Phos 2.9 mg/dL 09-17 Alb 3.2 g/dL<L> 09-17 Chol 98 mg/dL<L> LDL 46 mg/dL HDL 35 mg/dL<L> Trig 138 mg/dL

## 2020-09-19 NOTE — PROGRESS NOTE ADULT - PROBLEM SELECTOR PLAN 1
-A1c is 9.3%, goal less than 7%  -Inpatient BG target 100-180 mg/dl, above target today  -Increased Lantus to 28 units SQ qHS  -Increased Humalog to 10 units SQ TID before meals (Hold if NPO/not eating meal)   -Continue Humalog LOW dose correctional scales before meals and bedtime as currently ordered  -Check BG before meals and bedtime  -Consistent carb diet, RD consult done  -DM education: Spoke with primary RN to practice vial and syringe use- given patient visual deficits, unclear if she is performing correctly. Please document successful (or unsuccessful) return demonstration in DM CPG    Discharge Plan:  -Ideally would dc her home on basal bolus insulin pens - given A1c and need for tighter glucose control for wound healing. However, patient is unsure if she can do 4x per day injections, would be doing independently, lives alone  -Patient also reporting she prefers vial/syringe VS insulin pens - however, will need to further assess visual deficits to see which one is safer. Anticipate that insulin pens would actually benefit patient, given she can listen to the clicks for dosing    -Plan TBD based on above assessments & patient preference  -Recommend to send new prescription for glucometer (consider Prodigy "Talking" glucometer that can read result to patient), glucose test strips, lancets, alcohol swabs  -Followup with [facility] if desired- [address] [place] [address], [address], [facility] [address], [phone]

## 2020-09-19 NOTE — PROGRESS NOTE ADULT - PROBLEM SELECTOR PLAN 4
ENDO consult apprecaited    c/w  SQ Lantus 24  units QHS, Humalog 8 units Before meals TID, Hold if pt is NPO,   Hgba1c 9.3   c/w , FS, sliding scale,   TSH, free T4 wnl , UA, Urine  noted with proteinuria and glucosuria   f/u urine Culture,   pt is on NPH BID at home,  diabetic education   Nutrition eval ENDO consult apprecaited   FSBS elevated,   increased  Lantus 28  units QHS, Humalog 10 units Before meals TID, Hold if pt is NPO,   Hgba1c 9.3   c/w , FS, sliding scale,   TSH, free T4 wnl , UA, Urine  noted with proteinuria and glucosuria   f/u urine Culture,   pt is on NPH BID at home,  diabetic education   Nutrition eval

## 2020-09-20 LAB
ANION GAP SERPL CALC-SCNC: 13 MMO/L — SIGNIFICANT CHANGE UP (ref 7–14)
BUN SERPL-MCNC: 21 MG/DL — SIGNIFICANT CHANGE UP (ref 7–23)
CALCIUM SERPL-MCNC: 9.2 MG/DL — SIGNIFICANT CHANGE UP (ref 8.4–10.5)
CHLORIDE SERPL-SCNC: 102 MMOL/L — SIGNIFICANT CHANGE UP (ref 98–107)
CO2 SERPL-SCNC: 25 MMOL/L — SIGNIFICANT CHANGE UP (ref 22–31)
CREAT SERPL-MCNC: 1.56 MG/DL — HIGH (ref 0.5–1.3)
GLUCOSE BLDC GLUCOMTR-MCNC: 169 MG/DL — HIGH (ref 70–99)
GLUCOSE BLDC GLUCOMTR-MCNC: 225 MG/DL — HIGH (ref 70–99)
GLUCOSE BLDC GLUCOMTR-MCNC: 235 MG/DL — HIGH (ref 70–99)
GLUCOSE BLDC GLUCOMTR-MCNC: 236 MG/DL — HIGH (ref 70–99)
GLUCOSE SERPL-MCNC: 208 MG/DL — HIGH (ref 70–99)
HCT VFR BLD CALC: 32.9 % — LOW (ref 34.5–45)
HGB BLD-MCNC: 10 G/DL — LOW (ref 11.5–15.5)
MAGNESIUM SERPL-MCNC: 1.5 MG/DL — LOW (ref 1.6–2.6)
MCHC RBC-ENTMCNC: 27.9 PG — SIGNIFICANT CHANGE UP (ref 27–34)
MCHC RBC-ENTMCNC: 30.4 % — LOW (ref 32–36)
MCV RBC AUTO: 91.9 FL — SIGNIFICANT CHANGE UP (ref 80–100)
NRBC # FLD: 0 K/UL — SIGNIFICANT CHANGE UP (ref 0–0)
PHOSPHATE SERPL-MCNC: 2.9 MG/DL — SIGNIFICANT CHANGE UP (ref 2.5–4.5)
PLATELET # BLD AUTO: 283 K/UL — SIGNIFICANT CHANGE UP (ref 150–400)
PMV BLD: 9.6 FL — SIGNIFICANT CHANGE UP (ref 7–13)
POTASSIUM SERPL-MCNC: 4.7 MMOL/L — SIGNIFICANT CHANGE UP (ref 3.5–5.3)
POTASSIUM SERPL-SCNC: 4.7 MMOL/L — SIGNIFICANT CHANGE UP (ref 3.5–5.3)
RBC # BLD: 3.58 M/UL — LOW (ref 3.8–5.2)
RBC # FLD: 13.4 % — SIGNIFICANT CHANGE UP (ref 10.3–14.5)
SODIUM SERPL-SCNC: 140 MMOL/L — SIGNIFICANT CHANGE UP (ref 135–145)
WBC # BLD: 10.96 K/UL — HIGH (ref 3.8–10.5)
WBC # FLD AUTO: 10.96 K/UL — HIGH (ref 3.8–10.5)

## 2020-09-20 PROCEDURE — 99232 SBSQ HOSP IP/OBS MODERATE 35: CPT

## 2020-09-20 RX ORDER — INSULIN LISPRO 100/ML
VIAL (ML) SUBCUTANEOUS
Refills: 0 | Status: DISCONTINUED | OUTPATIENT
Start: 2020-09-20 | End: 2020-09-25

## 2020-09-20 RX ORDER — INSULIN GLARGINE 100 [IU]/ML
32 INJECTION, SOLUTION SUBCUTANEOUS AT BEDTIME
Refills: 0 | Status: DISCONTINUED | OUTPATIENT
Start: 2020-09-20 | End: 2020-09-21

## 2020-09-20 RX ADMIN — Medication 200 MILLIGRAM(S): at 05:59

## 2020-09-20 RX ADMIN — Medication 1 TABLET(S): at 17:37

## 2020-09-20 RX ADMIN — Medication 300 MILLIGRAM(S): at 17:46

## 2020-09-20 RX ADMIN — Medication 10 UNIT(S): at 17:36

## 2020-09-20 RX ADMIN — PIPERACILLIN AND TAZOBACTAM 25 GRAM(S): 4; .5 INJECTION, POWDER, LYOPHILIZED, FOR SOLUTION INTRAVENOUS at 05:59

## 2020-09-20 RX ADMIN — SENNA PLUS 1 TABLET(S): 8.6 TABLET ORAL at 12:32

## 2020-09-20 RX ADMIN — Medication 200 MILLIGRAM(S): at 17:37

## 2020-09-20 RX ADMIN — HEPARIN SODIUM 5000 UNIT(S): 5000 INJECTION INTRAVENOUS; SUBCUTANEOUS at 21:21

## 2020-09-20 RX ADMIN — Medication 1 DROP(S): at 05:59

## 2020-09-20 RX ADMIN — SODIUM CHLORIDE 75 MILLILITER(S): 9 INJECTION INTRAMUSCULAR; INTRAVENOUS; SUBCUTANEOUS at 13:29

## 2020-09-20 RX ADMIN — Medication 1 TABLET(S): at 05:59

## 2020-09-20 RX ADMIN — INSULIN GLARGINE 32 UNIT(S): 100 INJECTION, SOLUTION SUBCUTANEOUS at 22:35

## 2020-09-20 RX ADMIN — Medication 2: at 08:42

## 2020-09-20 RX ADMIN — PIPERACILLIN AND TAZOBACTAM 25 GRAM(S): 4; .5 INJECTION, POWDER, LYOPHILIZED, FOR SOLUTION INTRAVENOUS at 13:29

## 2020-09-20 RX ADMIN — AMLODIPINE BESYLATE 5 MILLIGRAM(S): 2.5 TABLET ORAL at 05:59

## 2020-09-20 RX ADMIN — HEPARIN SODIUM 5000 UNIT(S): 5000 INJECTION INTRAVENOUS; SUBCUTANEOUS at 13:29

## 2020-09-20 RX ADMIN — Medication 4: at 12:31

## 2020-09-20 RX ADMIN — HEPARIN SODIUM 5000 UNIT(S): 5000 INJECTION INTRAVENOUS; SUBCUTANEOUS at 05:59

## 2020-09-20 RX ADMIN — Medication 10 UNIT(S): at 08:42

## 2020-09-20 RX ADMIN — Medication 2: at 17:36

## 2020-09-20 RX ADMIN — Medication 10 UNIT(S): at 12:31

## 2020-09-20 RX ADMIN — PIPERACILLIN AND TAZOBACTAM 25 GRAM(S): 4; .5 INJECTION, POWDER, LYOPHILIZED, FOR SOLUTION INTRAVENOUS at 21:21

## 2020-09-20 RX ADMIN — SODIUM CHLORIDE 75 MILLILITER(S): 9 INJECTION INTRAMUSCULAR; INTRAVENOUS; SUBCUTANEOUS at 05:59

## 2020-09-20 RX ADMIN — Medication 1 DROP(S): at 17:37

## 2020-09-20 RX ADMIN — Medication 1 APPLICATION(S): at 12:34

## 2020-09-20 RX ADMIN — Medication 325 MILLIGRAM(S): at 13:29

## 2020-09-20 NOTE — PROGRESS NOTE ADULT - PROBLEM SELECTOR PLAN 1
-A1c is 9.3%, goal less than 7%  -Inpatient BG target 100-180 mg/dl, above target today  -Increased Lantus to 32 units SQ qHS  -Increased Humalog correctional scale to MODERATE dose before meals  -Continue Humalog LOW dose at bedtime   -Continue Humalog 10 units SQ TID before meals (Hold if NPO/not eating meal)   -Check BG before meals and bedtime  -Consistent carb diet, RD consult done  -DM education: Spoke with primary RN to practice vial and syringe use- given patient visual deficits, unclear if she is performing correctly. Please document successful (or unsuccessful) return demonstration in DM CPG    Discharge Plan:  -Ideally would dc her home on basal bolus insulin pens - given A1c and need for tighter glucose control for wound healing. However, patient is unsure if she can do 4x per day injections, would be doing independently, lives alone  -Patient also reporting she prefers vial/syringe VS insulin pens - however, will need to further assess visual deficits to see which one is safer. Anticipate that insulin pens would actually benefit patient, given she can listen to the clicks for dosing    -Plan TBD based on above assessments & patient preference  -Recommend to send new prescription for glucometer (consider Prodigy "Talking" glucometer that can read result to patient), glucose test strips, lancets, alcohol swabs  -Followup with [facility] if desired- [address] [place] [address], [address], [facility] [address], [phone]

## 2020-09-20 NOTE — PROGRESS NOTE ADULT - PROBLEM SELECTOR PLAN 1
Podiatry f/u, Vascular  sx consult appreciated,  MUSHTAQ/PVR noted as above, no further intervention as per Vascular   Cardiology risk stratification appreciated    Echo noted, Mod MS, grossly normal LV fn   , MET < 4   Left foot hallux plantar and dorsal wound that communicate, 5cc of pus expressed. Dorsal wound tracks 2cm proximally. Dorsal and plantar hallux wounds probe to bone, LF swelling to level of mid tibia, w/ extending erythema to the level of the midfoot. Possible Wound etiology secondary to neuropathy.   S/P bedside I&D of LF hallux wound: incision to dorsal hallux wound made along proximal tracking revealing 5cc of pus, no malodor.   LFXR: hallux suspicious for OM  -MRI left foot ordered  IV Zosyn, IV Vanco, f/u CBC, CMP, Cultures, Fall/aspiration precaution,   Bacid,  f/u vanco trough Podiatry f/u, Vascular  sx consult appreciated,  MUSHTAQ/PVR noted as above, no further intervention as per Vascular   Cardiology risk stratification appreciated    Echo noted, Mod MS, grossly normal LV fn   , MET < 4   Left foot hallux plantar and dorsal wound that communicate, 5cc of pus expressed. Dorsal wound tracks 2cm proximally. Dorsal and plantar hallux wounds probe to bone, LF swelling to level of mid tibia, w/ extending erythema to the level of the midfoot. Possible Wound etiology secondary to neuropathy.   S/P bedside I&D of LF hallux wound: incision to dorsal hallux wound made along proximal tracking revealing 5cc of pus, no malodor.   LFXR: hallux suspicious for OM  -MRI left foot ordered  IV Zosyn, IV Vanco, f/u CBC, CMP, Cultures, Fall/aspiration precaution,   Bacid,   vanco trough 13.4 on 9/19

## 2020-09-20 NOTE — PROGRESS NOTE ADULT - SUBJECTIVE AND OBJECTIVE BOX
Chief Complaint: DM 2    History: Patient seen at bedside. Reports she is feeling ok, eating meals, denies n/v, denies s/s of hypoglycemia    MEDICATIONS  (STANDING):  amLODIPine   Tablet 5 milliGRAM(s) Oral daily  aspirin enteric coated 325 milliGRAM(s) Oral daily  collagenase Ointment 1 Application(s) Topical daily  dextrose 5%. 1000 milliLiter(s) (50 mL/Hr) IV Continuous <Continuous>  dextrose 50% Injectable 12.5 Gram(s) IV Push once  dextrose 50% Injectable 25 Gram(s) IV Push once  dextrose 50% Injectable 25 Gram(s) IV Push once  heparin   Injectable 5000 Unit(s) SubCutaneous every 8 hours  influenza   Vaccine 0.5 milliLiter(s) IntraMuscular once  insulin glargine Injectable (LANTUS) 32 Unit(s) SubCutaneous at bedtime  insulin lispro (HumaLOG) corrective regimen sliding scale   SubCutaneous at bedtime  insulin lispro (HumaLOG) corrective regimen sliding scale   SubCutaneous three times a day before meals  insulin lispro Injectable (HumaLOG) 10 Unit(s) SubCutaneous three times a day before meals  labetalol 200 milliGRAM(s) Oral two times a day  lactobacillus acidophilus 1 Tablet(s) Oral every 12 hours  piperacillin/tazobactam IVPB.. 3.375 Gram(s) IV Intermittent every 8 hours  senna 1 Tablet(s) Oral daily  sodium chloride 0.9%. 1000 milliLiter(s) (75 mL/Hr) IV Continuous <Continuous>  timolol 0.5% Solution 1 Drop(s) Both EYES two times a day  vancomycin  IVPB 1500 milliGRAM(s) IV Intermittent every 24 hours    MEDICATIONS  (PRN):  bisacodyl Suppository 10 milliGRAM(s) Rectal daily PRN Constipation  dextrose 40% Gel 15 Gram(s) Oral once PRN Blood Glucose LESS THAN 70 milliGRAM(s)/deciliter  glucagon  Injectable 1 milliGRAM(s) IntraMuscular once PRN Glucose LESS THAN 70 milligrams/deciliter  polyethylene glycol 3350 17 Gram(s) Oral daily PRN Constipation    No Known Allergies    Review of Systems:  HEENT: No pain  Cardiovascular: No chest pain  Respiratory: No SOB  GI: No nausea, vomiting    PHYSICAL EXAM:  VITALS: T(C): 36.9 (09-20-20 @ 15:55)  T(F): 98.5 (09-20-20 @ 15:55), Max: 98.9 (09-19-20 @ 21:30)  HR: 78 (09-20-20 @ 17:33) (69 - 84)  BP: 144/58 (09-20-20 @ 17:33) (144/58 - 164/55)  RR:  (18 - 18)  SpO2:  (96% - 97%)  Wt(kg): --  GENERAL: NAD  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, no lid lag, anicteric  HEENT:  Atraumatic, Normocephalic, moist mucous membranes  RESPIRATORY: unlabored respirations     CAPILLARY BLOOD GLUCOSE    POCT Blood Glucose.: 169 mg/dL (20 Sep 2020 17:07)  POCT Blood Glucose.: 225 mg/dL (20 Sep 2020 12:16)  POCT Blood Glucose.: 235 mg/dL (20 Sep 2020 08:29)  POCT Blood Glucose.: 208 mg/dL (19 Sep 2020 22:20)      09-20    140  |  102  |  21  ----------------------------<  208<H>  4.7   |  25  |  1.56<H>    EGFR if : 37  EGFR if non : 32    Ca    9.2      09-20  Mg     1.5     09-20  Phos  2.9     09-20        Thyroid Function Tests:  09-17 @ 05:52 TSH 2.29 FreeT4 1.10 T3 -- Anti TPO -- Anti Thyroglobulin Ab -- TSI --      A1C with Estimated Average Glucose: 9.3 % (09-17-20 @ 05:52)

## 2020-09-20 NOTE — PROGRESS NOTE ADULT - PROBLEM SELECTOR PLAN 8
DVT Prophylaxis: SQ Heparin,  RUE erythema around IV site ? thrombophlebitis , cold compresses , will continue to monitor , change IV site DVT Prophylaxis: SQ Heparin,

## 2020-09-20 NOTE — PROGRESS NOTE ADULT - ASSESSMENT
Chemotherapy Verification - PRIMARY RN      Height = 166cm  Weight = 76.2kg  BSA = 1.87m2       Medication: 5FU CADD pump  Dose: 1920mg/m2  Over 46 hours Calculated Dose: 3590mg over 46 hours                             (In mg/m2, AUC, mg/kg)       I confirm this process was performed independently with the BSA and all final chemotherapy dosing calculations congruent.  Any discrepancies of 5% or greater have been addressed with the chemotherapy pharmacist. The resolution of the discrepancy has been documented in the EPIC progress notes.        80 y/o Female  with HX of DM type 2,  diabetic neuropathy, HTN, HLD, PVD,  Obesity, presents with chronic ulcer of LT 1st toe and LT heel with now worsening redness and swelling. Sent in by Dr. Meeks to be admitted to Dr. Ruiz for surgery (podiatry) and IV antibiotics. Has been taking Levaquin for past 2 days. Denies fever, chills, chest pain, shortness of breath, cough, abd pain, nausea, vomiting, diarrhea. Pt was seen by Podiatry tonight:  As per Podiatry : Left foot hallux plantar and dorsal wound that communicate, 5cc of pus expressed. Dorsal wound tracks 2cm proximally. Dorsal and plantar hallux wounds probe to bone, LF swelling to level of mid tibia, w/ extending erythema to the level of the midfoot. S/P bedside I&D of LF hallux wound: incision to dorsal hallux wound made along proximal tracking revealing 5cc of pus, no malodor. 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    Diffuse soft tissue swelling particularly around hallux. In addition, shallow plantar heel soft tissue ulceration. No tracking gas collections beyond these regions.    Focally eroded and indistinct appearing lateral cortex of the hallux distal phalanx onthe oblique view suspicious for osteomyelitis. No additional suspected areas of osteomyelitis particularly in the heel region subjacent to the aforementioned superficial soft tissue ulceration.    No fractures or dislocations.    Tarsometatarsal alignment maintained without evidence for a Lisfranc injury.        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 Preserved remaining joint spaces and no joint margin erosions.    Plantar calcaneal enthesophyte.    Generalized osteopenia otherwise no discrete lytic or blastic lesions.    Posterior tibial distribution vascular calcifications.         78 y/o Female  with HX of DM type 2,  diabetic neuropathy, HTN, HLD, PVD,  Obesity, presents with chronic ulcer of LT 1st toe and LT heel with now worsening redness and swelling. Sent in by Dr. Meeks to be admitted to Dr. Ruiz for surgery (podiatry) and IV antibiotics. Has been taking Levaquin for past 2 days. Denies fever, chills, chest pain, shortness of breath, cough, abd pain, nausea, vomiting, diarrhea. Pt was seen by Podiatry tonight:  As per Podiatry : Left foot hallux plantar and dorsal wound that communicate, 5cc of pus expressed. Dorsal wound tracks 2cm proximally. Dorsal and plantar hallux wounds probe to bone, LF swelling to level of mid tibia, w/ extending erythema to the level of the midfoot. S/P bedside I&D of LF hallux wound: incision to dorsal hallux wound made along proximal tracking revealing 5cc of pus, no malodor. LFXR: hallux suspicious for OM,   < from: Xray Foot AP + Lateral + Oblique, Left (09.16.20 @ 15:34) >  IMPRESSION:  Redemonstrated 3rd toe amputation defect through MTP joint level with rounded and smoothly corticated metatarsal head stump margin and preserved overlying soft tissue coverage.    Diffuse soft tissue swelling particularly around hallux. In addition, shallow plantar heel soft tissue ulceration. No tracking gas collections beyond these regions.    Focally eroded and indistinct appearing lateral cortex of the hallux distal phalanx onthe oblique view suspicious for osteomyelitis. No additional suspected areas of osteomyelitis particularly in the heel region subjacent to the aforementioned superficial soft tissue ulceration.    No fractures or dislocations.    Tarsometatarsal alignment maintained without evidence for a Lisfranc injury.        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 Preserved remaining joint spaces and no joint margin erosions.    Plantar calcaneal enthesophyte.    Generalized osteopenia otherwise no discrete lytic or blastic lesions.    Posterior tibial distribution vascular calcifications.

## 2020-09-20 NOTE — PROGRESS NOTE ADULT - PROBLEM SELECTOR PLAN 4
ENDO consult apprecaited   FSBS elevated,   increased  Lantus 28  units QHS, Humalog 10 units Before meals TID, Hold if pt is NPO,   Hgba1c 9.3   c/w , FS, sliding scale,   TSH, free T4 wnl , UA, Urine  noted with proteinuria and glucosuria   f/u urine Culture,   pt is on NPH BID at home,  diabetic education   Nutrition eval ENDO consult apprecaited   FSBS elevated,   increased  Lantus 32  units QHS, Humalog 10 units Before meals TID, Hold if pt is NPO,   Hgba1c 9.3   c/w , FS, sliding scale,   TSH, free T4 wnl , UA, Urine  noted with proteinuria and glucosuria   f/u urine Culture,   pt is on NPH BID at home,  diabetic education   Nutrition eval

## 2020-09-20 NOTE — PROGRESS NOTE ADULT - PROBLEM SELECTOR PLAN 2
BMP, UA, noted  f/u Urine Culture,   IVF LR @ 75 cc/hr,  HOLD ACEI/ARB ,   bladder scan wnl  cr trending down, likely close to her baseline, will continue to monitor, avoid nephrotoxics BMP, UA, noted  Urine Culture with normal pari    IVF LR @ 75 cc/hr,  HOLD ACEI/ARB ,   bladder scan wnl  cr stable  likely close to her baseline, will continue to monitor, avoid nephrotoxics  pt on IVF, if no change in Cr, will dc iVF in am

## 2020-09-20 NOTE — PROGRESS NOTE ADULT - SUBJECTIVE AND OBJECTIVE BOX
Patient is a 79y old  Female who presents with a chief complaint of diabetic foot ulcer, Left first toe, and left heel , R/O Osteomyelitis, (19 Sep 2020 18:15)      SUBJECTIVE / OVERNIGHT EVENTS:    MEDICATIONS  (STANDING):  amLODIPine   Tablet 5 milliGRAM(s) Oral daily  aspirin enteric coated 325 milliGRAM(s) Oral daily  collagenase Ointment 1 Application(s) Topical daily  dextrose 5%. 1000 milliLiter(s) (50 mL/Hr) IV Continuous <Continuous>  dextrose 50% Injectable 12.5 Gram(s) IV Push once  dextrose 50% Injectable 25 Gram(s) IV Push once  dextrose 50% Injectable 25 Gram(s) IV Push once  heparin   Injectable 5000 Unit(s) SubCutaneous every 8 hours  influenza   Vaccine 0.5 milliLiter(s) IntraMuscular once  insulin glargine Injectable (LANTUS) 28 Unit(s) SubCutaneous at bedtime  insulin lispro (HumaLOG) corrective regimen sliding scale   SubCutaneous three times a day before meals  insulin lispro (HumaLOG) corrective regimen sliding scale   SubCutaneous at bedtime  insulin lispro Injectable (HumaLOG) 10 Unit(s) SubCutaneous three times a day before meals  labetalol 200 milliGRAM(s) Oral two times a day  lactobacillus acidophilus 1 Tablet(s) Oral every 12 hours  piperacillin/tazobactam IVPB.. 3.375 Gram(s) IV Intermittent every 8 hours  senna 1 Tablet(s) Oral daily  sodium chloride 0.9%. 1000 milliLiter(s) (75 mL/Hr) IV Continuous <Continuous>  timolol 0.5% Solution 1 Drop(s) Both EYES two times a day  vancomycin  IVPB 1500 milliGRAM(s) IV Intermittent every 24 hours    MEDICATIONS  (PRN):  bisacodyl Suppository 10 milliGRAM(s) Rectal daily PRN Constipation  dextrose 40% Gel 15 Gram(s) Oral once PRN Blood Glucose LESS THAN 70 milliGRAM(s)/deciliter  glucagon  Injectable 1 milliGRAM(s) IntraMuscular once PRN Glucose LESS THAN 70 milligrams/deciliter  polyethylene glycol 3350 17 Gram(s) Oral daily PRN Constipation      Vital Signs Last 24 Hrs  T(C): 36.9 (20 Sep 2020 05:56), Max: 37.3 (19 Sep 2020 13:52)  T(F): 98.4 (20 Sep 2020 05:56), Max: 99.1 (19 Sep 2020 13:52)  HR: 69 (20 Sep 2020 05:56) (69 - 84)  BP: 164/55 (20 Sep 2020 05:56) (151/55 - 164/55)  BP(mean): --  RR: 18 (20 Sep 2020 05:56) (18 - 18)  SpO2: 96% (20 Sep 2020 05:56) (96% - 98%)  CAPILLARY BLOOD GLUCOSE      POCT Blood Glucose.: 235 mg/dL (20 Sep 2020 08:29)  POCT Blood Glucose.: 208 mg/dL (19 Sep 2020 22:20)  POCT Blood Glucose.: 243 mg/dL (19 Sep 2020 17:36)  POCT Blood Glucose.: 265 mg/dL (19 Sep 2020 11:59)    I&O's Summary      PHYSICAL EXAM:  GENERAL: NAD, well-developed  HEAD:  Atraumatic, Normocephalic  EYES: EOMI, PERRLA, conjunctiva and sclera clear  NECK: Supple, No JVD  CHEST/LUNG: Clear to auscultation bilaterally; No wheeze  HEART: Regular rate and rhythm; No murmurs, rubs, or gallops  ABDOMEN: Soft, Nontender, Nondistended; Bowel sounds present  EXTREMITIES:  2+ Peripheral Pulses, No clubbing, cyanosis, or edema  PSYCH: AAOx3  NEUROLOGY: non-focal  SKIN: No rashes or lesions    LABS:                        10.0   10.96 )-----------( 283      ( 20 Sep 2020 05:45 )             32.9     09-20    140  |  102  |  21  ----------------------------<  208<H>  4.7   |  25  |  1.56<H>    Ca    9.2      20 Sep 2020 05:45  Phos  2.9     09-20  Mg     1.5     09-20                RADIOLOGY & ADDITIONAL TESTS:    Imaging Personally Reviewed:    Consultant(s) Notes Reviewed:      Care Discussed with Consultants/Other Providers:   Patient is a 79y old  Female who presents with a chief complaint of diabetic foot ulcer, Left first toe, and left heel , R/O Osteomyelitis, (19 Sep 2020 18:15)      SUBJECTIVE / OVERNIGHT EVENTS: patient seen and examined by bedside, denies headache, dizziness, SOB, CP, Palpitations , N/V/D, abdominal pain  concerned about elevated blood sugar, denies pain in the foot , awaiting MRI       MEDICATIONS  (STANDING):  amLODIPine   Tablet 5 milliGRAM(s) Oral daily  aspirin enteric coated 325 milliGRAM(s) Oral daily  collagenase Ointment 1 Application(s) Topical daily  dextrose 5%. 1000 milliLiter(s) (50 mL/Hr) IV Continuous <Continuous>  dextrose 50% Injectable 12.5 Gram(s) IV Push once  dextrose 50% Injectable 25 Gram(s) IV Push once  dextrose 50% Injectable 25 Gram(s) IV Push once  heparin   Injectable 5000 Unit(s) SubCutaneous every 8 hours  influenza   Vaccine 0.5 milliLiter(s) IntraMuscular once  insulin glargine Injectable (LANTUS) 28 Unit(s) SubCutaneous at bedtime  insulin lispro (HumaLOG) corrective regimen sliding scale   SubCutaneous three times a day before meals  insulin lispro (HumaLOG) corrective regimen sliding scale   SubCutaneous at bedtime  insulin lispro Injectable (HumaLOG) 10 Unit(s) SubCutaneous three times a day before meals  labetalol 200 milliGRAM(s) Oral two times a day  lactobacillus acidophilus 1 Tablet(s) Oral every 12 hours  piperacillin/tazobactam IVPB.. 3.375 Gram(s) IV Intermittent every 8 hours  senna 1 Tablet(s) Oral daily  sodium chloride 0.9%. 1000 milliLiter(s) (75 mL/Hr) IV Continuous <Continuous>  timolol 0.5% Solution 1 Drop(s) Both EYES two times a day  vancomycin  IVPB 1500 milliGRAM(s) IV Intermittent every 24 hours    MEDICATIONS  (PRN):  bisacodyl Suppository 10 milliGRAM(s) Rectal daily PRN Constipation  dextrose 40% Gel 15 Gram(s) Oral once PRN Blood Glucose LESS THAN 70 milliGRAM(s)/deciliter  glucagon  Injectable 1 milliGRAM(s) IntraMuscular once PRN Glucose LESS THAN 70 milligrams/deciliter  polyethylene glycol 3350 17 Gram(s) Oral daily PRN Constipation      Vital Signs Last 24 Hrs  T(C): 36.9 (20 Sep 2020 05:56), Max: 37.3 (19 Sep 2020 13:52)  T(F): 98.4 (20 Sep 2020 05:56), Max: 99.1 (19 Sep 2020 13:52)  HR: 69 (20 Sep 2020 05:56) (69 - 84)  BP: 164/55 (20 Sep 2020 05:56) (151/55 - 164/55)  BP(mean): --  RR: 18 (20 Sep 2020 05:56) (18 - 18)  SpO2: 96% (20 Sep 2020 05:56) (96% - 98%)  CAPILLARY BLOOD GLUCOSE      POCT Blood Glucose.: 235 mg/dL (20 Sep 2020 08:29)  POCT Blood Glucose.: 208 mg/dL (19 Sep 2020 22:20)  POCT Blood Glucose.: 243 mg/dL (19 Sep 2020 17:36)  POCT Blood Glucose.: 265 mg/dL (19 Sep 2020 11:59)            PHYSICAL EXAM:  GENERAL: NAD, obese   HEAD:  Atraumatic, Normocephalic  EYES: EOMI, PERRLA, conjunctiva and sclera clear  CHEST/LUNG: Clear to auscultation bilaterally; No wheeze  HEART: Regular rate and rhythm;   ABDOMEN: Soft, Nontender, Nondistended; Bowel sounds present  EXTREMITIES:  diminished Peripheral Pulses, b/l  erythema+ , B/L pedal edema , erythema in RUE around IV site , slightly nodular     PSYCH: AAOx3  NEUROLOGY: non-focal  SKIN: left foot ulcer , with dressing , c/d/i     LABS:                        10.0   10.96 )-----------( 283      ( 20 Sep 2020 05:45 )             32.9     09-20    140  |  102  |  21  ----------------------------<  208<H>  4.7   |  25  |  1.56<H>    Ca    9.2      20 Sep 2020 05:45  Phos  2.9     09-20  Mg     1.5     09-20                RADIOLOGY & ADDITIONAL TESTS:    Imaging Personally Reviewed:    Consultant(s) Notes Reviewed:  Endo     Care Discussed with Consultants/Other Providers:

## 2020-09-21 LAB
ANION GAP SERPL CALC-SCNC: 14 MMO/L — SIGNIFICANT CHANGE UP (ref 7–14)
BUN SERPL-MCNC: 24 MG/DL — HIGH (ref 7–23)
CALCIUM SERPL-MCNC: 8.6 MG/DL — SIGNIFICANT CHANGE UP (ref 8.4–10.5)
CHLORIDE SERPL-SCNC: 101 MMOL/L — SIGNIFICANT CHANGE UP (ref 98–107)
CO2 SERPL-SCNC: 24 MMOL/L — SIGNIFICANT CHANGE UP (ref 22–31)
CREAT SERPL-MCNC: 1.55 MG/DL — HIGH (ref 0.5–1.3)
CULTURE RESULTS: SIGNIFICANT CHANGE UP
GLUCOSE BLDC GLUCOMTR-MCNC: 193 MG/DL — HIGH (ref 70–99)
GLUCOSE BLDC GLUCOMTR-MCNC: 199 MG/DL — HIGH (ref 70–99)
GLUCOSE BLDC GLUCOMTR-MCNC: 243 MG/DL — HIGH (ref 70–99)
GLUCOSE BLDC GLUCOMTR-MCNC: 262 MG/DL — HIGH (ref 70–99)
GLUCOSE SERPL-MCNC: 250 MG/DL — HIGH (ref 70–99)
HCT VFR BLD CALC: 32.1 % — LOW (ref 34.5–45)
HGB BLD-MCNC: 9.9 G/DL — LOW (ref 11.5–15.5)
MAGNESIUM SERPL-MCNC: 1.5 MG/DL — LOW (ref 1.6–2.6)
MCHC RBC-ENTMCNC: 27.6 PG — SIGNIFICANT CHANGE UP (ref 27–34)
MCHC RBC-ENTMCNC: 30.8 % — LOW (ref 32–36)
MCV RBC AUTO: 89.4 FL — SIGNIFICANT CHANGE UP (ref 80–100)
NRBC # FLD: 0 K/UL — SIGNIFICANT CHANGE UP (ref 0–0)
ORGANISM # SPEC MICROSCOPIC CNT: SIGNIFICANT CHANGE UP
ORGANISM # SPEC MICROSCOPIC CNT: SIGNIFICANT CHANGE UP
PHOSPHATE SERPL-MCNC: 2.9 MG/DL — SIGNIFICANT CHANGE UP (ref 2.5–4.5)
PLATELET # BLD AUTO: 241 K/UL — SIGNIFICANT CHANGE UP (ref 150–400)
PMV BLD: 9.7 FL — SIGNIFICANT CHANGE UP (ref 7–13)
POTASSIUM SERPL-MCNC: 4.1 MMOL/L — SIGNIFICANT CHANGE UP (ref 3.5–5.3)
POTASSIUM SERPL-SCNC: 4.1 MMOL/L — SIGNIFICANT CHANGE UP (ref 3.5–5.3)
RBC # BLD: 3.59 M/UL — LOW (ref 3.8–5.2)
RBC # FLD: 13.4 % — SIGNIFICANT CHANGE UP (ref 10.3–14.5)
SODIUM SERPL-SCNC: 139 MMOL/L — SIGNIFICANT CHANGE UP (ref 135–145)
SPECIMEN SOURCE: SIGNIFICANT CHANGE UP
WBC # BLD: 9.71 K/UL — SIGNIFICANT CHANGE UP (ref 3.8–10.5)
WBC # FLD AUTO: 9.71 K/UL — SIGNIFICANT CHANGE UP (ref 3.8–10.5)

## 2020-09-21 PROCEDURE — 73718 MRI LOWER EXTREMITY W/O DYE: CPT | Mod: 26,LT

## 2020-09-21 PROCEDURE — 99232 SBSQ HOSP IP/OBS MODERATE 35: CPT

## 2020-09-21 PROCEDURE — 99233 SBSQ HOSP IP/OBS HIGH 50: CPT

## 2020-09-21 RX ORDER — INSULIN LISPRO 100/ML
12 VIAL (ML) SUBCUTANEOUS
Refills: 0 | Status: DISCONTINUED | OUTPATIENT
Start: 2020-09-21 | End: 2020-09-24

## 2020-09-21 RX ORDER — INSULIN GLARGINE 100 [IU]/ML
36 INJECTION, SOLUTION SUBCUTANEOUS AT BEDTIME
Refills: 0 | Status: DISCONTINUED | OUTPATIENT
Start: 2020-09-21 | End: 2020-09-22

## 2020-09-21 RX ADMIN — Medication 300 MILLIGRAM(S): at 18:10

## 2020-09-21 RX ADMIN — Medication 1 TABLET(S): at 18:16

## 2020-09-21 RX ADMIN — Medication 325 MILLIGRAM(S): at 12:37

## 2020-09-21 RX ADMIN — HEPARIN SODIUM 5000 UNIT(S): 5000 INJECTION INTRAVENOUS; SUBCUTANEOUS at 06:08

## 2020-09-21 RX ADMIN — Medication 1 DROP(S): at 18:13

## 2020-09-21 RX ADMIN — PIPERACILLIN AND TAZOBACTAM 25 GRAM(S): 4; .5 INJECTION, POWDER, LYOPHILIZED, FOR SOLUTION INTRAVENOUS at 23:44

## 2020-09-21 RX ADMIN — Medication 200 MILLIGRAM(S): at 06:11

## 2020-09-21 RX ADMIN — Medication 6: at 12:36

## 2020-09-21 RX ADMIN — Medication 200 MILLIGRAM(S): at 18:13

## 2020-09-21 RX ADMIN — Medication 12 UNIT(S): at 18:11

## 2020-09-21 RX ADMIN — INSULIN GLARGINE 36 UNIT(S): 100 INJECTION, SOLUTION SUBCUTANEOUS at 21:36

## 2020-09-21 RX ADMIN — Medication 1 DROP(S): at 06:08

## 2020-09-21 RX ADMIN — Medication 12 UNIT(S): at 12:36

## 2020-09-21 RX ADMIN — Medication 1 TABLET(S): at 06:11

## 2020-09-21 RX ADMIN — Medication 10 UNIT(S): at 10:29

## 2020-09-21 RX ADMIN — Medication 1 APPLICATION(S): at 12:37

## 2020-09-21 RX ADMIN — Medication 4: at 10:29

## 2020-09-21 RX ADMIN — HEPARIN SODIUM 5000 UNIT(S): 5000 INJECTION INTRAVENOUS; SUBCUTANEOUS at 15:02

## 2020-09-21 RX ADMIN — HEPARIN SODIUM 5000 UNIT(S): 5000 INJECTION INTRAVENOUS; SUBCUTANEOUS at 21:36

## 2020-09-21 RX ADMIN — SENNA PLUS 1 TABLET(S): 8.6 TABLET ORAL at 15:01

## 2020-09-21 RX ADMIN — Medication 2: at 18:11

## 2020-09-21 RX ADMIN — PIPERACILLIN AND TAZOBACTAM 25 GRAM(S): 4; .5 INJECTION, POWDER, LYOPHILIZED, FOR SOLUTION INTRAVENOUS at 06:11

## 2020-09-21 RX ADMIN — AMLODIPINE BESYLATE 5 MILLIGRAM(S): 2.5 TABLET ORAL at 06:08

## 2020-09-21 RX ADMIN — PIPERACILLIN AND TAZOBACTAM 25 GRAM(S): 4; .5 INJECTION, POWDER, LYOPHILIZED, FOR SOLUTION INTRAVENOUS at 15:01

## 2020-09-21 NOTE — PROGRESS NOTE ADULT - SUBJECTIVE AND OBJECTIVE BOX
Patient is a 79y old  Female who presents with a chief complaint of diabetic foot ulcer, Left first toe, and left heel , R/O Osteomyelitis, (21 Sep 2020 10:42)        SUBJECTIVE / OVERNIGHT EVENTS:      MEDICATIONS  (STANDING):  amLODIPine   Tablet 5 milliGRAM(s) Oral daily  aspirin enteric coated 325 milliGRAM(s) Oral daily  collagenase Ointment 1 Application(s) Topical daily  dextrose 5%. 1000 milliLiter(s) (50 mL/Hr) IV Continuous <Continuous>  dextrose 50% Injectable 12.5 Gram(s) IV Push once  dextrose 50% Injectable 25 Gram(s) IV Push once  dextrose 50% Injectable 25 Gram(s) IV Push once  heparin   Injectable 5000 Unit(s) SubCutaneous every 8 hours  influenza   Vaccine 0.5 milliLiter(s) IntraMuscular once  insulin glargine Injectable (LANTUS) 36 Unit(s) SubCutaneous at bedtime  insulin lispro (HumaLOG) corrective regimen sliding scale   SubCutaneous at bedtime  insulin lispro (HumaLOG) corrective regimen sliding scale   SubCutaneous three times a day before meals  insulin lispro Injectable (HumaLOG) 12 Unit(s) SubCutaneous three times a day before meals  labetalol 200 milliGRAM(s) Oral two times a day  lactobacillus acidophilus 1 Tablet(s) Oral every 12 hours  piperacillin/tazobactam IVPB.. 3.375 Gram(s) IV Intermittent every 8 hours  senna 1 Tablet(s) Oral daily  timolol 0.5% Solution 1 Drop(s) Both EYES two times a day  vancomycin  IVPB 1500 milliGRAM(s) IV Intermittent every 24 hours    MEDICATIONS  (PRN):  bisacodyl Suppository 10 milliGRAM(s) Rectal daily PRN Constipation  dextrose 40% Gel 15 Gram(s) Oral once PRN Blood Glucose LESS THAN 70 milliGRAM(s)/deciliter  glucagon  Injectable 1 milliGRAM(s) IntraMuscular once PRN Glucose LESS THAN 70 milligrams/deciliter  polyethylene glycol 3350 17 Gram(s) Oral daily PRN Constipation      Vital Signs Last 24 Hrs  T(C): 36.8 (21 Sep 2020 10:35), Max: 36.9 (21 Sep 2020 06:05)  T(F): 98.3 (21 Sep 2020 10:35), Max: 98.5 (21 Sep 2020 06:05)  HR: 79 (21 Sep 2020 18:18) (70 - 81)  BP: 158/59 (21 Sep 2020 18:18) (142/55 - 158/59)  BP(mean): --  RR: 18 (21 Sep 2020 10:35) (18 - 20)  SpO2: 96% (21 Sep 2020 10:35) (96% - 99%)  CAPILLARY BLOOD GLUCOSE      POCT Blood Glucose.: 199 mg/dL (21 Sep 2020 17:54)  POCT Blood Glucose.: 262 mg/dL (21 Sep 2020 12:05)  POCT Blood Glucose.: 243 mg/dL (21 Sep 2020 10:25)  POCT Blood Glucose.: 236 mg/dL (20 Sep 2020 22:10)    I&O's Summary    20 Sep 2020 07:01  -  21 Sep 2020 07:00  --------------------------------------------------------  IN: 0 mL / OUT: 1000 mL / NET: -1000 mL          PHYSICAL EXAM  GENERAL: NAD, well-developed  HEAD:  Atraumatic, Normocephalic  EYES: EOMI, PERRLA, conjunctiva and sclera clear  NECK: Supple, No JVD  CHEST/LUNG: Clear to auscultation bilaterally; No wheeze  HEART: Regular rate and rhythm; No murmurs, rubs, or gallops  ABDOMEN: Soft, Nontender, Nondistended; Bowel sounds present  EXTREMITIES:  2+ Peripheral Pulses, No clubbing, cyanosis, or edema  PSYCH: AAOx3  SKIN: No rashes or lesions    LABS:                        9.9    9.71  )-----------( 241      ( 21 Sep 2020 05:57 )             32.1     09-21    139  |  101  |  24<H>  ----------------------------<  250<H>  4.1   |  24  |  1.55<H>    Ca    8.6      21 Sep 2020 05:57  Phos  2.9     09-21  Mg     1.5     09-21                RADIOLOGY & ADDITIONAL TESTS:    Imaging Personally Reviewed:  Consultant(s) Notes Reviewed:    Care Discussed with Consultants/Other Providers:   Patient is a 79y old  Female who presents with a chief complaint of diabetic foot ulcer, Left first toe, and left heel , R/O Osteomyelitis, (21 Sep 2020 10:42)      SUBJECTIVE / OVERNIGHT EVENTS:  Patient reported constipation now resolved - had BM today s/p receiving Senna. Pt without fever, chills, chest pain, SOB, n/v or abdominal pain. Pt with baseline numbness in distal Lower legs and feet b/l. Pt reported difficulty ambulating due to left foot wound.     MEDICATIONS  (STANDING):  amLODIPine   Tablet 5 milliGRAM(s) Oral daily  aspirin enteric coated 325 milliGRAM(s) Oral daily  collagenase Ointment 1 Application(s) Topical daily  dextrose 5%. 1000 milliLiter(s) (50 mL/Hr) IV Continuous <Continuous>  dextrose 50% Injectable 12.5 Gram(s) IV Push once  dextrose 50% Injectable 25 Gram(s) IV Push once  dextrose 50% Injectable 25 Gram(s) IV Push once  heparin   Injectable 5000 Unit(s) SubCutaneous every 8 hours  influenza   Vaccine 0.5 milliLiter(s) IntraMuscular once  insulin glargine Injectable (LANTUS) 36 Unit(s) SubCutaneous at bedtime  insulin lispro (HumaLOG) corrective regimen sliding scale   SubCutaneous at bedtime  insulin lispro (HumaLOG) corrective regimen sliding scale   SubCutaneous three times a day before meals  insulin lispro Injectable (HumaLOG) 12 Unit(s) SubCutaneous three times a day before meals  labetalol 200 milliGRAM(s) Oral two times a day  lactobacillus acidophilus 1 Tablet(s) Oral every 12 hours  piperacillin/tazobactam IVPB.. 3.375 Gram(s) IV Intermittent every 8 hours  senna 1 Tablet(s) Oral daily  timolol 0.5% Solution 1 Drop(s) Both EYES two times a day  vancomycin  IVPB 1500 milliGRAM(s) IV Intermittent every 24 hours    MEDICATIONS  (PRN):  bisacodyl Suppository 10 milliGRAM(s) Rectal daily PRN Constipation  dextrose 40% Gel 15 Gram(s) Oral once PRN Blood Glucose LESS THAN 70 milliGRAM(s)/deciliter  glucagon  Injectable 1 milliGRAM(s) IntraMuscular once PRN Glucose LESS THAN 70 milligrams/deciliter  polyethylene glycol 3350 17 Gram(s) Oral daily PRN Constipation      Vital Signs Last 24 Hrs  T(C): 36.8 (21 Sep 2020 10:35), Max: 36.9 (21 Sep 2020 06:05)  T(F): 98.3 (21 Sep 2020 10:35), Max: 98.5 (21 Sep 2020 06:05)  HR: 79 (21 Sep 2020 18:18) (70 - 81)  BP: 158/59 (21 Sep 2020 18:18) (142/55 - 158/59)  BP(mean): --  RR: 18 (21 Sep 2020 10:35) (18 - 20)  SpO2: 96% (21 Sep 2020 10:35) (96% - 99%)  CAPILLARY BLOOD GLUCOSE      POCT Blood Glucose.: 199 mg/dL (21 Sep 2020 17:54)  POCT Blood Glucose.: 262 mg/dL (21 Sep 2020 12:05)  POCT Blood Glucose.: 243 mg/dL (21 Sep 2020 10:25)  POCT Blood Glucose.: 236 mg/dL (20 Sep 2020 22:10)    I&O's Summary    20 Sep 2020 07:01  -  21 Sep 2020 07:00  --------------------------------------------------------  IN: 0 mL / OUT: 1000 mL / NET: -1000 mL          PHYSICAL EXAM  GENERAL: NAD, morbidly obese   CHEST/LUNG: Clear to auscultation bilaterally; No wheeze  HEART: Regular rate and rhythm;   ABDOMEN: Soft, Nontender, Nondistended; Bowel sounds present  EXTREMITIES:  diminished Peripheral Pulses, b/l  erythema+ , B/L pedal edema , erythema in RUE around IV site , slightly nodular     PSYCH: AAOx3  NEUROLOGY: non-focal  SKIN: left foot ulcer , with dressing , c/d/i         LABS:                        9.9    9.71  )-----------( 241      ( 21 Sep 2020 05:57 )             32.1     09-21    139  |  101  |  24<H>  ----------------------------<  250<H>  4.1   |  24  |  1.55<H>    Ca    8.6      21 Sep 2020 05:57  Phos  2.9     09-21  Mg     1.5     09-21      EXAM:  MR FOOT LT        PROCEDURE DATE:  Sep 21 2020   INTERPRETATION:  MR FOOT LEFT dated 9/21/2020 9:58 AM      FINDINGS:  BONE MARROW: There is abnormal marrow signal including hypointense T1 signal and edema throughout the first proximal distal phalanges. Fluid surrounds the first distal phalanges and extends into the first interphalangeal joint space. There is erosive change about the base the first distal phalanx. There has been prior resection of the third digit. There is narrowing of the tarsometatarsal joints worse at the third through fifth tarsometatarsal joints. Mild productive changes at the first metatarsophalangeal joint.  SYNOVIUM/ JOINT FLUID: There is fluid in the third IP joint.  TENDONS: The tendons are intact. No full-thickness tendon tear or retraction is seen. There are postsurgical changes of the third flexor and extensor tendons.  MUSCLES: Severe atrophy of the intrinsic musculature the foot edema throughout the musculature. These findings can be seen with chronic neuropathic change. There is chronic thickening of the central cord of plantar fascia  NEUROVASCULAR STRUCTURES: Preserved  SUBCUTANEOUS SOFT TISSUES: There is soft tissue swelling about the dorsum of the foot. Fluid surrounds the first distal phalanx and decompresses into the dorsal aspect of the distal forefoot at the first web space.. Skin wound at the plantar portion of the distal first digit with fluid tract extending to the first IP joint space.    IMPRESSION:  1.  Skin wound at the distal plantar first digit with fluid tracking towards the first IP joint. Concerning for cellulitis.  2.  Fluid surrounding the first digit and decompressing into soft tissues dorsal to the first web space. This can be seen with an infected fluid collection.  3.  Abnormal marrow signal in the first distal and proximal phalanges. Given the skin wound, findings are concerning for osteomyelitis.      Consultant(s) Notes Reviewed:  yes  Care Discussed with Consultants/Other Providers:

## 2020-09-21 NOTE — PROGRESS NOTE ADULT - PROBLEM SELECTOR PLAN 2
Urine Culture with normal pari    IVF LR @ 75 cc/hr,  HOLD ACEI/ARB ,   bladder scan wnl  cr stable  likely close to her baseline, will continue to monitor, avoid nephrotoxics  pt on IVF, if no change in Cr, will dc iVF in am

## 2020-09-21 NOTE — PROGRESS NOTE ADULT - SUBJECTIVE AND OBJECTIVE BOX
Chief Complaint: f/u DM2    History:  Patient seen at bedside today after her MRI. She states that she is eating well, does not have nausea, vomiting, abdominal pain.    Plan is to go to OR on 9/23 per notes from last week.    MEDICATIONS  (STANDING):  amLODIPine   Tablet 5 milliGRAM(s) Oral daily  aspirin enteric coated 325 milliGRAM(s) Oral daily  collagenase Ointment 1 Application(s) Topical daily  dextrose 5%. 1000 milliLiter(s) (50 mL/Hr) IV Continuous <Continuous>  dextrose 50% Injectable 12.5 Gram(s) IV Push once  dextrose 50% Injectable 25 Gram(s) IV Push once  dextrose 50% Injectable 25 Gram(s) IV Push once  heparin   Injectable 5000 Unit(s) SubCutaneous every 8 hours  influenza   Vaccine 0.5 milliLiter(s) IntraMuscular once  insulin glargine Injectable (LANTUS) 32 Unit(s) SubCutaneous at bedtime  insulin lispro (HumaLOG) corrective regimen sliding scale   SubCutaneous three times a day before meals  insulin lispro (HumaLOG) corrective regimen sliding scale   SubCutaneous at bedtime  insulin lispro Injectable (HumaLOG) 10 Unit(s) SubCutaneous three times a day before meals  labetalol 200 milliGRAM(s) Oral two times a day  lactobacillus acidophilus 1 Tablet(s) Oral every 12 hours  piperacillin/tazobactam IVPB.. 3.375 Gram(s) IV Intermittent every 8 hours  senna 1 Tablet(s) Oral daily  timolol 0.5% Solution 1 Drop(s) Both EYES two times a day  vancomycin  IVPB 1500 milliGRAM(s) IV Intermittent every 24 hours    MEDICATIONS  (PRN):  bisacodyl Suppository 10 milliGRAM(s) Rectal daily PRN Constipation  dextrose 40% Gel 15 Gram(s) Oral once PRN Blood Glucose LESS THAN 70 milliGRAM(s)/deciliter  glucagon  Injectable 1 milliGRAM(s) IntraMuscular once PRN Glucose LESS THAN 70 milligrams/deciliter  polyethylene glycol 3350 17 Gram(s) Oral daily PRN Constipation      Allergies  No Known Allergies    Review of Systems:  ALL OTHER SYSTEMS REVIEWED AND NEGATIVE    PHYSICAL EXAM:  VITALS: T(C): 36.9 (09-21-20 @ 06:05)  T(F): 98.5 (09-21-20 @ 06:05), Max: 98.5 (09-20-20 @ 15:55)  HR: 70 (09-21-20 @ 06:05) (70 - 83)  BP: 157/82 (09-21-20 @ 06:05) (142/55 - 157/82)  RR:  (18 - 20)  SpO2:  (96% - 99%)  Wt(kg): --  GENERAL: NAD, well-developed  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, anicteric  HEENT:  Atraumatic, Normocephalic  RESPIRATORY: + air movement bilaterally, no respiratory distress   GI: Soft, nontender, non distended  PSYCH: Alert and oriented x 3, reactive affect, euthymic mood     POCT Blood Glucose.: 243 mg/dL (09-21-20 @ 10:25) H 10, H 4  POCT Blood Glucose.: 236 mg/dL (09-20-20 @ 22:10) L 32  POCT Blood Glucose.: 169 mg/dL (09-20-20 @ 17:07) H 10, H 2  POCT Blood Glucose.: 225 mg/dL (09-20-20 @ 12:16) H 10, H 4  POCT Blood Glucose.: 235 mg/dL (09-20-20 @ 08:29) H 10, H 2  POCT Blood Glucose.: 208 mg/dL (09-19-20 @ 22:20)  POCT Blood Glucose.: 243 mg/dL (09-19-20 @ 17:36)  POCT Blood Glucose.: 265 mg/dL (09-19-20 @ 11:59)  POCT Blood Glucose.: 223 mg/dL (09-19-20 @ 08:23)  POCT Blood Glucose.: 235 mg/dL (09-18-20 @ 21:37)  POCT Blood Glucose.: 241 mg/dL (09-18-20 @ 17:26)  POCT Blood Glucose.: 183 mg/dL (09-18-20 @ 12:22)      09-21    139  |  101  |  24<H>  ----------------------------<  250<H>  4.1   |  24  |  1.55<H>    EGFR if : 37  EGFR if non : 32    Ca    8.6      09-21  Mg     1.5     09-21  Phos  2.9     09-21            Thyroid Function Tests:  09-17 @ 05:52 TSH 2.29 FreeT4 1.10 T3 -- Anti TPO -- Anti Thyroglobulin Ab -- TSI --

## 2020-09-21 NOTE — PROGRESS NOTE ADULT - ASSESSMENT
Ms. Gonzalez is a 80 y/o woman with HX of DM type 2 c/b diabetic neuropathy, HTN, HLD, PVD, morbid Obesity, presenting with chronic ulcer of Left 1st toe and Left heel admitted for Left foot hallux plantar and dorsal wound with associated cellulitis and underlying osteomyelitis. Pt S/P bedside I&D of LF hallux wound

## 2020-09-21 NOTE — PROGRESS NOTE ADULT - PROBLEM SELECTOR PLAN 5
Hold ACEI / ARB, + ARABELLA, on Labetalol, ASA,   - Norvasc 5 mg PO QD   will continue to monitor BP

## 2020-09-21 NOTE — PROGRESS NOTE ADULT - ATTENDING COMMENTS
Osmin Ralph MD   Pager # [phone]  On evenings and weekends, please call the office at [phone] or page endocrine fellow on call. Please note that this patient may be followed by different provider tomorrow. If no answer, contact the office.

## 2020-09-21 NOTE — PROGRESS NOTE ADULT - PROBLEM SELECTOR PLAN 3
Vascular consult  appreciated   -  MUSHTAQ/PVR noted on ASA,  Venous Doppler legs negative for DVT as well   no further intervention as per vascular

## 2020-09-21 NOTE — PROGRESS NOTE ADULT - PROBLEM SELECTOR PLAN 1
-A1c is 9.3%, goal less than 7.5% without hypoglycemia   -Inpatient BG target 100-180 mg/dl  -Increase Lantus to 36 units SQ qHS  -Increase Humalog to 12 units before meals  -c/w moderate correction scale qac   -Continue Humalog LOW dose at bedtime   -Check BG before meals and bedtime  -Consistent carb diet, RD consult done  -DM education: Please document successful (or unsuccessful) return demonstration in DM C[facility]    Discharge Plan:  -Ideally would dc her home on basal bolus insulin pens - given A1c and need for tighter glucose control for wound healing. However, patient is unsure if she can do 4x per day injections, would be doing independently, lives alone. Could dc instead on Novolog 70/30 or Novolin/Humulin 70/30  -Patient also reporting she prefers vial/syringe VS insulin pens - however, will need to further assess visual deficits to see which one is safer. Anticipate that insulin pens would actually benefit patient, given she can listen to the clicks for dosing    -Plan TBD based on above assessments & patient preference  -Recommend to send new prescription for glucometer (consider Prodigy "Talking" glucometer that can read result to patient), glucose test strips, lancets, alcohol swabs  -Followup with [facility] if desired- [address] [place] [address], [address], [place] [address], [phone]

## 2020-09-21 NOTE — PROGRESS NOTE ADULT - PROBLEM SELECTOR PLAN 1
Podiatry f/u, Vascular  sx consult appreciated,  MUSHTAQ/PVR noted - no further intervention as per Vascular surgery  Pt meeting clinical dx for osteomyelitis since Dorsal and plantar hallux wounds probe to bone, 9/21 MRI also with findings c/w osteomyelitis.   - IV Zosyn, IV Vanco, f/u CBC, CMP, Cultures, Fall/aspiration precaution,   Bacid,

## 2020-09-21 NOTE — PROGRESS NOTE ADULT - PROBLEM SELECTOR PLAN 4
ENDO consulted, recs reviewed and appreciated  FSBS elevated,   - Lantus 32  units QHS, Humalog 10 units Before meals TID, Hold if pt is NPO,   Hgba1c 9.3   c/w , FS, sliding scale,   TSH, free T4 wnl , UA, Urine  noted with proteinuria and glucosuria   f/u urine Culture,   pt is on NPH BID at home,  diabetic education   Nutrition eval

## 2020-09-21 NOTE — PROGRESS NOTE ADULT - ASSESSMENT
Pt is 79y F who presents w/ LF wound  -pt was seen and evaluated  -Left foot hallux w/ dactylitis; plantar and dorsal wound with resolving erythema  -L foot xray shows OM of hallux  -MRI left foot pending   -Booked for Left foot partial 1st ray resection with Dr. Ruiz on Wednesday 9/23 at 3pm, pending [facility] recs  -seen w/ attending Pt is 79y F who presents w/ LF wound  -pt was seen and evaluated  -Left foot hallux w/ dactylitis; plantar and dorsal wound with resolving erythema  -L foot xray shows OM of hallux  -MRI left foot pending   -Booked for Left foot partial 1st ray resection with Dr. Ruiz on Wednesday 9/23 at 3pm  -seen w/ attending

## 2020-09-21 NOTE — PROGRESS NOTE ADULT - SUBJECTIVE AND OBJECTIVE BOX
Podiatry pager #: [phone] ([facility])/ [phone] ([facility])    Patient is a 79y old  Female who presents with a chief complaint of diabetic foot ulcer, Left first toe, and left heel , R/O Osteomyelitis, (20 Sep 2020 18:51)       INTERVAL HPI/OVERNIGHT EVENTS:  Patient seen and evaluated at bedside.  Pt is resting comfortable in NAD. Denies N/V/F/C.     Allergies    No Known Allergies    Intolerances        Vital Signs Last 24 Hrs  T(C): 36.9 (21 Sep 2020 06:05), Max: 36.9 (20 Sep 2020 15:55)  T(F): 98.5 (21 Sep 2020 06:05), Max: 98.5 (20 Sep 2020 15:55)  HR: 70 (21 Sep 2020 06:05) (70 - 83)  BP: 157/82 (21 Sep 2020 06:05) (142/55 - 157/82)  BP(mean): --  RR: 20 (21 Sep 2020 06:05) (18 - 20)  SpO2: 99% (21 Sep 2020 06:05) (96% - 99%)    LABS:                        9.9    9.71  )-----------( 241      ( 21 Sep 2020 05:57 )             32.1     09-21    139  |  101  |  24<H>  ----------------------------<  250<H>  4.1   |  24  |  1.55<H>    Ca    8.6      21 Sep 2020 05:57  Phos  2.9     09-21  Mg     1.5     09-21          CAPILLARY BLOOD GLUCOSE      POCT Blood Glucose.: 236 mg/dL (20 Sep 2020 22:10)  POCT Blood Glucose.: 169 mg/dL (20 Sep 2020 17:07)  POCT Blood Glucose.: 225 mg/dL (20 Sep 2020 12:16)  POCT Blood Glucose.: 235 mg/dL (20 Sep 2020 08:29)      Lower Extremity Physical Exam:    Vasc: DP/PT 0/4 b/l   Left foot hallux plantar and dorsal wound that communicate. Dorsal wound tracks 2cm proximally. Dorsal and plantar hallux wounds probe to bone, edema and erythema improved.      RADIOLOGY & ADDITIONAL TESTS:

## 2020-09-22 ENCOUNTER — TRANSCRIPTION ENCOUNTER (OUTPATIENT)
Age: 80
End: 2020-09-22

## 2020-09-22 LAB
ANION GAP SERPL CALC-SCNC: 12 MMO/L — SIGNIFICANT CHANGE UP (ref 7–14)
BUN SERPL-MCNC: 25 MG/DL — HIGH (ref 7–23)
CALCIUM SERPL-MCNC: 9 MG/DL — SIGNIFICANT CHANGE UP (ref 8.4–10.5)
CHLORIDE SERPL-SCNC: 104 MMOL/L — SIGNIFICANT CHANGE UP (ref 98–107)
CO2 SERPL-SCNC: 25 MMOL/L — SIGNIFICANT CHANGE UP (ref 22–31)
CREAT SERPL-MCNC: 1.51 MG/DL — HIGH (ref 0.5–1.3)
GLUCOSE BLDC GLUCOMTR-MCNC: 143 MG/DL — HIGH (ref 70–99)
GLUCOSE BLDC GLUCOMTR-MCNC: 172 MG/DL — HIGH (ref 70–99)
GLUCOSE BLDC GLUCOMTR-MCNC: 197 MG/DL — HIGH (ref 70–99)
GLUCOSE BLDC GLUCOMTR-MCNC: 226 MG/DL — HIGH (ref 70–99)
GLUCOSE SERPL-MCNC: 172 MG/DL — HIGH (ref 70–99)
HCT VFR BLD CALC: 33.7 % — LOW (ref 34.5–45)
HGB BLD-MCNC: 10.2 G/DL — LOW (ref 11.5–15.5)
MAGNESIUM SERPL-MCNC: 1.5 MG/DL — LOW (ref 1.6–2.6)
MCHC RBC-ENTMCNC: 27.7 PG — SIGNIFICANT CHANGE UP (ref 27–34)
MCHC RBC-ENTMCNC: 30.3 % — LOW (ref 32–36)
MCV RBC AUTO: 91.6 FL — SIGNIFICANT CHANGE UP (ref 80–100)
NRBC # FLD: 0 K/UL — SIGNIFICANT CHANGE UP (ref 0–0)
PHOSPHATE SERPL-MCNC: 2.8 MG/DL — SIGNIFICANT CHANGE UP (ref 2.5–4.5)
PLATELET # BLD AUTO: 189 K/UL — SIGNIFICANT CHANGE UP (ref 150–400)
PMV BLD: 10.3 FL — SIGNIFICANT CHANGE UP (ref 7–13)
POTASSIUM SERPL-MCNC: 3.8 MMOL/L — SIGNIFICANT CHANGE UP (ref 3.5–5.3)
POTASSIUM SERPL-SCNC: 3.8 MMOL/L — SIGNIFICANT CHANGE UP (ref 3.5–5.3)
RBC # BLD: 3.68 M/UL — LOW (ref 3.8–5.2)
RBC # FLD: 13.5 % — SIGNIFICANT CHANGE UP (ref 10.3–14.5)
SODIUM SERPL-SCNC: 141 MMOL/L — SIGNIFICANT CHANGE UP (ref 135–145)
WBC # BLD: 9.59 K/UL — SIGNIFICANT CHANGE UP (ref 3.8–10.5)
WBC # FLD AUTO: 9.59 K/UL — SIGNIFICANT CHANGE UP (ref 3.8–10.5)

## 2020-09-22 PROCEDURE — 99233 SBSQ HOSP IP/OBS HIGH 50: CPT

## 2020-09-22 PROCEDURE — 99232 SBSQ HOSP IP/OBS MODERATE 35: CPT

## 2020-09-22 RX ORDER — INSULIN GLARGINE 100 [IU]/ML
29 INJECTION, SOLUTION SUBCUTANEOUS AT BEDTIME
Refills: 0 | Status: DISCONTINUED | OUTPATIENT
Start: 2020-09-22 | End: 2020-09-23

## 2020-09-22 RX ORDER — MAGNESIUM SULFATE 500 MG/ML
2 VIAL (ML) INJECTION ONCE
Refills: 0 | Status: COMPLETED | OUTPATIENT
Start: 2020-09-22 | End: 2020-09-22

## 2020-09-22 RX ADMIN — Medication 1 TABLET(S): at 05:00

## 2020-09-22 RX ADMIN — Medication 2: at 08:42

## 2020-09-22 RX ADMIN — PIPERACILLIN AND TAZOBACTAM 25 GRAM(S): 4; .5 INJECTION, POWDER, LYOPHILIZED, FOR SOLUTION INTRAVENOUS at 07:45

## 2020-09-22 RX ADMIN — Medication 1 DROP(S): at 17:52

## 2020-09-22 RX ADMIN — SENNA PLUS 1 TABLET(S): 8.6 TABLET ORAL at 12:38

## 2020-09-22 RX ADMIN — Medication 1 DROP(S): at 05:02

## 2020-09-22 RX ADMIN — HEPARIN SODIUM 5000 UNIT(S): 5000 INJECTION INTRAVENOUS; SUBCUTANEOUS at 12:39

## 2020-09-22 RX ADMIN — Medication 300 MILLIGRAM(S): at 19:28

## 2020-09-22 RX ADMIN — Medication 50 GRAM(S): at 13:26

## 2020-09-22 RX ADMIN — INSULIN GLARGINE 29 UNIT(S): 100 INJECTION, SOLUTION SUBCUTANEOUS at 22:02

## 2020-09-22 RX ADMIN — Medication 200 MILLIGRAM(S): at 17:52

## 2020-09-22 RX ADMIN — Medication 1 TABLET(S): at 17:51

## 2020-09-22 RX ADMIN — Medication 12 UNIT(S): at 08:41

## 2020-09-22 RX ADMIN — HEPARIN SODIUM 5000 UNIT(S): 5000 INJECTION INTRAVENOUS; SUBCUTANEOUS at 05:00

## 2020-09-22 RX ADMIN — Medication 1 APPLICATION(S): at 12:38

## 2020-09-22 RX ADMIN — PIPERACILLIN AND TAZOBACTAM 25 GRAM(S): 4; .5 INJECTION, POWDER, LYOPHILIZED, FOR SOLUTION INTRAVENOUS at 15:52

## 2020-09-22 RX ADMIN — HEPARIN SODIUM 5000 UNIT(S): 5000 INJECTION INTRAVENOUS; SUBCUTANEOUS at 21:10

## 2020-09-22 RX ADMIN — PIPERACILLIN AND TAZOBACTAM 25 GRAM(S): 4; .5 INJECTION, POWDER, LYOPHILIZED, FOR SOLUTION INTRAVENOUS at 23:08

## 2020-09-22 RX ADMIN — Medication 12 UNIT(S): at 17:52

## 2020-09-22 RX ADMIN — Medication 2: at 12:39

## 2020-09-22 RX ADMIN — Medication 12 UNIT(S): at 12:39

## 2020-09-22 RX ADMIN — AMLODIPINE BESYLATE 5 MILLIGRAM(S): 2.5 TABLET ORAL at 05:00

## 2020-09-22 RX ADMIN — Medication 200 MILLIGRAM(S): at 05:00

## 2020-09-22 RX ADMIN — Medication 325 MILLIGRAM(S): at 12:38

## 2020-09-22 NOTE — PROGRESS NOTE ADULT - SUBJECTIVE AND OBJECTIVE BOX
Patient is a 79y old  Female who presents with a chief complaint of diabetic foot ulcer, Left first toe, and left heel , R/O Osteomyelitis, (22 Sep 2020 15:23)    SUBJECTIVE / OVERNIGHT EVENTS:  Patient without any acute complaints. Patient admitted she has not been out of bed while being hospitalized, stating her surgeon said she should not put any pressure on her left foot. Patient stated she would get out of bed after her upcoming surgery with podiatry.     MEDICATIONS  (STANDING):  amLODIPine   Tablet 5 milliGRAM(s) Oral daily  aspirin enteric coated 325 milliGRAM(s) Oral daily  collagenase Ointment 1 Application(s) Topical daily  dextrose 5%. 1000 milliLiter(s) (50 mL/Hr) IV Continuous <Continuous>  dextrose 50% Injectable 12.5 Gram(s) IV Push once  dextrose 50% Injectable 25 Gram(s) IV Push once  dextrose 50% Injectable 25 Gram(s) IV Push once  heparin   Injectable 5000 Unit(s) SubCutaneous every 8 hours  influenza   Vaccine 0.5 milliLiter(s) IntraMuscular once  insulin glargine Injectable (LANTUS) 29 Unit(s) SubCutaneous at bedtime  insulin lispro (HumaLOG) corrective regimen sliding scale   SubCutaneous at bedtime  insulin lispro (HumaLOG) corrective regimen sliding scale   SubCutaneous three times a day before meals  insulin lispro Injectable (HumaLOG) 12 Unit(s) SubCutaneous three times a day before meals  labetalol 200 milliGRAM(s) Oral two times a day  lactobacillus acidophilus 1 Tablet(s) Oral every 12 hours  piperacillin/tazobactam IVPB.. 3.375 Gram(s) IV Intermittent every 8 hours  senna 1 Tablet(s) Oral daily  timolol 0.5% Solution 1 Drop(s) Both EYES two times a day  vancomycin  IVPB 1500 milliGRAM(s) IV Intermittent every 24 hours    MEDICATIONS  (PRN):  bisacodyl Suppository 10 milliGRAM(s) Rectal daily PRN Constipation  dextrose 40% Gel 15 Gram(s) Oral once PRN Blood Glucose LESS THAN 70 milliGRAM(s)/deciliter  glucagon  Injectable 1 milliGRAM(s) IntraMuscular once PRN Glucose LESS THAN 70 milligrams/deciliter  polyethylene glycol 3350 17 Gram(s) Oral daily PRN Constipation      Vital Signs Last 24 Hrs  T(C): 36.3 (22 Sep 2020 04:40), Max: 36.6 (21 Sep 2020 20:41)  T(F): 97.4 (22 Sep 2020 04:40), Max: 97.9 (21 Sep 2020 20:41)  HR: 71 (22 Sep 2020 04:40) (71 - 72)  BP: 154/53 (22 Sep 2020 04:40) (150/51 - 155/53)  BP(mean): --  RR: 18 (22 Sep 2020 04:40) (18 - 18)  SpO2: 98% (22 Sep 2020 04:40) (97% - 99%)  CAPILLARY BLOOD GLUCOSE  197 (22 Sep 2020 04:40)      POCT Blood Glucose.: 143 mg/dL (22 Sep 2020 17:14)  POCT Blood Glucose.: 197 mg/dL (22 Sep 2020 11:57)  POCT Blood Glucose.: 172 mg/dL (22 Sep 2020 08:24)  POCT Blood Glucose.: 193 mg/dL (21 Sep 2020 21:36)    I&O's Summary        PHYSICAL EXAM  GENERAL: NAD, well-developed  HEAD:  Atraumatic, Normocephalic  EYES: EOMI, PERRLA, conjunctiva and sclera clear  NECK: Supple, No JVD  CHEST/LUNG: Clear to auscultation bilaterally; No wheeze  HEART: Regular rate and rhythm; No murmurs, rubs, or gallops  ABDOMEN: Soft, Nontender, Nondistended; Bowel sounds present  EXTREMITIES:  2+ Peripheral Pulses, No clubbing, cyanosis, or edema  PSYCH: AAOx3  SKIN: No rashes or lesions    LABS:                        10.2   9.59  )-----------( 189      ( 22 Sep 2020 06:30 )             33.7     09-22    141  |  104  |  25<H>  ----------------------------<  172<H>  3.8   |  25  |  1.51<H>    Ca    9.0      22 Sep 2020 06:30  Phos  2.8     09-22  Mg     1.5     09-22                RADIOLOGY & ADDITIONAL TESTS:    Imaging Personally Reviewed:  Consultant(s) Notes Reviewed:    Care Discussed with Consultants/Other Providers:   Patient is a 79y old  Female who presents with a chief complaint of diabetic foot ulcer, Left first toe, and left heel , R/O Osteomyelitis, (22 Sep 2020 15:23)    SUBJECTIVE / OVERNIGHT EVENTS:  Patient without any acute complaints. Patient admitted she has not been out of bed while being hospitalized, stating her surgeon said she should not put any pressure on her left foot. Patient stated she would get out of bed after her upcoming surgery with podiatry. She has no fever, chills, chest pain, SOB, n/v or abdominal pain. Pt has decreased sensation of her feet b/l, chronic.     MEDICATIONS  (STANDING):  amLODIPine   Tablet 5 milliGRAM(s) Oral daily  aspirin enteric coated 325 milliGRAM(s) Oral daily  collagenase Ointment 1 Application(s) Topical daily  dextrose 5%. 1000 milliLiter(s) (50 mL/Hr) IV Continuous <Continuous>  dextrose 50% Injectable 12.5 Gram(s) IV Push once  dextrose 50% Injectable 25 Gram(s) IV Push once  dextrose 50% Injectable 25 Gram(s) IV Push once  heparin   Injectable 5000 Unit(s) SubCutaneous every 8 hours  influenza   Vaccine 0.5 milliLiter(s) IntraMuscular once  insulin glargine Injectable (LANTUS) 29 Unit(s) SubCutaneous at bedtime  insulin lispro (HumaLOG) corrective regimen sliding scale   SubCutaneous at bedtime  insulin lispro (HumaLOG) corrective regimen sliding scale   SubCutaneous three times a day before meals  insulin lispro Injectable (HumaLOG) 12 Unit(s) SubCutaneous three times a day before meals  labetalol 200 milliGRAM(s) Oral two times a day  lactobacillus acidophilus 1 Tablet(s) Oral every 12 hours  piperacillin/tazobactam IVPB.. 3.375 Gram(s) IV Intermittent every 8 hours  senna 1 Tablet(s) Oral daily  timolol 0.5% Solution 1 Drop(s) Both EYES two times a day  vancomycin  IVPB 1500 milliGRAM(s) IV Intermittent every 24 hours    MEDICATIONS  (PRN):  bisacodyl Suppository 10 milliGRAM(s) Rectal daily PRN Constipation  dextrose 40% Gel 15 Gram(s) Oral once PRN Blood Glucose LESS THAN 70 milliGRAM(s)/deciliter  glucagon  Injectable 1 milliGRAM(s) IntraMuscular once PRN Glucose LESS THAN 70 milligrams/deciliter  polyethylene glycol 3350 17 Gram(s) Oral daily PRN Constipation      Vital Signs Last 24 Hrs  T(C): 36.3 (22 Sep 2020 04:40), Max: 36.6 (21 Sep 2020 20:41)  T(F): 97.4 (22 Sep 2020 04:40), Max: 97.9 (21 Sep 2020 20:41)  HR: 71 (22 Sep 2020 04:40) (71 - 72)  BP: 154/53 (22 Sep 2020 04:40) (150/51 - 155/53)  RR: 18 (22 Sep 2020 04:40) (18 - 18)  SpO2: 98% (22 Sep 2020 04:40) (97% - 99%)  CAPILLARY BLOOD GLUCOSE  197 (22 Sep 2020 04:40)      POCT Blood Glucose.: 143 mg/dL (22 Sep 2020 17:14)  POCT Blood Glucose.: 197 mg/dL (22 Sep 2020 11:57)  POCT Blood Glucose.: 172 mg/dL (22 Sep 2020 08:24)  POCT Blood Glucose.: 193 mg/dL (21 Sep 2020 21:36)    I&O's Summary        PHYSICAL EXAM  GENERAL: NAD, morbidly obese   CHEST/LUNG: Clear to auscultation bilaterally; No wheeze  HEART: Regular rate and rhythm;   ABDOMEN: Soft, Nontender, Nondistended; Bowel sounds present  EXTREMITIES:  diminished Peripheral Pulses, b/l  erythema+ , B/L pedal edema , erythema in RUE around IV site , slightly nodular     PSYCH: AAOx3  NEUROLOGY: non-focal  SKIN: left foot ulcer , with dressing , c/d/i         LABS:                        10.2   9.59  )-----------( 189      ( 22 Sep 2020 06:30 )             33.7     09-22    141  |  104  |  25<H>  ----------------------------<  172<H>  3.8   |  25  |  1.51<H>    Ca    9.0      22 Sep 2020 06:30  Phos  2.8     09-22  Mg     1.5     09-22            Consultant(s) Notes Reviewed: yes   Care Discussed with Consultants/Other Providers:

## 2020-09-22 NOTE — PROGRESS NOTE ADULT - ASSESSMENT
79 year old woman with PMH of HTN, HLD, DM type 2, diabetic neuropathy, PVD, Obesity, presents with chronic ulcer of LT 1st toe and LT heel, now awaiting amputation, also with hyperglycemia in setting of uncontrolled DM2.     1. Uncontrolled type 2 diabetes mellitus with hyperglycemia.   -A1c is 9.3%, goal less than 7.5% without hypoglycemia   -Inpatient BG target 100-180 mg/dl  -Patient will be NPO after midnight for surgery tomorrow 9/23  -Recommend to reduce Lantus to 29 units SQ qHS  -Adjust Humalog correctional scale to q6h while NPO (would advise low dose scale for NPO)  -Check BG q6h    Once patient returned to floor and diet resumed:  -Continue Lantus 36 units SQ qHS  -Continue Humalog 12 units SQ TID before meals (Hold if not eating)  -Continue Humalog MODERATE dose correctional scale before meals  -Continue Humalog LOW dose at bedtime   -Check BG before meals and bedtime  -Consistent carb diet, RD consult done  -DM education: Please document successful (or unsuccessful) return demonstration in DM CPG    Discharge Plan:  -Ideally would dc her home on basal bolus insulin pens - given A1c and need for tighter glucose control for wound healing. However, patient is unsure if she can do 4x per day injections, would be doing independently, lives alone. Could dc instead on Novolog 70/30 or Novolin/Humulin 70/30  -Patient also reporting she prefers vial/syringe VS insulin pens - however, will need to further assess visual deficits to see which one is safer. Anticipate that insulin pens would actually benefit patient, given she can listen to the clicks for dosing    -Plan TBD based on above assessments & patient preference  -Recommend to send new prescription for glucometer (consider Prodigy "Talking" glucometer that can read result to patient), glucose test strips, lancets, alcohol swabs  -Followup with [facility] if desired- [address], [address], [place] [facility] [address], [phone].     2. Essential hypertension  -Goal less than 130/80  -Above goal - adjustments per primary team.     3. Hyperlipidemia, unspecified hyperlipidemia type  -LDL target less than 70  -LDL 46 (9/17/20)  -Continue monitoring as outpatient.     Brenda Pagan  Nurse Practitioner  Division of Endocrinology & Diabetes  In house pager #[phone]/long range pager #[phone]    If before 9AM or after 6PM, or on weekends/holidays, please call endocrine answering service for assistance ([phone]).  For nonurgent matters email [email or web address] for assistance.

## 2020-09-22 NOTE — PROGRESS NOTE ADULT - ASSESSMENT
79y F w/ left foot hallux wound   -pt was seen and evaluated  -Left foot hallux w/ dactylitis; plantar and dorsal wound with resolving erythema  -L foot xray shows OM of hallux  -MRI left foot shows OM in hallux distal and proximal phalanges   -Booked for Left foot partial 1st ray resection with Dr. Ruiz on Wednesday 9/23 at 3pm  -NPO at midnight   -Discussed w/ attending

## 2020-09-22 NOTE — PROGRESS NOTE ADULT - PROBLEM SELECTOR PLAN 2
Urine Culture with normal pari    IVF LR @ 75 cc/hr,  HOLD ACEI/ARB ,   bladder scan wnl  cr stable  likely close to her baseline, will continue to monitor, avoid nephrotoxics  pt on IVF, if no change in Cr, will dc iVF in am Urine Culture with normal pari    HOLD ACEI/ARB ,   bladder scan wnl  cr stable  likely close to her baseline, will continue to monitor, avoid nephrotoxics  pt on IVF, if no change in Cr, will dc iVF in am

## 2020-09-22 NOTE — PROGRESS NOTE ADULT - SUBJECTIVE AND OBJECTIVE BOX
Podiatry pager #: [phone] ([place])/ [phone] ([facility])    Patient is a 79y old  Female who presents with a chief complaint of diabetic foot ulcer, Left first toe, and left heel , R/O Osteomyelitis, (21 Sep 2020 18:26)       INTERVAL HPI/OVERNIGHT EVENTS:  Patient seen and evaluated at bedside.  Pt is resting comfortable in NAD. Denies N/V/F/C.     Allergies    No Known Allergies    Intolerances        Vital Signs Last 24 Hrs  T(C): 36.3 (22 Sep 2020 04:40), Max: 36.8 (21 Sep 2020 10:35)  T(F): 97.4 (22 Sep 2020 04:40), Max: 98.3 (21 Sep 2020 10:35)  HR: 71 (22 Sep 2020 04:40) (71 - 81)  BP: 154/53 (22 Sep 2020 04:40) (150/51 - 158/59)  BP(mean): --  RR: 18 (22 Sep 2020 04:40) (18 - 19)  SpO2: 98% (22 Sep 2020 04:40) (96% - 99%)    LABS:                        10.2   9.59  )-----------( 189      ( 22 Sep 2020 06:30 )             33.7     09-22    141  |  104  |  25<H>  ----------------------------<  172<H>  3.8   |  25  |  1.51<H>    Ca    9.0      22 Sep 2020 06:30  Phos  2.8     09-22  Mg     1.5     09-22          CAPILLARY BLOOD GLUCOSE      POCT Blood Glucose.: 193 mg/dL (21 Sep 2020 21:36)  POCT Blood Glucose.: 199 mg/dL (21 Sep 2020 17:54)  POCT Blood Glucose.: 262 mg/dL (21 Sep 2020 12:05)  POCT Blood Glucose.: 243 mg/dL (21 Sep 2020 10:25)      Lower Extremity Physical Exam:    Vasc: DP/PT 0/4 b/l   Left foot hallux plantar and dorsal wound that communicate. Dorsal wound tracks 2cm proximally. Dorsal and plantar hallux wounds probe to bone, edema and erythema improved.     RADIOLOGY & ADDITIONAL TESTS:  < from: MR Foot No Cont, Left (09.21.20 @ 09:58) >    EXAM:  MR FOOT LT        PROCEDURE DATE:  Sep 21 2020         INTERPRETATION:  MR FOOT LEFT dated 9/21/2020 9:58 AM    INDICATION: Leg pain. Hallux wound.    COMPARISON: Foot radiographs dated 9/16/2020    TECHNIQUE: Multi-sequential, multiplanar MRI imaging of the left ankle and foot was performed per standard protocol. The patient declined IV contrast.    FINDINGS:    BONE MARROW: There is abnormal marrow signal including hypointense T1 signal and edema throughout the first proximal distal phalanges. Fluid surrounds the first distal phalanges and extends into the first interphalangeal joint space. There is erosive change about the base the first distal phalanx. There has been prior resection of the third digit. There is narrowing of the tarsometatarsal joints worse at the third through fifth tarsometatarsal joints. Mild productive changes at the first metatarsophalangeal joint.  SYNOVIUM/ JOINT FLUID: There is fluid in the third IP joint.  TENDONS: The tendons are intact. No full-thickness tendon tear or retraction is seen. There are postsurgical changes of the third flexor and extensor tendons.  MUSCLES: Severe atrophy of the intrinsic musculature the foot edema throughout the musculature. These findings can be seen with chronic neuropathic change. There is chronic thickening of the central cord of plantar fascia  NEUROVASCULAR STRUCTURES: Preserved  SUBCUTANEOUS SOFT TISSUES: There is soft tissue swelling about the dorsum of the foot. Fluid surrounds the first distal phalanxand decompresses into the dorsal aspect of the distal forefoot at the first web space.. Skin wound at the plantar portion of the distal first digit with fluid tract extending to the first IP joint space.    IMPRESSION:    1.  Skin wound at the distalplantar first digit with fluid tracking towards the first IP joint. Concerning for cellulitis.  2.  Fluid surrounding the first digit and decompressing into soft tissues dorsal to the first web space. This can be seen with an infected fluid collection.  3.  Abnormal marrow signal in the first distal and proximal phalanges. Given the skin wound, findings are concerning for osteomyelitis.                PREMA AMBROCIO M.D., ATTENDING RADIOLOGIST  This document has been electronically signed. Sep 21 2020 10:26AM    < end of copied text >

## 2020-09-22 NOTE — PROGRESS NOTE ADULT - PROBLEM SELECTOR PLAN 1
Podiatry f/u, Vascular  sx consult appreciated,  MUSHTAQ/PVR noted - no further intervention as per Vascular surgery  Pt meeting clinical dx for osteomyelitis since Dorsal and plantar hallux wounds probe to bone, 9/21 MRI also with findings c/w osteomyelitis.   - IV Zosyn, IV Vanco, f/u CBC, CMP, Cultures, Fall/aspiration precaution,   Bacid, Podiatry f/u, Vascular  sx consult appreciated,  MUSHTAQ/PVR noted - no further intervention as per Vascular surgery  Pt meeting clinical dx for osteomyelitis since Dorsal and plantar hallux wounds probe to bone, 9/21 MRI also with findings c/w osteomyelitis.   9/16 abscess cx growing staphylococcus lugdunensis   - c/w IV Zosyn for now, but will likely narrow abx coverage postoperatively   - d/c IV Vanco since cx not growing MRSA  - f/u CBC, CMP, Fall/aspiration precaution,

## 2020-09-22 NOTE — PROVIDER CONTACT NOTE (OTHER) - ACTION/TREATMENT ORDERED:
No further orders at this time. Provider will ask patient what BP meds she takes at home.
No further actions ordered. Will continue to monitor.
vanco trough is not needed at this time. next trough will be ordered when needed.

## 2020-09-22 NOTE — PROGRESS NOTE ADULT - SUBJECTIVE AND OBJECTIVE BOX
Chief Complaint: DM 2    History: Patient seen at bedside. Reports she is feeling ok today, eating well, denies n/v, denies s/s of hypoglycemia  Patient planned for NPO after midnight for L foot partial 1st ray resection 9/23    MEDICATIONS  (STANDING):  amLODIPine   Tablet 5 milliGRAM(s) Oral daily  aspirin enteric coated 325 milliGRAM(s) Oral daily  collagenase Ointment 1 Application(s) Topical daily  dextrose 5%. 1000 milliLiter(s) (50 mL/Hr) IV Continuous <Continuous>  dextrose 50% Injectable 12.5 Gram(s) IV Push once  dextrose 50% Injectable 25 Gram(s) IV Push once  dextrose 50% Injectable 25 Gram(s) IV Push once  heparin   Injectable 5000 Unit(s) SubCutaneous every 8 hours  influenza   Vaccine 0.5 milliLiter(s) IntraMuscular once  insulin glargine Injectable (LANTUS) 36 Unit(s) SubCutaneous at bedtime  insulin lispro (HumaLOG) corrective regimen sliding scale   SubCutaneous at bedtime  insulin lispro (HumaLOG) corrective regimen sliding scale   SubCutaneous three times a day before meals  insulin lispro Injectable (HumaLOG) 12 Unit(s) SubCutaneous three times a day before meals  labetalol 200 milliGRAM(s) Oral two times a day  lactobacillus acidophilus 1 Tablet(s) Oral every 12 hours  piperacillin/tazobactam IVPB.. 3.375 Gram(s) IV Intermittent every 8 hours  senna 1 Tablet(s) Oral daily  timolol 0.5% Solution 1 Drop(s) Both EYES two times a day  vancomycin  IVPB 1500 milliGRAM(s) IV Intermittent every 24 hours    MEDICATIONS  (PRN):  bisacodyl Suppository 10 milliGRAM(s) Rectal daily PRN Constipation  dextrose 40% Gel 15 Gram(s) Oral once PRN Blood Glucose LESS THAN 70 milliGRAM(s)/deciliter  glucagon  Injectable 1 milliGRAM(s) IntraMuscular once PRN Glucose LESS THAN 70 milligrams/deciliter  polyethylene glycol 3350 17 Gram(s) Oral daily PRN Constipation    No Known Allergies    Review of Systems:  Cardiovascular: No chest pain  Respiratory: No SOB  GI: No nausea, vomiting  Endocrine: no hypoglycemia    PHYSICAL EXAM:  VITALS: T(C): 36.3 (09-22-20 @ 04:40)  T(F): 97.4 (09-22-20 @ 04:40), Max: 98 (09-21-20 @ 18:18)  HR: 71 (09-22-20 @ 04:40) (71 - 79)  BP: 154/53 (09-22-20 @ 04:40) (150/51 - 158/59)  RR:  (18 - 19)  SpO2:  (97% - 99%)  Wt(kg): --  GENERAL: NAD, well-groomed, well-developed  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, no lid lag, anicteric  HEENT:  Atraumatic, Normocephalic, moist mucous membranes  THYROID: Normal size, no palpable nodules  RESPIRATORY: Clear to auscultation bilaterally; No rales, rhonchi, wheezing  CARDIOVASCULAR: Regular rate and rhythm; No murmurs; no peripheral edema  GI: Soft, nontender, non distended  SKIN: Dry, intact, No rashes or lesions  MUSCULOSKELETAL: Full range of motion, normal strength  NEURO: extraocular movements intact, no tremor  PSYCH: Alert and oriented x 3, normal affect, normal mood    CAPILLARY BLOOD GLUCOSE  197 (22 Sep 2020 04:40)      POCT Blood Glucose.: 197 mg/dL (22 Sep 2020 11:57)  POCT Blood Glucose.: 172 mg/dL (22 Sep 2020 08:24)  POCT Blood Glucose.: 193 mg/dL (21 Sep 2020 21:36)  POCT Blood Glucose.: 199 mg/dL (21 Sep 2020 17:54)      09-22    141  |  104  |  25<H>  ----------------------------<  172<H>  3.8   |  25  |  1.51<H>    EGFR if : 38  EGFR if non : 33    Ca    9.0      09-22  Mg     1.5     09-22  Phos  2.8     09-22        Thyroid Function Tests:  09-17 @ 05:52 TSH 2.29 FreeT4 1.10 T3 -- Anti TPO -- Anti Thyroglobulin Ab -- TSI --      A1C with Estimated Average Glucose: 9.3 % (09-17-20 @ 05:52)

## 2020-09-23 ENCOUNTER — RESULT REVIEW (OUTPATIENT)
Age: 80
End: 2020-09-23

## 2020-09-23 DIAGNOSIS — N18.3 CHRONIC KIDNEY DISEASE, STAGE 3 (MODERATE): ICD-10-CM

## 2020-09-23 LAB
ANION GAP SERPL CALC-SCNC: 13 MMO/L — SIGNIFICANT CHANGE UP (ref 7–14)
ANION GAP SERPL CALC-SCNC: 13 MMO/L — SIGNIFICANT CHANGE UP (ref 7–14)
APTT BLD: 27.6 SEC — SIGNIFICANT CHANGE UP (ref 27–36.3)
BLD GP AB SCN SERPL QL: NEGATIVE — SIGNIFICANT CHANGE UP
BUN SERPL-MCNC: 25 MG/DL — HIGH (ref 7–23)
BUN SERPL-MCNC: 25 MG/DL — HIGH (ref 7–23)
CALCIUM SERPL-MCNC: 9.3 MG/DL — SIGNIFICANT CHANGE UP (ref 8.4–10.5)
CALCIUM SERPL-MCNC: 9.3 MG/DL — SIGNIFICANT CHANGE UP (ref 8.4–10.5)
CHLORIDE SERPL-SCNC: 102 MMOL/L — SIGNIFICANT CHANGE UP (ref 98–107)
CHLORIDE SERPL-SCNC: 102 MMOL/L — SIGNIFICANT CHANGE UP (ref 98–107)
CO2 SERPL-SCNC: 25 MMOL/L — SIGNIFICANT CHANGE UP (ref 22–31)
CO2 SERPL-SCNC: 25 MMOL/L — SIGNIFICANT CHANGE UP (ref 22–31)
CREAT SERPL-MCNC: 1.52 MG/DL — HIGH (ref 0.5–1.3)
CREAT SERPL-MCNC: 1.52 MG/DL — HIGH (ref 0.5–1.3)
GLUCOSE BLDC GLUCOMTR-MCNC: 154 MG/DL — HIGH (ref 70–99)
GLUCOSE BLDC GLUCOMTR-MCNC: 174 MG/DL — HIGH (ref 70–99)
GLUCOSE BLDC GLUCOMTR-MCNC: 187 MG/DL — HIGH (ref 70–99)
GLUCOSE BLDC GLUCOMTR-MCNC: 204 MG/DL — HIGH (ref 70–99)
GLUCOSE BLDC GLUCOMTR-MCNC: 242 MG/DL — HIGH (ref 70–99)
GLUCOSE SERPL-MCNC: 184 MG/DL — HIGH (ref 70–99)
GLUCOSE SERPL-MCNC: 184 MG/DL — HIGH (ref 70–99)
HCT VFR BLD CALC: 33.1 % — LOW (ref 34.5–45)
HGB BLD-MCNC: 10.3 G/DL — LOW (ref 11.5–15.5)
INR BLD: 1.08 — SIGNIFICANT CHANGE UP (ref 0.88–1.16)
MAGNESIUM SERPL-MCNC: 2.1 MG/DL — SIGNIFICANT CHANGE UP (ref 1.6–2.6)
MCHC RBC-ENTMCNC: 28.5 PG — SIGNIFICANT CHANGE UP (ref 27–34)
MCHC RBC-ENTMCNC: 31.1 % — LOW (ref 32–36)
MCV RBC AUTO: 91.7 FL — SIGNIFICANT CHANGE UP (ref 80–100)
NRBC # FLD: 0 K/UL — SIGNIFICANT CHANGE UP (ref 0–0)
PHOSPHATE SERPL-MCNC: 3.1 MG/DL — SIGNIFICANT CHANGE UP (ref 2.5–4.5)
PLATELET # BLD AUTO: 208 K/UL — SIGNIFICANT CHANGE UP (ref 150–400)
PMV BLD: 10.4 FL — SIGNIFICANT CHANGE UP (ref 7–13)
POTASSIUM SERPL-MCNC: 4.1 MMOL/L — SIGNIFICANT CHANGE UP (ref 3.5–5.3)
POTASSIUM SERPL-MCNC: 4.1 MMOL/L — SIGNIFICANT CHANGE UP (ref 3.5–5.3)
POTASSIUM SERPL-SCNC: 4.1 MMOL/L — SIGNIFICANT CHANGE UP (ref 3.5–5.3)
POTASSIUM SERPL-SCNC: 4.1 MMOL/L — SIGNIFICANT CHANGE UP (ref 3.5–5.3)
PROTHROM AB SERPL-ACNC: 12.3 SEC — SIGNIFICANT CHANGE UP (ref 10.6–13.6)
RBC # BLD: 3.61 M/UL — LOW (ref 3.8–5.2)
RBC # FLD: 13.6 % — SIGNIFICANT CHANGE UP (ref 10.3–14.5)
RH IG SCN BLD-IMP: POSITIVE — SIGNIFICANT CHANGE UP
SODIUM SERPL-SCNC: 140 MMOL/L — SIGNIFICANT CHANGE UP (ref 135–145)
SODIUM SERPL-SCNC: 140 MMOL/L — SIGNIFICANT CHANGE UP (ref 135–145)
WBC # BLD: 9.61 K/UL — SIGNIFICANT CHANGE UP (ref 3.8–10.5)
WBC # FLD AUTO: 9.61 K/UL — SIGNIFICANT CHANGE UP (ref 3.8–10.5)

## 2020-09-23 PROCEDURE — 73630 X-RAY EXAM OF FOOT: CPT | Mod: 26,LT

## 2020-09-23 PROCEDURE — 88305 TISSUE EXAM BY PATHOLOGIST: CPT | Mod: 26

## 2020-09-23 PROCEDURE — 99233 SBSQ HOSP IP/OBS HIGH 50: CPT

## 2020-09-23 PROCEDURE — 88311 DECALCIFY TISSUE: CPT | Mod: 26

## 2020-09-23 RX ORDER — CEFAZOLIN SODIUM 1 G
2000 VIAL (EA) INJECTION EVERY 8 HOURS
Refills: 0 | Status: DISCONTINUED | OUTPATIENT
Start: 2020-09-23 | End: 2020-09-25

## 2020-09-23 RX ORDER — OXYCODONE HYDROCHLORIDE 5 MG/1
5 TABLET ORAL ONCE
Refills: 0 | Status: DISCONTINUED | OUTPATIENT
Start: 2020-09-23 | End: 2020-09-23

## 2020-09-23 RX ORDER — ACETAMINOPHEN 500 MG
650 TABLET ORAL EVERY 6 HOURS
Refills: 0 | Status: DISCONTINUED | OUTPATIENT
Start: 2020-09-23 | End: 2020-09-23

## 2020-09-23 RX ORDER — ATORVASTATIN CALCIUM 80 MG/1
40 TABLET, FILM COATED ORAL AT BEDTIME
Refills: 0 | Status: DISCONTINUED | OUTPATIENT
Start: 2020-09-23 | End: 2020-09-25

## 2020-09-23 RX ORDER — SODIUM CHLORIDE 9 MG/ML
1000 INJECTION, SOLUTION INTRAVENOUS
Refills: 0 | Status: DISCONTINUED | OUTPATIENT
Start: 2020-09-23 | End: 2020-09-23

## 2020-09-23 RX ORDER — FENTANYL CITRATE 50 UG/ML
50 INJECTION INTRAVENOUS
Refills: 0 | Status: DISCONTINUED | OUTPATIENT
Start: 2020-09-23 | End: 2020-09-23

## 2020-09-23 RX ORDER — INSULIN GLARGINE 100 [IU]/ML
36 INJECTION, SOLUTION SUBCUTANEOUS AT BEDTIME
Refills: 0 | Status: DISCONTINUED | OUTPATIENT
Start: 2020-09-23 | End: 2020-09-24

## 2020-09-23 RX ORDER — OXYCODONE AND ACETAMINOPHEN 5; 325 MG/1; MG/1
1 TABLET ORAL EVERY 4 HOURS
Refills: 0 | Status: DISCONTINUED | OUTPATIENT
Start: 2020-09-23 | End: 2020-09-25

## 2020-09-23 RX ORDER — LOSARTAN POTASSIUM 100 MG/1
100 TABLET, FILM COATED ORAL DAILY
Refills: 0 | Status: DISCONTINUED | OUTPATIENT
Start: 2020-09-23 | End: 2020-09-25

## 2020-09-23 RX ADMIN — Medication 325 MILLIGRAM(S): at 13:06

## 2020-09-23 RX ADMIN — ATORVASTATIN CALCIUM 40 MILLIGRAM(S): 80 TABLET, FILM COATED ORAL at 21:21

## 2020-09-23 RX ADMIN — Medication 4: at 13:05

## 2020-09-23 RX ADMIN — PIPERACILLIN AND TAZOBACTAM 25 GRAM(S): 4; .5 INJECTION, POWDER, LYOPHILIZED, FOR SOLUTION INTRAVENOUS at 13:05

## 2020-09-23 RX ADMIN — Medication 200 MILLIGRAM(S): at 19:02

## 2020-09-23 RX ADMIN — Medication 1 DROP(S): at 05:19

## 2020-09-23 RX ADMIN — Medication 1 APPLICATION(S): at 13:06

## 2020-09-23 RX ADMIN — Medication 1 TABLET(S): at 19:04

## 2020-09-23 RX ADMIN — SENNA PLUS 1 TABLET(S): 8.6 TABLET ORAL at 13:05

## 2020-09-23 RX ADMIN — HEPARIN SODIUM 5000 UNIT(S): 5000 INJECTION INTRAVENOUS; SUBCUTANEOUS at 13:05

## 2020-09-23 RX ADMIN — HEPARIN SODIUM 5000 UNIT(S): 5000 INJECTION INTRAVENOUS; SUBCUTANEOUS at 21:21

## 2020-09-23 RX ADMIN — Medication 200 MILLIGRAM(S): at 05:19

## 2020-09-23 RX ADMIN — Medication 1 DROP(S): at 19:02

## 2020-09-23 RX ADMIN — Medication 2: at 19:03

## 2020-09-23 RX ADMIN — AMLODIPINE BESYLATE 5 MILLIGRAM(S): 2.5 TABLET ORAL at 05:19

## 2020-09-23 RX ADMIN — INSULIN GLARGINE 36 UNIT(S): 100 INJECTION, SOLUTION SUBCUTANEOUS at 23:11

## 2020-09-23 RX ADMIN — Medication 100 MILLIGRAM(S): at 21:16

## 2020-09-23 RX ADMIN — Medication 1 TABLET(S): at 05:19

## 2020-09-23 RX ADMIN — PIPERACILLIN AND TAZOBACTAM 25 GRAM(S): 4; .5 INJECTION, POWDER, LYOPHILIZED, FOR SOLUTION INTRAVENOUS at 06:59

## 2020-09-23 NOTE — PROGRESS NOTE ADULT - SUBJECTIVE AND OBJECTIVE BOX
Patient is a 79y old  Female who presents with a chief complaint of diabetic foot ulcer, Left first toe, and left heel , R/O Osteomyelitis, (22 Sep 2020 19:23)      INTERVAL HPI/OVERNIGHT EVENTS:   Pt is scheduled for Left foot partial 1st ray resection with Dr. Ruiz at 3 PM. Patient is aware of procedure and is NPO since midnight.    MEDICATIONS  (STANDING):  amLODIPine   Tablet 5 milliGRAM(s) Oral daily  aspirin enteric coated 325 milliGRAM(s) Oral daily  collagenase Ointment 1 Application(s) Topical daily  dextrose 5%. 1000 milliLiter(s) (50 mL/Hr) IV Continuous <Continuous>  dextrose 50% Injectable 12.5 Gram(s) IV Push once  dextrose 50% Injectable 25 Gram(s) IV Push once  dextrose 50% Injectable 25 Gram(s) IV Push once  heparin   Injectable 5000 Unit(s) SubCutaneous every 8 hours  influenza   Vaccine 0.5 milliLiter(s) IntraMuscular once  insulin glargine Injectable (LANTUS) 29 Unit(s) SubCutaneous at bedtime  insulin lispro (HumaLOG) corrective regimen sliding scale   SubCutaneous at bedtime  insulin lispro (HumaLOG) corrective regimen sliding scale   SubCutaneous three times a day before meals  insulin lispro Injectable (HumaLOG) 12 Unit(s) SubCutaneous three times a day before meals  labetalol 200 milliGRAM(s) Oral two times a day  lactobacillus acidophilus 1 Tablet(s) Oral every 12 hours  piperacillin/tazobactam IVPB.. 3.375 Gram(s) IV Intermittent every 8 hours  senna 1 Tablet(s) Oral daily  timolol 0.5% Solution 1 Drop(s) Both EYES two times a day    MEDICATIONS  (PRN):  bisacodyl Suppository 10 milliGRAM(s) Rectal daily PRN Constipation  dextrose 40% Gel 15 Gram(s) Oral once PRN Blood Glucose LESS THAN 70 milliGRAM(s)/deciliter  glucagon  Injectable 1 milliGRAM(s) IntraMuscular once PRN Glucose LESS THAN 70 milligrams/deciliter  polyethylene glycol 3350 17 Gram(s) Oral daily PRN Constipation      Allergies    No Known Allergies    Intolerances        Vital Signs Last 24 Hrs  T(C): 36.6 (23 Sep 2020 05:15), Max: 36.9 (22 Sep 2020 20:54)  T(F): 97.9 (23 Sep 2020 05:15), Max: 98.4 (22 Sep 2020 20:54)  HR: 67 (23 Sep 2020 05:15) (67 - 86)  BP: 154/58 (23 Sep 2020 05:15) (146/43 - 158/62)  BP(mean): --  RR: 18 (23 Sep 2020 05:15) (17 - 18)  SpO2: 97% (23 Sep 2020 05:15) (97% - 100%)    LABS:                        10.3   9.61  )-----------( 208      ( 23 Sep 2020 05:08 )             33.1     09-23    140  |  102  |  25<H>  ----------------------------<  184<H>  4.1   |  25  |  1.52<H>    Ca    9.3      23 Sep 2020 05:08  Phos  3.1     09-23  Mg     2.1     09-23      PT/INR - ( 23 Sep 2020 05:08 )   PT: 12.3 SEC;   INR: 1.08          PTT - ( 23 Sep 2020 05:08 )  PTT:27.6 SEC    CAPILLARY BLOOD GLUCOSE      POCT Blood Glucose.: 174 mg/dL (23 Sep 2020 05:26)  POCT Blood Glucose.: 226 mg/dL (22 Sep 2020 21:56)  POCT Blood Glucose.: 143 mg/dL (22 Sep 2020 17:14)  POCT Blood Glucose.: 197 mg/dL (22 Sep 2020 11:57)  POCT Blood Glucose.: 172 mg/dL (22 Sep 2020 08:24)      RADIOLOGY & ADDITIONAL TESTS:

## 2020-09-23 NOTE — PROGRESS NOTE ADULT - PROBLEM SELECTOR PLAN 2
Vascular consult  appreciated. MUSHTAQ/PVR noted on ASA,  Venous Doppler legs negative for DVT as well  - no further intervention as per vascular  - high intensity statin with Lipitor 40mg daily

## 2020-09-23 NOTE — PROGRESS NOTE ADULT - PROBLEM SELECTOR PLAN 5
- resume losartan 100mg daily  - c/w Labetalol 200mg BID   - Norvasc 5 mg PO QD   will continue to monitor BP

## 2020-09-23 NOTE — PROGRESS NOTE ADULT - ASSESSMENT
Ms. Gonzalez is a 78 y/o woman with HX of DM type 2 c/b diabetic neuropathy, HTN, HLD, PVD, morbid Obesity, presenting with chronic ulcer of Left 1st toe and Left heel admitted for Left foot hallux plantar and dorsal wound with associated cellulitis and underlying osteomyelitis. Pt S/P bedside I&D of LF hallux wound. Pending surgical intervention today with podiatry.

## 2020-09-23 NOTE — PROGRESS NOTE ADULT - PROBLEM SELECTOR PLAN 3
ENDO consulted, recs reviewed and appreciated. Not optimally controlled -  Hgba1c 9.3. TSH, free T4 wnl , UA, Urine noted with proteinuria and glucosuria. pt is on NPH BID at home,  - increase to Lantus 36  units QHS  - Humalog 12 units Before meals TID  c/w , FS, sliding scale,   - resume home regimen of losartan for renal protection,   diabetic education

## 2020-09-23 NOTE — PROGRESS NOTE ADULT - PROBLEM SELECTOR PLAN 1
Podiatry f/u, Vascular  sx consult appreciated,  MUSHTAQ/PVR noted - no further intervention as per Vascular surgery  Pt meeting clinical dx for osteomyelitis since Dorsal and plantar hallux wounds probe to bone, 9/21 MRI also with findings c/w osteomyelitis.   9/16 abscess cx growing staphylococcus lugdunensis   -d/c IV Zosyn --> transition to cefazolin 2g IV q8h  - f/u OR cultures and bone pathology  - f/u CBC, CMP, Fall/aspiration precaution,

## 2020-09-23 NOTE — PROGRESS NOTE ADULT - ASSESSMENT
Plan:   Pt is scheduled for Left foot partial 1st ray resection with Dr. Ruiz at 3 PM. Patient is aware of procedure and is NPO since midnight.  CXR on sunrise.  EKG on sunrise.  Medical/Cardiac clearance since ____ and documented in chart.  Consent signed and in chart.  Procedure was explained to patient in detail. All alternatives, risks and complications were discussed. All questions answered. Plan:   Pt is scheduled for Left foot partial 1st ray resection with Dr. Ruiz at 3 PM. Patient is aware of procedure and is NPO since midnight.  CXR on sunrise.  EKG on sunrise.  Medical/Cardiac clearance since 9/17/20 and documented in chart.  Consent signed and in chart.  Procedure was explained to patient in detail. All alternatives, risks and complications were discussed. All questions answered.

## 2020-09-23 NOTE — PROGRESS NOTE ADULT - PROBLEM SELECTOR PLAN 4
Initial concern for ARABELLA but Cr relatively stable and did not meet clinical criteria for ARABELLA. Pt with likely underlying CKD from chronic medical conditions  Urine Culture with normal pari.  bladder scan wnl  - will resume home regimen of losartan 100mg daily. Will d/c if pt develops hyperkalemia  - avoid nephrotoxics

## 2020-09-23 NOTE — BRIEF OPERATIVE NOTE - OPERATION/FINDINGS
s/p left foot partial 1st ray resection  good intraop bleeding  low concern for residual infection  low concern for viability

## 2020-09-23 NOTE — CHART NOTE - NSCHARTNOTEFT_GEN_A_CORE
79 year old woman with PMH of HTN, HLD, DM type 2, diabetic neuropathy, PVD, Obesity, presents with chronic ulcer of LT 1st toe and LT heel, now awaiting amputation, also with hyperglycemia in setting of uncontrolled DM2.     Patient in OR for L foot partial 1st ray resection today  Overall glucose trending 174-204 mg/dl today while NPO (noted patient missed correctional insulin this AM for glucose 174)  Once patient returned to floor and diet resumed:  Resume Lantus 36 units SQ qHS  Continue Humalog 12 units SQ TID before meals (Hold if not eating)  Continue Humalog MODERATE dose correctional scale before meals  Continue Humalog LOW dose at bedtime   Check BG before meals and bedtime  Consistent carb diet, RD consult done    CAPILLARY BLOOD GLUCOSE    POCT Blood Glucose.: 204 mg/dL (23 Sep 2020 12:13)  POCT Blood Glucose.: 187 mg/dL (23 Sep 2020 08:27)  POCT Blood Glucose.: 174 mg/dL (23 Sep 2020 05:26)  POCT Blood Glucose.: 226 mg/dL (22 Sep 2020 21:56)  POCT Blood Glucose.: 143 mg/dL (22 Sep 2020 17:14)    09-23    140  |  102  |  25<H>  ----------------------------<  184<H>  4.1   |  25  |  1.52<H>    Ca    9.3      23 Sep 2020 05:08  Phos  3.1     09-23  Mg     2.1     09-23      MEDICATIONS  (STANDING):  amLODIPine   Tablet 5 milliGRAM(s) Oral daily  aspirin enteric coated 325 milliGRAM(s) Oral daily  collagenase Ointment 1 Application(s) Topical daily  dextrose 5%. 1000 milliLiter(s) (50 mL/Hr) IV Continuous <Continuous>  dextrose 50% Injectable 12.5 Gram(s) IV Push once  dextrose 50% Injectable 25 Gram(s) IV Push once  dextrose 50% Injectable 25 Gram(s) IV Push once  heparin   Injectable 5000 Unit(s) SubCutaneous every 8 hours  influenza   Vaccine 0.5 milliLiter(s) IntraMuscular once  insulin glargine Injectable (LANTUS) 29 Unit(s) SubCutaneous at bedtime  insulin lispro (HumaLOG) corrective regimen sliding scale   SubCutaneous at bedtime  insulin lispro (HumaLOG) corrective regimen sliding scale   SubCutaneous three times a day before meals  insulin lispro Injectable (HumaLOG) 12 Unit(s) SubCutaneous three times a day before meals  labetalol 200 milliGRAM(s) Oral two times a day  lactated ringers. 1000 milliLiter(s) (75 mL/Hr) IV Continuous <Continuous>  lactobacillus acidophilus 1 Tablet(s) Oral every 12 hours  piperacillin/tazobactam IVPB.. 3.375 Gram(s) IV Intermittent every 8 hours  senna 1 Tablet(s) Oral daily  timolol 0.5% Solution 1 Drop(s) Both EYES two times a day    A1C with Estimated Average Glucose: 9.3 % (09-17-20 @ 05:52)

## 2020-09-23 NOTE — PROGRESS NOTE ADULT - SUBJECTIVE AND OBJECTIVE BOX
Patient is a 79y old  Female who presents with a chief complaint of diabetic foot ulcer, Left first toe, and left heel , R/O Osteomyelitis, (23 Sep 2020 08:21)    SUBJECTIVE / OVERNIGHT EVENTS:  Patient without any acute complaints. Pt evaluated just prior to being transported to the OR today. Patient without fever, chill, chest pain, SOB, n/v or abdominal pain. Pt did develop rash around prior IV site on the ventral surface of her left arm, which her nurse may have been due to prior administration of vancomycin.    MEDICATIONS  (STANDING):  amLODIPine   Tablet 5 milliGRAM(s) Oral daily  aspirin enteric coated 325 milliGRAM(s) Oral daily  collagenase Ointment 1 Application(s) Topical daily  dextrose 5%. 1000 milliLiter(s) (50 mL/Hr) IV Continuous <Continuous>  dextrose 50% Injectable 12.5 Gram(s) IV Push once  dextrose 50% Injectable 25 Gram(s) IV Push once  dextrose 50% Injectable 25 Gram(s) IV Push once  heparin   Injectable 5000 Unit(s) SubCutaneous every 8 hours  influenza   Vaccine 0.5 milliLiter(s) IntraMuscular once  insulin glargine Injectable (LANTUS) 36 Unit(s) SubCutaneous at bedtime  insulin lispro (HumaLOG) corrective regimen sliding scale   SubCutaneous at bedtime  insulin lispro (HumaLOG) corrective regimen sliding scale   SubCutaneous three times a day before meals  insulin lispro Injectable (HumaLOG) 12 Unit(s) SubCutaneous three times a day before meals  labetalol 200 milliGRAM(s) Oral two times a day  lactated ringers. 1000 milliLiter(s) (75 mL/Hr) IV Continuous <Continuous>  lactobacillus acidophilus 1 Tablet(s) Oral every 12 hours  piperacillin/tazobactam IVPB.. 3.375 Gram(s) IV Intermittent every 8 hours  senna 1 Tablet(s) Oral daily  timolol 0.5% Solution 1 Drop(s) Both EYES two times a day    MEDICATIONS  (PRN):  acetaminophen   Tablet .. 650 milliGRAM(s) Oral every 6 hours PRN Mild Pain (1 - 3)  bisacodyl Suppository 10 milliGRAM(s) Rectal daily PRN Constipation  dextrose 40% Gel 15 Gram(s) Oral once PRN Blood Glucose LESS THAN 70 milliGRAM(s)/deciliter  fentaNYL    Injectable 50 MICROGram(s) IV Push every 5 minutes PRN Severe Pain (7 - 10)  glucagon  Injectable 1 milliGRAM(s) IntraMuscular once PRN Glucose LESS THAN 70 milligrams/deciliter  oxycodone    5 mG/acetaminophen 325 mG 1 Tablet(s) Oral every 4 hours PRN Moderate Pain (4 - 6)  oxyCODONE    IR 5 milliGRAM(s) Oral once PRN Moderate Pain (4 - 6)  polyethylene glycol 3350 17 Gram(s) Oral daily PRN Constipation      Vital Signs Last 24 Hrs  T(C): 36.5 (23 Sep 2020 16:43), Max: 37.2 (23 Sep 2020 14:15)  T(F): 97.7 (23 Sep 2020 16:43), Max: 99 (23 Sep 2020 14:15)  HR: 74 (23 Sep 2020 17:00) (60 - 78)  BP: 153/73 (23 Sep 2020 17:00) (120/64 - 158/62)  BP(mean): 92 (23 Sep 2020 17:00) (81 - 113)  RR: 21 (23 Sep 2020 17:00) (15 - 21)  SpO2: 98% (23 Sep 2020 17:00) (97% - 100%)  CAPILLARY BLOOD GLUCOSE      POCT Blood Glucose.: 204 mg/dL (23 Sep 2020 12:13)  POCT Blood Glucose.: 187 mg/dL (23 Sep 2020 08:27)  POCT Blood Glucose.: 174 mg/dL (23 Sep 2020 05:26)  POCT Blood Glucose.: 226 mg/dL (22 Sep 2020 21:56)    I&O's Summary    23 Sep 2020 07:01  -  23 Sep 2020 17:14  --------------------------------------------------------  IN: 150 mL / OUT: 0 mL / NET: 150 mL          PHYSICAL EXAM  GENERAL: NAD, morbidly obese   CHEST/LUNG: Clear to auscultation bilaterally; No wheeze  HEART: Regular rate and rhythm;   ABDOMEN: Soft, Nontender, Nondistended; Bowel sounds present  EXTREMITIES:  diminished Peripheral Pulses, b/l  erythema+ , B/L pedal edema , erythema in RUE around IV site , slightly nodular     PSYCH: AAOx3  SKIN: left foot ulcer , with dressing , c/d/i. Left arm with erythema an ventral surface of forearm          LABS:                        10.3   9.61  )-----------( 208      ( 23 Sep 2020 05:08 )             33.1     09-23    140  |  102  |  25<H>  ----------------------------<  184<H>  4.1   |  25  |  1.52<H>    Ca    9.3      23 Sep 2020 05:08  Phos  3.1     09-23  Mg     2.1     09-23      PT/INR - ( 23 Sep 2020 05:08 )   PT: 12.3 SEC;   INR: 1.08          PTT - ( 23 Sep 2020 05:08 )  PTT:27.6 SEC      Consultant(s) Notes Reviewed:  yes  Care Discussed with Consultants/Other Providers:

## 2020-09-24 LAB
ANION GAP SERPL CALC-SCNC: 13 MMO/L — SIGNIFICANT CHANGE UP (ref 7–14)
BUN SERPL-MCNC: 27 MG/DL — HIGH (ref 7–23)
CALCIUM SERPL-MCNC: 9 MG/DL — SIGNIFICANT CHANGE UP (ref 8.4–10.5)
CHLORIDE SERPL-SCNC: 102 MMOL/L — SIGNIFICANT CHANGE UP (ref 98–107)
CO2 SERPL-SCNC: 26 MMOL/L — SIGNIFICANT CHANGE UP (ref 22–31)
CREAT SERPL-MCNC: 1.68 MG/DL — HIGH (ref 0.5–1.3)
GLUCOSE BLDC GLUCOMTR-MCNC: 127 MG/DL — HIGH (ref 70–99)
GLUCOSE BLDC GLUCOMTR-MCNC: 129 MG/DL — HIGH (ref 70–99)
GLUCOSE BLDC GLUCOMTR-MCNC: 214 MG/DL — HIGH (ref 70–99)
GLUCOSE BLDC GLUCOMTR-MCNC: 242 MG/DL — HIGH (ref 70–99)
GLUCOSE SERPL-MCNC: 267 MG/DL — HIGH (ref 70–99)
GRAM STN FLD: SIGNIFICANT CHANGE UP
HCT VFR BLD CALC: 33.2 % — LOW (ref 34.5–45)
HGB BLD-MCNC: 9.9 G/DL — LOW (ref 11.5–15.5)
MAGNESIUM SERPL-MCNC: 1.9 MG/DL — SIGNIFICANT CHANGE UP (ref 1.6–2.6)
MCHC RBC-ENTMCNC: 27.9 PG — SIGNIFICANT CHANGE UP (ref 27–34)
MCHC RBC-ENTMCNC: 29.8 % — LOW (ref 32–36)
MCV RBC AUTO: 93.5 FL — SIGNIFICANT CHANGE UP (ref 80–100)
NRBC # FLD: 0 K/UL — SIGNIFICANT CHANGE UP (ref 0–0)
PHOSPHATE SERPL-MCNC: 2.9 MG/DL — SIGNIFICANT CHANGE UP (ref 2.5–4.5)
PLATELET # BLD AUTO: 180 K/UL — SIGNIFICANT CHANGE UP (ref 150–400)
PMV BLD: 10.6 FL — SIGNIFICANT CHANGE UP (ref 7–13)
POTASSIUM SERPL-MCNC: 3.8 MMOL/L — SIGNIFICANT CHANGE UP (ref 3.5–5.3)
POTASSIUM SERPL-SCNC: 3.8 MMOL/L — SIGNIFICANT CHANGE UP (ref 3.5–5.3)
RBC # BLD: 3.55 M/UL — LOW (ref 3.8–5.2)
RBC # FLD: 13.7 % — SIGNIFICANT CHANGE UP (ref 10.3–14.5)
SARS-COV-2 RNA SPEC QL NAA+PROBE: SIGNIFICANT CHANGE UP
SODIUM SERPL-SCNC: 141 MMOL/L — SIGNIFICANT CHANGE UP (ref 135–145)
SPECIMEN SOURCE: SIGNIFICANT CHANGE UP
WBC # BLD: 10.48 K/UL — SIGNIFICANT CHANGE UP (ref 3.8–10.5)
WBC # FLD AUTO: 10.48 K/UL — SIGNIFICANT CHANGE UP (ref 3.8–10.5)

## 2020-09-24 PROCEDURE — 99233 SBSQ HOSP IP/OBS HIGH 50: CPT

## 2020-09-24 PROCEDURE — 99232 SBSQ HOSP IP/OBS MODERATE 35: CPT

## 2020-09-24 RX ORDER — CALAMINE AND ZINC OXIDE AND PHENOL 160; 10 MG/ML; MG/ML
1 LOTION TOPICAL ONCE
Refills: 0 | Status: COMPLETED | OUTPATIENT
Start: 2020-09-24 | End: 2020-09-24

## 2020-09-24 RX ORDER — INSULIN LISPRO 100/ML
14 VIAL (ML) SUBCUTANEOUS
Refills: 0 | Status: DISCONTINUED | OUTPATIENT
Start: 2020-09-24 | End: 2020-09-25

## 2020-09-24 RX ORDER — INSULIN GLARGINE 100 [IU]/ML
38 INJECTION, SOLUTION SUBCUTANEOUS AT BEDTIME
Refills: 0 | Status: DISCONTINUED | OUTPATIENT
Start: 2020-09-24 | End: 2020-09-25

## 2020-09-24 RX ADMIN — Medication 1 DROP(S): at 18:08

## 2020-09-24 RX ADMIN — Medication 100 MILLIGRAM(S): at 22:00

## 2020-09-24 RX ADMIN — AMLODIPINE BESYLATE 5 MILLIGRAM(S): 2.5 TABLET ORAL at 05:30

## 2020-09-24 RX ADMIN — HEPARIN SODIUM 5000 UNIT(S): 5000 INJECTION INTRAVENOUS; SUBCUTANEOUS at 22:00

## 2020-09-24 RX ADMIN — Medication 12 UNIT(S): at 12:37

## 2020-09-24 RX ADMIN — Medication 4: at 12:36

## 2020-09-24 RX ADMIN — Medication 200 MILLIGRAM(S): at 05:31

## 2020-09-24 RX ADMIN — INSULIN GLARGINE 38 UNIT(S): 100 INJECTION, SOLUTION SUBCUTANEOUS at 22:42

## 2020-09-24 RX ADMIN — Medication 100 MILLIGRAM(S): at 05:30

## 2020-09-24 RX ADMIN — Medication 1 DROP(S): at 05:31

## 2020-09-24 RX ADMIN — LOSARTAN POTASSIUM 100 MILLIGRAM(S): 100 TABLET, FILM COATED ORAL at 05:30

## 2020-09-24 RX ADMIN — ATORVASTATIN CALCIUM 40 MILLIGRAM(S): 80 TABLET, FILM COATED ORAL at 22:00

## 2020-09-24 RX ADMIN — Medication 1 TABLET(S): at 18:08

## 2020-09-24 RX ADMIN — Medication 325 MILLIGRAM(S): at 12:40

## 2020-09-24 RX ADMIN — Medication 4: at 08:43

## 2020-09-24 RX ADMIN — Medication 12 UNIT(S): at 08:43

## 2020-09-24 RX ADMIN — Medication 14 UNIT(S): at 18:06

## 2020-09-24 RX ADMIN — Medication 1 TABLET(S): at 05:31

## 2020-09-24 RX ADMIN — Medication 1 APPLICATION(S): at 12:41

## 2020-09-24 RX ADMIN — Medication 100 MILLIGRAM(S): at 12:41

## 2020-09-24 RX ADMIN — HEPARIN SODIUM 5000 UNIT(S): 5000 INJECTION INTRAVENOUS; SUBCUTANEOUS at 05:30

## 2020-09-24 RX ADMIN — HEPARIN SODIUM 5000 UNIT(S): 5000 INJECTION INTRAVENOUS; SUBCUTANEOUS at 12:38

## 2020-09-24 RX ADMIN — Medication 200 MILLIGRAM(S): at 18:13

## 2020-09-24 RX ADMIN — CALAMINE AND ZINC OXIDE AND PHENOL 1 APPLICATION(S): 160; 10 LOTION TOPICAL at 22:41

## 2020-09-24 NOTE — PROGRESS NOTE ADULT - ASSESSMENT
Pt is 1d s/p left foot partial 1st ray amputation (closed) 2/2 OM and left heel debridement    -Operative site viable without any signs of dehiscence. DSD applied.   -clean bone culture and biopsy taken for micro and path, respectively to determine course of post op abx (PO vs IV).  Very low concern for residual infection as level of amputation was 1 joint proximal and quality of the remaining tissue intra-op was viable and appeared healthy. Pt likely will be DC on PO abx  -Patient opting for rehab as she has no one to help her at home  -Home/rehab Wound care:  Left heel ulcer apply santyl and DSD daily.  DSD to be applied to the amputation site of the left foot.  -Z floats while in bed  -Cont IV abx while in house  -WBAT to left foot in surgical shoe for transferring only.  Ideally patietn should be off of her left foot until sutures are removed (2 weeks s/p)  -Ordered surgical shoe  -Wound care orders placed for nursing  -Will follow while in house

## 2020-09-24 NOTE — PHYSICAL THERAPY INITIAL EVALUATION ADULT - ACTIVE RANGE OF MOTION EXAMINATION, REHAB EVAL
except not tested on left foot/ankle s/p Sx/bilateral upper extremity Active ROM was WFL (within functional limits)/bilateral  lower extremity Active ROM was WFL (within functional limits)

## 2020-09-24 NOTE — PROGRESS NOTE ADULT - ASSESSMENT
Ms. Gonzalez is a 80 y/o woman with HX of DM type 2 c/b diabetic neuropathy, HTN, HLD, PVD, morbid Obesity, presenting with chronic ulcer of Left 1st toe and Left heel admitted for Left foot hallux plantar and dorsal wound with associated cellulitis and underlying osteomyelitis. Pt S/P bedside I&D of LF hallux wound. S/p left foot partial 1st ray amputation on 9/23 by podiatry.

## 2020-09-24 NOTE — PHYSICAL THERAPY INITIAL EVALUATION ADULT - GENERAL OBSERVATIONS, REHAB EVAL
Patient received semi supine in bed, (+) dressing on left foot , (+) obese , cast shoes on bilateral feet , in no apparent distress .

## 2020-09-24 NOTE — PHYSICAL THERAPY INITIAL EVALUATION ADULT - GAIT DEVIATIONS NOTED, PT EVAL
decreased gianna/decreased velocity of limb motion/decreased step length/decreased weight-shifting ability

## 2020-09-24 NOTE — PROGRESS NOTE ADULT - SUBJECTIVE AND OBJECTIVE BOX
ANESTHESIA POSTOP CHECK    79y Female POSTOP DAY 1 S/P left toe amp    [x ] NO APPARENT ANESTHESIA COMPLICATIONS      Comments:

## 2020-09-24 NOTE — PHYSICAL THERAPY INITIAL EVALUATION ADULT - ADDITIONAL COMMENTS
As per Podiatry MD Malu Larsen, WBAT to left foot in surgical shoe for transferring only.  Ideally patient should be off of her left foot until sutures are removed (2 weeks s/p)

## 2020-09-24 NOTE — PROGRESS NOTE ADULT - SUBJECTIVE AND OBJECTIVE BOX
Chief Complaint: DM 2 with hyperglycemia    History: Patient s/p OR yesterday for left foot partial 1st ray resection. Today, patient reports she is eating meals, denies n/v, denies s/s of hypoglycemia. Most recent BG above target at 214 mg/dl.   Patient reports she uses vial and syringe insulin at home. Attempted return demonstration with RN while admitted here x1, was NOT able to successfully draw up to correct dose. Reports she will practice again today. Patient is not interested in learning insulin pen, but understands if she cannot safely use vial; the pen may be a better option.     MEDICATIONS  (STANDING):  amLODIPine   Tablet 5 milliGRAM(s) Oral daily  aspirin enteric coated 325 milliGRAM(s) Oral daily  atorvastatin 40 milliGRAM(s) Oral at bedtime  ceFAZolin   IVPB 2000 milliGRAM(s) IV Intermittent every 8 hours  collagenase Ointment 1 Application(s) Topical daily  dextrose 5%. 1000 milliLiter(s) (50 mL/Hr) IV Continuous <Continuous>  dextrose 50% Injectable 12.5 Gram(s) IV Push once  dextrose 50% Injectable 25 Gram(s) IV Push once  dextrose 50% Injectable 25 Gram(s) IV Push once  heparin   Injectable 5000 Unit(s) SubCutaneous every 8 hours  influenza   Vaccine 0.5 milliLiter(s) IntraMuscular once  insulin glargine Injectable (LANTUS) 36 Unit(s) SubCutaneous at bedtime  insulin lispro (HumaLOG) corrective regimen sliding scale   SubCutaneous three times a day before meals  insulin lispro (HumaLOG) corrective regimen sliding scale   SubCutaneous at bedtime  insulin lispro Injectable (HumaLOG) 12 Unit(s) SubCutaneous three times a day before meals  labetalol 200 milliGRAM(s) Oral two times a day  lactobacillus acidophilus 1 Tablet(s) Oral every 12 hours  losartan 100 milliGRAM(s) Oral daily  senna 1 Tablet(s) Oral daily  timolol 0.5% Solution 1 Drop(s) Both EYES two times a day    MEDICATIONS  (PRN):  bisacodyl Suppository 10 milliGRAM(s) Rectal daily PRN Constipation  dextrose 40% Gel 15 Gram(s) Oral once PRN Blood Glucose LESS THAN 70 milliGRAM(s)/deciliter  glucagon  Injectable 1 milliGRAM(s) IntraMuscular once PRN Glucose LESS THAN 70 milligrams/deciliter  oxycodone    5 mG/acetaminophen 325 mG 1 Tablet(s) Oral every 4 hours PRN Moderate Pain (4 - 6)  polyethylene glycol 3350 17 Gram(s) Oral daily PRN Constipation    No Known Allergies    Review of Systems:  HEENT: No pain  Cardiovascular: No chest pain  Respiratory: No SOB  GI: No nausea, vomiting    PHYSICAL EXAM:  VITALS: T(C): 36.9 (09-24-20 @ 05:28)  T(F): 98.4 (09-24-20 @ 05:28), Max: 98.9 (09-23-20 @ 19:00)  HR: 72 (09-24-20 @ 05:28) (66 - 83)  BP: 150/52 (09-24-20 @ 05:28) (126/105 - 174/61)  RR:  (15 - 22)  SpO2:  (93% - 99%)  Wt(kg): --  GENERAL: NAD  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, no lid lag, anicteric  HEENT:  Atraumatic, Normocephalic, moist mucous membranes  RESPIRATORY: unlabored respirations   PSYCH: Alert and oriented x 3    CAPILLARY BLOOD GLUCOSE    POCT Blood Glucose.: 214 mg/dL (24 Sep 2020 12:05)  POCT Blood Glucose.: 242 mg/dL (24 Sep 2020 08:32)  POCT Blood Glucose.: 242 mg/dL (23 Sep 2020 22:30)  POCT Blood Glucose.: 154 mg/dL (23 Sep 2020 18:51)      09-24    141  |  102  |  27<H>  ----------------------------<  267<H>  3.8   |  26  |  1.68<H>    EGFR if : 33  EGFR if non : 29    Ca    9.0      09-24  Mg     1.9     09-24  Phos  2.9     09-24        Thyroid Function Tests:  09-17 @ 05:52 TSH 2.29 FreeT4 1.10 T3 -- Anti TPO -- Anti Thyroglobulin Ab -- TSI --      A1C with Estimated Average Glucose: 9.3 % (09-17-20 @ 05:52)

## 2020-09-24 NOTE — PROGRESS NOTE ADULT - PROBLEM SELECTOR PLAN 3
ENDO consulted, recs reviewed and appreciated. Not optimally controlled -  Hgba1c 9.3. TSH, free T4 wnl , UA, Urine noted with proteinuria and glucosuria. pt is on NPH BID at home,  - increase to Lantus 38  units QHS  - Humalog 14 units Before meals TID  c/w , FS, sliding scale,   - c/w home regimen of losartan for renal protection,   diabetic education

## 2020-09-24 NOTE — PROGRESS NOTE ADULT - PROBLEM SELECTOR PLAN 1
Podiatry following, Vascular  sx consulted,  MUSHTAQ/PVR noted - no further intervention as per Vascular surgery  Pt met clinical dx for osteomyelitis since Dorsal and plantar hallux wounds probe to bone, 9/21 MRI also with findings c/w osteomyelitis.   9/16 abscess cx growing staphylococcus lugdunensis   -c/w cefazolin 2g IV q8h for now  - f/u OR cultures and bone pathology  -  Wound care:  Left heel ulcer apply santyl and DSD daily.  DSD to be applied to the amputation site of the left foot.  -Z floats while in bed  -WBAT to left foot in surgical shoe for transferring only.  Ideally patient should be off of her left foot until sutures are removed (2 weeks s/p)

## 2020-09-24 NOTE — PROGRESS NOTE ADULT - SUBJECTIVE AND OBJECTIVE BOX
Patient is a 79y old  Female who presents with a chief complaint of diabetic foot ulcer, Left first toe, and left heel , R/O Osteomyelitis, (22 Sep 2020 19:23)      INTERVAL HPI/OVERNIGHT EVENTS:   Pt is scheduled for Left foot partial 1st ray resection with Dr. Ruiz at 3 PM. Patient is aware of procedure and is NPO since midnight.    MEDICATIONS  (STANDING):  amLODIPine   Tablet 5 milliGRAM(s) Oral daily  aspirin enteric coated 325 milliGRAM(s) Oral daily  collagenase Ointment 1 Application(s) Topical daily  dextrose 5%. 1000 milliLiter(s) (50 mL/Hr) IV Continuous <Continuous>  dextrose 50% Injectable 12.5 Gram(s) IV Push once  dextrose 50% Injectable 25 Gram(s) IV Push once  dextrose 50% Injectable 25 Gram(s) IV Push once  heparin   Injectable 5000 Unit(s) SubCutaneous every 8 hours  influenza   Vaccine 0.5 milliLiter(s) IntraMuscular once  insulin glargine Injectable (LANTUS) 29 Unit(s) SubCutaneous at bedtime  insulin lispro (HumaLOG) corrective regimen sliding scale   SubCutaneous at bedtime  insulin lispro (HumaLOG) corrective regimen sliding scale   SubCutaneous three times a day before meals  insulin lispro Injectable (HumaLOG) 12 Unit(s) SubCutaneous three times a day before meals  labetalol 200 milliGRAM(s) Oral two times a day  lactobacillus acidophilus 1 Tablet(s) Oral every 12 hours  piperacillin/tazobactam IVPB.. 3.375 Gram(s) IV Intermittent every 8 hours  senna 1 Tablet(s) Oral daily  timolol 0.5% Solution 1 Drop(s) Both EYES two times a day    MEDICATIONS  (PRN):  bisacodyl Suppository 10 milliGRAM(s) Rectal daily PRN Constipation  dextrose 40% Gel 15 Gram(s) Oral once PRN Blood Glucose LESS THAN 70 milliGRAM(s)/deciliter  glucagon  Injectable 1 milliGRAM(s) IntraMuscular once PRN Glucose LESS THAN 70 milligrams/deciliter  polyethylene glycol 3350 17 Gram(s) Oral daily PRN Constipation      Allergies    No Known Allergies    Intolerances        Vital Signs Last 24 Hrs  T(C): 36.6 (23 Sep 2020 05:15), Max: 36.9 (22 Sep 2020 20:54)  T(F): 97.9 (23 Sep 2020 05:15), Max: 98.4 (22 Sep 2020 20:54)  HR: 67 (23 Sep 2020 05:15) (67 - 86)  BP: 154/58 (23 Sep 2020 05:15) (146/43 - 158/62)  BP(mean): --  RR: 18 (23 Sep 2020 05:15) (17 - 18)  SpO2: 97% (23 Sep 2020 05:15) (97% - 100%)    LABS:                        10.3   9.61  )-----------( 208      ( 23 Sep 2020 05:08 )             33.1     09-23    140  |  102  |  25<H>  ----------------------------<  184<H>  4.1   |  25  |  1.52<H>    Ca    9.3      23 Sep 2020 05:08  Phos  3.1     09-23  Mg     2.1     09-23      PT/INR - ( 23 Sep 2020 05:08 )   PT: 12.3 SEC;   INR: 1.08          PTT - ( 23 Sep 2020 05:08 )  PTT:27.6 SEC    CAPILLARY BLOOD GLUCOSE      POCT Blood Glucose.: 174 mg/dL (23 Sep 2020 05:26)  POCT Blood Glucose.: 226 mg/dL (22 Sep 2020 21:56)  POCT Blood Glucose.: 143 mg/dL (22 Sep 2020 17:14)  POCT Blood Glucose.: 197 mg/dL (22 Sep 2020 11:57)  POCT Blood Glucose.: 172 mg/dL (22 Sep 2020 08:24)      RADIOLOGY & ADDITIONAL TESTS:      CHRISTINE:  Left foot amputation site with sutures intact, no signs of dehiscence, no hematoma or seroma. No erythema, edema, drainage, malodor or local signs of infection   Left heel ulceration measuring 4cm x 4 cm x 0 cm depth 100% granular without signs of local infection    Pedal pulses weakly palp 1/4 b/l, TG WNL, cap fill instant to remaining toes and amputation flap    s/p left foot partial 1st ray amputation    Sensation absent to b/l feet

## 2020-09-24 NOTE — PHYSICAL THERAPY INITIAL EVALUATION ADULT - PERTINENT HX OF CURRENT PROBLEM, REHAB EVAL
This is a 79y old  Female who presents with a chief complaint of diabetic foot ulcer, Left first toe, and left heel , R/O Osteomyelitis, is now s/p left foot  partial 1st ray resection

## 2020-09-24 NOTE — PROGRESS NOTE ADULT - ASSESSMENT
79 year old woman with PMH of HTN, HLD, DM type 2, diabetic neuropathy, PVD, Obesity, presents with chronic ulcer of LT 1st toe and LT heel, s/p left foot partial 1st ray resection 9/23/20. Endocrine consulted for hyperglycemia in setting of uncontrolled DM2.     1. Uncontrolled type 2 diabetes mellitus with hyperglycemia.   -A1c is 9.3%, goal less than 7.5% without hypoglycemia   -Inpatient BG target 100-180 mg/dl  -Patient above target today - hyperglycemia   -Recommend to increase Lantus to 38 units SQ qHS   -Note that while NPO, patient did well with a 20% reduction, Lantus 29 units. Please use this dose if NPO again.   -Increase Humalog to 14 units SQ TID before meals (Hold if not eating)  -Continue Humalog MODERATE dose correctional scale before meals  -Continue Humalog LOW dose at bedtime   -Check BG before meals and bedtime  -Consistent carb diet, RD consult done  -DM education: Please document successful (or unsuccessful) return demonstration in DM [facility] of insulin vial/syringe use.    Discharge Plan:  -Ideally would dc her home on basal bolus insulin pens - given A1c and need for tighter glucose control for wound healing. However, patient is unsure if she can do 4x per day injections, would be doing independently, lives alone. Could dc instead on Novolog 70/30 or Novolin/Humulin 70/30  -Patient also reporting she prefers vial/syringe VS insulin pens - however, will need to further assess visual deficits to see which one is safer. Anticipate that insulin pens would actually benefit patient, given she can listen to the clicks for dosing    -Plan TBD based on above assessments & patient preference  -Recommend to send new prescription for glucometer (consider Prodigy "Talking" glucometer that can read result to patient), glucose test strips, lancets, alcohol swabs  -Followup with [facility] if desired- [address], [address], [place] [address], [phone].     2. Essential hypertension  -Goal less than 130/80  -Above goal - adjustments per primary team.     3. Hyperlipidemia, unspecified hyperlipidemia type  -LDL target less than 70  -LDL 46 (9/17/20)  -Continue monitoring as outpatient.     Brenda Pagan  Nurse Practitioner  Division of Endocrinology & Diabetes  In house pager #[phone]/long range pager #[phone]    If before 9AM or after 6PM, or on weekends/holidays, please call endocrine answering service for assistance ([phone]).  For nonurgent matters email [email or web address] for assistance.

## 2020-09-24 NOTE — PROGRESS NOTE ADULT - SUBJECTIVE AND OBJECTIVE BOX
Patient is a 79y old  Female who presents with a chief complaint of diabetic foot ulcer, Left first toe, and left heel , R/O Osteomyelitis, (24 Sep 2020 10:59)        SUBJECTIVE / OVERNIGHT EVENTS:      MEDICATIONS  (STANDING):  amLODIPine   Tablet 5 milliGRAM(s) Oral daily  aspirin enteric coated 325 milliGRAM(s) Oral daily  atorvastatin 40 milliGRAM(s) Oral at bedtime  ceFAZolin   IVPB 2000 milliGRAM(s) IV Intermittent every 8 hours  collagenase Ointment 1 Application(s) Topical daily  dextrose 5%. 1000 milliLiter(s) (50 mL/Hr) IV Continuous <Continuous>  dextrose 50% Injectable 12.5 Gram(s) IV Push once  dextrose 50% Injectable 25 Gram(s) IV Push once  dextrose 50% Injectable 25 Gram(s) IV Push once  heparin   Injectable 5000 Unit(s) SubCutaneous every 8 hours  influenza   Vaccine 0.5 milliLiter(s) IntraMuscular once  insulin glargine Injectable (LANTUS) 36 Unit(s) SubCutaneous at bedtime  insulin lispro (HumaLOG) corrective regimen sliding scale   SubCutaneous three times a day before meals  insulin lispro (HumaLOG) corrective regimen sliding scale   SubCutaneous at bedtime  insulin lispro Injectable (HumaLOG) 12 Unit(s) SubCutaneous three times a day before meals  labetalol 200 milliGRAM(s) Oral two times a day  lactobacillus acidophilus 1 Tablet(s) Oral every 12 hours  losartan 100 milliGRAM(s) Oral daily  senna 1 Tablet(s) Oral daily  timolol 0.5% Solution 1 Drop(s) Both EYES two times a day    MEDICATIONS  (PRN):  bisacodyl Suppository 10 milliGRAM(s) Rectal daily PRN Constipation  dextrose 40% Gel 15 Gram(s) Oral once PRN Blood Glucose LESS THAN 70 milliGRAM(s)/deciliter  glucagon  Injectable 1 milliGRAM(s) IntraMuscular once PRN Glucose LESS THAN 70 milligrams/deciliter  oxycodone    5 mG/acetaminophen 325 mG 1 Tablet(s) Oral every 4 hours PRN Moderate Pain (4 - 6)  polyethylene glycol 3350 17 Gram(s) Oral daily PRN Constipation      Vital Signs Last 24 Hrs  T(C): 36.9 (24 Sep 2020 05:28), Max: 37.2 (23 Sep 2020 19:00)  T(F): 98.4 (24 Sep 2020 05:28), Max: 98.9 (23 Sep 2020 19:00)  HR: 72 (24 Sep 2020 05:28) (66 - 83)  BP: 150/52 (24 Sep 2020 05:28) (126/105 - 174/61)  BP(mean): 94 (23 Sep 2020 17:45) (81 - 113)  RR: 18 (24 Sep 2020 05:28) (15 - 22)  SpO2: 98% (24 Sep 2020 05:28) (93% - 99%)  CAPILLARY BLOOD GLUCOSE      POCT Blood Glucose.: 214 mg/dL (24 Sep 2020 12:05)  POCT Blood Glucose.: 242 mg/dL (24 Sep 2020 08:32)  POCT Blood Glucose.: 242 mg/dL (23 Sep 2020 22:30)  POCT Blood Glucose.: 154 mg/dL (23 Sep 2020 18:51)    I&O's Summary    23 Sep 2020 07:01  -  24 Sep 2020 07:00  --------------------------------------------------------  IN: 150 mL / OUT: 25 mL / NET: 125 mL          PHYSICAL EXAM  GENERAL: NAD, well-developed  HEAD:  Atraumatic, Normocephalic  EYES: EOMI, PERRLA, conjunctiva and sclera clear  NECK: Supple, No JVD  CHEST/LUNG: Clear to auscultation bilaterally; No wheeze  HEART: Regular rate and rhythm; No murmurs, rubs, or gallops  ABDOMEN: Soft, Nontender, Nondistended; Bowel sounds present  EXTREMITIES:  2+ Peripheral Pulses, No clubbing, cyanosis, or edema  PSYCH: AAOx3  SKIN: No rashes or lesions    LABS:                        9.9    10.48 )-----------( 180      ( 24 Sep 2020 05:20 )             33.2     09-24    141  |  102  |  27<H>  ----------------------------<  267<H>  3.8   |  26  |  1.68<H>    Ca    9.0      24 Sep 2020 05:20  Phos  2.9     09-24  Mg     1.9     09-24      PT/INR - ( 23 Sep 2020 05:08 )   PT: 12.3 SEC;   INR: 1.08          PTT - ( 23 Sep 2020 05:08 )  PTT:27.6 SEC          RADIOLOGY & ADDITIONAL TESTS:    Imaging Personally Reviewed:  Consultant(s) Notes Reviewed:    Care Discussed with Consultants/Other Providers:   Patient is a 79y old  Female who presents with a chief complaint of diabetic foot ulcer, Left first toe, and left heel , R/O Osteomyelitis, (24 Sep 2020 10:59)    SUBJECTIVE / OVERNIGHT EVENTS:  Patient concerned about her FSG levels being in the 200s. She has no headache, fever, chills, chest pain, SOB, n/v or abdominal pain. Patient denied pain in left foot. Pt was able to stand during her PT session today.     MEDICATIONS  (STANDING):  amLODIPine   Tablet 5 milliGRAM(s) Oral daily  aspirin enteric coated 325 milliGRAM(s) Oral daily  atorvastatin 40 milliGRAM(s) Oral at bedtime  ceFAZolin   IVPB 2000 milliGRAM(s) IV Intermittent every 8 hours  collagenase Ointment 1 Application(s) Topical daily  dextrose 5%. 1000 milliLiter(s) (50 mL/Hr) IV Continuous <Continuous>  dextrose 50% Injectable 12.5 Gram(s) IV Push once  dextrose 50% Injectable 25 Gram(s) IV Push once  dextrose 50% Injectable 25 Gram(s) IV Push once  heparin   Injectable 5000 Unit(s) SubCutaneous every 8 hours  influenza   Vaccine 0.5 milliLiter(s) IntraMuscular once  insulin glargine Injectable (LANTUS) 36 Unit(s) SubCutaneous at bedtime  insulin lispro (HumaLOG) corrective regimen sliding scale   SubCutaneous three times a day before meals  insulin lispro (HumaLOG) corrective regimen sliding scale   SubCutaneous at bedtime  insulin lispro Injectable (HumaLOG) 12 Unit(s) SubCutaneous three times a day before meals  labetalol 200 milliGRAM(s) Oral two times a day  lactobacillus acidophilus 1 Tablet(s) Oral every 12 hours  losartan 100 milliGRAM(s) Oral daily  senna 1 Tablet(s) Oral daily  timolol 0.5% Solution 1 Drop(s) Both EYES two times a day    MEDICATIONS  (PRN):  bisacodyl Suppository 10 milliGRAM(s) Rectal daily PRN Constipation  dextrose 40% Gel 15 Gram(s) Oral once PRN Blood Glucose LESS THAN 70 milliGRAM(s)/deciliter  glucagon  Injectable 1 milliGRAM(s) IntraMuscular once PRN Glucose LESS THAN 70 milligrams/deciliter  oxycodone    5 mG/acetaminophen 325 mG 1 Tablet(s) Oral every 4 hours PRN Moderate Pain (4 - 6)  polyethylene glycol 3350 17 Gram(s) Oral daily PRN Constipation      Vital Signs Last 24 Hrs  T(C): 36.9 (24 Sep 2020 05:28), Max: 37.2 (23 Sep 2020 19:00)  T(F): 98.4 (24 Sep 2020 05:28), Max: 98.9 (23 Sep 2020 19:00)  HR: 72 (24 Sep 2020 05:28) (66 - 83)  BP: 150/52 (24 Sep 2020 05:28) (126/105 - 174/61)  BP(mean): 94 (23 Sep 2020 17:45) (81 - 113)  RR: 18 (24 Sep 2020 05:28) (15 - 22)  SpO2: 98% (24 Sep 2020 05:28) (93% - 99%)  CAPILLARY BLOOD GLUCOSE      POCT Blood Glucose.: 214 mg/dL (24 Sep 2020 12:05)  POCT Blood Glucose.: 242 mg/dL (24 Sep 2020 08:32)  POCT Blood Glucose.: 242 mg/dL (23 Sep 2020 22:30)  POCT Blood Glucose.: 154 mg/dL (23 Sep 2020 18:51)    I&O's Summary    23 Sep 2020 07:01  -  24 Sep 2020 07:00  --------------------------------------------------------  IN: 150 mL / OUT: 25 mL / NET: 125 mL          PHYSICAL EXAM  GENERAL: NAD, morbidly obese   CHEST/LUNG: Clear to auscultation bilaterally; No wheeze  HEART: Regular rate and rhythm;   ABDOMEN: Soft, Nontender, Nondistended; Bowel sounds present  EXTREMITIES:  Left foot with dressing c/d/i. Limited ROM due to deconditioning but intact   PSYCH: AAOx3          LABS:                        9.9    10.48 )-----------( 180      ( 24 Sep 2020 05:20 )             33.2     09-24    141  |  102  |  27<H>  ----------------------------<  267<H>  3.8   |  26  |  1.68<H>    Ca    9.0      24 Sep 2020 05:20  Phos  2.9     09-24  Mg     1.9     09-24      PT/INR - ( 23 Sep 2020 05:08 )   PT: 12.3 SEC;   INR: 1.08          PTT - ( 23 Sep 2020 05:08 )  PTT:27.6 SEC        Consultant(s) Notes Reviewed:  yes  Care Discussed with Consultants/Other Providers:

## 2020-09-24 NOTE — PROGRESS NOTE ADULT - PROBLEM SELECTOR PLAN 4
Initial concern for ARABELLA but Cr relatively stable and did not meet clinical criteria for ARABELLA. Pt with likely underlying CKD from chronic medical conditions  Urine Culture with normal pari.  bladder scan wnl  - losartan 100mg daily. Will d/c if pt develops hyperkalemia  - avoid nephrotoxics

## 2020-09-25 ENCOUNTER — TRANSCRIPTION ENCOUNTER (OUTPATIENT)
Age: 80
End: 2020-09-25

## 2020-09-25 VITALS
SYSTOLIC BLOOD PRESSURE: 141 MMHG | TEMPERATURE: 99 F | HEART RATE: 89 BPM | RESPIRATION RATE: 18 BRPM | OXYGEN SATURATION: 98 % | DIASTOLIC BLOOD PRESSURE: 57 MMHG

## 2020-09-25 PROBLEM — I73.9 PERIPHERAL VASCULAR DISEASE, UNSPECIFIED: Chronic | Status: ACTIVE | Noted: 2020-09-16

## 2020-09-25 PROBLEM — E66.9 OBESITY, UNSPECIFIED: Chronic | Status: ACTIVE | Noted: 2020-09-16

## 2020-09-25 LAB
ANION GAP SERPL CALC-SCNC: 14 MMO/L — SIGNIFICANT CHANGE UP (ref 7–14)
BUN SERPL-MCNC: 24 MG/DL — HIGH (ref 7–23)
CALCIUM SERPL-MCNC: 9.3 MG/DL — SIGNIFICANT CHANGE UP (ref 8.4–10.5)
CHLORIDE SERPL-SCNC: 103 MMOL/L — SIGNIFICANT CHANGE UP (ref 98–107)
CO2 SERPL-SCNC: 25 MMOL/L — SIGNIFICANT CHANGE UP (ref 22–31)
CREAT SERPL-MCNC: 1.57 MG/DL — HIGH (ref 0.5–1.3)
GLUCOSE BLDC GLUCOMTR-MCNC: 125 MG/DL — HIGH (ref 70–99)
GLUCOSE BLDC GLUCOMTR-MCNC: 137 MG/DL — HIGH (ref 70–99)
GLUCOSE SERPL-MCNC: 129 MG/DL — HIGH (ref 70–99)
HCT VFR BLD CALC: 33 % — LOW (ref 34.5–45)
HGB BLD-MCNC: 10.2 G/DL — LOW (ref 11.5–15.5)
MAGNESIUM SERPL-MCNC: 1.9 MG/DL — SIGNIFICANT CHANGE UP (ref 1.6–2.6)
MCHC RBC-ENTMCNC: 28.4 PG — SIGNIFICANT CHANGE UP (ref 27–34)
MCHC RBC-ENTMCNC: 30.9 % — LOW (ref 32–36)
MCV RBC AUTO: 91.9 FL — SIGNIFICANT CHANGE UP (ref 80–100)
NRBC # FLD: 0 K/UL — SIGNIFICANT CHANGE UP (ref 0–0)
PHOSPHATE SERPL-MCNC: 2.7 MG/DL — SIGNIFICANT CHANGE UP (ref 2.5–4.5)
PLATELET # BLD AUTO: 171 K/UL — SIGNIFICANT CHANGE UP (ref 150–400)
PMV BLD: 10.6 FL — SIGNIFICANT CHANGE UP (ref 7–13)
POTASSIUM SERPL-MCNC: 3.9 MMOL/L — SIGNIFICANT CHANGE UP (ref 3.5–5.3)
POTASSIUM SERPL-SCNC: 3.9 MMOL/L — SIGNIFICANT CHANGE UP (ref 3.5–5.3)
RBC # BLD: 3.59 M/UL — LOW (ref 3.8–5.2)
RBC # FLD: 13.9 % — SIGNIFICANT CHANGE UP (ref 10.3–14.5)
SODIUM SERPL-SCNC: 142 MMOL/L — SIGNIFICANT CHANGE UP (ref 135–145)
WBC # BLD: 9.23 K/UL — SIGNIFICANT CHANGE UP (ref 3.8–10.5)
WBC # FLD AUTO: 9.23 K/UL — SIGNIFICANT CHANGE UP (ref 3.8–10.5)

## 2020-09-25 PROCEDURE — 99239 HOSP IP/OBS DSCHRG MGMT >30: CPT

## 2020-09-25 RX ORDER — INSULIN GLARGINE 100 [IU]/ML
38 INJECTION, SOLUTION SUBCUTANEOUS
Qty: 0 | Refills: 0 | DISCHARGE
Start: 2020-09-25

## 2020-09-25 RX ORDER — HUMAN INSULIN 100 [IU]/ML
40 INJECTION, SUSPENSION SUBCUTANEOUS
Qty: 0 | Refills: 0 | DISCHARGE

## 2020-09-25 RX ORDER — COLLAGENASE CLOSTRIDIUM HIST. 250 UNIT/G
1 OINTMENT (GRAM) TOPICAL
Qty: 0 | Refills: 0 | DISCHARGE
Start: 2020-09-25

## 2020-09-25 RX ORDER — AMLODIPINE BESYLATE 2.5 MG/1
1 TABLET ORAL
Qty: 0 | Refills: 0 | DISCHARGE
Start: 2020-09-25

## 2020-09-25 RX ORDER — POLYETHYLENE GLYCOL 3350 17 G/17G
17 POWDER, FOR SOLUTION ORAL
Qty: 0 | Refills: 0 | DISCHARGE
Start: 2020-09-25

## 2020-09-25 RX ORDER — INSULIN LISPRO 100/ML
14 VIAL (ML) SUBCUTANEOUS
Qty: 15 | Refills: 0
Start: 2020-09-25 | End: 2020-10-24

## 2020-09-25 RX ORDER — HUMAN INSULIN 100 [IU]/ML
30 INJECTION, SUSPENSION SUBCUTANEOUS
Qty: 0 | Refills: 0 | DISCHARGE

## 2020-09-25 RX ORDER — SENNA PLUS 8.6 MG/1
1 TABLET ORAL
Qty: 0 | Refills: 0 | DISCHARGE
Start: 2020-09-25

## 2020-09-25 RX ADMIN — Medication 1 DROP(S): at 05:15

## 2020-09-25 RX ADMIN — Medication 1 TABLET(S): at 05:14

## 2020-09-25 RX ADMIN — AMLODIPINE BESYLATE 5 MILLIGRAM(S): 2.5 TABLET ORAL at 05:13

## 2020-09-25 RX ADMIN — Medication 325 MILLIGRAM(S): at 12:22

## 2020-09-25 RX ADMIN — Medication 100 MILLIGRAM(S): at 05:13

## 2020-09-25 RX ADMIN — LOSARTAN POTASSIUM 100 MILLIGRAM(S): 100 TABLET, FILM COATED ORAL at 05:14

## 2020-09-25 RX ADMIN — Medication 14 UNIT(S): at 12:20

## 2020-09-25 RX ADMIN — Medication 200 MILLIGRAM(S): at 05:14

## 2020-09-25 RX ADMIN — HEPARIN SODIUM 5000 UNIT(S): 5000 INJECTION INTRAVENOUS; SUBCUTANEOUS at 05:14

## 2020-09-25 RX ADMIN — Medication 1 APPLICATION(S): at 12:22

## 2020-09-25 RX ADMIN — Medication 14 UNIT(S): at 08:46

## 2020-09-25 NOTE — DISCHARGE NOTE PROVIDER - INSTRUCTIONS
Consistent carbohydrate with evening snack  GERD diet  Sodium and cholesterol restricted  Low fat diet

## 2020-09-25 NOTE — PROGRESS NOTE ADULT - PROBLEM SELECTOR PROBLEM 1
Diabetic foot ulcer
Uncontrolled type 2 diabetes mellitus with hyperglycemia
Uncontrolled type 2 diabetes mellitus with hyperglycemia
Diabetic foot ulcer
Uncontrolled type 2 diabetes mellitus with hyperglycemia
Diabetic foot ulcer
Diabetic foot ulcer
Uncontrolled type 2 diabetes mellitus with hyperglycemia
Diabetic foot ulcer

## 2020-09-25 NOTE — DISCHARGE NOTE PROVIDER - NSDCFUADDINST_GEN_ALL_CORE_FT
Podiatry Discharge Instructions:  Follow up: Please follow up with Dr. Ruiz within 1 week of discharge from the hospital, please call [place] office: [phone] or [place] office: [phone] for appointment and discuss that you recently were seen in the hospital.  Wound Care: Please leave your dressing clean dry intact until your follow up appointment ..... OR Please apply _________  Weight bearing: Please weight bear as tolerated in a surgical shoe.  Antibiotics: Please continue as instructed.

## 2020-09-25 NOTE — PROGRESS NOTE ADULT - SUBJECTIVE AND OBJECTIVE BOX
Patient is a 79y old  Female who presents with a chief complaint of diabetic foot ulcer, Left first toe, and left heel , R/O Osteomyelitis, (25 Sep 2020 10:53)    SUBJECTIVE / OVERNIGHT EVENTS:  Patient without any acute complaints. Patient without any pain. No fever, chills, chest pain, SOb, n/v or abdominal pain    MEDICATIONS  (STANDING):  amLODIPine   Tablet 5 milliGRAM(s) Oral daily  aspirin enteric coated 325 milliGRAM(s) Oral daily  atorvastatin 40 milliGRAM(s) Oral at bedtime  ceFAZolin   IVPB 2000 milliGRAM(s) IV Intermittent every 8 hours  collagenase Ointment 1 Application(s) Topical daily  dextrose 5%. 1000 milliLiter(s) (50 mL/Hr) IV Continuous <Continuous>  dextrose 50% Injectable 12.5 Gram(s) IV Push once  dextrose 50% Injectable 25 Gram(s) IV Push once  dextrose 50% Injectable 25 Gram(s) IV Push once  heparin   Injectable 5000 Unit(s) SubCutaneous every 8 hours  influenza   Vaccine 0.5 milliLiter(s) IntraMuscular once  insulin glargine Injectable (LANTUS) 38 Unit(s) SubCutaneous at bedtime  insulin lispro (HumaLOG) corrective regimen sliding scale   SubCutaneous at bedtime  insulin lispro (HumaLOG) corrective regimen sliding scale   SubCutaneous three times a day before meals  insulin lispro Injectable (HumaLOG) 14 Unit(s) SubCutaneous three times a day before meals  labetalol 200 milliGRAM(s) Oral two times a day  lactobacillus acidophilus 1 Tablet(s) Oral every 12 hours  losartan 100 milliGRAM(s) Oral daily  senna 1 Tablet(s) Oral daily  timolol 0.5% Solution 1 Drop(s) Both EYES two times a day    MEDICATIONS  (PRN):  bisacodyl Suppository 10 milliGRAM(s) Rectal daily PRN Constipation  dextrose 40% Gel 15 Gram(s) Oral once PRN Blood Glucose LESS THAN 70 milliGRAM(s)/deciliter  glucagon  Injectable 1 milliGRAM(s) IntraMuscular once PRN Glucose LESS THAN 70 milligrams/deciliter  oxycodone    5 mG/acetaminophen 325 mG 1 Tablet(s) Oral every 4 hours PRN Moderate Pain (4 - 6)  polyethylene glycol 3350 17 Gram(s) Oral daily PRN Constipation      Vital Signs Last 24 Hrs  T(C): 37.1 (25 Sep 2020 12:30), Max: 37.1 (25 Sep 2020 12:30)  T(F): 98.8 (25 Sep 2020 12:30), Max: 98.8 (25 Sep 2020 12:30)  HR: 89 (25 Sep 2020 12:30) (70 - 89)  BP: 141/57 (25 Sep 2020 12:30) (141/57 - 152/51)  RR: 18 (25 Sep 2020 12:30) (17 - 18)  SpO2: 98% (25 Sep 2020 12:30) (98% - 99%)      CAPILLARY BLOOD GLUCOSE  POCT Blood Glucose.: 125 mg/dL (25 Sep 2020 12:13)  POCT Blood Glucose.: 137 mg/dL (25 Sep 2020 08:27)  POCT Blood Glucose.: 129 mg/dL (24 Sep 2020 22:10)          PHYSICAL EXAM  GENERAL: NAD, morbidly obese   CHEST/LUNG: Clear to auscultation bilaterally; No wheeze  HEART: Regular rate and rhythm;   ABDOMEN: Soft, Nontender, Nondistended, large pannus; Bowel sounds present  EXTREMITIES:  Left foot with dressing c/d/i. Limited ROM due to deconditioning but intact   PSYCH: AAOx3          LABS:                        10.2   9.23  )-----------( 171      ( 25 Sep 2020 05:25 )             33.0     09-25    142  |  103  |  24<H>  ----------------------------<  129<H>  3.9   |  25  |  1.57<H>    Ca    9.3      25 Sep 2020 05:25  Phos  2.7     09-25  Mg     1.9     09-25            Consultant(s) Notes Reviewed:  yes  Care Discussed with Consultants/Other Providers:

## 2020-09-25 NOTE — PROGRESS NOTE ADULT - PROBLEM SELECTOR PLAN 3
ENDO consulted, recs reviewed and appreciated. Not optimally controlled -  Hgba1c 9.3. TSH, free T4 wnl , UA, Urine noted with proteinuria and glucosuria. pt is on NPH BID at home,  - c/w Lantus 38  units QHS  - c/w Humalog 14 units Before meals TID  - FS, sliding scale,   - c/w home regimen of losartan for renal protection,   diabetic education

## 2020-09-25 NOTE — DISCHARGE NOTE PROVIDER - CARE PROVIDER_API CALL
Suzie Toribio  SURGERY  [address] [facility], [address] [place] [address]  Phone: [phone]  Fax: [phone]  Follow Up Time:     Primary care provider,   Phone: (   )    -  Fax: (   )    -  Follow Up Time:

## 2020-09-25 NOTE — DISCHARGE NOTE PROVIDER - NSDCMRMEDTOKEN_GEN_ALL_CORE_FT
Ecotrin 325 mg oral delayed release tablet: 1 tab(s) orally once a day  labetalol 200 mg oral tablet: 1 tab(s) orally 2 times a day  lactobacillus acidophilus oral capsule: 1 cap(s) orally 2 times a day  Lipitor 10 mg oral tablet: 1 tab(s) orally once a day (at bedtime)  losartan 100 mg oral tablet: 1 tab(s) orally once a day  NovoLIN N 100 units/mL subcutaneous suspension: 30 unit(s) subcutaneous once a day (at bedtime)  NovoLIN N 100 units/mL subcutaneous suspension: 40 unit(s) subcutaneous once a day, QAM  timolol hemihydrate 0.5% ophthalmic solution: 1 drop(s) to each affected eye once a day   amLODIPine 5 mg oral tablet: 1 tab(s) orally once a day  bisacodyl 10 mg rectal suppository: 1 suppository(ies) rectal once a day, As needed, Constipation  collagenase 250 units/g topical ointment: 1 application topically once a day left heel  Ecotrin 325 mg oral delayed release tablet: 1 tab(s) orally once a day  insulin glargine: 38 unit(s) subcutaneous once a day (at bedtime)  labetalol 200 mg oral tablet: 1 tab(s) orally 2 times a day  lactobacillus acidophilus oral capsule: 1 cap(s) orally 2 times a day  Lipitor 10 mg oral tablet: 1 tab(s) orally once a day (at bedtime)  losartan 100 mg oral tablet: 1 tab(s) orally once a day  oxycodone-acetaminophen 5 mg-325 mg oral tablet: 1 tab(s) orally every 4 hours, As needed, Moderate Pain (4 - 6)  polyethylene glycol 3350 oral powder for reconstitution: 17 gram(s) orally once a day, As needed, Constipation  senna oral tablet: 1 tab(s) orally once a day  timolol hemihydrate 0.5% ophthalmic solution: 1 drop(s) to each affected eye once a day   amLODIPine 5 mg oral tablet: 1 tab(s) orally once a day  Augmentin 875 mg-125 mg oral tablet: 1 tab(s) orally 2 times a day   bisacodyl 10 mg rectal suppository: 1 suppository(ies) rectal once a day, As needed, Constipation  collagenase 250 units/g topical ointment: 1 application topically once a day left heel  Ecotrin 325 mg oral delayed release tablet: 1 tab(s) orally once a day  HumaLOG 100 units/mL injectable solution: 14 unit(s) injectable 3 times a day (before meals)   insulin glargine: 38 unit(s) subcutaneous once a day (at bedtime)  labetalol 200 mg oral tablet: 1 tab(s) orally 2 times a day  lactobacillus acidophilus oral capsule: 1 cap(s) orally 2 times a day  Lipitor 10 mg oral tablet: 1 tab(s) orally once a day (at bedtime)  losartan 100 mg oral tablet: 1 tab(s) orally once a day  oxycodone-acetaminophen 5 mg-325 mg oral tablet: 1 tab(s) orally every 4 hours, As needed, Moderate Pain (4 - 6)  polyethylene glycol 3350 oral powder for reconstitution: 17 gram(s) orally once a day, As needed, Constipation  senna oral tablet: 1 tab(s) orally once a day  timolol hemihydrate 0.5% ophthalmic solution: 1 drop(s) to each affected eye once a day

## 2020-09-25 NOTE — PROGRESS NOTE ADULT - REASON FOR ADMISSION
diabetic foot ulcer, Left first toe, and left heel , R/O Osteomyelitis,
diabetic foot ulcer, Left first toe, and left heel , R/O Osteomyelitis
diabetic foot ulcer, Left first toe, and left heel , R/O Osteomyelitis,
Left foot hallux plantar and dorsal wound with associated cellulitis and underlying osteomyelitis.
diabetic foot ulcer, Left first toe, and left heel , R/O Osteomyelitis,

## 2020-09-25 NOTE — PROGRESS NOTE ADULT - PROBLEM SELECTOR PROBLEM 2
Essential hypertension
Essential hypertension
ARABELLA (acute kidney injury)
Essential hypertension
Essential hypertension
ARABELLA (acute kidney injury)
Essential hypertension
Essential hypertension
PVD (peripheral vascular disease)
ARABELLA (acute kidney injury)
PVD (peripheral vascular disease)
PVD (peripheral vascular disease)

## 2020-09-25 NOTE — PROGRESS NOTE ADULT - PROBLEM SELECTOR PLAN 7
on eye Drop,

## 2020-09-25 NOTE — DISCHARGE NOTE PROVIDER - PROVIDER TOKENS
PROVIDER:[TOKEN:[[id number]]],FREE:[LAST:[Primary care provider],PHONE:[(   )    -],FAX:[(   )    -]]

## 2020-09-25 NOTE — PROGRESS NOTE ADULT - PROBLEM SELECTOR PROBLEM 3
Hyperlipidemia, unspecified hyperlipidemia type
PVD (peripheral vascular disease)
Hyperlipidemia, unspecified hyperlipidemia type
PVD (peripheral vascular disease)
Type 2 diabetes mellitus with other specified complication, with long-term current use of insulin
PVD (peripheral vascular disease)
Type 2 diabetes mellitus with other specified complication, with long-term current use of insulin
Type 2 diabetes mellitus with other specified complication, with long-term current use of insulin

## 2020-09-25 NOTE — DISCHARGE NOTE NURSING/CASE MANAGEMENT/SOCIAL WORK - PATIENT PORTAL LINK FT
You can access the FollowMyHealth Patient Portal offered by [facility] by registering at the following website: [email or web address]. By joining [facility]’s FollowMyHealth portal, you will also be able to view your health information using other applications (apps) compatible with our system.

## 2020-09-25 NOTE — DISCHARGE NOTE NURSING/CASE MANAGEMENT/SOCIAL WORK - NSDCFUADDAPPT_GEN_ALL_CORE_FT
Please follow up with your primary care provider within 1 week of discharge from the hospital. If you do not have a primary care provider please follow up with the [facility], call [phone].    Please follow up with the podiatry team within 1 week of discharge from the hospital    Follow up with [facility] as outpatient [address], [address], [facility] [address], [phone].

## 2020-09-25 NOTE — PROGRESS NOTE ADULT - PROBLEM SELECTOR PLAN 1
Podiatry following, Vascular  sx consulted,  MUSHTAQ/PVR noted - no further intervention as per Vascular surgery  Pt met clinical dx for osteomyelitis since Dorsal and plantar hallux wounds probe to bone, 9/21 MRI also with findings c/w osteomyelitis.   9/16 abscess cx growing staphylococcus lugdunensis   - d/c cefazolin 2g IV q8h --> Augmentin 875mg BID x7 days   - f/u OR bone cultures, currently no growth thus far  - f/u bone pathology results  -  Wound care:  Left heel ulcer apply santyl and DSD daily.  DSD to be applied to the amputation site of the left foot.  -Z floats while in bed  -WBAT to left foot in surgical shoe for transferring only.  Ideally patient should be off of her left foot until sutures are removed (2 weeks s/p)

## 2020-09-25 NOTE — PROGRESS NOTE ADULT - PROVIDER SPECIALTY LIST ADULT
Anesthesia
Endocrinology
Hospitalist
Podiatry
Vascular Surgery
Podiatry
Podiatry
Endocrinology
Hospitalist
Hospitalist

## 2020-09-25 NOTE — DISCHARGE NOTE PROVIDER - NSDCCPCAREPLAN_GEN_ALL_CORE_FT
PRINCIPAL DISCHARGE DIAGNOSIS  Diagnosis: Foot ulcer  Assessment and Plan of Treatment: Podiatry and Vascular teams consulted  - MRI and xrays showed:   1.  Skin wound at the distalplantar first digit with fluid tracking towards the first IP joint. Concerning for cellulitis.  2.  Fluid surrounding the first digit and decompressing into soft tissues dorsal to the first web space. This can be seen with an infected fluid collection.  3.  Abnormal marrow signal in the first distal and proximal phalanges. Given the skin wound, findings are concerning for osteomyelitis.  - Left foot partial 1st ray resectionw as performed with Dr. Ruiz on Wednesday 9/23/2020  - Wound care recs:  Left heel ulcer apply santyl and DSD daily.  DSD to be applied to the amputation site of the left foot.  - WBAT to left foot in surgical shoe for transferring only.  Ideally patient should be off of her left foot until sutures are removed (2 weeks s/p)  - surgical shoe per podiatry   - Please remove sutures 2 weeks post surgery, surgery date: 9/23/2020.  - Outpatient follow up with podiatry and vascular team.  Please follow up with your primary care provider within 1 week of discharge from the hospital. If you do not have a primary care provider please follow up with the [facility], call [phone].        SECONDARY DISCHARGE DIAGNOSES  Diagnosis: Hyperlipidemia, unspecified hyperlipidemia type  Assessment and Plan of Treatment: Continue cholesterol control medications. Continue DASH diet. Follow up with your PCP within 1 week of discharge for further management and monitoring of lipid and cholesterol panels.    Diagnosis: Uncontrolled type 2 diabetes mellitus with hyperglycemia  Assessment and Plan of Treatment: Seen by endocrine  Continue basal bolus regimen as ordered   Continue your medication regimen and a consistent carbohydrate diet (Meaning eating the same amount of carbohydrates at the same time each day). Monitor blood glucose levels throughout the day before meals and at bedtime. Record blood sugars and bring to outpatient providers appointment in order to be reviewed by your doctor for management modifications. Monitor for signs/symptoms of low blood glucose, such as, dizziness, altered mental status, or cool/clammy skin. In addition, monitor for signs/symptoms of high blood glucose, such as, feeling hot, dry, fatigued, or with increased thirst/urination. Make regular podiatry appointments in order to have feet checked for wounds and uncontrolled toe nail growth to prevent infections, as well as, appointments with an ophthalmologist to monitor your vision.    Diagnosis: Stage 3 chronic kidney disease  Assessment and Plan of Treatment: In order to prevent further disease progression, continue to follow recommendations made by your primary provider/nephrologist. Continue a diet that is low in sodium and avoid foods that are concentrated in potassium and phosphorus. Continue your medications/supplementations as directed and avoid over-the-counter drugs that are harmful to kidneys, such as, Non-Steroidal Anti-Inflammatory Drugs (NSAIDs). Follow-up as outpatient to monitor your kidney function, as well as, vitamin D, Calcium, potassium, and phosphorus levels.    Diagnosis: High cholesterol  Assessment and Plan of Treatment: Lipitor 40mg daily.    Diagnosis: Glaucoma  Assessment and Plan of Treatment: Continue with eye drops    Diagnosis: Essential hypertension  Assessment and Plan of Treatment: Continue blood pressure medication regimen as directed. Monitor for any visual changes, headaches or dizziness.  Monitor blood pressure regularly.  Follow up with your PCP for further management for high blood pressure.     PRINCIPAL DISCHARGE DIAGNOSIS  Diagnosis: Foot ulcer  Assessment and Plan of Treatment: Podiatry and Vascular teams consulted  - MRI and xrays showed:   1.  Skin wound at the distalplantar first digit with fluid tracking towards the first IP joint. Concerning for cellulitis.  2.  Fluid surrounding the first digit and decompressing into soft tissues dorsal to the first web space. This can be seen with an infected fluid collection.  3.  Abnormal marrow signal in the first distal and proximal phalanges. Given the skin wound, findings are concerning for osteomyelitis.  - Left foot partial 1st ray resectionw as performed with Dr. Ruiz on Wednesday 9/23/2020  - Wound care recs:  Left heel ulcer apply santyl and DSD daily.  DSD to be applied to the amputation site of the left foot.  - WBAT to left foot in surgical shoe for transferring only.  Ideally patient should be off of her left foot until sutures are removed (2 weeks s/p)  - surgical shoe per podiatry   - Please remove sutures 2 weeks post surgery, surgery date: 9/23/2020.  - Outpatient follow up with podiatry and vascular team.  COMPLETE AUGMENTIN FOR 7 MORE DAYS  Please follow up with your primary care provider within 1 week of discharge from the hospital. If you do not have a primary care provider please follow up with the [facility], call [phone].        SECONDARY DISCHARGE DIAGNOSES  Diagnosis: Hyperlipidemia, unspecified hyperlipidemia type  Assessment and Plan of Treatment: Continue cholesterol control medications. Continue DASH diet. Follow up with your PCP within 1 week of discharge for further management and monitoring of lipid and cholesterol panels.    Diagnosis: Uncontrolled type 2 diabetes mellitus with hyperglycemia  Assessment and Plan of Treatment: Seen by endocrine  Continue basal bolus regimen as ordered   Continue your medication regimen and a consistent carbohydrate diet (Meaning eating the same amount of carbohydrates at the same time each day). Monitor blood glucose levels throughout the day before meals and at bedtime. Record blood sugars and bring to outpatient providers appointment in order to be reviewed by your doctor for management modifications. Monitor for signs/symptoms of low blood glucose, such as, dizziness, altered mental status, or cool/clammy skin. In addition, monitor for signs/symptoms of high blood glucose, such as, feeling hot, dry, fatigued, or with increased thirst/urination. Make regular podiatry appointments in order to have feet checked for wounds and uncontrolled toe nail growth to prevent infections, as well as, appointments with an ophthalmologist to monitor your vision.    Diagnosis: Stage 3 chronic kidney disease  Assessment and Plan of Treatment: In order to prevent further disease progression, continue to follow recommendations made by your primary provider/nephrologist. Continue a diet that is low in sodium and avoid foods that are concentrated in potassium and phosphorus. Continue your medications/supplementations as directed and avoid over-the-counter drugs that are harmful to kidneys, such as, Non-Steroidal Anti-Inflammatory Drugs (NSAIDs). Follow-up as outpatient to monitor your kidney function, as well as, vitamin D, Calcium, potassium, and phosphorus levels.    Diagnosis: High cholesterol  Assessment and Plan of Treatment: Lipitor 40mg daily.    Diagnosis: Glaucoma  Assessment and Plan of Treatment: Continue with eye drops    Diagnosis: Essential hypertension  Assessment and Plan of Treatment: Continue blood pressure medication regimen as directed. Monitor for any visual changes, headaches or dizziness.  Monitor blood pressure regularly.  Follow up with your PCP for further management for high blood pressure.

## 2020-09-25 NOTE — DISCHARGE NOTE PROVIDER - NSDCCAREPROVSEEN_GEN_ALL_CORE_FT
[facility] Medicine ACP, Team  [facility] Endocrinology, Team  [facility]

## 2020-09-25 NOTE — PROGRESS NOTE ADULT - PROBLEM SELECTOR PLAN 5
-  losartan 100mg daily  - c/w Labetalol 200mg BID   - Norvasc 5 mg PO QD   will continue to monitor BP

## 2020-09-25 NOTE — PROGRESS NOTE ADULT - PROBLEM SELECTOR PROBLEM 4
Stage 3 chronic kidney disease
Type 2 diabetes mellitus with other specified complication, with long-term current use of insulin
Stage 3 chronic kidney disease
Type 2 diabetes mellitus with other specified complication, with long-term current use of insulin
Stage 3 chronic kidney disease
Type 2 diabetes mellitus with other specified complication, with long-term current use of insulin

## 2020-09-25 NOTE — DISCHARGE NOTE PROVIDER - HOSPITAL COURSE
Ms. Gonzalez is a 78 y/o woman with HX of DM type 2 c/b diabetic neuropathy, HTN, HLD, PVD, morbid Obesity, presenting with chronic ulcer of Left 1st toe and Left heel admitted for Left foot hallux plantar and dorsal wound with associated cellulitis and underlying osteomyelitis. Pt S/P bedside I&D of LF hallux wound. S/p left foot partial 1st ray amputation on 9/23 by podiatry.    Diabetic foot ulcer   - Podiatry, Vascular consulted  - MUSHTAQ/PVR shows distal Rt infrapopliteal disease & Lt small vessel arterial disease - no intervention per Vascular  - S/P bedside I&D of LF hallux wound: incision to dorsal hallux wound made along proximal tracking revealing 5cc of pus, no malodor.   - LFXR: hallux suspicious for OM  - MRI shows Lt OM   - MUSHTAQ/PVR abnormal- per vascular, no vascular intervention   s/p Left foot partial 1st ray resection with Dr. Ruiz on Wednesday 9/23  - Wound care recs:  Left heel ulcer apply santyl and DSD daily.  DSD to be applied to the amputation site of the left foot.  - WBAT to left foot in surgical shoe for transferring only.  Ideally patient should be off of her left foot until sutures are removed (2 weeks s/p)  - surgical shoe per podiatry     PVD  - Vascular consulted  - MUSHTAQ/PVR noted on ASA  - Venous Doppler legs negative for DVT as well  - no further intervention as per vascular  - high intensity statin with Lipitor 40mg daily.     Type 2 diabetes mellitus with other specified complication, with long-term current use of insulin  - ENDO consulted, recs reviewed and appreciated. Not optimally controlled -  Hgba1c 9.3. TSH, free T4 wnl , UA, Urine noted with proteinuria and glucosuria. pt is on NPH BID at home,  - insulin dosing adjusted   - c/w home regimen of losartan for renal protection,   diabetic education.     Stage 3 chronic kidney disease  - initial concern for ARABELLA but Cr relatively stable and did not meet clinical criteria for ARABELLA. Pt with likely underlying CKD from chronic medical conditions  Urine Culture with normal pari.  bladder scan wnl  - losartan 100mg daily.    Hypertension  - resume losartan 100mg daily  - c/w Labetalol 200mg BID   - Norvasc 5 mg PO QD     High cholesterol  - Lipitor 40mg daily.    Glaucoma  - on eye Drops    Discussed case with Dr. Mcnamara on 9/25/2020, patient medically stable for discharge to rehab center.

## 2020-09-25 NOTE — PROGRESS NOTE ADULT - PROBLEM SELECTOR PLAN 9
Patient is medically stable to be discharged to rehab facility today. Spent 36 min coordinating discharge plan.    Transitions of Care Status:  1.  Name of PCP: Unknown  2.  PCP Contacted on Admission: [ ] Y    [ ] N    3.  PCP contacted at Discharge: [ ] Y    [ ] N    [ ] N/A  4.  Post-Discharge Appointment Date and Location:  5.  Summary of Handoff given to PCP:
Transitions of Care Status:  1.  Name of PCP: Unknown  2.  PCP Contacted on Admission: [ ] Y    [ ] N    3.  PCP contacted at Discharge: [ ] Y    [ ] N    [ ] N/A  4.  Post-Discharge Appointment Date and Location:  5.  Summary of Handoff given to PCP:
PT evaluation pending    Transitions of Care Status:  1.  Name of PCP: Unknown  2.  PCP Contacted on Admission: [ ] Y    [ ] N    3.  PCP contacted at Discharge: [ ] Y    [ ] N    [ ] N/A  4.  Post-Discharge Appointment Date and Location:  5.  Summary of Handoff given to PCP:
Transitions of Care Status:  1.  Name of PCP: Unknown  2.  PCP Contacted on Admission: [ ] Y    [ ] N    3.  PCP contacted at Discharge: [ ] Y    [ ] N    [ ] N/A  4.  Post-Discharge Appointment Date and Location:  5.  Summary of Handoff given to PCP:
PT evaluation pending    Transitions of Care Status:  1.  Name of PCP: Unknown  2.  PCP Contacted on Admission: [ ] Y    [ ] N    3.  PCP contacted at Discharge: [ ] Y    [ ] N    [ ] N/A  4.  Post-Discharge Appointment Date and Location:  5.  Summary of Handoff given to PCP:
Transitions of Care Status:  1.  Name of PCP: Unknown  2.  PCP Contacted on Admission: [ ] Y    [ ] N    3.  PCP contacted at Discharge: [ ] Y    [ ] N    [ ] N/A  4.  Post-Discharge Appointment Date and Location:  5.  Summary of Handoff given to PCP:

## 2020-09-29 LAB
CULTURE RESULTS: NO GROWTH — SIGNIFICANT CHANGE UP
SPECIMEN SOURCE: SIGNIFICANT CHANGE UP

## 2020-10-06 LAB — SURGICAL PATHOLOGY STUDY: SIGNIFICANT CHANGE UP

## 2020-10-28 ENCOUNTER — NON-APPOINTMENT (OUTPATIENT)
Age: 80
End: 2020-10-28

## 2020-11-30 RX ORDER — AMLODIPINE BESYLATE 5 MG/1
5 TABLET ORAL DAILY
Qty: 90 | Refills: 3 | Status: DISCONTINUED | COMMUNITY
Start: 2019-04-26 | End: 2020-11-30

## 2020-12-02 ENCOUNTER — APPOINTMENT (OUTPATIENT)
Dept: HOME HEALTH SERVICES | Facility: HOME HEALTH | Age: 80
End: 2020-12-02

## 2020-12-20 ENCOUNTER — NON-APPOINTMENT (OUTPATIENT)
Age: 80
End: 2020-12-20

## 2020-12-20 ENCOUNTER — TRANSCRIPTION ENCOUNTER (OUTPATIENT)
Age: 80
End: 2020-12-20

## 2020-12-20 ENCOUNTER — INPATIENT (INPATIENT)
Facility: HOSPITAL | Age: 80
LOS: 10 days | Discharge: NURSING, REHAB OR LONG-TERM CARE | End: 2020-12-31
Attending: HOSPITALIST | Admitting: HOSPITALIST
Payer: MEDICARE

## 2020-12-20 VITALS
SYSTOLIC BLOOD PRESSURE: 144 MMHG | OXYGEN SATURATION: 99 % | HEART RATE: 90 BPM | RESPIRATION RATE: 20 BRPM | HEIGHT: 64 IN | DIASTOLIC BLOOD PRESSURE: 57 MMHG | TEMPERATURE: 99 F

## 2020-12-20 DIAGNOSIS — R41.82 ALTERED MENTAL STATUS, UNSPECIFIED: ICD-10-CM

## 2020-12-20 DIAGNOSIS — Z29.9 ENCOUNTER FOR PROPHYLACTIC MEASURES, UNSPECIFIED: ICD-10-CM

## 2020-12-20 DIAGNOSIS — Z89.429 ACQUIRED ABSENCE OF OTHER TOE(S), UNSPECIFIED SIDE: Chronic | ICD-10-CM

## 2020-12-20 DIAGNOSIS — S91.309A UNSPECIFIED OPEN WOUND, UNSPECIFIED FOOT, INITIAL ENCOUNTER: ICD-10-CM

## 2020-12-20 DIAGNOSIS — E11.10 TYPE 2 DIABETES MELLITUS WITH KETOACIDOSIS WITHOUT COMA: ICD-10-CM

## 2020-12-20 DIAGNOSIS — N39.0 URINARY TRACT INFECTION, SITE NOT SPECIFIED: ICD-10-CM

## 2020-12-20 DIAGNOSIS — R73.9 HYPERGLYCEMIA, UNSPECIFIED: ICD-10-CM

## 2020-12-20 DIAGNOSIS — I10 ESSENTIAL (PRIMARY) HYPERTENSION: ICD-10-CM

## 2020-12-20 DIAGNOSIS — N17.9 ACUTE KIDNEY FAILURE, UNSPECIFIED: ICD-10-CM

## 2020-12-20 DIAGNOSIS — Z98.49 CATARACT EXTRACTION STATUS, UNSPECIFIED EYE: Chronic | ICD-10-CM

## 2020-12-20 DIAGNOSIS — E78.5 HYPERLIPIDEMIA, UNSPECIFIED: ICD-10-CM

## 2020-12-20 DIAGNOSIS — E11.9 TYPE 2 DIABETES MELLITUS WITHOUT COMPLICATIONS: ICD-10-CM

## 2020-12-20 DIAGNOSIS — E11.621 TYPE 2 DIABETES MELLITUS WITH FOOT ULCER: ICD-10-CM

## 2020-12-20 LAB
A1C WITH ESTIMATED AVERAGE GLUCOSE RESULT: 8.6 % — HIGH (ref 4–5.6)
ALBUMIN SERPL ELPH-MCNC: 2.9 G/DL — LOW (ref 3.3–5)
ALP SERPL-CCNC: 101 U/L — SIGNIFICANT CHANGE UP (ref 40–120)
ALT FLD-CCNC: 11 U/L — SIGNIFICANT CHANGE UP (ref 4–33)
ANION GAP SERPL CALC-SCNC: 17 MMOL/L — HIGH (ref 7–14)
ANION GAP SERPL CALC-SCNC: 20 MMOL/L — HIGH (ref 7–14)
APPEARANCE UR: ABNORMAL
AST SERPL-CCNC: 13 U/L — SIGNIFICANT CHANGE UP (ref 4–32)
B-OH-BUTYR SERPL-SCNC: 0.9 MMOL/L — HIGH (ref 0–0.4)
BASOPHILS # BLD AUTO: 0.02 K/UL — SIGNIFICANT CHANGE UP (ref 0–0.2)
BASOPHILS NFR BLD AUTO: 0.2 % — SIGNIFICANT CHANGE UP (ref 0–2)
BILIRUB SERPL-MCNC: 0.3 MG/DL — SIGNIFICANT CHANGE UP (ref 0.2–1.2)
BILIRUB UR-MCNC: NEGATIVE — SIGNIFICANT CHANGE UP
BLOOD GAS VENOUS COMPREHENSIVE RESULT: SIGNIFICANT CHANGE UP
BLOOD GAS VENOUS COMPREHENSIVE RESULT: SIGNIFICANT CHANGE UP
BUN SERPL-MCNC: 74 MG/DL — HIGH (ref 7–23)
BUN SERPL-MCNC: 75 MG/DL — HIGH (ref 7–23)
CALCIUM SERPL-MCNC: 8.9 MG/DL — SIGNIFICANT CHANGE UP (ref 8.4–10.5)
CALCIUM SERPL-MCNC: 9 MG/DL — SIGNIFICANT CHANGE UP (ref 8.4–10.5)
CHLORIDE SERPL-SCNC: 96 MMOL/L — LOW (ref 98–107)
CHLORIDE SERPL-SCNC: 98 MMOL/L — SIGNIFICANT CHANGE UP (ref 98–107)
CO2 SERPL-SCNC: 13 MMOL/L — LOW (ref 22–31)
CO2 SERPL-SCNC: 18 MMOL/L — LOW (ref 22–31)
COLOR SPEC: ABNORMAL
CREAT SERPL-MCNC: 3.46 MG/DL — HIGH (ref 0.5–1.3)
CREAT SERPL-MCNC: 3.48 MG/DL — HIGH (ref 0.5–1.3)
DIFF PNL FLD: ABNORMAL
EOSINOPHIL # BLD AUTO: 0 K/UL — SIGNIFICANT CHANGE UP (ref 0–0.5)
EOSINOPHIL NFR BLD AUTO: 0 % — SIGNIFICANT CHANGE UP (ref 0–6)
ESTIMATED AVERAGE GLUCOSE: 200 MG/DL — HIGH (ref 68–114)
GLUCOSE BLDC GLUCOMTR-MCNC: 268 MG/DL — HIGH (ref 70–99)
GLUCOSE BLDC GLUCOMTR-MCNC: 476 MG/DL — CRITICAL HIGH (ref 70–99)
GLUCOSE SERPL-MCNC: 456 MG/DL — CRITICAL HIGH (ref 70–99)
GLUCOSE SERPL-MCNC: 570 MG/DL — CRITICAL HIGH (ref 70–99)
GLUCOSE UR QL: ABNORMAL
HCT VFR BLD CALC: 30.7 % — LOW (ref 34.5–45)
HGB BLD-MCNC: 9.7 G/DL — LOW (ref 11.5–15.5)
IANC: 10.24 K/UL — HIGH (ref 1.5–8.5)
IMM GRANULOCYTES NFR BLD AUTO: 0.4 % — SIGNIFICANT CHANGE UP (ref 0–1.5)
KETONES UR-MCNC: NEGATIVE — SIGNIFICANT CHANGE UP
LEUKOCYTE ESTERASE UR-ACNC: ABNORMAL
LYMPHOCYTES # BLD AUTO: 0.53 K/UL — LOW (ref 1–3.3)
LYMPHOCYTES # BLD AUTO: 4.7 % — LOW (ref 13–44)
MAGNESIUM SERPL-MCNC: 1.5 MG/DL — LOW (ref 1.6–2.6)
MCHC RBC-ENTMCNC: 27.6 PG — SIGNIFICANT CHANGE UP (ref 27–34)
MCHC RBC-ENTMCNC: 31.6 GM/DL — LOW (ref 32–36)
MCV RBC AUTO: 87.5 FL — SIGNIFICANT CHANGE UP (ref 80–100)
MONOCYTES # BLD AUTO: 0.54 K/UL — SIGNIFICANT CHANGE UP (ref 0–0.9)
MONOCYTES NFR BLD AUTO: 4.7 % — SIGNIFICANT CHANGE UP (ref 2–14)
NEUTROPHILS # BLD AUTO: 10.24 K/UL — HIGH (ref 1.8–7.4)
NEUTROPHILS NFR BLD AUTO: 90 % — HIGH (ref 43–77)
NITRITE UR-MCNC: NEGATIVE — SIGNIFICANT CHANGE UP
NRBC # BLD: 0 /100 WBCS — SIGNIFICANT CHANGE UP
NRBC # FLD: 0 K/UL — SIGNIFICANT CHANGE UP
PH UR: 6 — SIGNIFICANT CHANGE UP (ref 5–8)
PHOSPHATE SERPL-MCNC: 4 MG/DL — SIGNIFICANT CHANGE UP (ref 2.5–4.5)
PLATELET # BLD AUTO: 154 K/UL — SIGNIFICANT CHANGE UP (ref 150–400)
POTASSIUM SERPL-MCNC: 4.3 MMOL/L — SIGNIFICANT CHANGE UP (ref 3.5–5.3)
POTASSIUM SERPL-MCNC: 4.7 MMOL/L — SIGNIFICANT CHANGE UP (ref 3.5–5.3)
POTASSIUM SERPL-SCNC: 4.3 MMOL/L — SIGNIFICANT CHANGE UP (ref 3.5–5.3)
POTASSIUM SERPL-SCNC: 4.7 MMOL/L — SIGNIFICANT CHANGE UP (ref 3.5–5.3)
PROT SERPL-MCNC: 6.5 G/DL — SIGNIFICANT CHANGE UP (ref 6–8.3)
PROT UR-MCNC: ABNORMAL
RBC # BLD: 3.51 M/UL — LOW (ref 3.8–5.2)
RBC # FLD: 14.4 % — SIGNIFICANT CHANGE UP (ref 10.3–14.5)
SARS-COV-2 RNA SPEC QL NAA+PROBE: SIGNIFICANT CHANGE UP
SODIUM SERPL-SCNC: 129 MMOL/L — LOW (ref 135–145)
SODIUM SERPL-SCNC: 133 MMOL/L — LOW (ref 135–145)
SP GR SPEC: 1.02 — SIGNIFICANT CHANGE UP (ref 1.01–1.02)
UROBILINOGEN FLD QL: SIGNIFICANT CHANGE UP
WBC # BLD: 11.37 K/UL — HIGH (ref 3.8–10.5)
WBC # FLD AUTO: 11.37 K/UL — HIGH (ref 3.8–10.5)

## 2020-12-20 PROCEDURE — 76770 US EXAM ABDO BACK WALL COMP: CPT | Mod: 26

## 2020-12-20 PROCEDURE — 71046 X-RAY EXAM CHEST 2 VIEWS: CPT | Mod: 26

## 2020-12-20 PROCEDURE — 73620 X-RAY EXAM OF FOOT: CPT | Mod: 26,LT

## 2020-12-20 PROCEDURE — 99223 1ST HOSP IP/OBS HIGH 75: CPT | Mod: GC

## 2020-12-20 PROCEDURE — 99285 EMERGENCY DEPT VISIT HI MDM: CPT

## 2020-12-20 RX ORDER — SODIUM CHLORIDE 9 MG/ML
1000 INJECTION INTRAMUSCULAR; INTRAVENOUS; SUBCUTANEOUS ONCE
Refills: 0 | Status: COMPLETED | OUTPATIENT
Start: 2020-12-20 | End: 2020-12-20

## 2020-12-20 RX ORDER — SODIUM CHLORIDE 9 MG/ML
1000 INJECTION, SOLUTION INTRAVENOUS
Refills: 0 | Status: DISCONTINUED | OUTPATIENT
Start: 2020-12-20 | End: 2020-12-31

## 2020-12-20 RX ORDER — ASPIRIN/CALCIUM CARB/MAGNESIUM 324 MG
1 TABLET ORAL
Qty: 0 | Refills: 0 | DISCHARGE

## 2020-12-20 RX ORDER — CEFTRIAXONE 500 MG/1
1000 INJECTION, POWDER, FOR SOLUTION INTRAMUSCULAR; INTRAVENOUS
Refills: 0 | Status: DISCONTINUED | OUTPATIENT
Start: 2020-12-21 | End: 2020-12-22

## 2020-12-20 RX ORDER — INSULIN LISPRO 100/ML
10 VIAL (ML) SUBCUTANEOUS ONCE
Refills: 0 | Status: COMPLETED | OUTPATIENT
Start: 2020-12-20 | End: 2020-12-20

## 2020-12-20 RX ORDER — DEXTROSE 50 % IN WATER 50 %
25 SYRINGE (ML) INTRAVENOUS ONCE
Refills: 0 | Status: DISCONTINUED | OUTPATIENT
Start: 2020-12-20 | End: 2020-12-31

## 2020-12-20 RX ORDER — INSULIN LISPRO 100/ML
VIAL (ML) SUBCUTANEOUS EVERY 4 HOURS
Refills: 0 | Status: DISCONTINUED | OUTPATIENT
Start: 2020-12-20 | End: 2020-12-21

## 2020-12-20 RX ORDER — TIMOLOL 0.5 %
1 DROPS OPHTHALMIC (EYE) DAILY
Refills: 0 | Status: DISCONTINUED | OUTPATIENT
Start: 2020-12-20 | End: 2020-12-31

## 2020-12-20 RX ORDER — SODIUM CHLORIDE 9 MG/ML
1000 INJECTION INTRAMUSCULAR; INTRAVENOUS; SUBCUTANEOUS
Refills: 0 | Status: DISCONTINUED | OUTPATIENT
Start: 2020-12-20 | End: 2020-12-21

## 2020-12-20 RX ORDER — DEXTROSE 50 % IN WATER 50 %
12.5 SYRINGE (ML) INTRAVENOUS ONCE
Refills: 0 | Status: DISCONTINUED | OUTPATIENT
Start: 2020-12-20 | End: 2020-12-31

## 2020-12-20 RX ORDER — HEPARIN SODIUM 5000 [USP'U]/ML
5000 INJECTION INTRAVENOUS; SUBCUTANEOUS EVERY 8 HOURS
Refills: 0 | Status: DISCONTINUED | OUTPATIENT
Start: 2020-12-20 | End: 2020-12-31

## 2020-12-20 RX ORDER — PIPERACILLIN AND TAZOBACTAM 4; .5 G/20ML; G/20ML
3.38 INJECTION, POWDER, LYOPHILIZED, FOR SOLUTION INTRAVENOUS ONCE
Refills: 0 | Status: COMPLETED | OUTPATIENT
Start: 2020-12-20 | End: 2020-12-20

## 2020-12-20 RX ORDER — ATORVASTATIN CALCIUM 80 MG/1
10 TABLET, FILM COATED ORAL AT BEDTIME
Refills: 0 | Status: DISCONTINUED | OUTPATIENT
Start: 2020-12-20 | End: 2020-12-31

## 2020-12-20 RX ORDER — VANCOMYCIN HCL 1 G
1000 VIAL (EA) INTRAVENOUS ONCE
Refills: 0 | Status: DISCONTINUED | OUTPATIENT
Start: 2020-12-20 | End: 2020-12-20

## 2020-12-20 RX ORDER — AMLODIPINE BESYLATE 2.5 MG/1
10 TABLET ORAL DAILY
Refills: 0 | Status: DISCONTINUED | OUTPATIENT
Start: 2020-12-20 | End: 2020-12-31

## 2020-12-20 RX ORDER — SODIUM CHLORIDE 9 MG/ML
1000 INJECTION, SOLUTION INTRAVENOUS ONCE
Refills: 0 | Status: COMPLETED | OUTPATIENT
Start: 2020-12-20 | End: 2020-12-20

## 2020-12-20 RX ORDER — GLUCAGON INJECTION, SOLUTION 0.5 MG/.1ML
1 INJECTION, SOLUTION SUBCUTANEOUS ONCE
Refills: 0 | Status: DISCONTINUED | OUTPATIENT
Start: 2020-12-20 | End: 2020-12-31

## 2020-12-20 RX ORDER — POLYETHYLENE GLYCOL 3350 17 G/17G
17 POWDER, FOR SOLUTION ORAL DAILY
Refills: 0 | Status: DISCONTINUED | OUTPATIENT
Start: 2020-12-20 | End: 2020-12-21

## 2020-12-20 RX ORDER — DEXTROSE 50 % IN WATER 50 %
15 SYRINGE (ML) INTRAVENOUS ONCE
Refills: 0 | Status: DISCONTINUED | OUTPATIENT
Start: 2020-12-20 | End: 2020-12-31

## 2020-12-20 RX ORDER — CEFTRIAXONE 500 MG/1
1000 INJECTION, POWDER, FOR SOLUTION INTRAMUSCULAR; INTRAVENOUS EVERY 24 HOURS
Refills: 0 | Status: DISCONTINUED | OUTPATIENT
Start: 2020-12-20 | End: 2020-12-20

## 2020-12-20 RX ORDER — COLLAGENASE CLOSTRIDIUM HIST. 250 UNIT/G
1 OINTMENT (GRAM) TOPICAL DAILY
Refills: 0 | Status: DISCONTINUED | OUTPATIENT
Start: 2020-12-20 | End: 2020-12-31

## 2020-12-20 RX ORDER — LACTOBACILLUS ACIDOPHILUS 100MM CELL
1 CAPSULE ORAL DAILY
Refills: 0 | Status: DISCONTINUED | OUTPATIENT
Start: 2020-12-20 | End: 2020-12-31

## 2020-12-20 RX ORDER — INFLUENZA VIRUS VACCINE 15; 15; 15; 15 UG/.5ML; UG/.5ML; UG/.5ML; UG/.5ML
0.5 SUSPENSION INTRAMUSCULAR ONCE
Refills: 0 | Status: COMPLETED | OUTPATIENT
Start: 2020-12-20 | End: 2020-12-20

## 2020-12-20 RX ORDER — INSULIN GLARGINE 100 [IU]/ML
30 INJECTION, SOLUTION SUBCUTANEOUS AT BEDTIME
Refills: 0 | Status: DISCONTINUED | OUTPATIENT
Start: 2020-12-21 | End: 2020-12-21

## 2020-12-20 RX ORDER — INSULIN LISPRO 100/ML
6 VIAL (ML) SUBCUTANEOUS
Refills: 0 | Status: DISCONTINUED | OUTPATIENT
Start: 2020-12-21 | End: 2020-12-21

## 2020-12-20 RX ORDER — INSULIN LISPRO 100/ML
VIAL (ML) SUBCUTANEOUS
Refills: 0 | Status: DISCONTINUED | OUTPATIENT
Start: 2020-12-20 | End: 2020-12-20

## 2020-12-20 RX ORDER — SENNA PLUS 8.6 MG/1
1 TABLET ORAL DAILY
Refills: 0 | Status: DISCONTINUED | OUTPATIENT
Start: 2020-12-20 | End: 2020-12-21

## 2020-12-20 RX ORDER — INSULIN GLARGINE 100 [IU]/ML
20 INJECTION, SOLUTION SUBCUTANEOUS ONCE
Refills: 0 | Status: COMPLETED | OUTPATIENT
Start: 2020-12-20 | End: 2020-12-20

## 2020-12-20 RX ORDER — LABETALOL HCL 100 MG
200 TABLET ORAL
Refills: 0 | Status: DISCONTINUED | OUTPATIENT
Start: 2020-12-20 | End: 2020-12-25

## 2020-12-20 RX ADMIN — HEPARIN SODIUM 5000 UNIT(S): 5000 INJECTION INTRAVENOUS; SUBCUTANEOUS at 23:06

## 2020-12-20 RX ADMIN — PIPERACILLIN AND TAZOBACTAM 200 GRAM(S): 4; .5 INJECTION, POWDER, LYOPHILIZED, FOR SOLUTION INTRAVENOUS at 16:48

## 2020-12-20 RX ADMIN — Medication 6: at 23:10

## 2020-12-20 RX ADMIN — SODIUM CHLORIDE 1000 MILLILITER(S): 9 INJECTION, SOLUTION INTRAVENOUS at 15:32

## 2020-12-20 RX ADMIN — ATORVASTATIN CALCIUM 10 MILLIGRAM(S): 80 TABLET, FILM COATED ORAL at 23:06

## 2020-12-20 RX ADMIN — Medication 10 UNIT(S): at 16:48

## 2020-12-20 RX ADMIN — SODIUM CHLORIDE 100 MILLILITER(S): 9 INJECTION INTRAMUSCULAR; INTRAVENOUS; SUBCUTANEOUS at 23:07

## 2020-12-20 RX ADMIN — SENNA PLUS 1 TABLET(S): 8.6 TABLET ORAL at 23:12

## 2020-12-20 RX ADMIN — SODIUM CHLORIDE 1000 MILLILITER(S): 9 INJECTION INTRAMUSCULAR; INTRAVENOUS; SUBCUTANEOUS at 18:54

## 2020-12-20 RX ADMIN — SODIUM CHLORIDE 1000 MILLILITER(S): 9 INJECTION INTRAMUSCULAR; INTRAVENOUS; SUBCUTANEOUS at 14:18

## 2020-12-20 RX ADMIN — INSULIN GLARGINE 20 UNIT(S): 100 INJECTION, SOLUTION SUBCUTANEOUS at 18:54

## 2020-12-20 RX ADMIN — AMLODIPINE BESYLATE 10 MILLIGRAM(S): 2.5 TABLET ORAL at 23:06

## 2020-12-20 NOTE — CONSULT NOTE ADULT - ASSESSMENT
Pt is 81yo who presents w/ Left heel wound to subQ  -pt was seen and examined  -Afebrile, WBC 11.37, ESR and CRP pending  -Left posterior heel wound down to subQ, w/ fibrogranular wound bed and central eschar, wound does not track, or probe beyond SubQ, no erythema, no malodor, no local signs of infection  -LFXR: no gas, or OM, final read still pending  -Pod plan for local wound care  -Will follow while inpatient  -Discussed w/ attending

## 2020-12-20 NOTE — H&P ADULT - NSHPLABSRESULTS_GEN_ALL_CORE
LABS: Personally reviewed labs, imaging, and ECG                          9.7    11.37 )-----------( 154      ( 20 Dec 2020 15:17 )             30.7       12-20    133<L>  |  98  |  74<H>  ----------------------------<  456<HH>  4.3   |  18<L>  |  3.48<H>    Ca    9.0      20 Dec 2020 17:50  Phos  4.0     12-  Mg     1.5     12-    TPro  6.5  /  Alb  2.9<L>  /  TBili  0.3  /  DBili  x   /  AST  13  /  ALT  11  /  AlkPhos  101  12-20       LIVER FUNCTIONS - ( 20 Dec 2020 15:17 )  Alb: 2.9 g/dL / Pro: 6.5 g/dL / ALK PHOS: 101 U/L / ALT: 11 U/L / AST: 13 U/L / GGT: x                    Urinalysis Basic - ( 20 Dec 2020 16:32 )    Color: Light Orange / Appearance: Slightly Turbid / S.020 / pH: x  Gluc: x / Ketone: Negative  / Bili: Negative / Urobili: <2 mg/dL   Blood: x / Protein: 30 mg/dL / Nitrite: Negative   Leuk Esterase: Large / RBC: 39 /HPF /  /HPF   Sq Epi: x / Non Sq Epi: 17 /HPF / Bacteria: Few    Lactate Trend    CAPILLARY BLOOD GLUCOSE    POCT Blood Glucose.: 476 mg/dL (20 Dec 2020 16:43)    RADIOLOGY & ADDITIONAL TESTS:     < from: Xray Chest 2 Views PA/Lat (20 @ 15:52) >    IMPRESSION: Clear lungs.    < end of copied text >    < from: Xray Foot AP + Lateral, Left (20 @ 15:53) >    IMPRESSION: No evidence of acute osteomyelitis.    < end of copied text > CXR: clear lungs, no pleural effusions - my reading     US KIDNEYS AND BLADDER  PROCEDURE DATE: Dec 20 2020  INTERPRETATION: CLINICAL INFORMATION: Acute renal insufficiency evaluate for obstruction  COMPARISON: None available.  TECHNIQUE: Sonography of the kidneys and bladder.  FINDINGS:  Right kidney: 11.8. No renal mass, hydronephrosis or calculi.  Left kidney: 8.4. No renal mass, hydronephrosis or calculi.  Urinary bladder: Within normal limits.  IMPRESSION:  No hydronephrosis.  Apparent discrepancy in renal size    RAD FOOT 2 VIEWS LEFT  PROCEDURE DATE: Dec 20 2020  INTERPRETATION: CLINICAL INDICATION: Left foot wound  TECHNIQUE: 3 views of the left foot.  COMPARISON: No similar prior comparisons available.  FINDINGS:  Interval resection of first ray to the level of the distal first metatarsal. The first ray stump is well corticated at its margins.  There is no evidence of subcutaneous tracking gas or cortical erosion in the region of the wound overlying the posterior heel.  Status post third toe resection.  There are no acute displaced fractures or dislocations.  Vascular calcifications.  IMPRESSION: No evidence of acute osteomyelitis.                          9.7    11.37 )-----------( 154      ( 20 Dec 2020 15:17 )             30.7       12-20    133<L>  |  98  |  74<H>  ----------------------------<  456<HH>  4.3   |  18<L>  |  3.48<H>    Ca    9.0      20 Dec 2020 17:50  Phos  4.0     12-20  Mg     1.5     12-20    TPro  6.5  /  Alb  2.9<L>  /  TBili  0.3  /  DBili  x   /  AST  13  /  ALT  11  /  AlkPhos  101  12-20       LIVER FUNCTIONS - ( 20 Dec 2020 15:17 )  Alb: 2.9 g/dL / Pro: 6.5 g/dL / ALK PHOS: 101 U/L / ALT: 11 U/L / AST: 13 U/L / GGT: x                    Urinalysis Basic - ( 20 Dec 2020 16:32 )    Color: Light Orange / Appearance: Slightly Turbid / S.020 / pH: x  Gluc: x / Ketone: Negative  / Bili: Negative / Urobili: <2 mg/dL   Blood: x / Protein: 30 mg/dL / Nitrite: Negative   Leuk Esterase: Large / RBC: 39 /HPF /  /HPF   Sq Epi: x / Non Sq Epi: 17 /HPF / Bacteria: Few    Lactate Trend    CAPILLARY BLOOD GLUCOSE    POCT Blood Glucose.: 476 mg/dL (20 Dec 2020 16:43)    RADIOLOGY & ADDITIONAL TESTS:     < from: Xray Chest 2 Views PA/Lat (20 @ 15:52) >    IMPRESSION: Clear lungs.    < end of copied text >    < from: Xray Foot AP + Lateral, Left (20 @ 15:53) >    IMPRESSION: No evidence of acute osteomyelitis.    < end of copied text >

## 2020-12-20 NOTE — H&P ADULT - ASSESSMENT
Pt is a 81 yo F w/ DM type 2 c/b diabetic neuropathy, HTN, HLD, PVD, CKD (Baseline crt 1.8-2), morbid Obesity, presenting w/ hyperglycemia. Pt found to have BG in 400s, but no evidence of DKA at this time, w/ closing AG, w/ UA+ for LE and bacteria, admitted to medicine for hyperglycemia management 81 yo Female, ambulatory with a walker, w/ DM Type 2 c/b diabetic neuropathy and multiple toes amputations, HTN, HLD, PVD, CKD (Baseline crt 1.8-2), morbid obesity a/w mild DKA and DM Lt heel ulcer, and likely UTI with hematuria; Found to have ARABELLA on CKD;

## 2020-12-20 NOTE — H&P ADULT - NSHPPHYSICALEXAM_GEN_ALL_CORE
Vital Signs (24 Hrs):  T(C): 37.1 (12-20-20 @ 16:48), Max: 37.2 (12-20-20 @ 13:25)  HR: 80 (12-20-20 @ 17:57) (80 - 90)  BP: 147/34 (12-20-20 @ 17:57) (136/36 - 159/30)  RR: 18 (12-20-20 @ 17:57) (16 - 22)  SpO2: 100% (12-20-20 @ 17:57) (98% - 100%)  Wt(kg): --  PHYSICAL EXAM:  GENERAL: NAD, lying in bed comfortably  HEAD:  Atraumatic, Normocephalic  EYES: EOMI, PERRLA, conjunctiva and sclera clear  ENT: Moist mucous membranes  NECK: Supple, No JVD  CHEST/LUNG: Clear to auscultation bilaterally; No rales, rhonchi, wheezing, or rubs. Unlabored respirations  HEART: Regular rate and rhythm; No murmurs, rubs, or gallops  ABDOMEN: Bowel sounds present; Soft, Nontender, Nondistended   EXTREMITIES:  2+ Peripheral Pulses, brisk capillary refill. No clubbing, cyanosis, or edema  NERVOUS SYSTEM:  Alert & Oriented X3, speech clear. No deficits   MSK: FROM all 4 extremities, full and equal strength  SKIN: L heel ulcer noted, no pus, clean margins Vital Signs (24 Hrs):  T(C): 37.1 (12-20-20 @ 16:48), Max: 37.2 (12-20-20 @ 13:25)  HR: 80 (12-20-20 @ 17:57) (80 - 90)  BP: 147/34 (12-20-20 @ 17:57) (136/36 - 159/30)  RR: 18 (12-20-20 @ 17:57) (16 - 22)  SpO2: 100% (12-20-20 @ 17:57) (98% - 100%)  Wt(kg): --  PHYSICAL EXAM:  GENERAL: NAD, lying in bed comfortably  HEAD:  Atraumatic, Normocephalic  EYES: EOMI, PERRLA, conjunctiva and sclera clear  ENT: Moist mucous membranes  NECK: Supple, No JVD  CHEST/LUNG: Clear to auscultation bilaterally; No rales, rhonchi, wheezing, or rubs. Unlabored respirations  HEART: Regular rate and rhythm; No murmurs, rubs, or gallops  ABDOMEN: Bowel sounds present; Soft, Nontender, Nondistended   EXTREMITIES:  2+ Peripheral Pulses, brisk capillary refill. No clubbing, cyanosis, or edema  NERVOUS SYSTEM: Alert & Oriented X3, speech clear. No deficits   MSK: FROM all 4 extremities, full and equal strength  SKIN: Lt heel ulcer noted, no pus, clean margins, no malodor, no erythema

## 2020-12-20 NOTE — ED PROVIDER NOTE - PHYSICAL EXAMINATION
GEN - NAD; nontoxic appearing; A+O x3   HEAD - NC/AT   EYES- PERRL, EOMI  ENT: Airway patent, mmm, Oral cavity and pharynx normal. No inflammation, swelling, exudate, or lesions.    NECK: Neck supple  PULMONARY - CTA b/l, symmetric breath sounds.   CARDIAC -s1s2, RRR, no M,G,R  ABDOMEN - +BS, ND, NT, soft, no guarding, no rebound, no masses   BACK - no CVA tenderness, Normal  spine   EXTREMITIES - FROM, symmetric pulses, LLE s/p partial amputation- wound-  SKIN - no rash or bruising   NEUROLOGIC - alert, speech clear, no focal deficits  PSYCH -nl mood/affect, nl insight. GEN - NAD; nontoxic appearing; A+O x3   HEAD - NC/AT   EYES- PERRL, EOMI  ENT: Airway patent, mmm, Oral cavity and pharynx normal. No inflammation, swelling, exudate, or lesions.    NECK: Neck supple  PULMONARY - CTA b/l, symmetric breath sounds.   CARDIAC -s1s2, RRR, no M,G,R  ABDOMEN - +BS, ND, NT, soft, no guarding, no rebound, no masses   BACK - no CVA tenderness, Normal  spine   EXTREMITIES - FROM, symmetric pulses, LLE s/p partial first ray amputation/3rd toe- wound-left posterior heel w/ eschar, approx 4x5cm  SKIN - no rash or bruising   NEUROLOGIC - alert, speech clear, no focal deficits  PSYCH -nl mood/affect, nl insight.

## 2020-12-20 NOTE — H&P ADULT - PROBLEM SELECTOR PLAN 1
Pt found to have BG in 400s at the ED, low suspicion for DKA likely 2/2 poor medical understanding and medication nonadherence,  - s/p 3L fluids at the ED, c/w maintenance fluids w/ NS @ 100cc/hr while NPO, w/ close respiratory monitoring   - remain NPO while repeating bmp  - f/u repeat BMP, then trend CMP w/ VBG + lactate daily  - endo c/s in AM w/ emphasis on pt education Pt found to have BG in 400s at the ED, likely 2/2 uncontrolled diabetes (HbA1c 8.6), low suspicion for DKA likely 2/2 poor medical understanding and medication nonadherence,  - s/p 3L fluids at the ED, c/w maintenance fluids w/ NS @ 100cc/hr while NPO, w/ close respiratory monitoring   - remain NPO while repeating bmp  - f/u repeat BMP, then trend CMP w/ VBG + lactate daily  - endo c/s in AM w/ emphasis on pt education Pt found to have BG in 400s at the ED, likely 2/2 uncontrolled diabetes (HbA1c 8.6), meeting mild DKA criteria, w/ pH 7.3, beta-hydroxy butarate 0.9, intial AG 20  - s/p 3L fluids at the ED, c/w maintenance fluids w/ NS @ 100cc/hr while NPO, w/ close respiratory monitoring   - will change to moderate ISS q4, FSBG q4 while NPO  - start w/ 6 units premeal, 30 units lantus at bedtime  - f/u repeat BMP, then trend CMP w/ VBG + lactate daily  - endo c/s in AM w/ emphasis on pt education BG =585 initially;  (HbA1c 8.6) in the setting of missing insulin;  Mild DKA criteria, w/ pH 7.3, beta-hydroxy butarate 0.9, intial AG 20  - s/p 3L fluids at the ED, c/w maintenance fluids w/ NS @ 100cc/hr while NPO, w/ close respiratory monitoring   - will change to moderate ISS q4, FSBG q4 while NPO  - start w/ 6 units premeal, 30 units lantus at bedtime  - f/u repeat BMP, then trend CMP w/ VBG + lactate daily  - Endocrinology c/s in AM  - Diabetes education in AM BG =585 initially;  (HbA1c 8.6) in the setting of missing insulin;  Mild DKA criteria, w/ pH 7.3, beta-hydroxy butarate 0.9, intial AG 20  - s/p 3L fluids at the ED, c/w maintenance fluids w/ NS @ 100cc/hr while NPO, w/ close respiratory monitoring   - monitor on low dose ISS  - start w/ 6 units premeal, 30 units lantus at bedtime  - repeat BMP w/ closed AG, trend vbg w/ lactate  - Endocrinology c/s in AM  - Diabetes education in AM

## 2020-12-20 NOTE — CONSULT NOTE ADULT - SUBJECTIVE AND OBJECTIVE BOX
Podiatry pager #: [phone]/ [phone]    Patient is a 80y old  Female who presents with a chief complaint of left heel wound.    HPI:    81 y/o f presents to the ed with hyperglycemia. Per patient-her vns came today and checked her blood sugar and found it to be very elevated. patient said she didn't eat much yesterday so she didn't take her insulin last night (30U novolin at night, 40U in am), did take her morning dose today. States she otherwise feels well since being discharged from hospital/rehab after having an amputation for infection/om a little while ago. Denies fevers, chills, ha, cp, sob, cough, abd pain, vomiting, diarrhea, foot pain/redness.    	Collateral per podiatry-vns called her podiatrist with concerns that wound looks infected and sent pictures. So sent her to ed.  PAST MEDICAL & SURGICAL HISTORY:  Obesity    PVD (peripheral vascular disease)    Glaucoma    Hypertension    Diabetic ulcer of heel    Neuropathy    PVD (peripheral vascular disease)    Diabetes  type 2    History of cataract surgery    Toe amputation status        MEDICATIONS  (STANDING):  insulin glargine Injectable (LANTUS) 20 Unit(s) SubCutaneous once  vancomycin  IVPB. 1000 milliGRAM(s) IV Intermittent once    MEDICATIONS  (PRN):      Allergies    No Known Allergies    Intolerances        VITALS:    Vital Signs Last 24 Hrs  T(C): 37 (20 Dec 2020 16:07), Max: 37.2 (20 Dec 2020 13:25)  T(F): 98.6 (20 Dec 2020 16:07), Max: 99 (20 Dec 2020 13:25)  HR: 87 (20 Dec 2020 16:07) (85 - 90)  BP: 138/58 (20 Dec 2020 16:07) (136/36 - 144/57)  BP(mean): --  RR: 16 (20 Dec 2020 16:07) (16 - 20)  SpO2: 99% (20 Dec 2020 16:07) (99% - 99%)    LABS:                          9.7    11.37 )-----------( 154      ( 20 Dec 2020 15:17 )             30.7       12-20    129<L>  |  96<L>  |  75<H>  ----------------------------<  570<HH>  4.7   |  13<L>  |  3.46<H>    Ca    8.9      20 Dec 2020 15:17  Phos  4.0     12-20  Mg     1.5     12-20    TPro  6.5  /  Alb  2.9<L>  /  TBili  0.3  /  DBili  x   /  AST  13  /  ALT  11  /  AlkPhos  101  12-20      CAPILLARY BLOOD GLUCOSE      POCT Blood Glucose.: 476 mg/dL (20 Dec 2020 16:43)  POCT Blood Glucose.: 585 mg/dL (20 Dec 2020 13:28)          LOWER EXTREMITY PHYSICAL EXAM:    Vascular: DP 1/4, PT 0/4 B/L  Neuro: Epicritic sensation reduced to the level of digits, B/L.  Musculoskeletal/Ortho: s/p LF partial first ray resection, and 3rd toe amp  Wound #1: Left posterior heel wound to subQ w/ central eschar.   Location: posterior left heel  Size: 4.0cm x 5cm  Depth: 0.2cm, down to subQ  Wound bed: fibrogranular w/ central eschar  Drainage: minimal  Odor: none  Periwound: no periwound erythema, xerosis noted  Etiology: pressure and diabetic foot ulcer    RADIOLOGY & ADDITIONAL STUDIES:

## 2020-12-20 NOTE — H&P ADULT - HISTORY OF PRESENT ILLNESS
Pt is a 81 yo F w/ DM type 2 c/b diabetic neuropathy, HTN, HLD, PVD, CKD (Baseline crt 1.8-2), morbid Obesity, presenting w/ hyperglycemia    At the ED, pt vital wnl, CBC sig for stable Hg fo 9.7, CMP sig for crt of 3.48 (baseline crt 1.8-2), hyperglcemia 570, AG of 20, betahydroxybutatrate of 0.9. pH 7.3, UA pos for bacteria, LE, and few epithelial cells, w/ L heel wound. s/p 10 units insulin, 20 units lantus, 3L fluids, vanc +zosyn. repeat bmp showing AG of 17. admitted to medicine for further management.  Pt is a 81 yo F w/ DM type 2 c/b diabetic neuropathy, HTN, HLD, PVD, CKD (Baseline crt 1.8-2), morbid Obesity, presenting w/ hyperglycemia. Pt reports that she was in her usual state of health lately but wasn't particularly hungry last night so she did not eat much and decided to hold her evening dose of novolog, 30 units. When she woke up this morning, she reports taking her 40 units of AM novolog. Pt reports she has also been urinating more freuqently recently, going every four hours. Pt denies other symptoms at this time, denies fevers, chills, chest p/p, sob, n/v/d, dysuria, melanitic stools, sick contacts, recent travel.     At the ED, pt vital wnl, CBC sig for stable Hg fo 9.7, CMP sig for crt of 3.48 (baseline crt 1.8-2), hyperglcemia 570, AG of 20, betahydroxybutatrate of 0.9. pH 7.3, UA pos for bacteria, LE, and few epithelial cells, w/ L heel wound. s/p 10 units insulin, 20 units lantus, 3L fluids, vanc +zosyn. repeat bmp showing AG of 17. admitted to medicine for further management.  Pt is a 79 yo F w/ DM type 2 c/b diabetic neuropathy, HTN, HLD, PVD, CKD (Baseline crt 1.8-2), morbid Obesity, presenting w/ hyperglycemia. Pt reports that she was in her usual state of health lately but wasn't particularly hungry last night so she did not eat much and decided to hold her evening dose of novolog, 30 units. When she woke up this morning, she reports taking her 40 units of AM novolog. Pt reports she has also been urinating more freuqently recently, going every four hours. Pt denies other symptoms at this time, denies fatigue, fevers, chills, chest p/p, sob, n/v/d, dysuria, melanotic stools, sick contacts, recent travel.     At the ED, pt vital wnl, CBC sig for stable Hg fo 9.7, CMP sig for crt of 3.48 (baseline crt 1.8-2), hyperglcemia 570, AG of 20, betahydroxybutatrate of 0.9. pH 7.3, UA pos for bacteria, LE, and few epithelial cells, w/ L heel wound. s/p 10 units short-acting insulin, 20 units lantus, 3L fluids, vanc +zosyn. repeat bmp showing AG of 17. admitted to medicine for further management.  79 yo Female w/ DM Type 2 c/b diabetic neuropathy, HTN, HLD, PVD, CKD (Baseline crt 1.8-2), morbid Obesity, presenting w/ hyperglycemia. Pt reports that she was in her usual state of health lately but wasn't particularly hungry last night so she did not eat much and decided to hold her evening dose of novolog, 30 units. When she woke up this morning, she reports taking her 40 units of AM novolog. Pt reports she has also been urinating more freuqently recently, going every four hours. Pt denies other symptoms at this time, denies fatigue, fevers, chills, chest p/p, sob, n/v/d, dysuria, melanotic stools, sick contacts, recent travel.     ED course: hyperglycemia 570, s/p 10 units short-acting insulin, 20 units Lantus 3L fluids, vanc +zosyn; 79 yo Female, ambulatory with a walker, w/ DM Type 2 c/b diabetic neuropathy and multiple toes amputations, HTN, HLD, PVD, CKD (Baseline crt 1.8-2), morbid Obesity, presenting w/ hyperglycemia. Pt reports that she was in her usual state of health lately but wasn't particularly hungry last night so she did not eat much and decided to hold her evening dose of novolog, 30 units. When she woke up this morning, she reports taking her 40 units of AM novolog. Pt reports she has also been urinating more frequently recently, going every four hours. Pt denies other symptoms at this time, denies fatigue, fevers, chills, chest p/p, sob, n/v/d, dysuria, melanotic stools, sick contacts, recent travel.     ED course: hyperglycemia 570, s/p 10 units short-acting insulin, 20 units Lantus 3L fluids, vancomycin x 1 dose, zosyn x 1 dose;

## 2020-12-20 NOTE — H&P ADULT - MUSCULOSKELETAL
SUBJECTIVE  Rickey is a 16 year old male. Today he is accompanied by mother and father.     Chief Complaint   Patient presents with   • Follow-up     elevated lft     History Review  Rickey is a 16 year old male who presents with his Mom and Dad for a follow up visit for elevated liver function labs. He was initially seen by Dr. Jackson on January 16, 2018 for elevated liver enzymes. An abdominal ultrasound was ordered by his PCP 12/2017 , which showed showed increased echogenicity of the liver suggesting hepatic steatosis. Rickey was seen 5/1/18 by Dr. Jackson. At that time his AST and ALT improved and parents had reduced his portion sizes. He lost 3 lbs since his previous visit. Further lifestyle modifications were recommended and to have repeat labs and follow up in 3 months.     August 2018:His repeat hepatic panel showed higher LFTs than previous. The weight that Rickey lost at his visit in May he regained, he was up 3 lbs.Of note, Dad said a few years ago Rickey had abnormal thyroid levels, but was seen by an Endocrinologist at [facility] and told everything was \"normal\". Previously checked and negative AIH, acute hepatitis panel, normal ceruloplasmin and alpha 1 anti-trypsin level.     December 2018, Rickey was doing well. Family met with Dietician at [facility] and was provided with lifestyle changes to work on. Per Dad they decreased their portion sizes and weren't eating out as much as before, more home cooked meals.No changes to his physical activity level, only exercise is really during gym class at school, which is 2 times per week. Weight is stable, no gain, which is good he lost ~ 1 lbs.      March 2019: Rickey's LFTs worsened and he gained 10 lbs. He continued to be asymptomatic. Dad mentioned that Rickey ate a lot of sweets, especially cookies every day. No changes in physical activity have been made, gym class is his only form of exercise and over the last 2 months hasn't had gym class because  they are in a swimming unit and due to his history of seizures is not in gym class.     May 2019: Over the last 2 months Rickey continued to be asymptomatic. After his last visit when he gained 10 lbs and had worsening labs parents really started to focus on a healthier diet again. He has decreased portion sizes and are limiting processed foods. He continued to still eat a fair amount of sweets, but has improved. Now that the weather has been nicer he has been more active as well outside of gym class. He is back to his weight from December so lost the 10 lbs he previously regained. No recent illnesses or fevers. Dad does mention that PCP checked his Thyroid levels recently and they are elevated again (which they were about a year ago as well) so will be seeing an Endocrinologist at [facility] for further evaluation.     Since he was last seen Rickey continues to be asymptomatic. Family continues to work on lifestyle modifications. Now that the weather is nice he goes for walks every day. Continues to work on portion sizes and limiting processed and fatty foods. Parents try to cook most meals at home vs. Going out. Rickey continues to have a good appetite, denies abdominal pain, nausea, vomiting, or diarrhea. Having normal bowel movements and urine output. Good energy level. Denies new rashes, fevers, or illnesses. Weight is down 2 lbs over the last 2 months.     Family history is negative for liver disease or other GI disease.         Review of Systems   Constitutional: Negative for activity change, appetite change, fatigue, fever and unexpected weight change.   HENT: Negative.    Eyes: Negative.    Respiratory: Negative.    Cardiovascular: Negative.    Gastrointestinal: Negative for abdominal distention, abdominal pain, anal bleeding, blood in stool, constipation, diarrhea, nausea, rectal pain and vomiting.   Endocrine: Negative.    Genitourinary: Negative.    Musculoskeletal: Negative.    Skin: Negative.     Neurological: Negative.    Hematological: Negative.    Psychiatric/Behavioral: Negative.        ALLERGIES: no known allergies.     Current Outpatient Medications   Medication Sig Dispense Refill   • albuterol (ACCUNEB) 0.63 MG/3ML nebulizer solution Take 0.63 mg by nebulization every 6 hours as needed for Wheezing.     • levetiracetam (KEPPRA) 1000 MG tablet Take 1,000 mg by mouth 2 times daily.     • Vitamin D, Ergocalciferol, 37085 units capsule Take 50,000 Units by mouth 1 day a week.       No current facility-administered medications for this visit.      OBJECTIVE  Visit Vitals  Ht 5' 7.4\" (1.712 m)   Wt (!) 88.3 kg (194 lb 11.2 oz)   BMI 30.13 kg/m²     BSA: 2.01 meters squared  Growth percentiles: 47 %ile (Z= -0.07) based on CDC (Boys, 2-20 Years) Stature-for-age data based on Stature recorded on 7/31/2019. 98 %ile (Z= 2.02) based on CDC (Boys, 2-20 Years) weight-for-age data using vitals from 7/31/2019.     Wt Readings from Last 4 Encounters:   07/31/19 (!) 88.3 kg (194 lb 11.2 oz) (98 %, Z= 2.02)*   05/15/19 (!) 89.1 kg (196 lb 6.4 oz) (98 %, Z= 2.11)*   03/14/19 (!) 93.5 kg (206 lb 3.2 oz) (>99 %, Z= 2.36)*   12/05/18 (!) 89 kg (196 lb 3.2 oz) (99 %, Z= 2.24)*     * Growth percentiles are based on CDC (Boys, 2-20 Years) data.     Laboratory Results:                 3/2/19              5/4/19        7/20/19  ALT          94                     92               70  AST:        43                     43                32  Total Protein: 7.7            7.7               7.5  Albumin: 3.9                    4.1               3.8  Alk Phos: 104                116                92  Total Bili: 0.5                   0.6               0.7  Direct Bili: 0.1                 0.1               0.1  Indirect Bili: 0.4               0.5               0.5  GGT:         120                104              110    Imaging:  Abdominal US showed persistent fatty liver.    Physical Exam   Constitutional: He is oriented to  person, place, and time. He appears well-developed and well-nourished. No distress.   HENT:   Head: Atraumatic.   Eyes: Conjunctivae are normal.   Neck: Neck supple.   Cardiovascular: Normal rate, regular rhythm and normal heart sounds.   No murmur heard.  Pulmonary/Chest: Effort normal and breath sounds normal.   Abdominal: Soft. Bowel sounds are normal. He exhibits no distension and no mass. There is no tenderness. There is no rebound and no guarding.   Musculoskeletal: Normal range of motion.   Lymphadenopathy:     He has no cervical adenopathy.   Neurological: He is alert and oriented to person, place, and time.   Skin: Skin is warm and dry. No rash noted. No erythema. No pallor.   Psychiatric: He has a normal mood and affect. His behavior is normal. Judgment and thought content normal.   Vitals reviewed.    ASSESSMENT  Problem List Items Addressed This Visit        Other    Overweight, pediatric, BMI (body mass index) > 99% for age    Relevant Orders    HEPATIC FUNCTION PANEL    GGT      Other Visit Diagnoses     Abnormal liver function test    -  Primary    Relevant Orders    HEPATIC FUNCTION PANEL    GGT        BERNA Lang is a 16 year old male with obesity, elevated liver function tests, and developmental delay. Improvement in weight and lifestyle modifications over the last 2 months.   ALT is still elevated (70), but the rest of the hepatic function panel is normal. His GGT continues to be elevated as well (110). There is a clear correlation of his weight and LFT results. Now that he is making better lifestyle decisions with diet and exercise LFTs are improved. Discussed importance of continuing diet modifications and making sure he has 60 minutes of physical exercise every day. Abdominal US showed a persistent fatty liver. I would like to see Rickey in 2-3 months for repeat labs and follow up.     Tawnya Ayers, BLANKA,CPNP-PC    Further Details: Patient was discussed with Dr. Jackson after the time of  the visit.     [Joint Pain] : joint pain [Negative] : Heme/Lymph no joint swelling/no joint erythema detailed exam

## 2020-12-20 NOTE — ED PROVIDER NOTE - PROGRESS NOTE DETAILS
DO Erma PGY-2: Spoke to pt's home care physician who provided further information. [facility] patient is almost completely blind which is why she is not on sliding scale. [facility] patient has been resistant for being placed in assisted living. [facility] is not safe for patient at home, has had multiple falls podiatry evaluated, abx given, admit for further management.

## 2020-12-20 NOTE — H&P ADULT - NSHPSOCIALHISTORY_GEN_ALL_CORE
Pt denies drinking, smoking, illicit drug use, lives by herself. Reports no tobacco, alcohol, illicit drug use, lives by herself. Reports no tobacco, alcohol, illicit drug use  lives alone  retired    former

## 2020-12-20 NOTE — H&P ADULT - PROBLEM SELECTOR PLAN 4
Found w/ L heel ulcer, concerning for infection at the ED, s/o vanc + zosyn; s/p R 1st digit partial amputation  - Xray of heel showing no signs of OM  - Seen by podiatry, wound care for now  - dressing per nursing staff, frequent turns Found w/ L heel ulcer, concerning for infection at the ED, seen by podiatry, no concern for infection at this time, s/p vanc + zosyn; s/p R 1st digit partial amputation  - Xray of heel showing no signs of OM  - Seen by podiatry, wound care for now  - dressing per nursing staff, frequent turns Found w/ L heel ulcer  s/p vanc + zosyn in ED  Evaluated by Podiatry Sx, no concern for infection at this time  - Xray of heel showing no signs of OM  - wound care   - dressing per nursing staff, frequent turns

## 2020-12-20 NOTE — H&P ADULT - PROBLEM SELECTOR PLAN 3
Likely 2/2 to dehydration, from frequent urination/poor PO intake, but obstruction also within differential  - s/p 3L fluids, so urine lytes will be unreliable  - US kidney/bladder showing no hydronephrosis, but discrepancy in kidney size (left smaller)  - strict I+O's  - trend CMP Likely pre-renal due to glycosuria and DKA, poor oral intake;  - s/p 3L fluids, so urine lytes will be unreliable  - monitor renal function  - glycemic control, plan as above   - hold losartan  - US kidney/bladder showing no hydronephrosis  - strict I+O's  - trend CMP

## 2020-12-20 NOTE — H&P ADULT - NSICDXPASTMEDICALHX_GEN_ALL_CORE_FT
CHEST TWO VIEWS:

 

HISTORY:

Cough and fever.

 

COMPARISON:

None.

 

FINDINGS:

Two views of the chest show normal sized cardiomediastinal silhouette. There is no evidence of consol
idation, mass, or pleural effusion. The bones are unremarkable.

 

IMPRESSION:

No evidence of acute cardiopulmonary disease.

 

POS: [facility] PAST MEDICAL HISTORY:  Diabetes type 2    Diabetic ulcer of heel     Glaucoma     Hypertension     Neuropathy     Obesity     PVD (peripheral vascular disease)     PVD (peripheral vascular disease)

## 2020-12-20 NOTE — H&P ADULT - PROBLEM SELECTOR PLAN 2
Found to have pos UA for bacteria, LE but epithelial cells also present, s/p vanc and zosyn in ED, asymptomatic   - f/u repeat UA, f/u Ucx  - can start ceftriaxone for now but ok to dc if repeat UA neg (+) UA for bacteria, large LE, (+) RBCs; Symptoms: (+) urinary frequency;   - f/u Ucx  - Ceftriaxone IV

## 2020-12-20 NOTE — PATIENT PROFILE ADULT - NSPROIMPLANTSMEDDEV_GEN_A_NUR
right ankle metal implant due to fracture x 10 years right ankle metal implant due to fracture x 10 years/None

## 2020-12-20 NOTE — H&P ADULT - NSHPREVIEWOFSYSTEMS_GEN_ALL_CORE
REVIEW OF SYSTEMS:  CONSTITUTIONAL: No weakness, fevers or chills  EYES: No visual changes;  No vertigo   ENT: No throat pain, no runny nose   NECK: No pain or stiffness  RESPIRATORY: No cough, wheezing, hemoptysis; No shortness of breath  CARDIOVASCULAR: No chest pain or palpitations  GASTROINTESTINAL: No abdominal or epigastric pain. No nausea, vomiting, or hematemesis; No diarrhea or constipation. No melena or hematochezia.  GENITOURINARY: +urinary frequency  NEUROLOGICAL: No numbness or weakness  SKIN: No itching, rashes  PSYCH: No mood changes, suicidal ideation

## 2020-12-20 NOTE — ED PROVIDER NOTE - NS ED MD DISPO ADMITTING SERVICE
Implemented All Fall Risk Interventions:  [place] to call system. Call bell, personal items and telephone within reach. Instruct patient to call for assistance. Room bathroom lighting operational. Non-slip footwear when patient is off stretcher. Physically safe environment: no spills, clutter or unnecessary equipment. Stretcher in lowest position, wheels locked, appropriate side rails in place. Provide visual cue, wrist band, yellow gown, etc. Monitor gait and stability. Monitor for mental status changes and reorient to person, place, and time. Review medications for side effects contributing to fall risk. Reinforce activity limits and safety measures with patient and family. MED

## 2020-12-20 NOTE — H&P ADULT - PROBLEM SELECTOR PLAN 5
Found to be taking labetalol 200mg twice daily and losartan 100mg daily (was taking amlodipine 10mg but dc'ed herself)  - US showing discrepancy in kidney size (left smaller)  - hold losartan in setting of ARABELLA  - c/w amlodipine and labetalol for now  - can check renin/aldosterone ratio in AM  - can persue further work-up for possible JOSY as outpatient Found to be taking labetalol 200mg twice daily and losartan 100mg daily (was taking amlodipine 10mg but dc'ed herself)  - hold losartan in setting of ARABELLA  - c/w amlodipine and labetalol for now  - can check renin/aldosterone ratio in AM  - can persue further work-up for possible JOSY as outpatient

## 2020-12-20 NOTE — H&P ADULT - EXTREMITIES COMMENTS
As above;  Lt foot: fresh dressing;  Left heel eschar, no erythema, no malodor, no local signs of infection;

## 2020-12-20 NOTE — ED PROVIDER NOTE - CLINICAL SUMMARY MEDICAL DECISION MAKING FREE TEXT BOX
Patient presents to ed with hyperglycemia and podiatry concern for foot infection in region of prior wound, afebrile, patient has no complaints, FSG HI-will check labs, cultures, xr chest/foot, podiatry eval, start treating hyperglycemia with fluids while awaiting labR to eval for dka, elec disturabnce, organ dysfunction, other infections, will administer insulin, will likely cover with abx and admit.

## 2020-12-20 NOTE — ED PROVIDER NOTE - OBJECTIVE STATEMENT
81 y/o f presents to the ed with hyperglycemia. Per patient-her vns came today and checked her blood sugar and found it to be very elevated. patient said she didn't eat much yesterday so she didn't take her insulin last night (30U novolin at night, 40U in am), did take her morning dose today. States she otherwise feels well since being discharged from hospital/rehab after having an amputation for infection/om a little while ago. Denies fevers, chills, ha, cp, sob, cough, abd pain, vomiting, diarrhea, foot pain/redness.    Collateral per podiatry-vns called her podiatrist with concerns that wound looks infected and sent pictures. So sent her to ed.

## 2020-12-20 NOTE — H&P ADULT - PROBLEM SELECTOR PLAN 8
GOC: full code  Activity: ambulate as tolerated  DVT prophylaxis: hep subq  Diet: NPO for now, CC diet once BG at 200s

## 2020-12-20 NOTE — ED ADULT NURSE NOTE - OBJECTIVE STATEMENT
pt received spot 11. pt A+Ox3 sent to ED by visiting nurse for "not looking well." pt states her glucometer has been broken since she came home from rehab x3 weeks ago. states she did not take her insulin last night because she had no appetite. denies fever/chills. has diabetic ulcer to left foot. receives daily wound care. no signs of infection. pt fs as noted. IVSL in place. NS infusing. will monitor.

## 2020-12-20 NOTE — ED PROVIDER NOTE - PMH
Diabetes  type 2  Diabetic ulcer of heel    Glaucoma    Hypertension    Neuropathy    Obesity    PVD (peripheral vascular disease)    PVD (peripheral vascular disease)

## 2020-12-20 NOTE — H&P ADULT - PROBLEM SELECTOR PLAN 7
T2DM, takes novolin 40u in AM and 30u in PM  - Hyperglycemic at the ED  - will place on moderate ISS q6 while NPO  - fingersticks q4 for now T2DM, takes novolin 40u in AM and 30u in PM  - Hyperglycemic at the ED  - will place on moderate ISS q6 while NPO  - fingersticks q4 for now  - endo c/s in AM GOC: full code  Activity: ambulate as tolerated, PT c/s placed   DVT prophylaxis: hep subq  Diet: NPO for now, change to CC diet in AM GOC: full code  Activity: ambulate as tolerated, PT c/s placed   DVT prophylaxis: hep subq  Diet: clear liquid, cc

## 2020-12-21 ENCOUNTER — NON-APPOINTMENT (OUTPATIENT)
Age: 80
End: 2020-12-21

## 2020-12-21 DIAGNOSIS — E78.5 HYPERLIPIDEMIA, UNSPECIFIED: ICD-10-CM

## 2020-12-21 DIAGNOSIS — I10 ESSENTIAL (PRIMARY) HYPERTENSION: ICD-10-CM

## 2020-12-21 DIAGNOSIS — N30.01 ACUTE CYSTITIS WITH HEMATURIA: ICD-10-CM

## 2020-12-21 DIAGNOSIS — E11.65 TYPE 2 DIABETES MELLITUS WITH HYPERGLYCEMIA: ICD-10-CM

## 2020-12-21 LAB
ALBUMIN SERPL ELPH-MCNC: 2.4 G/DL — LOW (ref 3.3–5)
ALP SERPL-CCNC: 92 U/L — SIGNIFICANT CHANGE UP (ref 40–120)
ALT FLD-CCNC: 12 U/L — SIGNIFICANT CHANGE UP (ref 4–33)
ANION GAP SERPL CALC-SCNC: 13 MMOL/L — SIGNIFICANT CHANGE UP (ref 7–14)
ANION GAP SERPL CALC-SCNC: 15 MMOL/L — HIGH (ref 7–14)
ANION GAP SERPL CALC-SCNC: 15 MMOL/L — HIGH (ref 7–14)
AST SERPL-CCNC: 23 U/L — SIGNIFICANT CHANGE UP (ref 4–32)
BILIRUB SERPL-MCNC: 0.2 MG/DL — SIGNIFICANT CHANGE UP (ref 0.2–1.2)
BLOOD GAS VENOUS COMPREHENSIVE RESULT: SIGNIFICANT CHANGE UP
BUN SERPL-MCNC: 63 MG/DL — HIGH (ref 7–23)
BUN SERPL-MCNC: 66 MG/DL — HIGH (ref 7–23)
BUN SERPL-MCNC: 70 MG/DL — HIGH (ref 7–23)
CALCIUM SERPL-MCNC: 8.5 MG/DL — SIGNIFICANT CHANGE UP (ref 8.4–10.5)
CALCIUM SERPL-MCNC: 8.6 MG/DL — SIGNIFICANT CHANGE UP (ref 8.4–10.5)
CALCIUM SERPL-MCNC: 9.2 MG/DL — SIGNIFICANT CHANGE UP (ref 8.4–10.5)
CHLORIDE SERPL-SCNC: 104 MMOL/L — SIGNIFICANT CHANGE UP (ref 98–107)
CHLORIDE SERPL-SCNC: 105 MMOL/L — SIGNIFICANT CHANGE UP (ref 98–107)
CHLORIDE SERPL-SCNC: 106 MMOL/L — SIGNIFICANT CHANGE UP (ref 98–107)
CO2 SERPL-SCNC: 15 MMOL/L — LOW (ref 22–31)
CO2 SERPL-SCNC: 17 MMOL/L — LOW (ref 22–31)
CO2 SERPL-SCNC: 19 MMOL/L — LOW (ref 22–31)
CREAT SERPL-MCNC: 3.04 MG/DL — HIGH (ref 0.5–1.3)
CREAT SERPL-MCNC: 3.1 MG/DL — HIGH (ref 0.5–1.3)
CREAT SERPL-MCNC: 3.25 MG/DL — HIGH (ref 0.5–1.3)
ERYTHROCYTE [SEDIMENTATION RATE] IN BLOOD: 34 MM/HR — HIGH (ref 4–25)
GLUCOSE BLDC GLUCOMTR-MCNC: 158 MG/DL — HIGH (ref 70–99)
GLUCOSE BLDC GLUCOMTR-MCNC: 182 MG/DL — HIGH (ref 70–99)
GLUCOSE BLDC GLUCOMTR-MCNC: 185 MG/DL — HIGH (ref 70–99)
GLUCOSE BLDC GLUCOMTR-MCNC: 208 MG/DL — HIGH (ref 70–99)
GLUCOSE BLDC GLUCOMTR-MCNC: 219 MG/DL — HIGH (ref 70–99)
GLUCOSE BLDC GLUCOMTR-MCNC: 226 MG/DL — HIGH (ref 70–99)
GLUCOSE SERPL-MCNC: 174 MG/DL — HIGH (ref 70–99)
GLUCOSE SERPL-MCNC: 180 MG/DL — HIGH (ref 70–99)
GLUCOSE SERPL-MCNC: 212 MG/DL — HIGH (ref 70–99)
MAGNESIUM SERPL-MCNC: 1.5 MG/DL — LOW (ref 1.6–2.6)
PHOSPHATE SERPL-MCNC: 2.9 MG/DL — SIGNIFICANT CHANGE UP (ref 2.5–4.5)
POTASSIUM SERPL-MCNC: 4.2 MMOL/L — SIGNIFICANT CHANGE UP (ref 3.5–5.3)
POTASSIUM SERPL-MCNC: 4.2 MMOL/L — SIGNIFICANT CHANGE UP (ref 3.5–5.3)
POTASSIUM SERPL-MCNC: 4.6 MMOL/L — SIGNIFICANT CHANGE UP (ref 3.5–5.3)
POTASSIUM SERPL-SCNC: 4.2 MMOL/L — SIGNIFICANT CHANGE UP (ref 3.5–5.3)
POTASSIUM SERPL-SCNC: 4.2 MMOL/L — SIGNIFICANT CHANGE UP (ref 3.5–5.3)
POTASSIUM SERPL-SCNC: 4.6 MMOL/L — SIGNIFICANT CHANGE UP (ref 3.5–5.3)
PROT SERPL-MCNC: 6.5 G/DL — SIGNIFICANT CHANGE UP (ref 6–8.3)
SODIUM SERPL-SCNC: 135 MMOL/L — SIGNIFICANT CHANGE UP (ref 135–145)
SODIUM SERPL-SCNC: 136 MMOL/L — SIGNIFICANT CHANGE UP (ref 135–145)
SODIUM SERPL-SCNC: 138 MMOL/L — SIGNIFICANT CHANGE UP (ref 135–145)

## 2020-12-21 PROCEDURE — 99233 SBSQ HOSP IP/OBS HIGH 50: CPT | Mod: GC

## 2020-12-21 PROCEDURE — 99223 1ST HOSP IP/OBS HIGH 75: CPT | Mod: GC

## 2020-12-21 RX ORDER — SENNA PLUS 8.6 MG/1
2 TABLET ORAL AT BEDTIME
Refills: 0 | Status: DISCONTINUED | OUTPATIENT
Start: 2020-12-21 | End: 2020-12-28

## 2020-12-21 RX ORDER — SODIUM CHLORIDE 9 MG/ML
1000 INJECTION, SOLUTION INTRAVENOUS
Refills: 0 | Status: DISCONTINUED | OUTPATIENT
Start: 2020-12-21 | End: 2020-12-22

## 2020-12-21 RX ORDER — MAGNESIUM SULFATE 500 MG/ML
2 VIAL (ML) INJECTION ONCE
Refills: 0 | Status: COMPLETED | OUTPATIENT
Start: 2020-12-21 | End: 2020-12-21

## 2020-12-21 RX ORDER — INSULIN LISPRO 100/ML
VIAL (ML) SUBCUTANEOUS AT BEDTIME
Refills: 0 | Status: DISCONTINUED | OUTPATIENT
Start: 2020-12-21 | End: 2020-12-31

## 2020-12-21 RX ORDER — INSULIN GLARGINE 100 [IU]/ML
22 INJECTION, SOLUTION SUBCUTANEOUS AT BEDTIME
Refills: 0 | Status: DISCONTINUED | OUTPATIENT
Start: 2020-12-21 | End: 2020-12-22

## 2020-12-21 RX ORDER — SIMETHICONE 80 MG/1
80 TABLET, CHEWABLE ORAL EVERY 8 HOURS
Refills: 0 | Status: DISCONTINUED | OUTPATIENT
Start: 2020-12-21 | End: 2020-12-31

## 2020-12-21 RX ORDER — ISOPROPYL ALCOHOL, BENZOCAINE .7; .06 ML/ML; ML/ML
1 SWAB TOPICAL
Qty: 100 | Refills: 1
Start: 2020-12-21 | End: 2021-02-08

## 2020-12-21 RX ORDER — ACETAMINOPHEN 500 MG
1000 TABLET ORAL ONCE
Refills: 0 | Status: COMPLETED | OUTPATIENT
Start: 2020-12-21 | End: 2020-12-22

## 2020-12-21 RX ORDER — INSULIN LISPRO 100/ML
VIAL (ML) SUBCUTANEOUS
Refills: 0 | Status: DISCONTINUED | OUTPATIENT
Start: 2020-12-21 | End: 2020-12-31

## 2020-12-21 RX ORDER — INSULIN LISPRO 100/ML
6 VIAL (ML) SUBCUTANEOUS
Refills: 0 | Status: DISCONTINUED | OUTPATIENT
Start: 2020-12-21 | End: 2020-12-22

## 2020-12-21 RX ORDER — AMLODIPINE BESYLATE 2.5 MG/1
1 TABLET ORAL
Qty: 0 | Refills: 0 | DISCHARGE

## 2020-12-21 RX ORDER — POLYETHYLENE GLYCOL 3350 17 G/17G
17 POWDER, FOR SOLUTION ORAL DAILY
Refills: 0 | Status: DISCONTINUED | OUTPATIENT
Start: 2020-12-21 | End: 2020-12-28

## 2020-12-21 RX ADMIN — Medication 200 MILLIGRAM(S): at 18:44

## 2020-12-21 RX ADMIN — ATORVASTATIN CALCIUM 10 MILLIGRAM(S): 80 TABLET, FILM COATED ORAL at 22:51

## 2020-12-21 RX ADMIN — SENNA PLUS 2 TABLET(S): 8.6 TABLET ORAL at 22:51

## 2020-12-21 RX ADMIN — Medication 2: at 09:28

## 2020-12-21 RX ADMIN — Medication 1: at 12:53

## 2020-12-21 RX ADMIN — HEPARIN SODIUM 5000 UNIT(S): 5000 INJECTION INTRAVENOUS; SUBCUTANEOUS at 13:42

## 2020-12-21 RX ADMIN — Medication 1: at 18:00

## 2020-12-21 RX ADMIN — SODIUM CHLORIDE 100 MILLILITER(S): 9 INJECTION INTRAMUSCULAR; INTRAVENOUS; SUBCUTANEOUS at 00:50

## 2020-12-21 RX ADMIN — Medication 6 UNIT(S): at 12:53

## 2020-12-21 RX ADMIN — Medication 1 TABLET(S): at 12:52

## 2020-12-21 RX ADMIN — Medication 4: at 02:24

## 2020-12-21 RX ADMIN — AMLODIPINE BESYLATE 10 MILLIGRAM(S): 2.5 TABLET ORAL at 05:48

## 2020-12-21 RX ADMIN — Medication 6 UNIT(S): at 18:43

## 2020-12-21 RX ADMIN — POLYETHYLENE GLYCOL 3350 17 GRAM(S): 17 POWDER, FOR SOLUTION ORAL at 12:51

## 2020-12-21 RX ADMIN — Medication 6 UNIT(S): at 09:28

## 2020-12-21 RX ADMIN — CEFTRIAXONE 100 MILLIGRAM(S): 500 INJECTION, POWDER, FOR SOLUTION INTRAMUSCULAR; INTRAVENOUS at 04:00

## 2020-12-21 RX ADMIN — HEPARIN SODIUM 5000 UNIT(S): 5000 INJECTION INTRAVENOUS; SUBCUTANEOUS at 05:51

## 2020-12-21 RX ADMIN — Medication 200 MILLIGRAM(S): at 05:49

## 2020-12-21 RX ADMIN — Medication 1: at 07:17

## 2020-12-21 RX ADMIN — HEPARIN SODIUM 5000 UNIT(S): 5000 INJECTION INTRAVENOUS; SUBCUTANEOUS at 22:50

## 2020-12-21 RX ADMIN — Medication 50 GRAM(S): at 12:53

## 2020-12-21 RX ADMIN — INSULIN GLARGINE 22 UNIT(S): 100 INJECTION, SOLUTION SUBCUTANEOUS at 22:51

## 2020-12-21 RX ADMIN — Medication 10 MILLIGRAM(S): at 18:46

## 2020-12-21 RX ADMIN — SODIUM CHLORIDE 100 MILLILITER(S): 9 INJECTION, SOLUTION INTRAVENOUS at 10:36

## 2020-12-21 RX ADMIN — SIMETHICONE 80 MILLIGRAM(S): 80 TABLET, CHEWABLE ORAL at 18:42

## 2020-12-21 RX ADMIN — Medication 1 DROP(S): at 18:42

## 2020-12-21 NOTE — PROGRESS NOTE ADULT - ATTENDING COMMENTS
80F with a history of HTN, DM2 c/b diabetic neuropathy and multiple toe amputations, HLD, PVD, CKD (baseline Cr 1.8-2), morbid obesity admitted for mild DKA now improved, likely 2/2 UTI and insulin noncompliance.  -Endocrine following, appreciate recs  -C/w basal/bolus regimen, monitor FS qac and qHS  -AG slightly elevated again to 15 this morning, will monitor BMP q6h  -C/w CTX for empiric treatment of UTI, follow up urine culture  -Cr elevated to 3.4 on admission, now downtrending- baseline appears to be 1.7-1.8- will c/w IVF and monitor  -Renal US with no parenchymal kidney disease or hydronephrosis noted  -Appreciate podiatry recs for heel ulcer- does not appear to be infected at this time, X-ray with no evidence of OM  -PT eval

## 2020-12-21 NOTE — PROGRESS NOTE ADULT - PROBLEM SELECTOR PLAN 7
GOC: full code  Activity: ambulate as tolerated, PT c/s placed   DVT prophylaxis: hep subq  Diet: clear liquid, cc

## 2020-12-21 NOTE — PROGRESS NOTE ADULT - PROBLEM SELECTOR PLAN 5
Found to be taking labetalol 200mg twice daily and losartan 100mg daily (was taking amlodipine 10mg but dc'ed herself)  - hold losartan in setting of ARABELLA  - c/w amlodipine and labetalol for now  - can check renin/aldosterone ratio in AM  - can persue further work-up for possible JOSY as outpatient Found w/ L heel ulcer  s/p vanc + zosyn in ED  Evaluated by Podiatry Sx, no concern for infection at this time  - Xray of heel showing no signs of OM  - wound care   - dressing per nursing staff, frequent turns L heel ulcer; XR without gas or OM  - evaluated by podiatry as not appearing infected and recommended local wound care  - wound care c/s ordered

## 2020-12-21 NOTE — PROGRESS NOTE ADULT - ASSESSMENT
81 yo Female, ambulatory with a walker, w/ DM Type 2 c/b diabetic neuropathy and multiple toes amputations, HTN, HLD, PVD, CKD (Baseline crt 1.8-2), morbid obesity a/w mild DKA and DM Lt heel ulcer, and likely UTI with hematuria; Found to have ARABELLA on CKD;  80y F w/ HTN, DM2 c/b diabetic neuropathy and multiple toes amputations, HLD, PVD, CKD (baseline Cr 1.8-2), morbid obesity presenting with several days of generalized weakness, decreased PO intake (and subsequent holding of PM dose of home novolin), and urinary frequency admitted for mild DKA in the setting of likely UTI, and likely pre-renal ARABELLA.

## 2020-12-21 NOTE — PROGRESS NOTE ADULT - SUBJECTIVE AND OBJECTIVE BOX
Andrés Malhotra, PGY3   Pager [phone]    INTERVAL HPI/OVERNIGHT EVENTS:    SUBJECTIVE: Patient seen and examined at bedside.       OBJECTIVE:    VITAL SIGNS:  ICU Vital Signs Last 24 Hrs  T(C): 37.2 (21 Dec 2020 06:00), Max: 37.5 (20 Dec 2020 23:00)  T(F): 99 (21 Dec 2020 06:00), Max: 99.5 (20 Dec 2020 23:00)  HR: 88 (21 Dec 2020 06:00) (80 - 91)  BP: 118/51 (21 Dec 2020 06:00) (118/51 - 159/30)  BP(mean): 75 (20 Dec 2020 16:48) (75 - 75)  ABP: --  ABP(mean): --  RR: 19 (21 Dec 2020 06:00) (16 - 22)  SpO2: 97% (21 Dec 2020 06:00) (96% - 100%)        12-20 @ 07:01  -  12- @ 07:00  --------------------------------------------------------  IN: 1250 mL / OUT: 550 mL / NET: 700 mL      CAPILLARY BLOOD GLUCOSE      POCT Blood Glucose.: 158 mg/dL (21 Dec 2020 06:57)      PHYSICAL EXAM:  General: sedated  HEENT: NCAT, PERRL, clear conjunctiva, sclera anicteric  Neck: supple, no JVD  Respiratory: CTAB anterolaterally  Cardiovascular: RRR, S1S2, no m/r/g  Abdomen: soft, nontender, nondistended, bowel sounds intact  Extremities: no edema, no cyanosis  Skin: warm, perfused  Neurological: nonfocal    MEDICATIONS:  MEDICATIONS  (STANDING):  amLODIPine   Tablet 10 milliGRAM(s) Oral daily  atorvastatin 10 milliGRAM(s) Oral at bedtime  cefTRIAXone   IVPB 1000 milliGRAM(s) IV Intermittent <User Schedule>  collagenase Ointment 1 Application(s) Topical daily  dextrose 40% Gel 15 Gram(s) Oral once  dextrose 5%. 1000 milliLiter(s) (50 mL/Hr) IV Continuous <Continuous>  dextrose 5%. 1000 milliLiter(s) (100 mL/Hr) IV Continuous <Continuous>  dextrose 50% Injectable 25 Gram(s) IV Push once  dextrose 50% Injectable 12.5 Gram(s) IV Push once  dextrose 50% Injectable 25 Gram(s) IV Push once  glucagon  Injectable 1 milliGRAM(s) IntraMuscular once  heparin   Injectable 5000 Unit(s) SubCutaneous every 8 hours  influenza   Vaccine 0.5 milliLiter(s) IntraMuscular once  insulin glargine Injectable (LANTUS) 30 Unit(s) SubCutaneous at bedtime  insulin lispro (ADMELOG) corrective regimen sliding scale   SubCutaneous three times a day before meals  insulin lispro Injectable (ADMELOG) 6 Unit(s) SubCutaneous three times a day before meals  labetalol 200 milliGRAM(s) Oral two times a day  lactobacillus acidophilus 1 Tablet(s) Oral daily  senna 1 Tablet(s) Oral daily  sodium chloride 0.9%. 1000 milliLiter(s) (100 mL/Hr) IV Continuous <Continuous>  timolol 0.5% Solution 1 Drop(s) Both EYES daily    MEDICATIONS  (PRN):  bisacodyl Suppository 10 milliGRAM(s) Rectal daily PRN Constipation  polyethylene glycol 3350 17 Gram(s) Oral daily PRN Constipation      ALLERGIES:  Allergies    No Known Allergies    Intolerances        LABS:                        9.7    11.37 )-----------( 154      ( 20 Dec 2020 15:17 )             30.7     12-21    135  |  105  |  70<H>  ----------------------------<  212<H>  4.2   |  17<L>  |  3.25<H>    Ca    8.6      21 Dec 2020 01:38  Phos  4.0     12-20  Mg     1.5     12-20    TPro  6.5  /  Alb  2.9<L>  /  TBili  0.3  /  DBili  x   /  AST  13  /  ALT  11  /  AlkPhos  101  12-20      Urinalysis Basic - ( 20 Dec 2020 16:32 )    Color: Light Orange / Appearance: Slightly Turbid / S.020 / pH: x  Gluc: x / Ketone: Negative  / Bili: Negative / Urobili: <2 mg/dL   Blood: x / Protein: 30 mg/dL / Nitrite: Negative   Leuk Esterase: Large / RBC: 39 /HPF /  /HPF   Sq Epi: x / Non Sq Epi: 17 /HPF / Bacteria: Few          RADIOLOGY & ADDITIONAL TESTS: Reviewed. Andrés Malhotra, PGY3   Pager [phone]    INTERVAL HPI/OVERNIGHT EVENTS: DKA improved.    SUBJECTIVE: Patient seen and examined at bedside. Denies any active complaints.      OBJECTIVE:    VITAL SIGNS:  Vital Signs Last 24 Hrs  T(C): 37.2 (21 Dec 2020 06:00), Max: 37.5 (20 Dec 2020 23:00)  T(F): 99 (21 Dec 2020 06:00), Max: 99.5 (20 Dec 2020 23:00)  HR: 88 (21 Dec 2020 06:00) (80 - 91)  BP: 118/51 (21 Dec 2020 06:00) (118/51 - 159/30)  BP(mean): 75 (20 Dec 2020 16:48) (75 - 75)  ABP: --  ABP(mean): --  RR: 19 (21 Dec 2020 06:00) (16 - 22)  SpO2: 97% (21 Dec 2020 06:00) (96% - 100%)        12-20 @ 07:01  -  12- @ 07:00  --------------------------------------------------------  IN: 1250 mL / OUT: 550 mL / NET: 700 mL      CAPILLARY BLOOD GLUCOSE      POCT Blood Glucose.: 158 mg/dL (21 Dec 2020 06:57)      PHYSICAL EXAM:  General: Alert, NAD  HEENT: NCAT, clear conjunctiva, sclera anicteric  Neck: supple, no JVD  Respiratory: CTAB anterolaterally  Cardiovascular: RRR, S1S2, no m/r/g  Abdomen: soft, nontender, nondistended, bowel sounds intact  : primafit  Extremities: no edema, no cyanosis, s/p b/l toe amputations  Skin: warm, perfused, LLE heel ulcer dressing c/d/i  Neurological: AOx3, CN2-12 grossly intact, IGNACIO    MEDICATIONS:  MEDICATIONS  (STANDING):  amLODIPine   Tablet 10 milliGRAM(s) Oral daily  atorvastatin 10 milliGRAM(s) Oral at bedtime  cefTRIAXone   IVPB 1000 milliGRAM(s) IV Intermittent <User Schedule>  collagenase Ointment 1 Application(s) Topical daily  dextrose 40% Gel 15 Gram(s) Oral once  dextrose 5%. 1000 milliLiter(s) (50 mL/Hr) IV Continuous <Continuous>  dextrose 5%. 1000 milliLiter(s) (100 mL/Hr) IV Continuous <Continuous>  dextrose 50% Injectable 25 Gram(s) IV Push once  dextrose 50% Injectable 12.5 Gram(s) IV Push once  dextrose 50% Injectable 25 Gram(s) IV Push once  glucagon  Injectable 1 milliGRAM(s) IntraMuscular once  heparin   Injectable 5000 Unit(s) SubCutaneous every 8 hours  influenza   Vaccine 0.5 milliLiter(s) IntraMuscular once  insulin glargine Injectable (LANTUS) 30 Unit(s) SubCutaneous at bedtime  insulin lispro (ADMELOG) corrective regimen sliding scale   SubCutaneous three times a day before meals  insulin lispro Injectable (ADMELOG) 6 Unit(s) SubCutaneous three times a day before meals  labetalol 200 milliGRAM(s) Oral two times a day  lactobacillus acidophilus 1 Tablet(s) Oral daily  senna 1 Tablet(s) Oral daily  sodium chloride 0.9%. 1000 milliLiter(s) (100 mL/Hr) IV Continuous <Continuous>  timolol 0.5% Solution 1 Drop(s) Both EYES daily    MEDICATIONS  (PRN):  bisacodyl Suppository 10 milliGRAM(s) Rectal daily PRN Constipation  polyethylene glycol 3350 17 Gram(s) Oral daily PRN Constipation      ALLERGIES:  Allergies    No Known Allergies    Intolerances        LABS:                        9.7    11.37 )-----------( 154      ( 20 Dec 2020 15:17 )             30.7     12-21    135  |  105  |  70<H>  ----------------------------<  212<H>  4.2   |  17<L>  |  3.25<H>    Ca    8.6      21 Dec 2020 01:38  Phos  4.0     12-20  Mg     1.5     12-20    TPro  6.5  /  Alb  2.9<L>  /  TBili  0.3  /  DBili  x   /  AST  13  /  ALT  11  /  AlkPhos  101  12-20      Urinalysis Basic - ( 20 Dec 2020 16:32 )    Color: Light Orange / Appearance: Slightly Turbid / S.020 / pH: x  Gluc: x / Ketone: Negative  / Bili: Negative / Urobili: <2 mg/dL   Blood: x / Protein: 30 mg/dL / Nitrite: Negative   Leuk Esterase: Large / RBC: 39 /HPF /  /HPF   Sq Epi: x / Non Sq Epi: 17 /HPF / Bacteria: Few          RADIOLOGY & ADDITIONAL TESTS: Reviewed.

## 2020-12-21 NOTE — PROGRESS NOTE ADULT - PROBLEM SELECTOR PLAN 1
BG =585 initially;  (HbA1c 8.6) in the setting of missing insulin;  Mild DKA criteria, w/ pH 7.3, beta-hydroxy butarate 0.9, intial AG 20  - s/p 3L fluids at the ED, c/w maintenance fluids w/ NS @ 100cc/hr while NPO, w/ close respiratory monitoring   - monitor on low dose ISS  - start w/ 6 units premeal, 30 units lantus at bedtime  - repeat BMP w/ closed AG, trend vbg w/ lactate  - Endocrinology c/s in AM  - Diabetes education in AM Presented with mild DKA in the setting of UTI and intentionally skipping her PM dose of BID novolin over on the past two nights prior to admission  - c/w LR@100cc  - c/w lantus 30 qhs, admelog 6 tidac, low ISS  - monitor BMP/VBG q6  - endo c/s placed, f/u recs  - will need glucometer/supplies on discharge

## 2020-12-21 NOTE — PROGRESS NOTE ADULT - PROBLEM SELECTOR PLAN 3
Likely pre-renal due to glycosuria and DKA, poor oral intake;  - s/p 3L fluids, so urine lytes will be unreliable  - monitor renal function  - glycemic control, plan as above   - hold losartan  - US kidney/bladder showing no hydronephrosis  - strict I+O's  - trend CMP Likely pre-renal in setting of DKA and poor oral intake over the past few days in the setting of UTI. US kidney/bladder showing no hydronephrosis.  - improving with IVF  - c/w LR@100cc/hr until improved PO intake  - hold home losartan  - monitor BMP, I/Os, renally dose meds, avoid nephrotoxins

## 2020-12-21 NOTE — CONSULT NOTE ADULT - ASSESSMENT
81 yo F with history of uncontrolled DM Type 2 c/b diabetic neuropathy and multiple toes amputations, HTN, HLD, PVD, CKD3(Baseline crt 1.8-2), morbid Obesity, presenting w/ hyperglycemia. Patient did not take her evening dose of Novolin N 30units qhs. Pt reports polyuria recently. On Admission , AG 20, HCO3 13, BHB 0.9, PH 7.3. A1c 8.6%.   Endocrine consult requested for the management of hyperglycemia in patient with DM2.       Uncontrolled DM2 c/b Neuropathy, PVD, retinopathy and CKD3  A1c 8.6% in setting of ARABELLA on CKD  FS premeal and qhs   FS goal 100-180      HTN   goal 130/80  At goal. Continue Amlodipine and hold losartan in light of ARABELLA     HLD   Continue atorvastatin       To be discussed with Dr Mckenzie Wolf-Shefali Sanchez MD  Endocrine Fellow   Pager  79 yo F with history of uncontrolled DM Type 2 c/b diabetic neuropathy and multiple toes amputations, HTN, HLD, PVD, CKD3(Baseline crt 1.8-2), morbid Obesity, presenting w/ hyperglycemia. Patient did not take her evening dose of Novolin N 30units qhs. Pt reports polyuria recently. On Admission , AG 20, HCO3 13, BHB 0.9, PH 7.3. A1c 8.6%.   Endocrine consult requested for the management of hyperglycemia in patient with DM2.       Uncontrolled DM2 c/b Neuropathy, PVD, retinopathy and CKD3  A1c 8.6% in setting of ARABELLA on CKD  FS premeal and qhs   FS goal 100-200  Recommend Lantus 22units qhs   Recommend Admelog 6units TIDac  Recommend low correctional scale TIDac and qhs     On discharge continue Novolin N, dose to be determined. Patient does not want to do so four times daily injections  Patient can continue to follow up with PCP whom does house calls on discharge, as patient unable to make it to office visit .     HTN   goal 130/80  At goal. Continue Amlodipine and hold losartan in light of ARABELLA     HLD   Continue atorvastatin       Discussed with Dr Mckenzie Sanchez MD  Endocrine Fellow   Pager  81 yo F with history of uncontrolled DM Type 2 c/b diabetic neuropathy and multiple toes amputations, HTN, HLD, PVD, CKD3(Baseline crt 1.8-2), morbid Obesity, presenting w/ hyperglycemia. Patient did not take her evening dose of Novolin N 30units qhs. Pt reports polyuria recently. On Admission , AG 20, HCO3 13, BHB 0.9, PH 7.3. A1c 8.6%.   Endocrine consult requested for the management of hyperglycemia in patient with DM2.       Uncontrolled DM2 c/b Neuropathy, PVD, retinopathy and CKD3  A1c 8.6% in setting of ARABELLA on CKD  FS premeal and qhs   FS goal 100-200  Recommend Lantus 22units qhs   Recommend Admelog 6units TIDac  Recommend low correctional scale TIDac and qhs     On discharge will continue insulin. Patient currently on Novolin N, however likely benefit from Novolin 73/30, dose to be determined if patient noted to be eating three meals daily. Patient does not want to do basal/bolus insulin as it would require four times daily injections.   Patient can continue to follow up with PCP whom does house calls on discharge, as patient unable to make it to office visit .     HTN   goal 130/80  At goal. Continue Amlodipine and hold losartan in light of ARABELLA     HLD   Continue atorvastatin       Discussed with Dr Mckenzie Sanchez MD  Endocrine Fellow   Pager

## 2020-12-21 NOTE — PROGRESS NOTE ADULT - PROBLEM SELECTOR PLAN 4
Found w/ L heel ulcer  s/p vanc + zosyn in ED  Evaluated by Podiatry Sx, no concern for infection at this time  - Xray of heel showing no signs of OM  - wound care   - dressing per nursing staff, frequent turns Controlled  - c/w home labetalol 200mg BID  - restart previous home med of amlodipine 10mg qd while home med of daily and losartan 100mg daily (was taking amlodipine 10mg but dc'ed herself)  - hold losartan in setting of ARABELLA  - c/w amlodipine and labetalol for now  - can check renin/aldosterone ratio in AM  - can persue further work-up for possible JOSY as outpatient Controlled  - c/w home labetalol 200mg BID  - restart previous home med of amlodipine 10mg qd while holding home losartan in setting of ARABELLA

## 2020-12-21 NOTE — PROGRESS NOTE ADULT - PROBLEM SELECTOR PLAN 6
Lipitor 10mg at home  - c/w home meds GOC: full code  Activity: ambulate as tolerated, PT c/s placed   DVT prophylaxis: hep subq  Diet: clear liquid, cc DVT ppx: HSQ 5000 q8  Diet: carb consistent regular  Dispo: lives at home alone with regular assistance from her nephew; unclear if patient has home care services; pending PT eval for generalized weakness  Activity: ambulate as tolerated with walker  Code status: full code

## 2020-12-21 NOTE — PROGRESS NOTE ADULT - PROBLEM SELECTOR PLAN 2
(+) UA for bacteria, large LE, (+) RBCs; Symptoms: (+) urinary frequency;   - f/u Ucx  - Ceftriaxone IV Presents with several days of urinary frequency and positive UA  - c/w CTX for now  - f/u UCx in lab

## 2020-12-21 NOTE — PROGRESS NOTE ADULT - SUBJECTIVE AND OBJECTIVE BOX
Podiatry pager #: [phone] ([place])/ [phone] ([facility])    Patient is a 80y old  Female who presents with a chief complaint of Hyperglycemia (21 Dec 2020 07:36)       INTERVAL HPI/OVERNIGHT EVENTS:  Patient seen and evaluated at bedside.  Pt is resting comfortable in NAD. Denies N/V/F/C.     Allergies    No Known Allergies    Intolerances        Vital Signs Last 24 Hrs  T(C): 37.2 (21 Dec 2020 06:00), Max: 37.5 (20 Dec 2020 23:00)  T(F): 99 (21 Dec 2020 06:00), Max: 99.5 (20 Dec 2020 23:00)  HR: 88 (21 Dec 2020 06:00) (80 - 91)  BP: 118/51 (21 Dec 2020 06:00) (118/51 - 159/30)  BP(mean): 75 (20 Dec 2020 16:48) (75 - 75)  RR: 19 (21 Dec 2020 06:00) (16 - 22)  SpO2: 97% (21 Dec 2020 06:00) (96% - 100%)    LABS:                        9.7    11.37 )-----------( 154      ( 20 Dec 2020 15:17 )             30.7     12-21    136  |  106  |  66<H>  ----------------------------<  174<H>  4.6   |  15<L>  |  3.04<H>    Ca    8.5      21 Dec 2020 08:09  Phos  2.9     12-21  Mg     1.5     12-21    TPro  6.5  /  Alb  2.4<L>  /  TBili  0.2  /  DBili  x   /  AST  23  /  ALT  12  /  AlkPhos  92  12-21      Urinalysis Basic - ( 20 Dec 2020 16:32 )    Color: Light Orange / Appearance: Slightly Turbid / S.020 / pH: x  Gluc: x / Ketone: Negative  / Bili: Negative / Urobili: <2 mg/dL   Blood: x / Protein: 30 mg/dL / Nitrite: Negative   Leuk Esterase: Large / RBC: 39 /HPF /  /HPF   Sq Epi: x / Non Sq Epi: 17 /HPF / Bacteria: Few      CAPILLARY BLOOD GLUCOSE      POCT Blood Glucose.: 226 mg/dL (21 Dec 2020 09:00)  POCT Blood Glucose.: 158 mg/dL (21 Dec 2020 06:57)  POCT Blood Glucose.: 208 mg/dL (21 Dec 2020 02:10)  POCT Blood Glucose.: 268 mg/dL (20 Dec 2020 22:16)  POCT Blood Glucose.: 476 mg/dL (20 Dec 2020 16:43)  POCT Blood Glucose.: 585 mg/dL (20 Dec 2020 13:28)      Lower Extremity Physical Exam:  Vascular: DP 1/4, PT 0/4 B/L  Neuro: Epicritic sensation reduced to the level of digits, B/L.  Musculoskeletal/Ortho: s/p LF partial first ray resection, and 3rd toe amp  Wound #1: Left posterior heel wound to subQ w/ central eschar.   Location: posterior left heel  Size: 4.0cm x 5cm  Depth: 0.2cm, down to subQ  Wound bed: fibrogranular w/ central eschar  Drainage: minimal  Odor: none  Periwound: no periwound erythema, xerosis noted  Etiology: pressure and diabetic foot ulcer    RADIOLOGY & ADDITIONAL TESTS:

## 2020-12-21 NOTE — CONSULT NOTE ADULT - SUBJECTIVE AND OBJECTIVE BOX
HPI: 81 yo F with history of DM Type 2 c/b diabetic neuropathy and multiple toes amputations, HTN, HLD, PVD, CKD (Baseline crt 1.8-2), morbid Obesity, presenting w/ hyperglycemia. Pt reports that she was in her usual state of health lately but wasn't particularly hungry last night so she did not eat much and decided to hold her evening dose of Novolin N 30 units. When she woke up this morning, she reports taking her 40 units of AM Novolin. Pt reports she has also been urinating more frequently recently, going every four hours. Pt denies other symptoms at this time, denies fatigue, fevers, chills, chest p/p, sob, n/v/d, dysuria, melanotic stools, sick contacts, recent travel.   On Admission , AG 20, HCO3 13, BHB 0.9, PH 7.3. s/p 10 units short-acting insulin, 20 units Lantus 3L fluids. A1c 8.6%.   Endocrine consult requested for the management of hyperglycemia in patient with DM2.   Patient was seen by Endocrine on previous admission in 9/2020.     Endocrine History:  Patient was diagnosed with DM2 30 years ago.   Has history of multiple toe amputation, neuropathy, retinopathy and CKD3.         PAST MEDICAL & SURGICAL HISTORY:  Obesity  PVD (peripheral vascular disease)  Glaucoma  Hypertension  Diabetic ulcer of heel  Neuropathy  DM type 2  History of cataract surgery  Toe amputation    FAMILY HISTORY:    Social History:    Outpatient Medications:  bisacodyl 10 mg rectal suppository: 1 suppository(ies) rectal once a day, As needed, Constipation (21 Dec 2020 08:08)  collagenase 250 units/g topical ointment: 1 application topically once a day left heel (21 Dec 2020 08:08)  labetalol 200 mg oral tablet: 1 tab(s) orally 2 times a day (21 Dec 2020 08:08)  lactobacillus acidophilus oral capsule: 1 cap(s) orally 2 times a day (21 Dec 2020 08:08)  Lipitor 10 mg oral tablet: 1 tab(s) orally once a day (at bedtime) (21 Dec 2020 08:08)  losartan 100 mg oral tablet: 1 tab(s) orally once a day (21 Dec 2020 08:08)  NovoLIN N 100 units/mL subcutaneous suspension: 30 unit(s) subcutaneous once a day (at bedtime) (21 Dec 2020 08:08)  NovoLIN N 100 units/mL subcutaneous suspension: 40 unit(s) subcutaneous once a day in AM (21 Dec 2020 08:08)  polyethylene glycol 3350 oral powder for reconstitution: 17 gram(s) orally once a day, As needed, Constipation (21 Dec 2020 08:08)  senna oral tablet: 1 tab(s) orally once a day (21 Dec 2020 08:08)  timolol hemihydrate 0.5% ophthalmic solution: 1 drop(s) to each affected eye once a day (21 Dec 2020 08:08)    MEDICATIONS  (STANDING):  amLODIPine   Tablet 10 milliGRAM(s) Oral daily  atorvastatin 10 milliGRAM(s) Oral at bedtime  cefTRIAXone   IVPB 1000 milliGRAM(s) IV Intermittent <User Schedule>  collagenase Ointment 1 Application(s) Topical daily  dextrose 40% Gel 15 Gram(s) Oral once  dextrose 5%. 1000 milliLiter(s) (50 mL/Hr) IV Continuous <Continuous>  dextrose 5%. 1000 milliLiter(s) (100 mL/Hr) IV Continuous <Continuous>  dextrose 50% Injectable 25 Gram(s) IV Push once  dextrose 50% Injectable 12.5 Gram(s) IV Push once  dextrose 50% Injectable 25 Gram(s) IV Push once  glucagon  Injectable 1 milliGRAM(s) IntraMuscular once  heparin   Injectable 5000 Unit(s) SubCutaneous every 8 hours  influenza   Vaccine 0.5 milliLiter(s) IntraMuscular once  insulin glargine Injectable (LANTUS) 30 Unit(s) SubCutaneous at bedtime  insulin lispro (ADMELOG) corrective regimen sliding scale   SubCutaneous three times a day before meals  insulin lispro Injectable (ADMELOG) 6 Unit(s) SubCutaneous three times a day before meals  labetalol 200 milliGRAM(s) Oral two times a day  lactated ringers. 1000 milliLiter(s) (100 mL/Hr) IV Continuous <Continuous>  lactobacillus acidophilus 1 Tablet(s) Oral daily  magnesium sulfate  IVPB 2 Gram(s) IV Intermittent once  polyethylene glycol 3350 17 Gram(s) Oral daily  senna 2 Tablet(s) Oral at bedtime  timolol 0.5% Solution 1 Drop(s) Both EYES daily    MEDICATIONS  (PRN):  bisacodyl Suppository 10 milliGRAM(s) Rectal daily PRN Constipation    Allergies  No Known Allergies    Review of Systems:  Constitutional: No fever  Eyes: No blurry vision  Neuro: No tremors  HEENT: No pain  Cardiovascular: No chest pain, palpitations  Respiratory: No SOB, no cough  GI: No nausea, vomiting, abdominal pain  : No dysuria  Skin: no rash  Psych: no depression  Endocrine: + polyuria, polydipsia  Hem/lymph: no swelling    ALL OTHER SYSTEMS REVIEWED AND NEGATIVE    PHYSICAL EXAM:  VITALS: T(C): 37.2 (12-21-20 @ 06:00)  T(F): 99 (12-21-20 @ 06:00), Max: 99.5 (12-20-20 @ 23:00)  HR: 88 (12-21-20 @ 06:00) (80 - 91)  BP: 118/51 (12-21-20 @ 06:00) (118/51 - 159/30)  RR:  (16 - 22)  SpO2:  (96% - 100%)  Wt(kg): 106kg   GENERAL: NAD, well-groomed, well-developed  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, anicteric  HEENT:  Atraumatic, Normocephalic, moist mucous membranes  THYROID: Normal size, no palpable nodules, nontender   RESPIRATORY: Clear to auscultation bilaterally; No rales, rhonchi, wheezing  CARDIOVASCULAR: Regular rate and rhythm; No murmurs; no peripheral edema  GI: Soft, nontender, non distended, normal bowel sounds  SKIN: Dry, intact, No rashes or lesions  MUSCULOSKELETAL: Full range of motion, normal strength  NEURO: sensation intact, extraocular movements intact, no tremor  PSYCH: Alert and oriented x 3, reactive affect     POCT Blood Glucose.: 226 mg/dL (12-21-20 @ 09:00)  POCT Blood Glucose.: 158 mg/dL (12-21-20 @ 06:57)  POCT Blood Glucose.: 208 mg/dL (12-21-20 @ 02:10)  POCT Blood Glucose.: 268 mg/dL (12-20-20 @ 22:16)  POCT Blood Glucose.: 476 mg/dL (12-20-20 @ 16:43)  POCT Blood Glucose.: 585 mg/dL (12-20-20 @ 13:28)                            9.7    11.37 )-----------( 154      ( 20 Dec 2020 15:17 )             30.7       12-21    136  |  106  |  66<H>  ----------------------------<  174<H>  4.6   |  15<L>  |  3.04<H>    EGFR if : 16<L>  EGFR if non : 14<L>    Ca    8.5      12-21  Mg     1.5     12-21  Phos  2.9     12-21    TPro  6.5  /  Alb  2.4<L>  /  TBili  0.2  /  DBili  x   /  AST  23  /  ALT  12  /  AlkPhos  92  12-21                   HPI: 79 yo F with history of DM Type 2 c/b diabetic neuropathy and multiple toes amputations, HTN, HLD, PVD, CKD (Baseline crt 1.8-2), morbid Obesity, presenting w/ hyperglycemia. Pt reports that she was in her usual state of health lately but wasn't particularly hungry last night so she did not eat much and decided to hold her evening dose of Novolin N 30 units. When she woke up this morning, she reports taking her 40 units of AM Novolin. Pt reports she has also been urinating more frequently recently. On Admission , AG 20, HCO3 13, BHB 0.9, PH 7.3. s/p 10 units short-acting insulin, 20 units Lantus 3L fluids. A1c 8.6%.   Endocrine consult requested for the management of hyperglycemia in patient with DM2.   Patient was seen by Endocrine on previous admission in 9/2020.     Endocrine History:  Follows up with PCP that does home visits, Dr Martínez.   Patient was diagnosed with DM2 30 years ago.   Has history of multiple toe amputation, neuropathy, retinopathy and CKD3.   Patient reports taking vial and syringe Novolin N 40units qam and 30units qpm. Patient reports Nephew prefills syringes at  the beginning of the week due to her poor vision.   Patient does not check FS due to poor vision.     PAST MEDICAL & SURGICAL HISTORY:  Obesity  PVD (peripheral vascular disease)  Glaucoma  Hypertension  Diabetic ulcer of heel  Neuropathy  DM type 2  History of cataract surgery  Toe amputation    FAMILY HISTORY:  Does not report family history of DM    Social History:  Remote history of tobacco use.   Social alcohol use    Outpatient Medications:  bisacodyl 10 mg rectal suppository: 1 suppository(ies) rectal once a day, As needed, Constipation (21 Dec 2020 08:08)  collagenase 250 units/g topical ointment: 1 application topically once a day left heel (21 Dec 2020 08:08)  labetalol 200 mg oral tablet: 1 tab(s) orally 2 times a day (21 Dec 2020 08:08)  lactobacillus acidophilus oral capsule: 1 cap(s) orally 2 times a day (21 Dec 2020 08:08)  Lipitor 10 mg oral tablet: 1 tab(s) orally once a day (at bedtime) (21 Dec 2020 08:08)  losartan 100 mg oral tablet: 1 tab(s) orally once a day (21 Dec 2020 08:08)  NovoLIN N 100 units/mL subcutaneous suspension: 30 unit(s) subcutaneous once a day (at bedtime) (21 Dec 2020 08:08)  NovoLIN N 100 units/mL subcutaneous suspension: 40 unit(s) subcutaneous once a day in AM (21 Dec 2020 08:08)  polyethylene glycol 3350 oral powder for reconstitution: 17 gram(s) orally once a day, As needed, Constipation (21 Dec 2020 08:08)  senna oral tablet: 1 tab(s) orally once a day (21 Dec 2020 08:08)  timolol hemihydrate 0.5% ophthalmic solution: 1 drop(s) to each affected eye once a day (21 Dec 2020 08:08)    MEDICATIONS  (STANDING):  amLODIPine   Tablet 10 milliGRAM(s) Oral daily  atorvastatin 10 milliGRAM(s) Oral at bedtime  cefTRIAXone   IVPB 1000 milliGRAM(s) IV Intermittent <User Schedule>  collagenase Ointment 1 Application(s) Topical daily  dextrose 40% Gel 15 Gram(s) Oral once  dextrose 5%. 1000 milliLiter(s) (50 mL/Hr) IV Continuous <Continuous>  dextrose 5%. 1000 milliLiter(s) (100 mL/Hr) IV Continuous <Continuous>  dextrose 50% Injectable 25 Gram(s) IV Push once  dextrose 50% Injectable 12.5 Gram(s) IV Push once  dextrose 50% Injectable 25 Gram(s) IV Push once  glucagon  Injectable 1 milliGRAM(s) IntraMuscular once  heparin   Injectable 5000 Unit(s) SubCutaneous every 8 hours  influenza   Vaccine 0.5 milliLiter(s) IntraMuscular once  insulin glargine Injectable (LANTUS) 30 Unit(s) SubCutaneous at bedtime  insulin lispro (ADMELOG) corrective regimen sliding scale   SubCutaneous three times a day before meals  insulin lispro Injectable (ADMELOG) 6 Unit(s) SubCutaneous three times a day before meals  labetalol 200 milliGRAM(s) Oral two times a day  lactated ringers. 1000 milliLiter(s) (100 mL/Hr) IV Continuous <Continuous>  lactobacillus acidophilus 1 Tablet(s) Oral daily  magnesium sulfate  IVPB 2 Gram(s) IV Intermittent once  polyethylene glycol 3350 17 Gram(s) Oral daily  senna 2 Tablet(s) Oral at bedtime  timolol 0.5% Solution 1 Drop(s) Both EYES daily    MEDICATIONS  (PRN):  bisacodyl Suppository 10 milliGRAM(s) Rectal daily PRN Constipation    Allergies  No Known Allergies    Review of Systems:  Constitutional: No fever  Eyes: No blurry vision  Neuro: No tremors  HEENT: No pain  Cardiovascular: No chest pain, palpitations  Respiratory: No SOB, no cough  GI: No nausea, vomiting, abdominal pain  : No dysuria  Skin: no rash  Psych: no depression  Endocrine: + polyuria, polydipsia  Hem/lymph: no swelling    ALL OTHER SYSTEMS REVIEWED AND NEGATIVE    PHYSICAL EXAM:  VITALS: T(C): 37.2 (12-21-20 @ 06:00)  T(F): 99 (12-21-20 @ 06:00), Max: 99.5 (12-20-20 @ 23:00)  HR: 88 (12-21-20 @ 06:00) (80 - 91)  BP: 118/51 (12-21-20 @ 06:00) (118/51 - 159/30)  RR:  (16 - 22)  SpO2:  (96% - 100%)  Wt(kg): 106kg   GENERAL: NAD, well-groomed, obese   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, anicteric  HEENT:  Atraumatic, Normocephalic, moist mucous membranes  THYROID: Normal size, no palpable nodules, nontender   RESPIRATORY: Clear to auscultation bilaterally; No rales, rhonchi, wheezing  CARDIOVASCULAR: Regular rate and rhythm; No murmurs; no peripheral edema  GI: Soft, nontender, non distended, normal bowel sounds  SKIN: Dry, intact, No rashes or lesions  MUSCULOSKELETAL: Full range of motion, normal strength  NEURO: sensation intact, extraocular movements intact, no tremor  PSYCH: Alert and oriented x 3, reactive affect     POCT Blood Glucose.: 226 mg/dL (12-21-20 @ 09:00)  POCT Blood Glucose.: 158 mg/dL (12-21-20 @ 06:57)  POCT Blood Glucose.: 208 mg/dL (12-21-20 @ 02:10)  POCT Blood Glucose.: 268 mg/dL (12-20-20 @ 22:16)  POCT Blood Glucose.: 476 mg/dL (12-20-20 @ 16:43)  POCT Blood Glucose.: 585 mg/dL (12-20-20 @ 13:28)                            9.7    11.37 )-----------( 154      ( 20 Dec 2020 15:17 )             30.7       12-21    136  |  106  |  66<H>  ----------------------------<  174<H>  4.6   |  15<L>  |  3.04<H>    EGFR if : 16<L>  EGFR if non : 14<L>    Ca    8.5      12-21  Mg     1.5     12-21  Phos  2.9     12-21    TPro  6.5  /  Alb  2.4<L>  /  TBili  0.2  /  DBili  x   /  AST  23  /  ALT  12  /  AlkPhos  92  12-21

## 2020-12-21 NOTE — PROGRESS NOTE ADULT - ASSESSMENT
Pt is 79yo who presents w/ Left heel wound to subQ  -pt was seen and examined  -Left posterior heel wound down to subQ, w/ fibrogranular wound bed and central eschar, wound does not track, or probe beyond SubQ, no erythema, no malodor, no local signs of infection  -LFXR: no gas, or OM, final read still pending  -Pod plan for local wound care  -Will follow while inpatient  -Discussed w/ attending Pt is 79yo who presents w/ Left heel wound to subQ  -pt was seen and examined  -Left posterior heel wound down to subQ, w/ fibrogranular wound bed and central eschar, wound does not track, or probe beyond SubQ, no erythema, no malodor, no local signs of infection  -LFXR: no gas, or OM, final   -Pod plan for local wound care  -Will follow while inpatient  -Discussed w/ attending

## 2020-12-22 LAB
ALDOST SERPL-MCNC: 15.8 NG/DL — SIGNIFICANT CHANGE UP
ANION GAP SERPL CALC-SCNC: 15 MMOL/L — HIGH (ref 7–14)
BASOPHILS # BLD AUTO: 0.03 K/UL — SIGNIFICANT CHANGE UP (ref 0–0.2)
BASOPHILS NFR BLD AUTO: 0.3 % — SIGNIFICANT CHANGE UP (ref 0–2)
BUN SERPL-MCNC: 63 MG/DL — HIGH (ref 7–23)
CALCIUM SERPL-MCNC: 8.6 MG/DL — SIGNIFICANT CHANGE UP (ref 8.4–10.5)
CHLORIDE SERPL-SCNC: 104 MMOL/L — SIGNIFICANT CHANGE UP (ref 98–107)
CO2 SERPL-SCNC: 18 MMOL/L — LOW (ref 22–31)
CREAT SERPL-MCNC: 3.09 MG/DL — HIGH (ref 0.5–1.3)
EOSINOPHIL # BLD AUTO: 0.02 K/UL — SIGNIFICANT CHANGE UP (ref 0–0.5)
EOSINOPHIL NFR BLD AUTO: 0.2 % — SIGNIFICANT CHANGE UP (ref 0–6)
GLUCOSE BLDC GLUCOMTR-MCNC: 133 MG/DL — HIGH (ref 70–99)
GLUCOSE BLDC GLUCOMTR-MCNC: 182 MG/DL — HIGH (ref 70–99)
GLUCOSE BLDC GLUCOMTR-MCNC: 214 MG/DL — HIGH (ref 70–99)
GLUCOSE BLDC GLUCOMTR-MCNC: 243 MG/DL — HIGH (ref 70–99)
GLUCOSE SERPL-MCNC: 227 MG/DL — HIGH (ref 70–99)
HCT VFR BLD CALC: 30.9 % — LOW (ref 34.5–45)
HGB BLD-MCNC: 9.6 G/DL — LOW (ref 11.5–15.5)
IANC: 9.78 K/UL — HIGH (ref 1.5–8.5)
IMM GRANULOCYTES NFR BLD AUTO: 0.3 % — SIGNIFICANT CHANGE UP (ref 0–1.5)
LYMPHOCYTES # BLD AUTO: 0.85 K/UL — LOW (ref 1–3.3)
LYMPHOCYTES # BLD AUTO: 7.4 % — LOW (ref 13–44)
MAGNESIUM SERPL-MCNC: 2 MG/DL — SIGNIFICANT CHANGE UP (ref 1.6–2.6)
MCHC RBC-ENTMCNC: 27 PG — SIGNIFICANT CHANGE UP (ref 27–34)
MCHC RBC-ENTMCNC: 31.1 GM/DL — LOW (ref 32–36)
MCV RBC AUTO: 86.8 FL — SIGNIFICANT CHANGE UP (ref 80–100)
MONOCYTES # BLD AUTO: 0.78 K/UL — SIGNIFICANT CHANGE UP (ref 0–0.9)
MONOCYTES NFR BLD AUTO: 6.8 % — SIGNIFICANT CHANGE UP (ref 2–14)
NEUTROPHILS # BLD AUTO: 9.78 K/UL — HIGH (ref 1.8–7.4)
NEUTROPHILS NFR BLD AUTO: 85 % — HIGH (ref 43–77)
NRBC # BLD: 0 /100 WBCS — SIGNIFICANT CHANGE UP
NRBC # FLD: 0 K/UL — SIGNIFICANT CHANGE UP
PHOSPHATE SERPL-MCNC: 3.3 MG/DL — SIGNIFICANT CHANGE UP (ref 2.5–4.5)
PLATELET # BLD AUTO: 157 K/UL — SIGNIFICANT CHANGE UP (ref 150–400)
POTASSIUM SERPL-MCNC: 4.2 MMOL/L — SIGNIFICANT CHANGE UP (ref 3.5–5.3)
POTASSIUM SERPL-SCNC: 4.2 MMOL/L — SIGNIFICANT CHANGE UP (ref 3.5–5.3)
RBC # BLD: 3.56 M/UL — LOW (ref 3.8–5.2)
RBC # FLD: 14.6 % — HIGH (ref 10.3–14.5)
SODIUM SERPL-SCNC: 137 MMOL/L — SIGNIFICANT CHANGE UP (ref 135–145)
WBC # BLD: 11.49 K/UL — HIGH (ref 3.8–10.5)
WBC # FLD AUTO: 11.49 K/UL — HIGH (ref 3.8–10.5)

## 2020-12-22 PROCEDURE — 99232 SBSQ HOSP IP/OBS MODERATE 35: CPT | Mod: GC

## 2020-12-22 PROCEDURE — 99233 SBSQ HOSP IP/OBS HIGH 50: CPT | Mod: GC

## 2020-12-22 RX ORDER — ACETAMINOPHEN 500 MG
975 TABLET ORAL EVERY 6 HOURS
Refills: 0 | Status: DISCONTINUED | OUTPATIENT
Start: 2020-12-22 | End: 2020-12-31

## 2020-12-22 RX ORDER — POLYETHYLENE GLYCOL 3350 17 G/17G
17 POWDER, FOR SOLUTION ORAL ONCE
Refills: 0 | Status: COMPLETED | OUTPATIENT
Start: 2020-12-22 | End: 2020-12-22

## 2020-12-22 RX ORDER — PIPERACILLIN AND TAZOBACTAM 4; .5 G/20ML; G/20ML
3.38 INJECTION, POWDER, LYOPHILIZED, FOR SOLUTION INTRAVENOUS EVERY 12 HOURS
Refills: 0 | Status: DISCONTINUED | OUTPATIENT
Start: 2020-12-22 | End: 2020-12-24

## 2020-12-22 RX ORDER — INSULIN LISPRO 100/ML
8 VIAL (ML) SUBCUTANEOUS
Refills: 0 | Status: DISCONTINUED | OUTPATIENT
Start: 2020-12-22 | End: 2020-12-23

## 2020-12-22 RX ORDER — INSULIN GLARGINE 100 [IU]/ML
24 INJECTION, SOLUTION SUBCUTANEOUS AT BEDTIME
Refills: 0 | Status: DISCONTINUED | OUTPATIENT
Start: 2020-12-22 | End: 2020-12-23

## 2020-12-22 RX ORDER — SENNA PLUS 8.6 MG/1
2 TABLET ORAL ONCE
Refills: 0 | Status: COMPLETED | OUTPATIENT
Start: 2020-12-22 | End: 2020-12-22

## 2020-12-22 RX ADMIN — Medication 6 UNIT(S): at 08:58

## 2020-12-22 RX ADMIN — HEPARIN SODIUM 5000 UNIT(S): 5000 INJECTION INTRAVENOUS; SUBCUTANEOUS at 06:47

## 2020-12-22 RX ADMIN — Medication 6 UNIT(S): at 13:00

## 2020-12-22 RX ADMIN — Medication 1 DROP(S): at 13:01

## 2020-12-22 RX ADMIN — AMLODIPINE BESYLATE 10 MILLIGRAM(S): 2.5 TABLET ORAL at 06:47

## 2020-12-22 RX ADMIN — Medication 400 MILLIGRAM(S): at 00:30

## 2020-12-22 RX ADMIN — Medication 2: at 08:57

## 2020-12-22 RX ADMIN — PIPERACILLIN AND TAZOBACTAM 25 GRAM(S): 4; .5 INJECTION, POWDER, LYOPHILIZED, FOR SOLUTION INTRAVENOUS at 17:51

## 2020-12-22 RX ADMIN — Medication 8 UNIT(S): at 17:57

## 2020-12-22 RX ADMIN — SIMETHICONE 80 MILLIGRAM(S): 80 TABLET, CHEWABLE ORAL at 08:59

## 2020-12-22 RX ADMIN — HEPARIN SODIUM 5000 UNIT(S): 5000 INJECTION INTRAVENOUS; SUBCUTANEOUS at 13:01

## 2020-12-22 RX ADMIN — Medication 2: at 13:00

## 2020-12-22 RX ADMIN — Medication 975 MILLIGRAM(S): at 18:30

## 2020-12-22 RX ADMIN — Medication 975 MILLIGRAM(S): at 17:51

## 2020-12-22 RX ADMIN — Medication 200 MILLIGRAM(S): at 17:51

## 2020-12-22 RX ADMIN — Medication 1: at 17:57

## 2020-12-22 RX ADMIN — POLYETHYLENE GLYCOL 3350 17 GRAM(S): 17 POWDER, FOR SOLUTION ORAL at 13:01

## 2020-12-22 RX ADMIN — Medication 10 MILLIGRAM(S): at 00:30

## 2020-12-22 RX ADMIN — Medication 1 TABLET(S): at 13:01

## 2020-12-22 RX ADMIN — CEFTRIAXONE 100 MILLIGRAM(S): 500 INJECTION, POWDER, FOR SOLUTION INTRAMUSCULAR; INTRAVENOUS at 04:26

## 2020-12-22 RX ADMIN — Medication 1 APPLICATION(S): at 13:00

## 2020-12-22 RX ADMIN — Medication 200 MILLIGRAM(S): at 06:47

## 2020-12-22 RX ADMIN — Medication 1000 MILLIGRAM(S): at 01:39

## 2020-12-22 NOTE — PROGRESS NOTE ADULT - ATTENDING COMMENTS
80F with a history of HTN, DM2 c/b diabetic neuropathy and multiple toe amputations, HLD, PVD, CKD (baseline Cr 1.8-2), morbid obesity admitted for mild DKA now improved, likely 2/2 UTI and insulin noncompliance.  -Endocrine following, appreciate recs  -C/w basal/bolus regimen, monitor FS qac and qHS  -Spiked fever overnight with no improvement in Cr- will check CTAP to r/o infected stone  -Switch CTX to Zosyn  -Renal US with no parenchymal kidney disease or hydronephrosis noted  -Appreciate podiatry recs for heel ulcer- does not appear to be infected at this time, X-ray with no evidence of OM  -PT eval recommending QUAN

## 2020-12-22 NOTE — PROGRESS NOTE ADULT - PROBLEM SELECTOR PLAN 2
Presents with several days of urinary frequency and positive UA  -switch abx to zosyn given fever last night  -check CT abd/pelvis to r/o pyelo

## 2020-12-22 NOTE — PROGRESS NOTE ADULT - ASSESSMENT
80y F w/ HTN, DM2 c/b diabetic neuropathy and multiple toes amputations, HLD, PVD, CKD (baseline Cr 1.8-2), morbid obesity presenting with several days of generalized weakness, decreased PO intake (and subsequent holding of PM dose of home novolin), and urinary frequency admitted for mild DKA in the setting of likely UTI, and likely pre-renal ARABELLA.

## 2020-12-22 NOTE — PROGRESS NOTE ADULT - PROBLEM SELECTOR PLAN 4
Controlled  - c/w home labetalol 200mg BID  - restart previous home med of amlodipine 10mg qd while holding home losartan in setting of ARABELLA

## 2020-12-22 NOTE — PROGRESS NOTE ADULT - PROBLEM SELECTOR PLAN 1
Presented with mild DKA in the setting of UTI and medication noncompliance  - c/w lantus 30 qhs, admelog 6 tidac, low ISS  - monitor BMP/VBG  - endo c/s  recommending Lantus 22, admelog 6  - will need glucometer/supplies on discharge (Rx sent)

## 2020-12-22 NOTE — PROGRESS NOTE ADULT - ASSESSMENT
79 yo F with history of uncontrolled DM Type 2 c/b diabetic neuropathy and multiple toes amputations, HTN, HLD, PVD, CKD3(Baseline crt 1.8-2), morbid Obesity, presenting w/ hyperglycemia. Patient did not take her evening dose of Novolin N 30units qhs. Endocrine consult requested for the management of hyperglycemia in patient with DM2.       Uncontrolled DM2 c/b Neuropathy, PVD, retinopathy and CKD3  A1c 8.6% in setting of ARABELLA on CKD  FS premeal and qhs   FS goal 100-200  Recommend increase Lantus to 24units qhs   Recommend increase Admelog 8units TIDac  Recommend low correctional scale TIDac and qhs     On discharge will continue insulin. Patient currently on Novolin N, however likely benefit from Novolin 73/30, dose to be determined.   Will further discuss with patient and nephew is they would prefer pens over vial and syringe.   Patient does not want to do basal/bolus insulin as it would require four times daily injections. Has audible glucometer, however has difficulty inserting test strips.   Patient can continue to follow up with PCP whom does house calls on discharge, as patient unable to make it to office visit .     HTN   goal 130/80  At goal. Continue Amlodipine and hold losartan in light of ARABELLA     HLD   Continue atorvastatin       Discussed with Dr Mckenzie Sanchez MD  Endocrine Fellow   Pager

## 2020-12-22 NOTE — PHYSICAL THERAPY INITIAL EVALUATION ADULT - PERTINENT HX OF CURRENT PROBLEM, REHAB EVAL
Pt. admitted for hyperglycemia, acute cystitis with hematuria. Pt. with diabetic ulcer of left heel. Per documentation, xray of heel showing no signs of OM.

## 2020-12-22 NOTE — PROGRESS NOTE ADULT - PROBLEM SELECTOR PLAN 6
DVT ppx: HSQ 5000 q8  Diet: carb consistent regular  Dispo: lives at home alone with regular assistance from her nephew; unclear if patient has home care services; will continue to encourage to participate with PT  Code status: full code

## 2020-12-22 NOTE — PROGRESS NOTE ADULT - PROBLEM SELECTOR PLAN 5
L heel ulcer; XR without gas or OM  does not appear to be contributing to septic picture  -wound consult, follow up

## 2020-12-22 NOTE — PHYSICAL THERAPY INITIAL EVALUATION ADULT - PATIENT PROFILE REVIEW, REHAB EVAL
Activity - Ambulate with assistance. Spoke with Rachael MOREL RN, pt. ok to be seen for PT Evaluation./yes

## 2020-12-22 NOTE — PROGRESS NOTE ADULT - SUBJECTIVE AND OBJECTIVE BOX
Podiatry pager #: [phone] ([place])/ [phone] ([facility])    Patient is a 80y old  Female who presents with a chief complaint of Hyperglycemia (21 Dec 2020 07:36)       INTERVAL HPI/OVERNIGHT EVENTS:  Patient seen and evaluated at bedside.  Pt is resting comfortable in NAD. Denies N/V/F/C.     Allergies    No Known Allergies    Intolerances        Vital Signs Last 24 Hrs  T(C): 37.2 (21 Dec 2020 06:00), Max: 37.5 (20 Dec 2020 23:00)  T(F): 99 (21 Dec 2020 06:00), Max: 99.5 (20 Dec 2020 23:00)  HR: 88 (21 Dec 2020 06:00) (80 - 91)  BP: 118/51 (21 Dec 2020 06:00) (118/51 - 159/30)  BP(mean): 75 (20 Dec 2020 16:48) (75 - 75)  RR: 19 (21 Dec 2020 06:00) (16 - 22)  SpO2: 97% (21 Dec 2020 06:00) (96% - 100%)    LABS:                        9.7    11.37 )-----------( 154      ( 20 Dec 2020 15:17 )             30.7     12-21    136  |  106  |  66<H>  ----------------------------<  174<H>  4.6   |  15<L>  |  3.04<H>    Ca    8.5      21 Dec 2020 08:09  Phos  2.9     12-21  Mg     1.5     12-21    TPro  6.5  /  Alb  2.4<L>  /  TBili  0.2  /  DBili  x   /  AST  23  /  ALT  12  /  AlkPhos  92  12-21      Urinalysis Basic - ( 20 Dec 2020 16:32 )    Color: Light Orange / Appearance: Slightly Turbid / S.020 / pH: x  Gluc: x / Ketone: Negative  / Bili: Negative / Urobili: <2 mg/dL   Blood: x / Protein: 30 mg/dL / Nitrite: Negative   Leuk Esterase: Large / RBC: 39 /HPF /  /HPF   Sq Epi: x / Non Sq Epi: 17 /HPF / Bacteria: Few      CAPILLARY BLOOD GLUCOSE      POCT Blood Glucose.: 226 mg/dL (21 Dec 2020 09:00)  POCT Blood Glucose.: 158 mg/dL (21 Dec 2020 06:57)  POCT Blood Glucose.: 208 mg/dL (21 Dec 2020 02:10)  POCT Blood Glucose.: 268 mg/dL (20 Dec 2020 22:16)  POCT Blood Glucose.: 476 mg/dL (20 Dec 2020 16:43)  POCT Blood Glucose.: 585 mg/dL (20 Dec 2020 13:28)      Lower Extremity Physical Exam:  Vascular: DP 1/4, PT 0/4 B/L  Neuro: Epicritic sensation reduced to the level of digits, B/L.  Musculoskeletal/Ortho: s/p LF partial first ray resection, and 3rd toe amp  Wound #1: Left posterior heel wound to subQ w/ central eschar.   Location: posterior left heel  Size: 4.0cm x 5cm  Depth: 0.2cm, down to subQ  Wound bed: fibrogranular w/ central eschar  Drainage: minimal  Odor: none  Periwound: no periwound erythema, xerosis noted  Etiology: pressure and diabetic foot ulcer    RADIOLOGY & ADDITIONAL TESTS:

## 2020-12-22 NOTE — PROGRESS NOTE ADULT - SUBJECTIVE AND OBJECTIVE BOX
Arnaldo Westfall M.D.  Internal Medicine PGY-3  Pager: [phone]/ [phone]    Patient is a 80y old  Female who presents with a chief complaint of Hyperglycemia (21 Dec 2020 12:28)    SUBJECTIVE / OVERNIGHT EVENTS: Patient seen and examined. Her main complaint is not being able to have bowel movement for several days. She feels weak and unable to work with PT.     OBJECTIVE:  Vital Signs Last 24 Hrs  T(C): 37.7 (22 Dec 2020 13:53), Max: 38.7 (21 Dec 2020 21:20)  T(F): 99.9 (22 Dec 2020 13:53), Max: 101.7 (21 Dec 2020 21:20)  HR: 81 (22 Dec 2020 13:53) (81 - 94)  BP: 139/63 (22 Dec 2020 13:53) (139/63 - 150/52)  RR: 20 (22 Dec 2020 13:53) (18 - 20)  SpO2: 92% (22 Dec 2020 13:53) (92% - 98%)    I&O's Summary    21 Dec 2020 07:01  -  22 Dec 2020 07:00  --------------------------------------------------------  IN: 240 mL / OUT: 0 mL / NET: 240 mL      Physical Examination:  GEN: chronically ill appearing, in NAD  EYES: PERRL, EOMI, no scleral icterus  HEAD/NECK: Normocephalic, atraumatic, no cervical lymphadenopathy  RESP: no accessory muscle use, B/L air entry, CTAB   CARDIO: regular rate/rhythm, no LE edema B/L  ABD: soft, NT, ND, +BS  PSYCH: A&Ox3, normal affect  SKIN: warm, dry, in tact, no rashes  NEURO: no focal neurologic deficits appreciated  EXT: no lower extremity edema, no cyanosis  VASC: good peripheral pulses    Labs:  CAPILLARY BLOOD GLUCOSE      POCT Blood Glucose.: 214 mg/dL (22 Dec 2020 12:19)  POCT Blood Glucose.: 243 mg/dL (22 Dec 2020 08:44)  POCT Blood Glucose.: 219 mg/dL (21 Dec 2020 22:06)  POCT Blood Glucose.: 182 mg/dL (21 Dec 2020 17:35)    CBC Full  -  ( 22 Dec 2020 07:15 )  WBC Count : 11.49 K/uL  RBC Count : 3.56 M/uL  Hemoglobin : 9.6 g/dL  Hematocrit : 30.9 %  Platelet Count - Automated : 157 K/uL  Mean Cell Volume : 86.8 fL  Mean Cell Hemoglobin : 27.0 pg  Mean Cell Hemoglobin Concentration : 31.1 gm/dL  Auto Neutrophil # : 9.78 K/uL  Auto Lymphocyte # : 0.85 K/uL  Auto Monocyte # : 0.78 K/uL  Auto Eosinophil # : 0.02 K/uL  Auto Basophil # : 0.03 K/uL  Auto Neutrophil % : 85.0 %  Auto Lymphocyte % : 7.4 %  Auto Monocyte % : 6.8 %  Auto Eosinophil % : 0.2 %  Auto Basophil % : 0.3 %        137  |  104  |  63<H>  ----------------------------<  227<H>  4.2   |  18<L>  |  3.09<H>    Ca    8.6      22 Dec 2020 07:15  Phos  3.3     12  Mg     2.0         TPro  6.5  /  Alb  2.4<L>  /  TBili  0.2  /  DBili  x   /  AST  23  /  ALT  12  /  AlkPhos  92  12-21    Culture - Blood (collected 20 Dec 2020 18:52)  Source: .Blood Blood-Venous  Preliminary Report (21 Dec 2020 19:02):    No growth to date.    Culture - Blood (collected 20 Dec 2020 18:52)  Source: .Blood Blood-Peripheral  Preliminary Report (21 Dec 2020 19:02):    No growth to date.    Urinalysis Basic - ( 20 Dec 2020 16:32 )    Color: Light Orange / Appearance: Slightly Turbid / S.020 / pH: x  Gluc: x / Ketone: Negative  / Bili: Negative / Urobili: <2 mg/dL   Blood: x / Protein: 30 mg/dL / Nitrite: Negative   Leuk Esterase: Large / RBC: 39 /HPF /  /HPF   Sq Epi: x / Non Sq Epi: 17 /HPF / Bacteria: Few    Imaging Personally Reviewed:    I&O's Summary    21 Dec 2020 07:01  -  22 Dec 2020 07:00  --------------------------------------------------------  IN: 240 mL / OUT: 0 mL / NET: 240 mL    Consultant(s) Notes Reviewed:   Care Discussed with Consultants/Other Providers:    MEDICATIONS  (STANDING):  amLODIPine   Tablet 10 milliGRAM(s) Oral daily  atorvastatin 10 milliGRAM(s) Oral at bedtime  collagenase Ointment 1 Application(s) Topical daily  dextrose 40% Gel 15 Gram(s) Oral once  dextrose 5%. 1000 milliLiter(s) (50 mL/Hr) IV Continuous <Continuous>  dextrose 5%. 1000 milliLiter(s) (100 mL/Hr) IV Continuous <Continuous>  dextrose 50% Injectable 25 Gram(s) IV Push once  dextrose 50% Injectable 25 Gram(s) IV Push once  dextrose 50% Injectable 12.5 Gram(s) IV Push once  glucagon  Injectable 1 milliGRAM(s) IntraMuscular once  heparin   Injectable 5000 Unit(s) SubCutaneous every 8 hours  influenza   Vaccine 0.5 milliLiter(s) IntraMuscular once  insulin glargine Injectable (LANTUS) 22 Unit(s) SubCutaneous at bedtime  insulin lispro (ADMELOG) corrective regimen sliding scale   SubCutaneous at bedtime  insulin lispro (ADMELOG) corrective regimen sliding scale   SubCutaneous three times a day before meals  insulin lispro Injectable (ADMELOG) 6 Unit(s) SubCutaneous three times a day before meals  labetalol 200 milliGRAM(s) Oral two times a day  lactobacillus acidophilus 1 Tablet(s) Oral daily  piperacillin/tazobactam IVPB.. 3.375 Gram(s) IV Intermittent every 12 hours  polyethylene glycol 3350 17 Gram(s) Oral daily  senna 2 Tablet(s) Oral at bedtime  timolol 0.5% Solution 1 Drop(s) Both EYES daily    MEDICATIONS  (PRN):  bisacodyl Suppository 10 milliGRAM(s) Rectal daily PRN Constipation  simethicone 80 milliGRAM(s) Chew every 8 hours PRN Gas

## 2020-12-22 NOTE — PHYSICAL THERAPY INITIAL EVALUATION ADULT - ADDITIONAL COMMENTS
Pt. owns a straight cane, rolling walker, shower chair, and transport chair. Pt. reports she has a stair lift in her home.     Pt. was left semisupine in bed post PT Evaluation, no apparent distress, call bell within reach.

## 2020-12-22 NOTE — PROGRESS NOTE ADULT - SUBJECTIVE AND OBJECTIVE BOX
Chief Complaint: uncontrolled DM2    History: Patient seen and examined at bedside with nephew present. Per nephew since the last hospitalization, he has been encouraging the patient to eat three meals daily. Discussed using   Glucerna as meal replacement is patient does not want to have a meal.    Patient has been on Novolin N for years.   Patient unable to see well therefore she has audible glucometer, however still has problem inserting test strips.     MEDICATIONS  (STANDING):  amLODIPine   Tablet 10 milliGRAM(s) Oral daily  atorvastatin 10 milliGRAM(s) Oral at bedtime  collagenase Ointment 1 Application(s) Topical daily  dextrose 40% Gel 15 Gram(s) Oral once  dextrose 5%. 1000 milliLiter(s) (50 mL/Hr) IV Continuous <Continuous>  dextrose 5%. 1000 milliLiter(s) (100 mL/Hr) IV Continuous <Continuous>  dextrose 50% Injectable 25 Gram(s) IV Push once  dextrose 50% Injectable 25 Gram(s) IV Push once  dextrose 50% Injectable 12.5 Gram(s) IV Push once  glucagon  Injectable 1 milliGRAM(s) IntraMuscular once  heparin   Injectable 5000 Unit(s) SubCutaneous every 8 hours  influenza   Vaccine 0.5 milliLiter(s) IntraMuscular once  insulin glargine Injectable (LANTUS) 22 Unit(s) SubCutaneous at bedtime  insulin lispro (ADMELOG) corrective regimen sliding scale   SubCutaneous at bedtime  insulin lispro (ADMELOG) corrective regimen sliding scale   SubCutaneous three times a day before meals  insulin lispro Injectable (ADMELOG) 6 Unit(s) SubCutaneous three times a day before meals  labetalol 200 milliGRAM(s) Oral two times a day  lactobacillus acidophilus 1 Tablet(s) Oral daily  piperacillin/tazobactam IVPB.. 3.375 Gram(s) IV Intermittent every 12 hours  polyethylene glycol 3350 17 Gram(s) Oral daily  senna 2 Tablet(s) Oral at bedtime  timolol 0.5% Solution 1 Drop(s) Both EYES daily    MEDICATIONS  (PRN):  acetaminophen   Tablet .. 975 milliGRAM(s) Oral every 6 hours PRN Mild Pain (1 - 3)  bisacodyl Suppository 10 milliGRAM(s) Rectal daily PRN Constipation  simethicone 80 milliGRAM(s) Chew every 8 hours PRN Gas    PHYSICAL EXAM:  VITALS: T(C): 37.7 (12-22-20 @ 13:53)  T(F): 99.9 (12-22-20 @ 13:53), Max: 101.7 (12-21-20 @ 21:20)  HR: 81 (12-22-20 @ 13:53) (81 - 94)  BP: 139/63 (12-22-20 @ 13:53) (139/63 - 150/52)  RR:  (18 - 20)  SpO2:  (92% - 98%)  Wt(kg): 106kg   GENERAL: NAD, well-groomed, well-developed  RESPIRATORY: Clear to auscultation bilaterally; No rales, rhonchi, wheezing, or rubs  CARDIOVASCULAR: Regular rate and rhythm; No murmurs; no peripheral edema  GI: Soft, nontender, non distended, normal bowel sounds  SKIN: left heel ulcer, multiple amputations of toes on b/l feet   MUSCULOSKELETAL: Full range of motion, normal strength  NEURO: sensation intact, extraocular movements intact, no tremor, normal reflexes  PSYCH: Alert and oriented x 3, reactive affect     POCT Blood Glucose.: 214 mg/dL (12-22-20 @ 12:19)  POCT Blood Glucose.: 243 mg/dL (12-22-20 @ 08:44)  POCT Blood Glucose.: 219 mg/dL (12-21-20 @ 22:06)  POCT Blood Glucose.: 182 mg/dL (12-21-20 @ 17:35)  POCT Blood Glucose.: 185 mg/dL (12-21-20 @ 12:29)  POCT Blood Glucose.: 226 mg/dL (12-21-20 @ 09:00)  POCT Blood Glucose.: 158 mg/dL (12-21-20 @ 06:57)  POCT Blood Glucose.: 208 mg/dL (12-21-20 @ 02:10)  POCT Blood Glucose.: 268 mg/dL (12-20-20 @ 22:16)  POCT Blood Glucose.: 476 mg/dL (12-20-20 @ 16:43)  POCT Blood Glucose.: 585 mg/dL (12-20-20 @ 13:28)      12-22    137  |  104  |  63<H>  ----------------------------<  227<H>  4.2   |  18<L>  |  3.09<H>    EGFR if : 16<L>  EGFR if non : 14<L>    Ca    8.6      12-22  Mg     2.0     12-22  Phos  3.3     12-22    TPro  6.5  /  Alb  2.4<L>  /  TBili  0.2  /  DBili  x   /  AST  23  /  ALT  12  /  AlkPhos  92  12-21

## 2020-12-22 NOTE — PROGRESS NOTE ADULT - PROBLEM SELECTOR PLAN 3
Likely pre-renal in setting of DKA and poor oral intake over the past few days in the setting of UTI. US kidney/bladder showing no hydronephrosis.  - improving with IVF  - c/w LR@100cc/hr until improved PO intake  - hold home losartan  - monitor BMP, I/Os, renally dose meds, avoid nephrotoxins

## 2020-12-22 NOTE — PROGRESS NOTE ADULT - ATTENDING COMMENTS
Uncontrolled DM2 complicated by heel ulcer, legally blind, ARABELLA presented with glucose 500s and mild ketosis.  Inpatient agree with basal bolus plan as outlined.  At home on NPH vials (renewed by pcp, might not need to stay on vials). Nephew helps set up insulin at home.  A1c 8.6% not too much above goal for this patient but maybe could optimize a little.  Patient declines 4 injections per day for dc. Consider 70/30 insulin for dc - can consider Novolog 70/30 pen vs. vial depending on patient/nephew preference.    Jean Paul Ogden MD  Division of Endocrinology  Pager: [phone]    If after 6PM or before 9AM, or on weekends/holidays, please call endocrine answering service for assistance ([phone]).  For nonurgent matters email [email or web address] for assistance.

## 2020-12-22 NOTE — PROGRESS NOTE ADULT - ASSESSMENT
Pt is 79yo who presents w/ Left heel wound to subQ  -pt was seen and examined  -Left posterior heel wound down to subQ, w/ fibrogranular wound bed and central eschar, wound does not track, or probe beyond SubQ, no erythema, no malodor, no local signs of infection  -LFXR: no gas, or OM, final   -Little to no suspicion for underlying bone infection. Elevated glucose 2/2 UTI.   -Pod plan for local wound care, santyl and DSD daily  -Will follow while inpatient

## 2020-12-22 NOTE — ADVANCED PRACTICE NURSE CONSULT - REASON FOR CONSULT
Patient seen by Podiatry for left heel wound, podiatry to follow while inpatient as per note.     Please contact Wound Care Service Line if we can be of further assistance (ext [phone]).

## 2020-12-23 ENCOUNTER — TRANSCRIPTION ENCOUNTER (OUTPATIENT)
Age: 80
End: 2020-12-23

## 2020-12-23 DIAGNOSIS — N20.9 URINARY CALCULUS, UNSPECIFIED: ICD-10-CM

## 2020-12-23 DIAGNOSIS — E87.2 ACIDOSIS: ICD-10-CM

## 2020-12-23 LAB
ALBUMIN SERPL ELPH-MCNC: 2.4 G/DL — LOW (ref 3.3–5)
ALP SERPL-CCNC: 112 U/L — SIGNIFICANT CHANGE UP (ref 40–120)
ALT FLD-CCNC: 16 U/L — SIGNIFICANT CHANGE UP (ref 4–33)
ANION GAP SERPL CALC-SCNC: 15 MMOL/L — HIGH (ref 7–14)
APPEARANCE UR: ABNORMAL
AST SERPL-CCNC: 20 U/L — SIGNIFICANT CHANGE UP (ref 4–32)
BACTERIA # UR AUTO: NEGATIVE — SIGNIFICANT CHANGE UP
BILIRUB SERPL-MCNC: 0.3 MG/DL — SIGNIFICANT CHANGE UP (ref 0.2–1.2)
BILIRUB UR-MCNC: NEGATIVE — SIGNIFICANT CHANGE UP
BUN SERPL-MCNC: 65 MG/DL — HIGH (ref 7–23)
CALCIUM SERPL-MCNC: 8.4 MG/DL — SIGNIFICANT CHANGE UP (ref 8.4–10.5)
CHLORIDE SERPL-SCNC: 103 MMOL/L — SIGNIFICANT CHANGE UP (ref 98–107)
CHLORIDE UR-SCNC: 39 MMOL/L — SIGNIFICANT CHANGE UP
CO2 SERPL-SCNC: 18 MMOL/L — LOW (ref 22–31)
COLOR SPEC: YELLOW — SIGNIFICANT CHANGE UP
CREAT ?TM UR-MCNC: 135 MG/DL — SIGNIFICANT CHANGE UP
CREAT SERPL-MCNC: 3.24 MG/DL — HIGH (ref 0.5–1.3)
CRP SERPL-MCNC: 323.4 MG/L — HIGH
CULTURE RESULTS: SIGNIFICANT CHANGE UP
DIFF PNL FLD: ABNORMAL
EPI CELLS # UR: 0 /HPF — SIGNIFICANT CHANGE UP (ref 0–5)
GLUCOSE BLDC GLUCOMTR-MCNC: 132 MG/DL — HIGH (ref 70–99)
GLUCOSE BLDC GLUCOMTR-MCNC: 163 MG/DL — HIGH (ref 70–99)
GLUCOSE BLDC GLUCOMTR-MCNC: 170 MG/DL — HIGH (ref 70–99)
GLUCOSE BLDC GLUCOMTR-MCNC: 205 MG/DL — HIGH (ref 70–99)
GLUCOSE BLDC GLUCOMTR-MCNC: 213 MG/DL — HIGH (ref 70–99)
GLUCOSE SERPL-MCNC: 181 MG/DL — HIGH (ref 70–99)
GLUCOSE UR QL: NEGATIVE — SIGNIFICANT CHANGE UP
KETONES UR-MCNC: NEGATIVE — SIGNIFICANT CHANGE UP
LEUKOCYTE ESTERASE UR-ACNC: ABNORMAL
MAGNESIUM SERPL-MCNC: 1.9 MG/DL — SIGNIFICANT CHANGE UP (ref 1.6–2.6)
NITRITE UR-MCNC: NEGATIVE — SIGNIFICANT CHANGE UP
PH UR: 6 — SIGNIFICANT CHANGE UP (ref 5–8)
PHOSPHATE SERPL-MCNC: 3.1 MG/DL — SIGNIFICANT CHANGE UP (ref 2.5–4.5)
POTASSIUM SERPL-MCNC: 4.1 MMOL/L — SIGNIFICANT CHANGE UP (ref 3.5–5.3)
POTASSIUM SERPL-SCNC: 4.1 MMOL/L — SIGNIFICANT CHANGE UP (ref 3.5–5.3)
POTASSIUM UR-SCNC: 46.9 MMOL/L — SIGNIFICANT CHANGE UP
PROT ?TM UR-MCNC: 185 MG/DL — SIGNIFICANT CHANGE UP
PROT SERPL-MCNC: 6.3 G/DL — SIGNIFICANT CHANGE UP (ref 6–8.3)
PROT UR-MCNC: ABNORMAL
PROT/CREAT UR-RTO: 1.4 RATIO — HIGH (ref 0–0.2)
RBC CASTS # UR COMP ASSIST: SIGNIFICANT CHANGE UP /HPF (ref 0–4)
SODIUM SERPL-SCNC: 136 MMOL/L — SIGNIFICANT CHANGE UP (ref 135–145)
SODIUM UR-SCNC: 44 MMOL/L — SIGNIFICANT CHANGE UP
SP GR SPEC: 1.02 — SIGNIFICANT CHANGE UP (ref 1.01–1.02)
SPECIMEN SOURCE: SIGNIFICANT CHANGE UP
UROBILINOGEN FLD QL: SIGNIFICANT CHANGE UP
WBC UR QL: >50 /HPF — SIGNIFICANT CHANGE UP (ref 0–5)

## 2020-12-23 PROCEDURE — 99232 SBSQ HOSP IP/OBS MODERATE 35: CPT | Mod: GC

## 2020-12-23 PROCEDURE — 99233 SBSQ HOSP IP/OBS HIGH 50: CPT | Mod: GC

## 2020-12-23 PROCEDURE — 74176 CT ABD & PELVIS W/O CONTRAST: CPT | Mod: 26

## 2020-12-23 PROCEDURE — 99223 1ST HOSP IP/OBS HIGH 75: CPT

## 2020-12-23 PROCEDURE — 99232 SBSQ HOSP IP/OBS MODERATE 35: CPT

## 2020-12-23 RX ORDER — TAMSULOSIN HYDROCHLORIDE 0.4 MG/1
0.4 CAPSULE ORAL AT BEDTIME
Refills: 0 | Status: DISCONTINUED | OUTPATIENT
Start: 2020-12-23 | End: 2020-12-31

## 2020-12-23 RX ORDER — INSULIN LISPRO 100/ML
8 VIAL (ML) SUBCUTANEOUS
Refills: 0 | Status: DISCONTINUED | OUTPATIENT
Start: 2020-12-23 | End: 2020-12-24

## 2020-12-23 RX ORDER — INSULIN LISPRO 100/ML
9 VIAL (ML) SUBCUTANEOUS
Refills: 0 | Status: DISCONTINUED | OUTPATIENT
Start: 2020-12-23 | End: 2020-12-24

## 2020-12-23 RX ORDER — INSULIN GLARGINE 100 [IU]/ML
27 INJECTION, SOLUTION SUBCUTANEOUS AT BEDTIME
Refills: 0 | Status: DISCONTINUED | OUTPATIENT
Start: 2020-12-23 | End: 2020-12-24

## 2020-12-23 RX ORDER — SODIUM BICARBONATE 1 MEQ/ML
650 SYRINGE (ML) INTRAVENOUS
Refills: 0 | Status: DISCONTINUED | OUTPATIENT
Start: 2020-12-23 | End: 2020-12-31

## 2020-12-23 RX ADMIN — Medication 8 UNIT(S): at 13:05

## 2020-12-23 RX ADMIN — Medication 1 APPLICATION(S): at 12:18

## 2020-12-23 RX ADMIN — Medication 650 MILLIGRAM(S): at 18:19

## 2020-12-23 RX ADMIN — HEPARIN SODIUM 5000 UNIT(S): 5000 INJECTION INTRAVENOUS; SUBCUTANEOUS at 05:51

## 2020-12-23 RX ADMIN — INSULIN GLARGINE 27 UNIT(S): 100 INJECTION, SOLUTION SUBCUTANEOUS at 22:14

## 2020-12-23 RX ADMIN — Medication 2: at 13:04

## 2020-12-23 RX ADMIN — SIMETHICONE 80 MILLIGRAM(S): 80 TABLET, CHEWABLE ORAL at 22:13

## 2020-12-23 RX ADMIN — HEPARIN SODIUM 5000 UNIT(S): 5000 INJECTION INTRAVENOUS; SUBCUTANEOUS at 14:01

## 2020-12-23 RX ADMIN — PIPERACILLIN AND TAZOBACTAM 25 GRAM(S): 4; .5 INJECTION, POWDER, LYOPHILIZED, FOR SOLUTION INTRAVENOUS at 05:50

## 2020-12-23 RX ADMIN — SENNA PLUS 2 TABLET(S): 8.6 TABLET ORAL at 00:18

## 2020-12-23 RX ADMIN — Medication 975 MILLIGRAM(S): at 23:08

## 2020-12-23 RX ADMIN — Medication 2: at 09:16

## 2020-12-23 RX ADMIN — HEPARIN SODIUM 5000 UNIT(S): 5000 INJECTION INTRAVENOUS; SUBCUTANEOUS at 22:08

## 2020-12-23 RX ADMIN — Medication 200 MILLIGRAM(S): at 18:19

## 2020-12-23 RX ADMIN — Medication 8 UNIT(S): at 09:16

## 2020-12-23 RX ADMIN — PIPERACILLIN AND TAZOBACTAM 25 GRAM(S): 4; .5 INJECTION, POWDER, LYOPHILIZED, FOR SOLUTION INTRAVENOUS at 18:19

## 2020-12-23 RX ADMIN — HEPARIN SODIUM 5000 UNIT(S): 5000 INJECTION INTRAVENOUS; SUBCUTANEOUS at 00:18

## 2020-12-23 RX ADMIN — ATORVASTATIN CALCIUM 10 MILLIGRAM(S): 80 TABLET, FILM COATED ORAL at 00:17

## 2020-12-23 RX ADMIN — Medication 1: at 18:26

## 2020-12-23 RX ADMIN — INSULIN GLARGINE 24 UNIT(S): 100 INJECTION, SOLUTION SUBCUTANEOUS at 00:49

## 2020-12-23 RX ADMIN — Medication 200 MILLIGRAM(S): at 05:51

## 2020-12-23 RX ADMIN — TAMSULOSIN HYDROCHLORIDE 0.4 MILLIGRAM(S): 0.4 CAPSULE ORAL at 22:08

## 2020-12-23 RX ADMIN — Medication 975 MILLIGRAM(S): at 13:41

## 2020-12-23 RX ADMIN — Medication 975 MILLIGRAM(S): at 12:18

## 2020-12-23 RX ADMIN — Medication 1 TABLET(S): at 12:18

## 2020-12-23 RX ADMIN — AMLODIPINE BESYLATE 10 MILLIGRAM(S): 2.5 TABLET ORAL at 05:51

## 2020-12-23 RX ADMIN — Medication 8 UNIT(S): at 18:25

## 2020-12-23 RX ADMIN — ATORVASTATIN CALCIUM 10 MILLIGRAM(S): 80 TABLET, FILM COATED ORAL at 22:09

## 2020-12-23 RX ADMIN — Medication 1 DROP(S): at 12:18

## 2020-12-23 RX ADMIN — Medication 975 MILLIGRAM(S): at 22:08

## 2020-12-23 NOTE — PROGRESS NOTE ADULT - SUBJECTIVE AND OBJECTIVE BOX
Andrés Malhotra, PGY3  Pager [phone]      Patient is a 80y old  Female who presents with a chief complaint of Hyperglycemia (22 Dec 2020 17:19)      SUBJECTIVE/INTERVAL EVENTS: Patient seen and examined at bedside. Sleeping, easily aroused. Only complaint is wanting to sleep. Denies other acute issues. Had loose BM.    MEDICATIONS  (STANDING):  amLODIPine   Tablet 10 milliGRAM(s) Oral daily  atorvastatin 10 milliGRAM(s) Oral at bedtime  collagenase Ointment 1 Application(s) Topical daily  dextrose 40% Gel 15 Gram(s) Oral once  dextrose 5%. 1000 milliLiter(s) (50 mL/Hr) IV Continuous <Continuous>  dextrose 5%. 1000 milliLiter(s) (100 mL/Hr) IV Continuous <Continuous>  dextrose 50% Injectable 25 Gram(s) IV Push once  dextrose 50% Injectable 12.5 Gram(s) IV Push once  dextrose 50% Injectable 25 Gram(s) IV Push once  glucagon  Injectable 1 milliGRAM(s) IntraMuscular once  heparin   Injectable 5000 Unit(s) SubCutaneous every 8 hours  influenza   Vaccine 0.5 milliLiter(s) IntraMuscular once  insulin glargine Injectable (LANTUS) 24 Unit(s) SubCutaneous at bedtime  insulin lispro (ADMELOG) corrective regimen sliding scale   SubCutaneous at bedtime  insulin lispro (ADMELOG) corrective regimen sliding scale   SubCutaneous three times a day before meals  insulin lispro Injectable (ADMELOG) 8 Unit(s) SubCutaneous three times a day before meals  labetalol 200 milliGRAM(s) Oral two times a day  lactobacillus acidophilus 1 Tablet(s) Oral daily  piperacillin/tazobactam IVPB.. 3.375 Gram(s) IV Intermittent every 12 hours  polyethylene glycol 3350 17 Gram(s) Oral daily  senna 2 Tablet(s) Oral at bedtime  timolol 0.5% Solution 1 Drop(s) Both EYES daily    MEDICATIONS  (PRN):  acetaminophen   Tablet .. 975 milliGRAM(s) Oral every 6 hours PRN Mild Pain (1 - 3)  bisacodyl Suppository 10 milliGRAM(s) Rectal daily PRN Constipation  simethicone 80 milliGRAM(s) Chew every 8 hours PRN Gas      VITAL SIGNS:  T(F): 99 (12-23-20 @ 05:46), Max: 99.9 (12-22-20 @ 13:53)  HR: 89 (12-23-20 @ 05:46) (80 - 89)  BP: 145/56 (12-23-20 @ 05:46) (107/33 - 145/56)  RR: 18 (12-23-20 @ 05:46) (17 - 20)  SpO2: 95% (12-23-20 @ 05:46) (92% - 96%)    I&O's Summary    Daily     Daily     PHYSICAL EXAM:  Gen: Alert, NAD  HEENT: NCAT, conjunctiva clear, sclera anicteric  Neck: Supple, no JVD  CV: RRR, S1S2, no m/r/g  Resp: CTAB, normal respiratory effort  Abd: Soft, nontender, nondistended, bowel sounds intact  Ext: no edema, no clubbing or cyanosis  Neuro: Alert, appropriate speech, CN2-12 grossly intact, IGNACIO  SKIN: warm, perfused, L heel ulcer dressing c/d/i    LABS:                        9.6    11.49 )-----------( 157      ( 22 Dec 2020 07:15 )             30.9     Hgb Trend: 9.6<--, 9.7<--  12-22    137  |  104  |  63<H>  ----------------------------<  227<H>  4.2   |  18<L>  |  3.09<H>    Ca    8.6      22 Dec 2020 07:15  Phos  3.3     12-22  Mg     2.0     12-22    TPro  6.5  /  Alb  2.4<L>  /  TBili  0.2  /  DBili  x   /  AST  23  /  ALT  12  /  AlkPhos  92  12-21    Creatinine Trend: 3.09<--, 3.10<--, 3.04<--, 3.25<--, 3.48<--, 3.46<--  LIVER FUNCTIONS - ( 21 Dec 2020 08:09 )  Alb: 2.4 g/dL / Pro: 6.5 g/dL / ALK PHOS: 92 U/L / ALT: 12 U/L / AST: 23 U/L / GGT: x                     CAPILLARY BLOOD GLUCOSE      POCT Blood Glucose.: 132 mg/dL (23 Dec 2020 00:37)  POCT Blood Glucose.: 133 mg/dL (22 Dec 2020 21:45)  POCT Blood Glucose.: 182 mg/dL (22 Dec 2020 17:50)  POCT Blood Glucose.: 214 mg/dL (22 Dec 2020 12:19)  POCT Blood Glucose.: 243 mg/dL (22 Dec 2020 08:44)      RADIOLOGY & ADDITIONAL TESTS: Reviewed    Imaging Personally Reviewed:    Consultant(s) Notes Reviewed:      Care Discussed with Consultants/Other Providers:

## 2020-12-23 NOTE — PROGRESS NOTE ADULT - PROBLEM SELECTOR PLAN 1
Presents with several days of urinary frequency and positive UA, broadened from CTX to zosyn on 12/22 for fever and persistent leukocytosis  - check CTAP to eval pyelo or infected stone  - f/u UCx in lab  - BCx NGTD Presents with several days of urinary frequency and positive UA, broadened from CTX to zosyn on 12/22 for fever and persistent leukocytosis  - UCx with >100k CoNS, ?contaminant, will ask lab to run sensitivities  - BCx NGTD  - CTAP 12/23 showing right-sided obstructive uropathy with mild right hydroureteronephrosis to the level of two distal right ureteral stones measuring 2 and 6 mm, respectively  - urology c/s placed, appreciate recs

## 2020-12-23 NOTE — PROGRESS NOTE ADULT - ATTENDING COMMENTS
80F with a history of HTN, DM2 c/b diabetic neuropathy and multiple toe amputations, HLD, PVD, CKD (baseline Cr 1.8-2), morbid obesity admitted for mild DKA now improved, likely 2/2 UTI and insulin noncompliance.  -Endocrine following, appreciate recs, c/w basal/bolus regimen, monitor FS qac and qHS  -CTAP with 2 obstructing R ureteral stones, mild R hydroureteronephrosis  -Renal and Urology consulted  -Urine culture growing coag-negative staph- will ask micro lab to run sensitivities  -C/w Zosyn- afebrile now for 24h  -Appreciate podiatry recs for heel ulcer- does not appear to be infected at this time, X-ray with no evidence of OM  -PT eval recommending QUAN

## 2020-12-23 NOTE — PROGRESS NOTE ADULT - ASSESSMENT
81 yo F with history of uncontrolled DM Type 2 c/b diabetic neuropathy and multiple toes amputations, HTN, HLD, PVD, CKD3(Baseline crt 1.8-2), morbid Obesity, presenting w/ hyperglycemia. Patient did not take her evening dose of Novolin N 30units qhs. Endocrine consult requested for the management of hyperglycemia in patient with DM2.       Uncontrolled DM2 c/b Neuropathy, PVD, retinopathy and CKD3  A1c 8.6% in setting of ARABELLA on CKD  FS premeal and qhs   FS goal 100-200  Recommend increase Lantus to 27 units qhs   Recommend increase Admelog 9 before breakfast and lunch, continue Admelog 8units before dinner.   Recommend low correctional scale TIDac and qhs     On discharge will continue insulin. Patient currently on Novolin N, however likely benefit from Novolin 73/30, dose to be determined.   Will further discuss with patient and nephew is they would prefer pens over vial and syringe.   Patient does not want to do basal/bolus insulin as it would require four times daily injections. Has audible glucometer, however has difficulty inserting test strips.   Patient can continue to follow up with PCP whom does house calls on discharge, as patient unable to make it to office visit .     HTN   goal 130/80  At goal. Continue Amlodipine and hold losartan in light of ARABELLA     HLD   Continue atorvastatin         Carmen Sanchez MD  Endocrine Fellow   Pager  [phone] F with history of uncontrolled DM Type 2 c/b diabetic neuropathy and multiple toes amputations, HTN, HLD, PVD, CKD3(Baseline crt 1.8-2), morbid Obesity, presenting w/ hyperglycemia. Patient did not take her evening dose of Novolin N 30units qhs. Endocrine consult requested for the management of hyperglycemia in patient with DM2.       Uncontrolled DM2 c/b Neuropathy, PVD, retinopathy and CKD3  A1c 8.6% in setting of ARABELLA on CKD  FS premeal and qhs   FS goal 100-200  Recommend increase Lantus to 27 units qhs   Recommend increase Admelog 9 before breakfast continue Admelog 8units before lunch and dinner.   Recommend low correctional scale TIDac and qhs     On discharge will continue insulin. Patient currently on Novolin N, dose to be determine. We acknowledge this regimen is not ideal, however given patient visual deficits limited in options therfore recommend continue current regimen. Will consider adding tradjenta for better glycemic control without risk of hypoglycemia.   Patient states she tried insulin pens in the patient, however unable to visualize the numbers on the pen to adjust doses.   Patient does not want to do basal/bolus insulin as it would require four times daily injections. Has audible glucometer, however has difficulty inserting test strips.   Patient can continue to follow up with PCP whom does house calls on discharge, as patient unable to make it to office visit .     HTN   goal 130/80  At goal. Continue Amlodipine and hold losartan in light of ARABELLA     HLD   Continue atorvastatin     Discussed with Dr Mckenzie Sanchez MD  Endocrine Fellow   Pager

## 2020-12-23 NOTE — PROGRESS NOTE ADULT - SUBJECTIVE AND OBJECTIVE BOX
Chief Complaint:     History:    MEDICATIONS  (STANDING):  amLODIPine   Tablet 10 milliGRAM(s) Oral daily  atorvastatin 10 milliGRAM(s) Oral at bedtime  collagenase Ointment 1 Application(s) Topical daily  dextrose 40% Gel 15 Gram(s) Oral once  dextrose 5%. 1000 milliLiter(s) (50 mL/Hr) IV Continuous <Continuous>  dextrose 5%. 1000 milliLiter(s) (100 mL/Hr) IV Continuous <Continuous>  dextrose 50% Injectable 25 Gram(s) IV Push once  dextrose 50% Injectable 12.5 Gram(s) IV Push once  dextrose 50% Injectable 25 Gram(s) IV Push once  glucagon  Injectable 1 milliGRAM(s) IntraMuscular once  heparin   Injectable 5000 Unit(s) SubCutaneous every 8 hours  influenza   Vaccine 0.5 milliLiter(s) IntraMuscular once  insulin glargine Injectable (LANTUS) 24 Unit(s) SubCutaneous at bedtime  insulin lispro (ADMELOG) corrective regimen sliding scale   SubCutaneous at bedtime  insulin lispro (ADMELOG) corrective regimen sliding scale   SubCutaneous three times a day before meals  insulin lispro Injectable (ADMELOG) 8 Unit(s) SubCutaneous three times a day before meals  labetalol 200 milliGRAM(s) Oral two times a day  lactobacillus acidophilus 1 Tablet(s) Oral daily  piperacillin/tazobactam IVPB.. 3.375 Gram(s) IV Intermittent every 12 hours  polyethylene glycol 3350 17 Gram(s) Oral daily  senna 2 Tablet(s) Oral at bedtime  timolol 0.5% Solution 1 Drop(s) Both EYES daily    MEDICATIONS  (PRN):  acetaminophen   Tablet .. 975 milliGRAM(s) Oral every 6 hours PRN Mild Pain (1 - 3)  bisacodyl Suppository 10 milliGRAM(s) Rectal daily PRN Constipation  simethicone 80 milliGRAM(s) Chew every 8 hours PRN Gas    PHYSICAL EXAM:  VITALS: T(C): 37.2 (12-23-20 @ 05:46)  T(F): 99 (12-23-20 @ 05:46), Max: 99.9 (12-22-20 @ 13:53)  HR: 89 (12-23-20 @ 05:46) (80 - 89)  BP: 145/56 (12-23-20 @ 05:46) (107/33 - 145/56)  RR:  (17 - 20)  SpO2:  (92% - 96%)  Wt(kg): 79kg   GENERAL: NAD, well-groomed, well-developed  RESPIRATORY: Clear to auscultation bilaterally; No rales, rhonchi, wheezing, or rubs  CARDIOVASCULAR: Regular rate and rhythm; No murmurs; no peripheral edema  GI: Soft, nontender, non distended, normal bowel sounds  SKIN: Dry, intact, No rashes or lesions  MUSCULOSKELETAL: Full range of motion, normal strength  NEURO: sensation intact, extraocular movements intact, no tremor, normal reflexes  PSYCH: Alert and oriented x 3, reactive affect      POCT Blood Glucose.: 213 mg/dL (12-23-20 @ 12:19)  POCT Blood Glucose.: 205 mg/dL (12-23-20 @ 09:08)  POCT Blood Glucose.: 132 mg/dL (12-23-20 @ 00:37)  POCT Blood Glucose.: 133 mg/dL (12-22-20 @ 21:45)  POCT Blood Glucose.: 182 mg/dL (12-22-20 @ 17:50)  POCT Blood Glucose.: 214 mg/dL (12-22-20 @ 12:19)  POCT Blood Glucose.: 243 mg/dL (12-22-20 @ 08:44)  POCT Blood Glucose.: 219 mg/dL (12-21-20 @ 22:06)  POCT Blood Glucose.: 182 mg/dL (12-21-20 @ 17:35)  POCT Blood Glucose.: 185 mg/dL (12-21-20 @ 12:29)  POCT Blood Glucose.: 226 mg/dL (12-21-20 @ 09:00)  POCT Blood Glucose.: 158 mg/dL (12-21-20 @ 06:57)  POCT Blood Glucose.: 208 mg/dL (12-21-20 @ 02:10)  POCT Blood Glucose.: 268 mg/dL (12-20-20 @ 22:16)  POCT Blood Glucose.: 476 mg/dL (12-20-20 @ 16:43)  POCT Blood Glucose.: 585 mg/dL (12-20-20 @ 13:28)      12-23    136  |  103  |  65<H>  ----------------------------<  181<H>  4.1   |  18<L>  |  3.24<H>    EGFR if : 15<L>  EGFR if non : 13<L>    Ca    8.4      12-23  Mg     1.9     12-23  Phos  3.1     12-23    TPro  6.3  /  Alb  2.4<L>  /  TBili  0.3  /  DBili  x   /  AST  20  /  ALT  16  /  AlkPhos  112  12-23                                 Chief Complaint: management of DM2    History: Patient seen and examined at bedside. Patient BG remains above goal. Tolerating diet, NAD.     MEDICATIONS  (STANDING):  amLODIPine   Tablet 10 milliGRAM(s) Oral daily  atorvastatin 10 milliGRAM(s) Oral at bedtime  collagenase Ointment 1 Application(s) Topical daily  dextrose 40% Gel 15 Gram(s) Oral once  dextrose 5%. 1000 milliLiter(s) (50 mL/Hr) IV Continuous <Continuous>  dextrose 5%. 1000 milliLiter(s) (100 mL/Hr) IV Continuous <Continuous>  dextrose 50% Injectable 25 Gram(s) IV Push once  dextrose 50% Injectable 12.5 Gram(s) IV Push once  dextrose 50% Injectable 25 Gram(s) IV Push once  glucagon  Injectable 1 milliGRAM(s) IntraMuscular once  heparin   Injectable 5000 Unit(s) SubCutaneous every 8 hours  influenza   Vaccine 0.5 milliLiter(s) IntraMuscular once  insulin glargine Injectable (LANTUS) 24 Unit(s) SubCutaneous at bedtime  insulin lispro (ADMELOG) corrective regimen sliding scale   SubCutaneous at bedtime  insulin lispro (ADMELOG) corrective regimen sliding scale   SubCutaneous three times a day before meals  insulin lispro Injectable (ADMELOG) 8 Unit(s) SubCutaneous three times a day before meals  labetalol 200 milliGRAM(s) Oral two times a day  lactobacillus acidophilus 1 Tablet(s) Oral daily  piperacillin/tazobactam IVPB.. 3.375 Gram(s) IV Intermittent every 12 hours  polyethylene glycol 3350 17 Gram(s) Oral daily  senna 2 Tablet(s) Oral at bedtime  timolol 0.5% Solution 1 Drop(s) Both EYES daily    MEDICATIONS  (PRN):  acetaminophen   Tablet .. 975 milliGRAM(s) Oral every 6 hours PRN Mild Pain (1 - 3)  bisacodyl Suppository 10 milliGRAM(s) Rectal daily PRN Constipation  simethicone 80 milliGRAM(s) Chew every 8 hours PRN Gas    PHYSICAL EXAM:  VITALS: T(C): 37.2 (12-23-20 @ 05:46)  T(F): 99 (12-23-20 @ 05:46), Max: 99.9 (12-22-20 @ 13:53)  HR: 89 (12-23-20 @ 05:46) (80 - 89)  BP: 145/56 (12-23-20 @ 05:46) (107/33 - 145/56)  RR:  (17 - 20)  SpO2:  (92% - 96%)  Wt(kg): 79kg   GENERAL: NAD, well-groomed, well-developed  RESPIRATORY: Clear to auscultation bilaterally; No rales, rhonchi, wheezing, or rubs  CARDIOVASCULAR: Regular rate and rhythm; No murmurs; no peripheral edema  GI: Soft, nontender, non distended, normal bowel sounds  SKIN: Dry, intact, No rashes or lesions  MUSCULOSKELETAL: Full range of motion, normal strength  NEURO: sensation intact, extraocular movements intact, no tremor, normal reflexes  PSYCH: Alert and oriented x 3, reactive affect      POCT Blood Glucose.: 213 mg/dL (12-23-20 @ 12:19)  POCT Blood Glucose.: 205 mg/dL (12-23-20 @ 09:08)  POCT Blood Glucose.: 132 mg/dL (12-23-20 @ 00:37)  POCT Blood Glucose.: 133 mg/dL (12-22-20 @ 21:45)  POCT Blood Glucose.: 182 mg/dL (12-22-20 @ 17:50)  POCT Blood Glucose.: 214 mg/dL (12-22-20 @ 12:19)  POCT Blood Glucose.: 243 mg/dL (12-22-20 @ 08:44)  POCT Blood Glucose.: 219 mg/dL (12-21-20 @ 22:06)  POCT Blood Glucose.: 182 mg/dL (12-21-20 @ 17:35)  POCT Blood Glucose.: 185 mg/dL (12-21-20 @ 12:29)  POCT Blood Glucose.: 226 mg/dL (12-21-20 @ 09:00)  POCT Blood Glucose.: 158 mg/dL (12-21-20 @ 06:57)  POCT Blood Glucose.: 208 mg/dL (12-21-20 @ 02:10)  POCT Blood Glucose.: 268 mg/dL (12-20-20 @ 22:16)  POCT Blood Glucose.: 476 mg/dL (12-20-20 @ 16:43)  POCT Blood Glucose.: 585 mg/dL (12-20-20 @ 13:28)      12-23    136  |  103  |  65<H>  ----------------------------<  181<H>  4.1   |  18<L>  |  3.24<H>    EGFR if : 15<L>  EGFR if non : 13<L>    Ca    8.4      12-23  Mg     1.9     12-23  Phos  3.1     12-23    TPro  6.3  /  Alb  2.4<L>  /  TBili  0.3  /  DBili  x   /  AST  20  /  ALT  16  /  AlkPhos  112  12-23

## 2020-12-23 NOTE — PROGRESS NOTE ADULT - PROBLEM SELECTOR PLAN 6
DVT ppx: HSQ 5000 q8  Diet: carb consistent regular  Dispo: lives at home alone with regular assistance from her nephew; unclear if patient has home care services; PT recommending rehab  Code status: full code

## 2020-12-23 NOTE — CONSULT NOTE ADULT - PROBLEM SELECTOR RECOMMENDATION 9
Based on pt's clinical picture and history would recommend R ureteral stent placement as the safest option.  Currently pt is refusing to have a stent placed.  Therefore can continue to monitor for fevers and trend creatinine   Start expulsion therapy including starting Flomax, straining voids, and increasing PO fluids as tolerated.  Will follow with you

## 2020-12-23 NOTE — CONSULT NOTE ADULT - PROBLEM SELECTOR PROBLEM 1
Type 2 diabetes mellitus with hyperglycemia, with long-term current use of insulin
Urolithiasis
Acute kidney injury superimposed on CKD

## 2020-12-23 NOTE — CONSULT NOTE ADULT - PROBLEM SELECTOR RECOMMENDATION 2
In setting of CKD. Serum bicarbonate noted to be low at 18 today. Recommend starting sodium bicarbonate 650mg PO BID. Monitor serum CO2.    If any questions, please feel free to contact me  Wilberto Gooden   Nephrology Fellow  [phone]  (After 5 pm or on weekends please page the on-call fellow)

## 2020-12-23 NOTE — CONSULT NOTE ADULT - SUBJECTIVE AND OBJECTIVE BOX
HPI:  80-year-old female with long standing DM, HTN, legally blind, obesity, and diabetic foot ulcer was admitted to [facility] on 12/20/20 for hyperglycemia. Pt. says she skipped dinner the night before and when she checked her CBGs in the AM it was high so she came to the ER. Pt. endorses frequent urination the week prior and reports not checking her CBGs as her monitor was broken. Denies any personal or family history of kidney stones or kidney disease. Denies any NSAIDs, herbal supplements, or OTC medications.  Urology team consulted for CT Abdomen which shows mild R hydronephrosis and 2 stones in the distal ureter.  She denies any dysuria or fevers.  She c/o mild, vague, R sided pain.      PAST MEDICAL & SURGICAL HISTORY:  Obesity    PVD (peripheral vascular disease)    Glaucoma    Hypertension    Diabetic ulcer of heel    Neuropathy    PVD (peripheral vascular disease)    Diabetes  type 2    History of cataract surgery    Toe amputation status        MEDICATIONS  (STANDING):  amLODIPine   Tablet 10 milliGRAM(s) Oral daily  atorvastatin 10 milliGRAM(s) Oral at bedtime  collagenase Ointment 1 Application(s) Topical daily  dextrose 40% Gel 15 Gram(s) Oral once  dextrose 5%. 1000 milliLiter(s) (50 mL/Hr) IV Continuous <Continuous>  dextrose 5%. 1000 milliLiter(s) (100 mL/Hr) IV Continuous <Continuous>  dextrose 50% Injectable 25 Gram(s) IV Push once  dextrose 50% Injectable 12.5 Gram(s) IV Push once  dextrose 50% Injectable 25 Gram(s) IV Push once  glucagon  Injectable 1 milliGRAM(s) IntraMuscular once  heparin   Injectable 5000 Unit(s) SubCutaneous every 8 hours  influenza   Vaccine 0.5 milliLiter(s) IntraMuscular once  insulin glargine Injectable (LANTUS) 24 Unit(s) SubCutaneous at bedtime  insulin lispro (ADMELOG) corrective regimen sliding scale   SubCutaneous at bedtime  insulin lispro (ADMELOG) corrective regimen sliding scale   SubCutaneous three times a day before meals  insulin lispro Injectable (ADMELOG) 8 Unit(s) SubCutaneous three times a day before meals  labetalol 200 milliGRAM(s) Oral two times a day  lactobacillus acidophilus 1 Tablet(s) Oral daily  piperacillin/tazobactam IVPB.. 3.375 Gram(s) IV Intermittent every 12 hours  polyethylene glycol 3350 17 Gram(s) Oral daily  senna 2 Tablet(s) Oral at bedtime  timolol 0.5% Solution 1 Drop(s) Both EYES daily    MEDICATIONS  (PRN):  acetaminophen   Tablet .. 975 milliGRAM(s) Oral every 6 hours PRN Mild Pain (1 - 3)  bisacodyl Suppository 10 milliGRAM(s) Rectal daily PRN Constipation  simethicone 80 milliGRAM(s) Chew every 8 hours PRN Gas      FAMILY HISTORY:  Family hx of lung cancer    Family history of CHF (congestive heart failure)        Allergies    No Known Allergies        SOCIAL HISTORY:   Reports no tobacco, alcohol, illicit drug use.   lives alone retired        REVIEW OF SYSTEMS: Otherwise negative as stated in HPI      Vital Signs Last 24 Hrs  T(C): 37.2 (23 Dec 2020 05:46), Max: 37.7 (22 Dec 2020 13:53)  T(F): 99 (23 Dec 2020 05:46), Max: 99.9 (22 Dec 2020 13:53)  HR: 89 (23 Dec 2020 05:46) (80 - 89)  BP: 145/56 (23 Dec 2020 05:46) (107/33 - 145/56)  BP(mean): --  RR: 18 (23 Dec 2020 05:46) (17 - 20)  SpO2: 95% (23 Dec 2020 05:46) (92% - 96%)    PHYSICAL EXAM:    General: [x ] Awake and Alert in no acute distress    Respiratory and Thorax: [ x ] no resp distress   	  Cardiovascular: [x ] S1,S2 Reg Rate    Gastrointestinal: [x ]soft  [x ] non tender [ ] +BS  [ ] scars,   CVAT [ ]Y  [ ]N  /    [ ]L  [ ]R                           Musculoskeletal / Extremities:  AROM x 4 [x ]Y  [ ]N  	        LABS:                        9.6    11.49 )-----------( 157      ( 22 Dec 2020 07:15 )             30.9     12-23    136  |  103  |  65<H>  ----------------------------<  181<H>  4.1   |  18<L>  |  3.24<H>    Ca    8.4      23 Dec 2020 08:37  Phos  3.1     12-23  Mg     1.9     12-23    TPro  6.3  /  Alb  2.4<L>  /  TBili  0.3  /  DBili  x   /  AST  20  /  ALT  16  /  AlkPhos  112  12-23      URINE CX:  Culture - Urine (12.20.20 @ 18:57)   Specimen Source: .Urine Clean Catch (Midstream)   Culture Results:   >100,000 CFU/ml Coag Negative Staphylococcus Identification and   susceptibility to follow.       RADIOLOGY:      EXAM:  CT ABDOMEN AND PELVIS        PROCEDURE DATE:  Dec 23 2020         INTERPRETATION:  CLINICAL INFORMATION: Fever. Acute kidney injury.    COMPARISON: None.    PROCEDURE:  CT of the Abdomen and Pelvis was performed without intravenous contrast.  Intravenous contrast: None.  Oral contrast: None.  Sagittal and coronal reformats were performed.    FINDINGS:  LOWER CHEST: Small bilateral pleural effusions, right greater than left. Mitral annular calcification.    LIVER: Within normal limits.  BILE DUCTS: Normal caliber.  GALLBLADDER: Within normal limits.  SPLEEN: Within normal limits.  PANCREAS: Fatty atrophy.  ADRENALS: Within normal limits.  KIDNEYS/URETERS: Mild right hydroureteronephrosis with 2 stones in the distal ureter measuring 2 mm and 6 mm, respectively. Nonobstructing stones in the right kidney. Bilateral renal cortical thinning. Exophytic left upper pole renal cyst measuring 1.7 cm.    BLADDER: Within normal limits.  REPRODUCTIVE ORGANS: Uterus and adnexa within normal limits.    BOWEL: No bowel obstruction. Appendix is normal. Diverticulosis.  PERITONEUM: No ascites.  VESSELS: Atherosclerotic calcifications.  RETROPERITONEUM/LYMPH NODES: No lymphadenopathy.  ABDOMINAL WALL: Within normal limits.  BONES: Degenerative changes.    IMPRESSION:  Right-sided obstructive uropathy with mild right hydroureteronephrosis to the level of two distal right ureteral stones measuring 2 and 6 mm, respectively.            MANI CHILD MD; Attending Radiologist  This document has been electronically signed. Dec 23 2020 11:45AM

## 2020-12-23 NOTE — PROGRESS NOTE ADULT - PROBLEM SELECTOR PLAN 3
Initially presented with mild DKA in the setting of UTI and medication noncompliance; a1c 8.6  - c/w lantus 24 qhs, admelog 8 tidac, low ISS per endo  - appreciate endo recs  - will need glucometer/supplies on discharge (Rx sent to [facility])

## 2020-12-23 NOTE — PROGRESS NOTE ADULT - PROBLEM SELECTOR PLAN 2
Likely pre-renal in setting of DKA and poor oral intake over the past few days in the setting of UTI. US kidney/bladder showing no parenchymal kidney disease or hydronephrosis.  - s/p 3L IVF  - hold home losartan  - monitor BMP, I/Os, renally dose meds, avoid nephrotoxins Mixed pre-renal in setting of DKA and poor oral intake over the past few days in the setting of UTI vs component of R-sided obstruction from stone  - s/p 3L IVF  - hold home losartan  - renal c/s placed, appreciate recs  - monitor BMP, I/Os, renally dose meds, avoid nephrotoxins

## 2020-12-23 NOTE — CONSULT NOTE ADULT - PROBLEM SELECTOR RECOMMENDATION 9
Pt. with multifactorial ARABELLA on CKD in the setting of dehydration and obstructive uropathy. Upon lab review on [facility], Scr was 2.01 on 11/23/20. Scr on arrival was 3.46. Pt. received fluid resuscitation and Scr today remains elevated at 3.24. UA significant for proteinuria, however many epithelial cells noted. CT Abdomen shows R hydronephrosis with obstructing stone and L exophytic mass. Pt. with CKD likely secondary to longstanding DM. Pt. with multifactorial ARABELLA on CKD. Recommend to repeat UA/urine culture, urine electrolytes, and spot urine TP/Cr ratio. Recommend Urology evaluation for hydronephrosis and obstructing stones. Monitor labs and urine output. Avoid potential nephrotoxins. Dose medications as per eGFR. Pt. with multifactorial ARABELLA on CKD in the setting of dehydration and obstructive uropathy. Upon lab review on [facility], Scr was 2.01 on 11/23/20. Scr on arrival was 3.46. Pt. received fluid resuscitation and Scr today remains elevated at 3.24. UA significant for proteinuria, however many epithelial cells noted. CT Abdomen shows R hydronephrosis with obstructing stone and L exophytic mass. Pt. with CKD likely secondary to longstanding DM. Pt. with multifactorial ARABELLA on CKD. Recommend to repeat UA/urine culture, urine electrolytes, and spot urine TP/Cr ratio. Recommend Urology evaluation for hydronephrosis and obstructing stones. Monitor labs and urine output. Avoid potential nephrotoxins. Dose medications as per eGFR.  Can consider starting flomax 0.4 mg po daily to assist with stone passage.

## 2020-12-23 NOTE — PROGRESS NOTE ADULT - ATTENDING COMMENTS
Uncontrolled DM2 complicated by heel ulcer, legally blind, ARABELLA presented with glucose 500s and mild ketosis.  Inpatient agree with basal bolus plan as outlined.  At home on NPH vials. Nephew helps set up insulin at home in advance. This system would not work with pens. Patient prefers to stay with this plan for dc due to logistical reasons. Also since 70/30 would have higher risk of hypoglycemia given visual impairments and she is unable to check FS consistently at home will keep NPH vial BID plan for dc.  A1c 8.6% not too much above goal for this patient but maybe could optimize a little. Can add Tradjenta or other DPP4 to NPH plan for discharge.    Jean Paul Ogden MD  Division of Endocrinology  Pager: [phone]    If after 6PM or before 9AM, or on weekends/holidays, please call endocrine answering service for assistance ([phone]).  For nonurgent matters email [email or web address] for assistance.

## 2020-12-23 NOTE — CONSULT NOTE ADULT - SUBJECTIVE AND OBJECTIVE BOX
[facility] DIVISION OF KIDNEY DISEASES AND HYPERTENSION -- [phone]  -- INITIAL CONSULT NOTE  --------------------------------------------------------------------------------  HPI: 80-year-old female with long standing DM,         PAST HISTORY  --------------------------------------------------------------------------------  PAST MEDICAL & SURGICAL HISTORY:  Obesity    PVD (peripheral vascular disease)    Glaucoma    Hypertension    Diabetic ulcer of heel    Neuropathy    PVD (peripheral vascular disease)    Diabetes  type 2    History of cataract surgery    Toe amputation status      FAMILY HISTORY:  Family hx of lung cancer    Family history of CHF (congestive heart failure)      PAST SOCIAL HISTORY: lives at home. Denies any EtOH or smoking    ALLERGIES & MEDICATIONS  --------------------------------------------------------------------------------  Allergies    No Known Allergies    Intolerances      Standing Inpatient Medications  amLODIPine   Tablet 10 milliGRAM(s) Oral daily  atorvastatin 10 milliGRAM(s) Oral at bedtime  collagenase Ointment 1 Application(s) Topical daily  glucagon  Injectable 1 milliGRAM(s) IntraMuscular once  heparin   Injectable 5000 Unit(s) SubCutaneous every 8 hours  influenza   Vaccine 0.5 milliLiter(s) IntraMuscular once  insulin glargine Injectable (LANTUS) 24 Unit(s) SubCutaneous at bedtime  insulin lispro (ADMELOG) corrective regimen sliding scale   SubCutaneous at bedtime  insulin lispro (ADMELOG) corrective regimen sliding scale   SubCutaneous three times a day before meals  insulin lispro Injectable (ADMELOG) 8 Unit(s) SubCutaneous three times a day before meals  labetalol 200 milliGRAM(s) Oral two times a day  lactobacillus acidophilus 1 Tablet(s) Oral daily  piperacillin/tazobactam IVPB.. 3.375 Gram(s) IV Intermittent every 12 hours  polyethylene glycol 3350 17 Gram(s) Oral daily  senna 2 Tablet(s) Oral at bedtime  timolol 0.5% Solution 1 Drop(s) Both EYES daily    REVIEW OF SYSTEMS  --------------------------------------------------------------------------------  Gen: no lethargy  Respiratory: No dyspnea  CV: No chest pain  GI: No abdominal pain  MSK: no LE edema  Neuro: + legally blind  Heme: No bleeding    All other systems were reviewed and are negative, except as noted.    VITALS/PHYSICAL EXAM  --------------------------------------------------------------------------------  T(C): 37.2 (12-23-20 @ 05:46), Max: 37.7 (12-22-20 @ 13:53)  HR: 89 (12-23-20 @ 05:46) (80 - 89)  BP: 145/56 (12-23-20 @ 05:46) (107/33 - 145/56)  RR: 18 (12-23-20 @ 05:46) (17 - 20)  SpO2: 95% (12-23-20 @ 05:46) (92% - 96%)  Wt(kg): --    Physical Exam:  	Gen: NAD, obese  	HEENT: MMM  	Pulm: good air entry B/L  	CV: S1S2  	Abd: Soft, +BS   	Ext: No LE edema B/L, left heel ulcer  	Neuro: Awake  	Skin: Warm and dry  	  LABS/STUDIES  --------------------------------------------------------------------------------              9.6    11.49 >-----------<  157      [12-22-20 @ 07:15]              30.9     136  |  103  |  65  ----------------------------<  181      [12-23-20 @ 08:37]  4.1   |  18  |  3.24        Ca     8.4     [12-23-20 @ 08:37]      Mg     1.9     [12-23-20 @ 08:37]      Phos  3.1     [12-23-20 @ 08:37]    Creatinine Trend:  SCr 3.24 [12-23 @ 08:37]  SCr 3.09 [12-22 @ 07:15]  SCr 3.10 [12-21 @ 14:41]  SCr 3.04 [12-21 @ 08:09]  SCr 3.25 [12-21 @ 01:38]    Urinalysis - [12-20-20 @ 16:32]      Color Light Orange / Appearance Slightly Turbid / SG 1.020 / pH 6.0      Gluc >= 1000 mg/dL / Ketone Negative  / Bili Negative / Urobili <2 mg/dL       Blood Large / Protein 30 mg/dL / Leuk Est Large / Nitrite Negative      RBC 39 /  / Hyaline 8 / Gran  / Sq Epi  / Non Sq Epi 17 / Bacteria Few   [facility] DIVISION OF KIDNEY DISEASES AND HYPERTENSION -- [phone]  -- INITIAL CONSULT NOTE  --------------------------------------------------------------------------------  HPI: 80-year-old female with long standing DM, HTN, legally blind, obesity, and diabetic foot ulcer was admitted to [facility] on 12/20/20 for hyperglycemia. Pt. says she skipped dinner the night before and when she checked her CBGs in the AM it was high so she came to the ER. Pt. endorses frequent urination the week prior and reports not checking her CBGs as her monitor was broken. Denies any personal or family history of kidney disease. Denies any NSAIDs, herbal supplements, or OTC medications. Pt. was started on insulin therapy as per endocrinology. Nephrology team consulted for elevated serum creatinine. Upon review of labs on [facility], Scr was 2.01 on 11/23/20. On admission, Scr was 3.46. Scr remains elevated at 3.24 today. Of note, pt. went for CT Abdomen which shows mild R hydronephrosis and 2 stones in the distal ureter.    Pt. evaluated at bedside, in no acute distress. Offers no complaints.     PAST HISTORY  --------------------------------------------------------------------------------  PAST MEDICAL & SURGICAL HISTORY:  Obesity    PVD (peripheral vascular disease)    Glaucoma    Hypertension    Diabetic ulcer of heel    Neuropathy    PVD (peripheral vascular disease)    Diabetes  type 2    History of cataract surgery    Toe amputation status    FAMILY HISTORY:  Family hx of lung cancer    Family history of CHF (congestive heart failure)      PAST SOCIAL HISTORY: lives at home. Denies any EtOH or smoking    ALLERGIES & MEDICATIONS  --------------------------------------------------------------------------------  Allergies    No Known Allergies    Intolerances      Standing Inpatient Medications  amLODIPine   Tablet 10 milliGRAM(s) Oral daily  atorvastatin 10 milliGRAM(s) Oral at bedtime  collagenase Ointment 1 Application(s) Topical daily  glucagon  Injectable 1 milliGRAM(s) IntraMuscular once  heparin   Injectable 5000 Unit(s) SubCutaneous every 8 hours  influenza   Vaccine 0.5 milliLiter(s) IntraMuscular once  insulin glargine Injectable (LANTUS) 24 Unit(s) SubCutaneous at bedtime  insulin lispro (ADMELOG) corrective regimen sliding scale   SubCutaneous at bedtime  insulin lispro (ADMELOG) corrective regimen sliding scale   SubCutaneous three times a day before meals  insulin lispro Injectable (ADMELOG) 8 Unit(s) SubCutaneous three times a day before meals  labetalol 200 milliGRAM(s) Oral two times a day  lactobacillus acidophilus 1 Tablet(s) Oral daily  piperacillin/tazobactam IVPB.. 3.375 Gram(s) IV Intermittent every 12 hours  polyethylene glycol 3350 17 Gram(s) Oral daily  senna 2 Tablet(s) Oral at bedtime  timolol 0.5% Solution 1 Drop(s) Both EYES daily    REVIEW OF SYSTEMS  --------------------------------------------------------------------------------  Gen: no lethargy  Respiratory: No dyspnea  CV: No chest pain  GI: No abdominal pain  MSK: no LE edema  Neuro: + legally blind  Heme: No bleeding    All other systems were reviewed and are negative, except as noted.    VITALS/PHYSICAL EXAM  --------------------------------------------------------------------------------  T(C): 37.2 (12-23-20 @ 05:46), Max: 37.7 (12-22-20 @ 13:53)  HR: 89 (12-23-20 @ 05:46) (80 - 89)  BP: 145/56 (12-23-20 @ 05:46) (107/33 - 145/56)  RR: 18 (12-23-20 @ 05:46) (17 - 20)  SpO2: 95% (12-23-20 @ 05:46) (92% - 96%)  Wt(kg): --    Physical Exam:  	Gen: NAD, obese  	HEENT: MMM  	Pulm: good air entry B/L  	CV: S1S2  	Abd: Soft, +BS   	Ext: No LE edema B/L, left heel ulcer  	Neuro: Awake  	Skin: Warm and dry  	  LABS/STUDIES  --------------------------------------------------------------------------------              9.6    11.49 >-----------<  157      [12-22-20 @ 07:15]              30.9     136  |  103  |  65  ----------------------------<  181      [12-23-20 @ 08:37]  4.1   |  18  |  3.24        Ca     8.4     [12-23-20 @ 08:37]      Mg     1.9     [12-23-20 @ 08:37]      Phos  3.1     [12-23-20 @ 08:37]    Creatinine Trend:  SCr 3.24 [12-23 @ 08:37]  SCr 3.09 [12-22 @ 07:15]  SCr 3.10 [12-21 @ 14:41]  SCr 3.04 [12-21 @ 08:09]  SCr 3.25 [12-21 @ 01:38]    Urinalysis - [12-20-20 @ 16:32]      Color Light Orange / Appearance Slightly Turbid / SG 1.020 / pH 6.0      Gluc >= 1000 mg/dL / Ketone Negative  / Bili Negative / Urobili <2 mg/dL       Blood Large / Protein 30 mg/dL / Leuk Est Large / Nitrite Negative      RBC 39 /  / Hyaline 8 / Gran  / Sq Epi  / Non Sq Epi 17 / Bacteria Few

## 2020-12-23 NOTE — PROGRESS NOTE ADULT - PROBLEM SELECTOR PLAN 4
Controlled  - c/w home labetalol 200mg BID  - restarted previous home med of amlodipine 10mg qd while holding home losartan in setting of ARABELLA

## 2020-12-24 ENCOUNTER — TRANSCRIPTION ENCOUNTER (OUTPATIENT)
Age: 80
End: 2020-12-24

## 2020-12-24 DIAGNOSIS — N13.2 HYDRONEPHROSIS WITH RENAL AND URETERAL CALCULOUS OBSTRUCTION: ICD-10-CM

## 2020-12-24 LAB
-  AMPICILLIN/SULBACTAM: SIGNIFICANT CHANGE UP
-  CEFAZOLIN: SIGNIFICANT CHANGE UP
-  GENTAMICIN: SIGNIFICANT CHANGE UP
-  OXACILLIN: SIGNIFICANT CHANGE UP
-  PENICILLIN: SIGNIFICANT CHANGE UP
-  RIFAMPIN: SIGNIFICANT CHANGE UP
-  TETRACYCLINE: SIGNIFICANT CHANGE UP
-  TRIMETHOPRIM/SULFAMETHOXAZOLE: SIGNIFICANT CHANGE UP
-  VANCOMYCIN: SIGNIFICANT CHANGE UP
ANION GAP SERPL CALC-SCNC: 18 MMOL/L — HIGH (ref 7–14)
BASOPHILS # BLD AUTO: 0.14 K/UL — SIGNIFICANT CHANGE UP (ref 0–0.2)
BASOPHILS NFR BLD AUTO: 0.9 % — SIGNIFICANT CHANGE UP (ref 0–2)
BUN SERPL-MCNC: 65 MG/DL — HIGH (ref 7–23)
CALCIUM SERPL-MCNC: 8.1 MG/DL — LOW (ref 8.4–10.5)
CHLORIDE SERPL-SCNC: 100 MMOL/L — SIGNIFICANT CHANGE UP (ref 98–107)
CO2 SERPL-SCNC: 18 MMOL/L — LOW (ref 22–31)
CREAT SERPL-MCNC: 3.42 MG/DL — HIGH (ref 0.5–1.3)
CULTURE RESULTS: SIGNIFICANT CHANGE UP
EOSINOPHIL # BLD AUTO: 0.14 K/UL — SIGNIFICANT CHANGE UP (ref 0–0.5)
EOSINOPHIL NFR BLD AUTO: 0.9 % — SIGNIFICANT CHANGE UP (ref 0–6)
GLUCOSE BLDC GLUCOMTR-MCNC: 203 MG/DL — HIGH (ref 70–99)
GLUCOSE BLDC GLUCOMTR-MCNC: 230 MG/DL — HIGH (ref 70–99)
GLUCOSE BLDC GLUCOMTR-MCNC: 259 MG/DL — HIGH (ref 70–99)
GLUCOSE BLDC GLUCOMTR-MCNC: 261 MG/DL — HIGH (ref 70–99)
GLUCOSE BLDC GLUCOMTR-MCNC: 266 MG/DL — HIGH (ref 70–99)
GLUCOSE SERPL-MCNC: 331 MG/DL — HIGH (ref 70–99)
HCT VFR BLD CALC: 29.8 % — LOW (ref 34.5–45)
HGB BLD-MCNC: 9.2 G/DL — LOW (ref 11.5–15.5)
IANC: 12.8 K/UL — HIGH (ref 1.5–8.5)
LYMPHOCYTES # BLD AUTO: 1.47 K/UL — SIGNIFICANT CHANGE UP (ref 1–3.3)
LYMPHOCYTES # BLD AUTO: 9.5 % — LOW (ref 13–44)
MAGNESIUM SERPL-MCNC: 2 MG/DL — SIGNIFICANT CHANGE UP (ref 1.6–2.6)
MCHC RBC-ENTMCNC: 27.1 PG — SIGNIFICANT CHANGE UP (ref 27–34)
MCHC RBC-ENTMCNC: 30.9 GM/DL — LOW (ref 32–36)
MCV RBC AUTO: 87.9 FL — SIGNIFICANT CHANGE UP (ref 80–100)
METHOD TYPE: SIGNIFICANT CHANGE UP
MONOCYTES # BLD AUTO: 0.4 K/UL — SIGNIFICANT CHANGE UP (ref 0–0.9)
MONOCYTES NFR BLD AUTO: 2.6 % — SIGNIFICANT CHANGE UP (ref 2–14)
NEUTROPHILS # BLD AUTO: 13.04 K/UL — HIGH (ref 1.8–7.4)
NEUTROPHILS NFR BLD AUTO: 78.4 % — HIGH (ref 43–77)
ORGANISM # SPEC MICROSCOPIC CNT: SIGNIFICANT CHANGE UP
ORGANISM # SPEC MICROSCOPIC CNT: SIGNIFICANT CHANGE UP
PHOSPHATE SERPL-MCNC: 3.3 MG/DL — SIGNIFICANT CHANGE UP (ref 2.5–4.5)
PLATELET # BLD AUTO: 186 K/UL — SIGNIFICANT CHANGE UP (ref 150–400)
POTASSIUM SERPL-MCNC: 3.9 MMOL/L — SIGNIFICANT CHANGE UP (ref 3.5–5.3)
POTASSIUM SERPL-SCNC: 3.9 MMOL/L — SIGNIFICANT CHANGE UP (ref 3.5–5.3)
RBC # BLD: 3.39 M/UL — LOW (ref 3.8–5.2)
RBC # FLD: 15.4 % — HIGH (ref 10.3–14.5)
SODIUM SERPL-SCNC: 136 MMOL/L — SIGNIFICANT CHANGE UP (ref 135–145)
WBC # BLD: 15.45 K/UL — HIGH (ref 3.8–10.5)
WBC # FLD AUTO: 15.45 K/UL — HIGH (ref 3.8–10.5)

## 2020-12-24 PROCEDURE — 99232 SBSQ HOSP IP/OBS MODERATE 35: CPT | Mod: GC

## 2020-12-24 PROCEDURE — 99222 1ST HOSP IP/OBS MODERATE 55: CPT | Mod: GC

## 2020-12-24 PROCEDURE — 99233 SBSQ HOSP IP/OBS HIGH 50: CPT

## 2020-12-24 PROCEDURE — 99233 SBSQ HOSP IP/OBS HIGH 50: CPT | Mod: GC

## 2020-12-24 PROCEDURE — 52332 CYSTOSCOPY AND TREATMENT: CPT | Mod: RT

## 2020-12-24 RX ORDER — HYDROMORPHONE HYDROCHLORIDE 2 MG/ML
1 INJECTION INTRAMUSCULAR; INTRAVENOUS; SUBCUTANEOUS
Refills: 0 | Status: DISCONTINUED | OUTPATIENT
Start: 2020-12-24 | End: 2020-12-24

## 2020-12-24 RX ORDER — HYDROMORPHONE HYDROCHLORIDE 2 MG/ML
1 INJECTION INTRAMUSCULAR; INTRAVENOUS; SUBCUTANEOUS
Refills: 0 | Status: DISCONTINUED | OUTPATIENT
Start: 2020-12-24 | End: 2020-12-30

## 2020-12-24 RX ORDER — VANCOMYCIN HCL 1 G
1500 VIAL (EA) INTRAVENOUS ONCE
Refills: 0 | Status: COMPLETED | OUTPATIENT
Start: 2020-12-24 | End: 2020-12-24

## 2020-12-24 RX ORDER — HYDROMORPHONE HYDROCHLORIDE 2 MG/ML
0.5 INJECTION INTRAMUSCULAR; INTRAVENOUS; SUBCUTANEOUS
Refills: 0 | Status: DISCONTINUED | OUTPATIENT
Start: 2020-12-24 | End: 2020-12-24

## 2020-12-24 RX ORDER — HYDROMORPHONE HYDROCHLORIDE 2 MG/ML
2 INJECTION INTRAMUSCULAR; INTRAVENOUS; SUBCUTANEOUS
Refills: 0 | Status: DISCONTINUED | OUTPATIENT
Start: 2020-12-24 | End: 2020-12-31

## 2020-12-24 RX ORDER — SODIUM CHLORIDE 9 MG/ML
1000 INJECTION, SOLUTION INTRAVENOUS
Refills: 0 | Status: DISCONTINUED | OUTPATIENT
Start: 2020-12-24 | End: 2020-12-26

## 2020-12-24 RX ORDER — INSULIN LISPRO 100/ML
9 VIAL (ML) SUBCUTANEOUS
Refills: 0 | Status: DISCONTINUED | OUTPATIENT
Start: 2020-12-24 | End: 2020-12-27

## 2020-12-24 RX ORDER — INSULIN GLARGINE 100 [IU]/ML
30 INJECTION, SOLUTION SUBCUTANEOUS AT BEDTIME
Refills: 0 | Status: DISCONTINUED | OUTPATIENT
Start: 2020-12-24 | End: 2020-12-25

## 2020-12-24 RX ORDER — ONDANSETRON 8 MG/1
4 TABLET, FILM COATED ORAL ONCE
Refills: 0 | Status: DISCONTINUED | OUTPATIENT
Start: 2020-12-24 | End: 2020-12-24

## 2020-12-24 RX ADMIN — Medication 650 MILLIGRAM(S): at 17:36

## 2020-12-24 RX ADMIN — Medication 300 MILLIGRAM(S): at 16:21

## 2020-12-24 RX ADMIN — Medication 200 MILLIGRAM(S): at 06:47

## 2020-12-24 RX ADMIN — Medication 650 MILLIGRAM(S): at 06:47

## 2020-12-24 RX ADMIN — Medication 1 DROP(S): at 12:41

## 2020-12-24 RX ADMIN — Medication 1: at 22:40

## 2020-12-24 RX ADMIN — TAMSULOSIN HYDROCHLORIDE 0.4 MILLIGRAM(S): 0.4 CAPSULE ORAL at 22:30

## 2020-12-24 RX ADMIN — Medication 2: at 12:40

## 2020-12-24 RX ADMIN — HYDROMORPHONE HYDROCHLORIDE 0.5 MILLIGRAM(S): 2 INJECTION INTRAMUSCULAR; INTRAVENOUS; SUBCUTANEOUS at 19:40

## 2020-12-24 RX ADMIN — HEPARIN SODIUM 5000 UNIT(S): 5000 INJECTION INTRAVENOUS; SUBCUTANEOUS at 14:36

## 2020-12-24 RX ADMIN — SODIUM CHLORIDE 75 MILLILITER(S): 9 INJECTION, SOLUTION INTRAVENOUS at 20:20

## 2020-12-24 RX ADMIN — Medication 200 MILLIGRAM(S): at 17:36

## 2020-12-24 RX ADMIN — HYDROMORPHONE HYDROCHLORIDE 0.5 MILLIGRAM(S): 2 INJECTION INTRAMUSCULAR; INTRAVENOUS; SUBCUTANEOUS at 08:16

## 2020-12-24 RX ADMIN — HYDROMORPHONE HYDROCHLORIDE 0.5 MILLIGRAM(S): 2 INJECTION INTRAMUSCULAR; INTRAVENOUS; SUBCUTANEOUS at 07:55

## 2020-12-24 RX ADMIN — HEPARIN SODIUM 5000 UNIT(S): 5000 INJECTION INTRAVENOUS; SUBCUTANEOUS at 06:47

## 2020-12-24 RX ADMIN — HYDROMORPHONE HYDROCHLORIDE 0.5 MILLIGRAM(S): 2 INJECTION INTRAMUSCULAR; INTRAVENOUS; SUBCUTANEOUS at 19:55

## 2020-12-24 RX ADMIN — ATORVASTATIN CALCIUM 10 MILLIGRAM(S): 80 TABLET, FILM COATED ORAL at 22:29

## 2020-12-24 RX ADMIN — Medication 1 APPLICATION(S): at 12:41

## 2020-12-24 RX ADMIN — HYDROMORPHONE HYDROCHLORIDE 2 MILLIGRAM(S): 2 INJECTION INTRAMUSCULAR; INTRAVENOUS; SUBCUTANEOUS at 11:21

## 2020-12-24 RX ADMIN — HEPARIN SODIUM 5000 UNIT(S): 5000 INJECTION INTRAVENOUS; SUBCUTANEOUS at 22:30

## 2020-12-24 RX ADMIN — Medication 2: at 09:33

## 2020-12-24 RX ADMIN — INSULIN GLARGINE 30 UNIT(S): 100 INJECTION, SOLUTION SUBCUTANEOUS at 22:29

## 2020-12-24 RX ADMIN — AMLODIPINE BESYLATE 10 MILLIGRAM(S): 2.5 TABLET ORAL at 06:47

## 2020-12-24 RX ADMIN — Medication 1 TABLET(S): at 12:41

## 2020-12-24 RX ADMIN — Medication 3: at 17:35

## 2020-12-24 RX ADMIN — HYDROMORPHONE HYDROCHLORIDE 2 MILLIGRAM(S): 2 INJECTION INTRAMUSCULAR; INTRAVENOUS; SUBCUTANEOUS at 10:58

## 2020-12-24 RX ADMIN — PIPERACILLIN AND TAZOBACTAM 25 GRAM(S): 4; .5 INJECTION, POWDER, LYOPHILIZED, FOR SOLUTION INTRAVENOUS at 06:48

## 2020-12-24 NOTE — PROGRESS NOTE ADULT - PROBLEM SELECTOR PLAN 1
Presents with several days of urinary frequency and positive UA, broadened from CTX to zosyn on 12/22 for fever and persistent leukocytosis  - UCx with >100k staph epi, ?contaminant, lab will run sensitivities  - BCx NGTD  - CTAP 12/23 showing right-sided obstructive uropathy with mild right hydroureteronephrosis to the level of two distal right ureteral stones measuring 2 and 6 mm, respectively  - appreciate urology recs Presents with several days of urinary frequency and positive UA, broadened from CTX to zosyn on 12/22 for fever and persistent leukocytosis. Found to have obstructive R ureteral stones.  - UCx with >100k staph epi, ?contaminant, lab will run sensitivities  - BCx NGTD  - c/w zosyn (12/22- ) Presents with several days of urinary frequency and positive UA, broadened from CTX to zosyn on 12/22 for fever and persistent leukocytosis. Found to have obstructive R ureteral stones.  - UCx with >100k staph epi, ?contaminant, lab will run sensitivities  - BCx NGTD  - elevated leukocytosis possibly 2/2 infected kidney stones  - c/w zosyn (12/22- )

## 2020-12-24 NOTE — PROGRESS NOTE ADULT - SUBJECTIVE AND OBJECTIVE BOX
UROLOGY DAILY PROGRESS NOTE:     Subjective:    Still with significant right flank pain.  Voiding well.    Objective:    NAD, awake and alert  Respirations nonlabored  Abdomen soft, nontender, nondistended    MEDICATIONS  (STANDING):  amLODIPine   Tablet 10 milliGRAM(s) Oral daily  atorvastatin 10 milliGRAM(s) Oral at bedtime  collagenase Ointment 1 Application(s) Topical daily  dextrose 40% Gel 15 Gram(s) Oral once  dextrose 5%. 1000 milliLiter(s) (50 mL/Hr) IV Continuous <Continuous>  dextrose 5%. 1000 milliLiter(s) (100 mL/Hr) IV Continuous <Continuous>  dextrose 50% Injectable 25 Gram(s) IV Push once  dextrose 50% Injectable 12.5 Gram(s) IV Push once  dextrose 50% Injectable 25 Gram(s) IV Push once  glucagon  Injectable 1 milliGRAM(s) IntraMuscular once  heparin   Injectable 5000 Unit(s) SubCutaneous every 8 hours  influenza   Vaccine 0.5 milliLiter(s) IntraMuscular once  insulin glargine Injectable (LANTUS) 27 Unit(s) SubCutaneous at bedtime  insulin lispro (ADMELOG) corrective regimen sliding scale   SubCutaneous at bedtime  insulin lispro (ADMELOG) corrective regimen sliding scale   SubCutaneous three times a day before meals  insulin lispro Injectable (ADMELOG) 8 Unit(s) SubCutaneous before lunch  insulin lispro Injectable (ADMELOG) 9 Unit(s) SubCutaneous before breakfast  insulin lispro Injectable (ADMELOG) 8 Unit(s) SubCutaneous before dinner  labetalol 200 milliGRAM(s) Oral two times a day  lactobacillus acidophilus 1 Tablet(s) Oral daily  piperacillin/tazobactam IVPB.. 3.375 Gram(s) IV Intermittent every 12 hours  polyethylene glycol 3350 17 Gram(s) Oral daily  senna 2 Tablet(s) Oral at bedtime  sodium bicarbonate 650 milliGRAM(s) Oral two times a day  tamsulosin 0.4 milliGRAM(s) Oral at bedtime  timolol 0.5% Solution 1 Drop(s) Both EYES daily    MEDICATIONS  (PRN):  acetaminophen   Tablet .. 975 milliGRAM(s) Oral every 6 hours PRN Mild Pain (1 - 3)  bisacodyl Suppository 10 milliGRAM(s) Rectal daily PRN Constipation  HYDROmorphone   Tablet 2 milliGRAM(s) Oral every 3 hours PRN Moderate Pain (4 - 6) refractory to tylenol  HYDROmorphone  Injectable 0.5 milliGRAM(s) IV Push every 3 hours PRN Severe Pain (7 - 10)  simethicone 80 milliGRAM(s) Chew every 8 hours PRN Gas      Vital Signs Last 24 Hrs  T(C): 37.1 (24 Dec 2020 07:54), Max: 37.2 (23 Dec 2020 21:03)  T(F): 98.8 (24 Dec 2020 07:54), Max: 99 (23 Dec 2020 21:03)  HR: 85 (24 Dec 2020 07:54) (76 - 85)  BP: 144/55 (24 Dec 2020 07:54) (104/60 - 144/56)  BP(mean): --  RR: 16 (24 Dec 2020 07:54) (16 - 18)  SpO2: 94% (24 Dec 2020 07:54) (92% - 97%)    I&O's Detail    23 Dec 2020 07:01  -  24 Dec 2020 07:00  --------------------------------------------------------  IN:  Total IN: 0 mL    OUT:    Stool (mL): 600 mL    Voided (mL): 750 mL  Total OUT: 1350 mL    Total NET: -1350 mL          Daily     Daily     LABS:        136  |  103  |  65<H>  ----------------------------<  181<H>  4.1   |  18<L>  |  3.24<H>    Ca    8.4      23 Dec 2020 08:37  Phos  3.1       Mg     1.9         TPro  6.3  /  Alb  2.4<L>  /  TBili  0.3  /  DBili  x   /  AST  20  /  ALT  16  /  AlkPhos  112        Urinalysis Basic - ( 23 Dec 2020 19:01 )    Color: Yellow / Appearance: Slightly Turbid / S.018 / pH: x  Gluc: x / Ketone: Negative  / Bili: Negative / Urobili: <2 mg/dL   Blood: x / Protein: 100 mg/dL / Nitrite: Negative   Leuk Esterase: Large / RBC: 25-50 /HPF / WBC >50 /HPF   Sq Epi: x / Non Sq Epi: 0 /HPF / Bacteria: Negative

## 2020-12-24 NOTE — CONSULT NOTE ADULT - SUBJECTIVE AND OBJECTIVE BOX
HPI:  79 yo Female, ambulatory with a walker, w/ DM Type 2 c/b diabetic neuropathy and multiple toes amputations, HTN, HLD, PVD, CKD (Baseline crt 1.8-2), morbid Obesity, presenting w/ hyperglycemia. Pt reports that she was in her usual state of health lately but wasn't particularly hungry last night so she did not eat much and decided to hold her evening dose of novolog, 30 units. When she woke up this morning, she reports taking her 40 units of AM novolog. Pt reports she has also been urinating more frequently recently, going every four hours. Pt denies other symptoms at this time, denies fatigue, fevers, chills, chest p/p, sob, n/v/d, dysuria, melanotic stools, sick contacts, recent travel.     ED course: hyperglycemia 570, s/p 10 units short-acting insulin, 20 units Lantus 3L fluids, vancomycin x 1 dose, zosyn x 1 dose; (20 Dec 2020 19:08)      PAST MEDICAL & SURGICAL HISTORY:  Obesity    PVD (peripheral vascular disease)    Glaucoma    Hypertension    Diabetic ulcer of heel    Neuropathy    PVD (peripheral vascular disease)    Diabetes  type 2    History of cataract surgery    Toe amputation status        Allergies    No Known Allergies    Intolerances        ANTIMICROBIALS:  vancomycin  IVPB 1500 once      OTHER MEDS:  acetaminophen   Tablet .. 975 milliGRAM(s) Oral every 6 hours PRN  amLODIPine   Tablet 10 milliGRAM(s) Oral daily  atorvastatin 10 milliGRAM(s) Oral at bedtime  bisacodyl Suppository 10 milliGRAM(s) Rectal daily PRN  collagenase Ointment 1 Application(s) Topical daily  dextrose 40% Gel 15 Gram(s) Oral once  dextrose 5%. 1000 milliLiter(s) IV Continuous <Continuous>  dextrose 5%. 1000 milliLiter(s) IV Continuous <Continuous>  dextrose 50% Injectable 25 Gram(s) IV Push once  dextrose 50% Injectable 12.5 Gram(s) IV Push once  dextrose 50% Injectable 25 Gram(s) IV Push once  glucagon  Injectable 1 milliGRAM(s) IntraMuscular once  heparin   Injectable 5000 Unit(s) SubCutaneous every 8 hours  HYDROmorphone   Tablet 2 milliGRAM(s) Oral every 3 hours PRN  HYDROmorphone  Injectable 1 milliGRAM(s) IV Push every 3 hours PRN  influenza   Vaccine 0.5 milliLiter(s) IntraMuscular once  insulin glargine Injectable (LANTUS) 30 Unit(s) SubCutaneous at bedtime  insulin lispro (ADMELOG) corrective regimen sliding scale   SubCutaneous at bedtime  insulin lispro (ADMELOG) corrective regimen sliding scale   SubCutaneous three times a day before meals  insulin lispro Injectable (ADMELOG) 9 Unit(s) SubCutaneous three times a day before meals  labetalol 200 milliGRAM(s) Oral two times a day  lactobacillus acidophilus 1 Tablet(s) Oral daily  polyethylene glycol 3350 17 Gram(s) Oral daily  senna 2 Tablet(s) Oral at bedtime  simethicone 80 milliGRAM(s) Chew every 8 hours PRN  sodium bicarbonate 650 milliGRAM(s) Oral two times a day  tamsulosin 0.4 milliGRAM(s) Oral at bedtime  timolol 0.5% Solution 1 Drop(s) Both EYES daily      SOCIAL HISTORY:  lives alone, no pets at home  no smoking, alcohol or drug abuse  no recent travel    FAMILY HISTORY:  Family hx of lung cancer    Family history of CHF (congestive heart failure)        ROS:    All other systems negative     Constitutional: no fever, no chills, + dizzy  Eye: no eye pain, no redness, no vision changes  ENT:  no sore throat, no rhinorrhea  Cardiovascular:  no chest pain, no palpitation  Respiratory:  no SOB, no cough  GI:  no abd pain, no vomiting, no diarrhea  urinary: no dysuria, no hematuria, no flank pain  : no vaginal discharge or bleeding  musculoskeletal:  no joint pain, no joint swelling  skin:  no rash, chronic heel wound  neurology:  no headache, no seizure, no change in mental status  psych: no anxiety, no depression     Physical Exam:    General:    NAD, non toxic  Head: atraumatic, normocephalic  Eyes: normal sclera and conjunctiva  ENT:   no oropharyngeal lesions, no LAD, neck supple  Cardio:    regular S1,S2, no murmur  Respiratory:   clear b/l, no wheezing  abd:   soft, BS +, not tender  :     no CVAT, no suprapubic tenderness, no riggins  Musculoskeletal : multiple toe amputations, L heel ulcer with eschar, no purulence  Skin:    no rash  vascular: no phlebitis  Neurologic:     no focal deficits  psych: normal affect      Drug Dosing Weight  Height (cm): 162.6 (20 Dec 2020 23:00)  Weight (kg): 106.6 (23 Dec 2020 18:06)  BMI (kg/m2): 40.3 (23 Dec 2020 18:06)  BSA (m2): 2.1 (23 Dec 2020 18:06)    Vital Signs Last 24 Hrs  T(F): 98.7 (20 @ 14:29), Max: 101.7 (20 @ 21:20)    Vital Signs Last 24 Hrs  HR: 85 (20 @ 07:54) (81 - 85)  BP: 144/55 (20 @ 07:54) (134/55 - 144/56)  RR: 17 (20 @ 14:29)  SpO2: 94% (20 @ 07:54) (92% - 94%)  Wt(kg): --                          9.2    15.45 )-----------( 186      ( 24 Dec 2020 08:01 )             29.8           136  |  100  |  65<H>  ----------------------------<  331<H>  3.9   |  18<L>  |  3.42<H>    Ca    8.1<L>      24 Dec 2020 08:01  Phos  3.3       Mg     2.0         TPro  6.3  /  Alb  2.4<L>  /  TBili  0.3  /  DBili  x   /  AST  20  /  ALT  16  /  AlkPhos  112        Urinalysis Basic - ( 23 Dec 2020 19:01 )    Color: Yellow / Appearance: Slightly Turbid / S.018 / pH: x  Gluc: x / Ketone: Negative  / Bili: Negative / Urobili: <2 mg/dL   Blood: x / Protein: 100 mg/dL / Nitrite: Negative   Leuk Esterase: Large / RBC: 25-50 /HPF / WBC >50 /HPF   Sq Epi: x / Non Sq Epi: 0 /HPF / Bacteria: Negative        MICROBIOLOGY:  v  .Urine Clean Catch (Midstream)  20   >100,000 CFU/ml Staphylococcus epidermidis  --  Staphylococcus epidermidis      .Blood Blood-Peripheral  20   No growth to date.  --  --                  RADIOLOGY:    Images independently visualized and reviewed personally,  findings as below    < from: CT Abdomen and Pelvis No Cont (12.23.20 @ 10:44) >  IMPRESSION:  Right-sided obstructive uropathy with mild right hydroureteronephrosis to the level of two distal right ureteral stones measuring 2 and 6 mm, respectively.        < end of copied text >

## 2020-12-24 NOTE — PROGRESS NOTE ADULT - PROBLEM SELECTOR PLAN 2
In setting of CKD. Serum bicarbonate noted to be low at 18 today. Started on sodium bicarbonate 650mg PO BID yesterday. Monitor serum CO2.    If any questions, please feel free to contact me  Wilberto Gooden   Nephrology Fellow  [phone]  (After 5 pm or on weekends please page the on-call fellow).

## 2020-12-24 NOTE — PROGRESS NOTE ADULT - PROBLEM SELECTOR PROBLEM 3
Type 2 diabetes mellitus with hyperglycemia, with long-term current use of insulin Acute kidney injury superimposed on CKD

## 2020-12-24 NOTE — PROGRESS NOTE ADULT - PROBLEM SELECTOR PLAN 3
Initially presented with mild DKA in the setting of UTI and medication noncompliance; a1c 8.6  - c/w lantus 27 qhs, admelog 8-9 tidac, low ISS per endo  - appreciate endo recs  - will need glucometer/supplies on discharge (Rx sent to [facility]) Mixed pre-renal in setting of DKA and poor oral intake over the past few days in the setting of UTI vs component of R-sided obstruction from stone  - s/p 3L IVF  - hold home losartan  - appreciate renal recs  - started bicarb 650 BID  - monitor BMP, I/Os, renally dose meds, avoid nephrotoxins

## 2020-12-24 NOTE — PROGRESS NOTE ADULT - PROBLEM SELECTOR PLAN 1
Pt. with multifactorial ARABELLA on CKD in the setting of dehydration and obstructive uropathy. Upon lab review on [facility], Scr was 2.01 on 11/23/20. Scr on arrival was 3.46. Pt. received fluid resuscitation and Scr today remains elevated at 3.46. CT Abdomen shows R hydronephrosis with obstructing stone and L exophytic mass. Pt. with CKD likely secondary to longstanding DM. Pt. with multifactorial ARABELLA on CKD. Pt. agreeable for stent placement, recommend urology follow up today. Monitor labs and urine output. Avoid potential nephrotoxins. Dose medications as per eGFR.

## 2020-12-24 NOTE — PROGRESS NOTE ADULT - PROBLEM SELECTOR PLAN 4
Controlled  - c/w home labetalol 200mg BID  - restarted previous home med of amlodipine 10mg qd while holding home losartan in setting of ARABELLA Initially presented with mild DKA in the setting of UTI and medication noncompliance; a1c 8.6  - c/w lantus 27 qhs, admelog 8-9 tidac, low ISS per endo  - appreciate endo recs  - will need glucometer/supplies on discharge (Rx sent to [facility])

## 2020-12-24 NOTE — DISCHARGE NOTE PROVIDER - HOSPITAL COURSE
HPI:  81 yo Female, ambulatory with a walker, w/ DM Type 2 c/b diabetic neuropathy and multiple toes amputations, HTN, HLD, PVD, CKD (Baseline crt 1.8-2), morbid Obesity, presenting w/ hyperglycemia. Pt reports that she was in her usual state of health lately but wasn't particularly hungry last night so she did not eat much and decided to hold her evening dose of novolog, 30 units. When she woke up this morning, she reports taking her 40 units of AM novolog. Pt reports she has also been urinating more frequently recently, going every four hours. Pt denies other symptoms at this time, denies fatigue, fevers, chills, chest p/p, sob, n/v/d, dysuria, melanotic stools, sick contacts, recent travel.     ED course: hyperglycemia 570, s/p 10 units short-acting insulin, 20 units Lantus 3L fluids, vancomycin x 1 dose, zosyn x 1 dose; (20 Dec 2020 19:08    Hospital course:  Patient admitted to medicine for mild DKA in the setting of UTI/missed insulin doses, and ARABELLA. DKA resolved with basal/bolus insulin. a1c was 8.6. Initially treated with CTX for UTI, then switched to zosyn for fever and persistent leukocytosis. UCx from admission grew 100k staph epi. Further sepsis workup with CTAP showed R-sided hydronephrosis to the level of two obstructing ureteral stones. Patient underwent right ureteral stent placement on 12/24. Post-op UCx grew ------. Patient was evaluated by PT and recommended QUAN. HPI:  81 yo Female, ambulatory with a walker, w/ DM Type 2 c/b diabetic neuropathy and multiple toes amputations, HTN, HLD, PVD, CKD (Baseline crt 1.8-2), morbid Obesity, presenting w/ hyperglycemia. Pt reports that she was in her usual state of health lately but wasn't particularly hungry last night so she did not eat much and decided to hold her evening dose of novolog, 30 units. When she woke up this morning, she reports taking her 40 units of AM novolog. Pt reports she has also been urinating more frequently recently, going every four hours. Pt denies other symptoms at this time, denies fatigue, fevers, chills, chest p/p, sob, n/v/d, dysuria, melanotic stools, sick contacts, recent travel.     ED course: hyperglycemia 570, s/p 10 units short-acting insulin, 20 units Lantus 3L fluids, vancomycin x 1 dose, zosyn x 1 dose; (20 Dec 2020 19:08    Hospital course:  Patient admitted to medicine for mild DKA in the setting of UTI/missed insulin doses, and ARABELLA. DKA resolved with basal/bolus insulin. a1c was 8.6. Initially treated with CTX for UTI, then switched to zosyn for fever and persistent leukocytosis. UCx from admission grew 100k staph epi resistant to zosyn. Patient was switched to vancomycin. Further sepsis workup with CTAP showed R-sided hydronephrosis to the level of two obstructing ureteral stones. Patient underwent right ureteral stent placement on 12/24. Patient had sustained improvement of her serum creatinine to near her previous baseline around 2. Post-op UCx grew candida glabrata for which patient was treated with fluconazole per ID recs. Course was complicated by intermittent new onset asymptomatic hypoxemia to the mid 80s on RA evaluated by pulmonology as 2/2 volume overload and atelectasis (from obesity and prolonged immobility in the hospital). Patient intermittently required 2L NC. Oxygenation improved with airway clearance and deep breathing/incentive spirometry exercises. Patient was evaluated by PT and recommended QUAN. Stable for discharge to rehab.

## 2020-12-24 NOTE — PROGRESS NOTE ADULT - ATTENDING COMMENTS
80F with a history of HTN, DM2 c/b diabetic neuropathy and multiple toe amputations, HLD, PVD, CKD (baseline Cr 1.8-2), morbid obesity admitted for mild DKA now improved, likely 2/2 UTI and insulin noncompliance.  -Endocrine following, appreciate recs, c/w basal/bolus regimen, monitor FS qac and qHS  -CTAP with 2 obstructing R ureteral stones, mild R hydroureteronephrosis with worsening flank pain. Plan for R ureteral stent placement today  -Renal and Urology following, appreciate recs  -Urine culture growing staph epi- switch Zosyn to vancomycin dosed by level, will consult ID for PO options once patient improved  -Appreciate podiatry recs for heel ulcer- does not appear to be infected at this time, X-ray with no evidence of OM  -PT eval recommending QUAN- patient agreeable

## 2020-12-24 NOTE — PROGRESS NOTE ADULT - ASSESSMENT
81 yo F with history of uncontrolled DM Type 2 c/b diabetic neuropathy and multiple toes amputations, HTN, HLD, PVD, CKD3(Baseline crt 1.8-2), morbid Obesity, presenting w/ hyperglycemia. Patient did not take her evening dose of Novolin N 30units qhs. Endocrine consult requested for the management of hyperglycemia in patient with DM2.       Uncontrolled DM2 c/b Neuropathy, PVD, retinopathy and CKD3  A1c 8.6% in setting of ARABELLA on CKD  FS premeal and qhs   FS goal 100-200  Recommend increase Lantus to 30 units qhs   Recommend increase Admelog 9units TIDac  Recommend low correctional scale TIDac and qhs     On discharge will continue insulin. Patient currently on Novolin N, dose to be determine. We acknowledge this regimen is not ideal, however given patient visual deficits limited in options therfore recommend continue current regimen. Will consider adding tradjenta for better glycemic control without risk of hypoglycemia.   Patient states she tried insulin pens in the patient, however unable to visualize the numbers on the pen to adjust doses.   Patient does not want to do basal/bolus insulin as it would require four times daily injections. Has audible glucometer, however has difficulty inserting test strips.   Patient can continue to follow up with PCP whom does house calls on discharge, as patient unable to make it to office visit .     HTN   goal 130/80  At goal. Continue Amlodipine and hold losartan in light of ARABELLA     HLD   Continue atorvastatin         Carmen Sanchez MD  Endocrine Fellow   Pager  79 yo F with history of uncontrolled DM Type 2 c/b diabetic neuropathy and multiple toes amputations, HTN, HLD, PVD, CKD3(Baseline crt 1.8-2), morbid Obesity, presenting w/ hyperglycemia. Patient did not take her evening dose of Novolin N 30units qhs. Endocrine consult requested for the management of hyperglycemia in patient with DM2.       Uncontrolled DM2 c/b Neuropathy, PVD, retinopathy and CKD3  A1c 8.6% in setting of ARABELLA on CKD  FS premeal and qhs   FS goal 100-200  Recommend increase Lantus to 30 units qhs   Recommend increase Admelog 9units TIDac  Recommend low correctional scale TIDac and qhs     On discharge will continue insulin. Patient currently on Novolin N, dose to be determine. We acknowledge this regimen is not ideal, however given patient visual deficits limited in options therfore recommend continue current regimen. Will consider adding tradjenta for better glycemic control without risk of hypoglycemia.   Patient states she tried insulin pens in the patient, however unable to visualize the numbers on the pen to adjust doses.   Patient does not want to do basal/bolus insulin as it would require four times daily injections. Has audible glucometer, however has difficulty inserting test strips.   Patient can continue to follow up with PCP whom does house calls on discharge, as patient unable to make it to office visit .     HTN   goal 130/80  At goal. Continue Amlodipine and hold losartan in light of ARABELLA     HLD   Continue atorvastatin     Discussed with Dr Mckenzie Sanchez MD  Endocrine Fellow   Pager  79 yo F with history of uncontrolled DM Type 2 c/b diabetic neuropathy and multiple toes amputations, HTN, HLD, PVD, CKD3(Baseline crt 1.8-2), morbid Obesity, presenting w/ hyperglycemia. Patient did not take her evening dose of Novolin N 30units qhs. Endocrine consult requested for the management of hyperglycemia in patient with DM2.       Uncontrolled DM2 c/b Neuropathy, PVD, retinopathy and CKD3  A1c 8.6% in setting of ARABELLA on CKD  FS premeal and qhs   FS goal 100-200  While inpatient:  Recommend increase Lantus to 30 units qhs   Recommend increase Admelog 9units TIDac  Recommend low correctional scale TIDac and qhs     On discharge will resume home insulin. Patient currently on Novolin N, dose to be determine. We acknowledge this regimen is not ideal, however given patient visual deficits limited in options therfore recommend continue current regimen. Will consider adding tradjenta for better glycemic control without risk of hypoglycemia.   Patient states she tried insulin pens in the patient, however unable to visualize the numbers on the pen to adjust doses.   Patient does not want to do basal/bolus insulin as it would require four times daily injections. Has audible glucometer, however has difficulty inserting test strips.   Patient can continue to follow up with PCP whom does house calls on discharge, as patient unable to make it to office visit .     HTN   goal 130/80  At goal. Continue Amlodipine and hold losartan in light of ARABELLA     HLD   Continue atorvastatin     Discussed with Dr Mckenzie Sanchez MD  Endocrine Fellow   Pager  79 yo F with history of uncontrolled DM Type 2 c/b diabetic neuropathy and multiple toes amputations, HTN, HLD, PVD, CKD3(Baseline crt 1.8-2), morbid Obesity, presenting w/ hyperglycemia. Patient did not take her evening dose of Novolin N 30units qhs. Endocrine consult requested for the management of hyperglycemia in patient with DM2.       Uncontrolled DM2 c/b Neuropathy, PVD, retinopathy and CKD3  A1c 8.6% in setting of ARABELLA on CKD  FS premeal and qhs   FS goal 100-200  While inpatient:  Recommend increase Lantus to 30 units qhs   Recommend increase Admelog 9units TIDac  Recommend low correctional scale TIDac and qhs     On discharge will resume home insulin. Recommend Novolin N 40units qam and 30units qhs.  We acknowledge this regimen is not ideal, however given patient visual deficits limited in options therfore recommend continue current regimen. Also discharge on Tradjenta 5mg daily PO for better glycemic control without risk of hypoglycemia.   Patient states she tried insulin pens in the past, however unable to visualize the numbers on the pen to adjust doses.   Patient does not want to do basal/bolus insulin as it would require four times daily injections. Has audible glucometer, however has difficulty inserting test strips.   Patient can continue to follow up with PCP whom does house calls on discharge, as patient unable to make it to office visit .     HTN   goal 130/80  At goal. Continue Amlodipine and hold losartan in light of ARABELLA     HLD   Continue atorvastatin     Discussed with Dr Mckenzie Sanchez MD  Endocrine Fellow   Pager

## 2020-12-24 NOTE — DISCHARGE NOTE PROVIDER - NSDCMRMEDTOKEN_GEN_ALL_CORE_FT
alcohol swabs : Apply topically to affected area 3 times a day.  ICD-10 code E11. 9.  bisacodyl 10 mg rectal suppository: 1 suppository(ies) rectal once a day, As needed, Constipation  collagenase 250 units/g topical ointment: 1 application topically once a day left heel  glucometer (per patient&#x27;s insurance): Test blood sugars four times a day. Dispense #1 glucometer.  ICD-10 code E11. 9.  labetalol 200 mg oral tablet: 1 tab(s) orally 2 times a day  lactobacillus acidophilus oral capsule: 1 cap(s) orally 2 times a day  lancets: 1 application subcutaneously 3 times a day.  ICD-10 code E11. 9.  Lipitor 10 mg oral tablet: 1 tab(s) orally once a day (at bedtime)  losartan 100 mg oral tablet: 1 tab(s) orally once a day  NovoLIN N 100 units/mL subcutaneous suspension: 30 unit(s) subcutaneous once a day (at bedtime)  NovoLIN N 100 units/mL subcutaneous suspension: 40 unit(s) subcutaneous once a day in AM  polyethylene glycol 3350 oral powder for reconstitution: 17 gram(s) orally once a day, As needed, Constipation  senna oral tablet: 1 tab(s) orally once a day  test strips (per patient&#x27;s insurance): 1 application subcutaneously 3 times a day . ** Compatible with patient&#x27;s glucometer **   ICD-10 code E11. 9.  timolol hemihydrate 0.5% ophthalmic solution: 1 drop(s) to each affected eye once a day   acetaminophen 325 mg oral tablet: 2 tab(s) orally every 6 hours, As Needed - 3)  amLODIPine 10 mg oral tablet: 1 tab(s) orally once a day  atorvastatin 10 mg oral tablet: 1 tab(s) orally once a day (at bedtime)  bisacodyl 10 mg rectal suppository: 1 suppository(ies) rectal once a day, As needed, Constipation  collagenase 250 units/g topical ointment: 1 application topically once a day  fluconazole 200 mg oral tablet: 1 tab(s) orally once a day from 1/1/21 through 1/8/21  guaiFENesin 100 mg/5 mL oral liquid: 10 milliliter(s) orally every 6 hours, As needed, Cough/chest congestion  heparin: 5000 unit(s) subcutaneous every 8 hours for DVT prophylaxis  HYDROmorphone 2 mg oral tablet: 1 tab(s) orally every 3 hours, As needed, Moderate Pain (4 - 6) refractory to tylenol  ipratropium-albuterol 0.5 mg-2.5 mg/3 mLinhalation solution: 3 milliliter(s) inhaled every 6 hours, As needed, Shortness of Breath and/or Wheezing  labetalol 100 mg oral tablet: 1 tab(s) orally 2 times a day  menthol-benzocaine 3.6 mg-15 mg mucous membrane lozenge: 1 lozenge orally every 4 hours, As Needed for sore throat  NovoLIN N 100 units/mL subcutaneous suspension: 30 unit(s) subcutaneous once a day (at bedtime)  NovoLIN N 100 units/mL subcutaneous suspension: 40 unit(s) subcutaneous once a day in AM  polyethylene glycol 3350 oral powder for reconstitution: 17 gram(s) orally once a day, As needed, Constipation  senna oral tablet: 1 tab(s) orally once a day  simethicone 80 mg oral tablet, chewable: 1 tab(s) orally every 8 hours, As needed, Gas  sodium bicarbonate 650 mg oral tablet: 1 tab(s) orally 2 times a day  tamsulosin 0.4 mg oral capsule: 1 cap(s) orally once a day (at bedtime)  timolol maleate 0.5% ophthalmic solution: 1 drop(s) to each affected eye once a day

## 2020-12-24 NOTE — PROGRESS NOTE ADULT - SUBJECTIVE AND OBJECTIVE BOX
[facility] DIVISION OF KIDNEY DISEASES AND HYPERTENSION -- FOLLOW UP NOTE  --------------------------------------------------------------------------------  HPI: 80-year-old female with long standing DM, HTN, legally blind, obesity, and diabetic foot ulcer was admitted to [place] on 12/20/20 for hyperglycemia. Nephrology team consulted for elevated serum creatinine. Upon review of labs on [facility], Scr was 2.01 on 11/23/20. On admission, Scr was 3.46. Of note, pt. went for CT Abdomen which shows mild R hydronephrosis and 2 stones in the distal ureter. Scr remains elevated at 3.42 today.    Pt. evaluated at bedside, in no acute distress. Offers no complaints. Pt. agreeable to stent placement.    PAST HISTORY  --------------------------------------------------------------------------------  No significant changes to PMH, PSH, FHx, SHx, unless otherwise noted    ALLERGIES & MEDICATIONS  --------------------------------------------------------------------------------  Allergies    No Known Allergies    Intolerances    Standing Inpatient Medications  amLODIPine   Tablet 10 milliGRAM(s) Oral daily  atorvastatin 10 milliGRAM(s) Oral at bedtime  collagenase Ointment 1 Application(s) Topical daily  glucagon  Injectable 1 milliGRAM(s) IntraMuscular once  heparin   Injectable 5000 Unit(s) SubCutaneous every 8 hours  influenza   Vaccine 0.5 milliLiter(s) IntraMuscular once  insulin glargine Injectable (LANTUS) 27 Unit(s) SubCutaneous at bedtime  insulin lispro (ADMELOG) corrective regimen sliding scale   SubCutaneous at bedtime  insulin lispro (ADMELOG) corrective regimen sliding scale   SubCutaneous three times a day before meals  insulin lispro Injectable (ADMELOG) 8 Unit(s) SubCutaneous before lunch  insulin lispro Injectable (ADMELOG) 9 Unit(s) SubCutaneous before breakfast  insulin lispro Injectable (ADMELOG) 8 Unit(s) SubCutaneous before dinner  labetalol 200 milliGRAM(s) Oral two times a day  lactobacillus acidophilus 1 Tablet(s) Oral daily  piperacillin/tazobactam IVPB.. 3.375 Gram(s) IV Intermittent every 12 hours  polyethylene glycol 3350 17 Gram(s) Oral daily  senna 2 Tablet(s) Oral at bedtime  sodium bicarbonate 650 milliGRAM(s) Oral two times a day  tamsulosin 0.4 milliGRAM(s) Oral at bedtime  timolol 0.5% Solution 1 Drop(s) Both EYES daily    REVIEW OF SYSTEMS  --------------------------------------------------------------------------------  Gen: no lethargy  Respiratory: No dyspnea  CV: No chest pain  GI: No abdominal pain  MSK: no LE edema  Neuro: + legally blind  Heme: No bleeding    All other systems were reviewed and are negative, except as noted.    VITALS/PHYSICAL EXAM  --------------------------------------------------------------------------------  T(C): 37.1 (12-24-20 @ 07:54), Max: 37.2 (12-23-20 @ 21:03)  HR: 85 (12-24-20 @ 07:54) (76 - 85)  BP: 144/55 (12-24-20 @ 07:54) (104/60 - 144/56)  RR: 16 (12-24-20 @ 07:54) (16 - 18)  SpO2: 94% (12-24-20 @ 07:54) (92% - 97%)  Wt(kg): --    Weight (kg): 106.6 (12-23-20 @ 18:06)    12-23-20 @ 07:01  -  12-24-20 @ 07:00  --------------------------------------------------------  IN: 0 mL / OUT: 1350 mL / NET: -1350 mL    Physical Exam:  	Gen: NAD, obese  	HEENT: MMM  	Pulm: good air entry B/L  	CV: S1S2  	Abd: Soft, +BS   	Ext: No LE edema B/L, left heel ulcer  	Neuro: Awake  	Skin: Warm and dry    LABS/STUDIES  --------------------------------------------------------------------------------              9.2    15.45 >-----------<  186      [12-24-20 @ 08:01]              29.8     136  |  100  |  65  ----------------------------<  331      [12-24-20 @ 08:01]  3.9   |  18  |  3.42        Ca     8.1     [12-24-20 @ 08:01]      Mg     2.0     [12-24-20 @ 08:01]      Phos  3.3     [12-24-20 @ 08:01]    Creatinine Trend:  SCr 3.42 [12-24 @ 08:01]  SCr 3.24 [12-23 @ 08:37]  SCr 3.09 [12-22 @ 07:15]  SCr 3.10 [12-21 @ 14:41]  SCr 3.04 [12-21 @ 08:09]

## 2020-12-24 NOTE — PROGRESS NOTE ADULT - ASSESSMENT
80 y female with R obstructive uropathy 2/2 two R distal stones (2mm & 6mm)  Still with elevated SCr and pain    - Plan for right ureteral stent today  - NPO  - Trend SCr (if significantly improved this AM will consider deferring procedure)  - Continue flomax  - Strain urine  Consent in chart

## 2020-12-24 NOTE — PROGRESS NOTE ADULT - SUBJECTIVE AND OBJECTIVE BOX
Chief Complaint: uncontrolled DM2    History: Patient seen and examined at bedside. Patient reporting right side and back pain. Patient reports she is tolerating diet, no nausea or vomiting.   Currently NPO for right ureteral stent today.     MEDICATIONS  (STANDING):  amLODIPine   Tablet 10 milliGRAM(s) Oral daily  atorvastatin 10 milliGRAM(s) Oral at bedtime  collagenase Ointment 1 Application(s) Topical daily  dextrose 40% Gel 15 Gram(s) Oral once  dextrose 5%. 1000 milliLiter(s) (50 mL/Hr) IV Continuous <Continuous>  dextrose 5%. 1000 milliLiter(s) (100 mL/Hr) IV Continuous <Continuous>  dextrose 50% Injectable 25 Gram(s) IV Push once  dextrose 50% Injectable 12.5 Gram(s) IV Push once  dextrose 50% Injectable 25 Gram(s) IV Push once  glucagon  Injectable 1 milliGRAM(s) IntraMuscular once  heparin   Injectable 5000 Unit(s) SubCutaneous every 8 hours  influenza   Vaccine 0.5 milliLiter(s) IntraMuscular once  insulin glargine Injectable (LANTUS) 27 Unit(s) SubCutaneous at bedtime  insulin lispro (ADMELOG) corrective regimen sliding scale   SubCutaneous at bedtime  insulin lispro (ADMELOG) corrective regimen sliding scale   SubCutaneous three times a day before meals  insulin lispro Injectable (ADMELOG) 8 Unit(s) SubCutaneous before lunch  insulin lispro Injectable (ADMELOG) 9 Unit(s) SubCutaneous before breakfast  insulin lispro Injectable (ADMELOG) 8 Unit(s) SubCutaneous before dinner  labetalol 200 milliGRAM(s) Oral two times a day  lactobacillus acidophilus 1 Tablet(s) Oral daily  piperacillin/tazobactam IVPB.. 3.375 Gram(s) IV Intermittent every 12 hours  polyethylene glycol 3350 17 Gram(s) Oral daily  senna 2 Tablet(s) Oral at bedtime  sodium bicarbonate 650 milliGRAM(s) Oral two times a day  tamsulosin 0.4 milliGRAM(s) Oral at bedtime  timolol 0.5% Solution 1 Drop(s) Both EYES daily    MEDICATIONS  (PRN):  acetaminophen   Tablet .. 975 milliGRAM(s) Oral every 6 hours PRN Mild Pain (1 - 3)  bisacodyl Suppository 10 milliGRAM(s) Rectal daily PRN Constipation  HYDROmorphone   Tablet 2 milliGRAM(s) Oral every 3 hours PRN Moderate Pain (4 - 6) refractory to tylenol  HYDROmorphone  Injectable 1 milliGRAM(s) IV Push every 3 hours PRN Severe Pain (7 - 10)  simethicone 80 milliGRAM(s) Chew every 8 hours PRN Gas    PHYSICAL EXAM:  VITALS: T(C): 37.1 (12-24-20 @ 07:54)  T(F): 98.8 (12-24-20 @ 07:54), Max: 99 (12-23-20 @ 21:03)  HR: 85 (12-24-20 @ 07:54) (76 - 85)  BP: 144/55 (12-24-20 @ 07:54) (104/60 - 144/56)  RR:  (16 - 18)  SpO2:  (92% - 97%)  Wt(kg): 106kg   GENERAL: NAD, well-groomed, well-developed  RESPIRATORY: Clear to auscultation bilaterally; No rales, rhonchi, wheezing, or rubs  CARDIOVASCULAR: Regular rate and rhythm; No murmurs; no peripheral edema  GI: Soft, right abdominal tender, non distended, normal bowel sounds  SKIN: Dry, intact, No rashes or lesions  MUSCULOSKELETAL: Full range of motion, normal strength  PSYCH: Alert and oriented x 3, reactive affect       POCT Blood Glucose.: 230 mg/dL (12-24-20 @ 11:50)  POCT Blood Glucose.: 203 mg/dL (12-24-20 @ 08:48)  POCT Blood Glucose.: 170 mg/dL (12-23-20 @ 22:09)  POCT Blood Glucose.: 163 mg/dL (12-23-20 @ 18:25)  POCT Blood Glucose.: 213 mg/dL (12-23-20 @ 12:19)  POCT Blood Glucose.: 205 mg/dL (12-23-20 @ 09:08)  POCT Blood Glucose.: 132 mg/dL (12-23-20 @ 00:37)  POCT Blood Glucose.: 133 mg/dL (12-22-20 @ 21:45)  POCT Blood Glucose.: 182 mg/dL (12-22-20 @ 17:50)  POCT Blood Glucose.: 214 mg/dL (12-22-20 @ 12:19)  POCT Blood Glucose.: 243 mg/dL (12-22-20 @ 08:44)  POCT Blood Glucose.: 219 mg/dL (12-21-20 @ 22:06)  POCT Blood Glucose.: 182 mg/dL (12-21-20 @ 17:35)  POCT Blood Glucose.: 185 mg/dL (12-21-20 @ 12:29)      12-24    136  |  100  |  65<H>  ----------------------------<  331<H>  3.9   |  18<L>  |  3.42<H>    EGFR if : 14<L>  EGFR if non : 12<L>    Ca    8.1<L>      12-24  Mg     2.0     12-24  Phos  3.3     12-24    TPro  6.3  /  Alb  2.4<L>  /  TBili  0.3  /  DBili  x   /  AST  20  /  ALT  16  /  AlkPhos  112  12-23

## 2020-12-24 NOTE — DISCHARGE NOTE PROVIDER - NSFOLLOWUPCLINICS_GEN_ALL_ED_FT
[facility] Endocrinology  Endocrinology  [address] [place] [address]  Phone: [phone]  Fax:   Follow Up Time:

## 2020-12-24 NOTE — CONSULT NOTE ADULT - ASSESSMENT
80 f with DM c/b diabetic neuropathy and multiple toes amputations, HTN, HLD, PVD, CKD (Baseline crt 1.8-2), morbid Obesity, presenting w/ hyperglycemia.   podiatry evaluated her for L heel chronic ulcer with eschar, no evidence of infection, xray no osteo  WBC initially 11 now 15 but no symptoms just has occasional R flank pain  abd/pelvis CT: Right-sided obstructive uropathy with mild right hydroureteronephrosis to the level of two distal right ureteral stones measuring 2 and 6 mm, respectively.  blood cx negative, urine cx with staph epi  s/p zosyn and ceftriaxone 12/20-12/24  plan for IR stent  pt was dizzy today and an episode of desaturation to 80s which improved after coughing    obstructive uropathy, nephrolithiasis  positive urine cx with staph epi, likely contaminant or colonization    * stat vanco as pt is going for ureteral stent  * check kidney urine cx when the stent is placed  * will adjust as per the kidney cultures  * monitor the WBC  * if more desaturation will need repeat chest imaging    The above assessment and plan was discussed with the primary team    Malini Ayers MD  Pager [phone]  After 5pm and on weekends call [phone]

## 2020-12-24 NOTE — PROGRESS NOTE ADULT - PROBLEM SELECTOR PLAN 2
Mixed pre-renal in setting of DKA and poor oral intake over the past few days in the setting of UTI vs component of R-sided obstruction from stone  - s/p 3L IVF  - hold home losartan  - appreciate renal recs  - started bicarb 650 BID  - monitor BMP, I/Os, renally dose meds, avoid nephrotoxins CTAP 12/23 showing right-sided obstructive uropathy with mild right hydroureteronephrosis to the level of two distal right ureteral stones measuring 2 and 6 mm, respectively  - appreciate urology recs, may need stent on this admission  - tylenol 975 q6 for mild pain, start dilaudid 2mg PO q3 for mod pain, start dilaudid 0.5 IV q3 for severe pain, titrate as needed CTAP 12/23 showing right-sided obstructive uropathy with mild right hydroureteronephrosis to the level of two distal right ureteral stones measuring 2 and 6 mm, respectively  - appreciate urology recs, may need stent on this admission  - tylenol 975 q6 for mild pain, start dilaudid 2mg PO q3 for mod pain, start dilaudid 1 IV q3 for severe pain, titrate as needed

## 2020-12-24 NOTE — PROGRESS NOTE ADULT - PROBLEM SELECTOR PLAN 6
DVT ppx: HSQ 5000 q8  Diet: carb consistent regular  Dispo: lives at home alone with regular assistance from her nephew; unclear if patient has home care services; PT recommending rehab  Code status: full code L heel ulcer not appearing infected on exam; XR without gas or OM  - local wound care per podiatry

## 2020-12-24 NOTE — PROGRESS NOTE ADULT - ATTENDING COMMENTS
Uncontrolled DM2 complicated by heel ulcer, legally blind, ARABELLA presented with glucose 500s and mild ketosis.  Inpatient agree with basal bolus plan as outlined.  At home on NPH vials. Nephew helps set up insulin at home in advance. This system would not work with pens. Patient prefers to stay with this plan for dc due to logistical reasons. Also since 70/30 would have higher risk of hypoglycemia given visual impairments and she is unable to check FS consistently at home will keep NPH vial BID plan for dc.  A1c 8.6% not too much above goal for this patient but maybe could optimize a little. Can add Tradjenta or other DPP4 to NPH plan for discharge.    Jean Paul Ogden MD  Division of Endocrinology  Pager: [phone]    If after 6PM or before 9AM, or on weekends/holidays, please call endocrine answering service for assistance ([phone]).  For nonurgent matters email [email or web address] for [email or web address].

## 2020-12-24 NOTE — PROGRESS NOTE ADULT - SUBJECTIVE AND OBJECTIVE BOX
Andrés Malhotra, PGY3  Pager [phone]      Patient is a 80y old  Female who presents with a chief complaint of Hyperglycemia (24 Dec 2020 06:57)      SUBJECTIVE/INTERVAL EVENTS: Patient seen and examined at bedside. Laying on right side, appearing uncomfortable. Reports feeling "not good". Reports 8/10 colicky R-sided/back pain. Denies other acute issues.    MEDICATIONS  (STANDING):  amLODIPine   Tablet 10 milliGRAM(s) Oral daily  atorvastatin 10 milliGRAM(s) Oral at bedtime  collagenase Ointment 1 Application(s) Topical daily  dextrose 40% Gel 15 Gram(s) Oral once  dextrose 5%. 1000 milliLiter(s) (50 mL/Hr) IV Continuous <Continuous>  dextrose 5%. 1000 milliLiter(s) (100 mL/Hr) IV Continuous <Continuous>  dextrose 50% Injectable 25 Gram(s) IV Push once  dextrose 50% Injectable 12.5 Gram(s) IV Push once  dextrose 50% Injectable 25 Gram(s) IV Push once  glucagon  Injectable 1 milliGRAM(s) IntraMuscular once  heparin   Injectable 5000 Unit(s) SubCutaneous every 8 hours  influenza   Vaccine 0.5 milliLiter(s) IntraMuscular once  insulin glargine Injectable (LANTUS) 27 Unit(s) SubCutaneous at bedtime  insulin lispro (ADMELOG) corrective regimen sliding scale   SubCutaneous at bedtime  insulin lispro (ADMELOG) corrective regimen sliding scale   SubCutaneous three times a day before meals  insulin lispro Injectable (ADMELOG) 8 Unit(s) SubCutaneous before lunch  insulin lispro Injectable (ADMELOG) 9 Unit(s) SubCutaneous before breakfast  insulin lispro Injectable (ADMELOG) 8 Unit(s) SubCutaneous before dinner  labetalol 200 milliGRAM(s) Oral two times a day  lactobacillus acidophilus 1 Tablet(s) Oral daily  piperacillin/tazobactam IVPB.. 3.375 Gram(s) IV Intermittent every 12 hours  polyethylene glycol 3350 17 Gram(s) Oral daily  senna 2 Tablet(s) Oral at bedtime  sodium bicarbonate 650 milliGRAM(s) Oral two times a day  tamsulosin 0.4 milliGRAM(s) Oral at bedtime  timolol 0.5% Solution 1 Drop(s) Both EYES daily    MEDICATIONS  (PRN):  acetaminophen   Tablet .. 975 milliGRAM(s) Oral every 6 hours PRN Mild Pain (1 - 3)  bisacodyl Suppository 10 milliGRAM(s) Rectal daily PRN Constipation  HYDROmorphone   Tablet 2 milliGRAM(s) Oral every 3 hours PRN Moderate Pain (4 - 6) refractory to tylenol  HYDROmorphone  Injectable 0.5 milliGRAM(s) IV Push every 3 hours PRN Severe Pain (7 - 10)  simethicone 80 milliGRAM(s) Chew every 8 hours PRN Gas      VITAL SIGNS:  T(F): 98.8 (20 @ 07:54), Max: 99 (20 @ 21:03)  HR: 85 (20 @ 07:54) (76 - 85)  BP: 144/55 (20 @ 07:54) (104/60 - 144/56)  RR: 16 (20 @ 07:54) (16 - 18)  SpO2: 94% (20 @ 07:54) (92% - 97%)    I&O's Summary    23 Dec 2020 07:01  -  24 Dec 2020 07:00  --------------------------------------------------------  IN: 0 mL / OUT: 1350 mL / NET: -1350 mL      Daily     Daily     PHYSICAL EXAM:  Gen: Alert, appearing uncomfortable from reports of R sided pain  HEENT: NCAT, conjunctiva clear, sclera anicteric  Neck: Supple, no JVD  CV: RRR, S1S2, no m/r/g  Resp: CTAB, normal respiratory effort  Abd: Soft, nontender, nondistended, bowel sounds intact  Ext: no edema, no clubbing or cyanosis  Neuro: Alert, appropriate speech, CN2-12 grossly intact, IGNACIO  SKIN: warm, perfused, L heel ulcer dressing c/d/i    LABS:    Hgb Trend: 9.6<--, 9.7<--      136  |  103  |  65<H>  ----------------------------<  181<H>  4.1   |  18<L>  |  3.24<H>    Ca    8.4      23 Dec 2020 08:37  Phos  3.1     12-  Mg     1.9         TPro  6.3  /  Alb  2.4<L>  /  TBili  0.3  /  DBili  x   /  AST  20  /  ALT  16  /  AlkPhos  112      Creatinine Trend: 3.24<--, 3.09<--, 3.10<--, 3.04<--, 3.25<--, 3.48<--  LIVER FUNCTIONS - ( 23 Dec 2020 08:37 )  Alb: 2.4 g/dL / Pro: 6.3 g/dL / ALK PHOS: 112 U/L / ALT: 16 U/L / AST: 20 U/L / GGT: x                 Urinalysis Basic - ( 23 Dec 2020 19:01 )    Color: Yellow / Appearance: Slightly Turbid / S.018 / pH: x  Gluc: x / Ketone: Negative  / Bili: Negative / Urobili: <2 mg/dL   Blood: x / Protein: 100 mg/dL / Nitrite: Negative   Leuk Esterase: Large / RBC: 25-50 /HPF / WBC >50 /HPF   Sq Epi: x / Non Sq Epi: 0 /HPF / Bacteria: Negative        CAPILLARY BLOOD GLUCOSE      POCT Blood Glucose.: 170 mg/dL (23 Dec 2020 22:09)  POCT Blood Glucose.: 163 mg/dL (23 Dec 2020 18:25)  POCT Blood Glucose.: 213 mg/dL (23 Dec 2020 12:19)  POCT Blood Glucose.: 205 mg/dL (23 Dec 2020 09:08)      RADIOLOGY & ADDITIONAL TESTS: Reviewed    Imaging Personally Reviewed:    Consultant(s) Notes Reviewed:      Care Discussed with Consultants/Other Providers:

## 2020-12-24 NOTE — PROGRESS NOTE ADULT - PROBLEM SELECTOR PLAN 5
L heel ulcer not appearing infected on exam; XR without gas or OM  - local wound care per podiatry Controlled  - c/w home labetalol 200mg BID  - restarted previous home med of amlodipine 10mg qd while holding home losartan in setting of ARABELLA

## 2020-12-25 LAB
ANION GAP SERPL CALC-SCNC: 14 MMOL/L — SIGNIFICANT CHANGE UP (ref 7–14)
ANION GAP SERPL CALC-SCNC: 18 MMOL/L — HIGH (ref 7–14)
BASOPHILS # BLD AUTO: 0.02 K/UL — SIGNIFICANT CHANGE UP (ref 0–0.2)
BASOPHILS NFR BLD AUTO: 0.1 % — SIGNIFICANT CHANGE UP (ref 0–2)
BUN SERPL-MCNC: 66 MG/DL — HIGH (ref 7–23)
BUN SERPL-MCNC: 68 MG/DL — HIGH (ref 7–23)
CALCIUM SERPL-MCNC: 8.1 MG/DL — LOW (ref 8.4–10.5)
CALCIUM SERPL-MCNC: 8.3 MG/DL — LOW (ref 8.4–10.5)
CHLORIDE SERPL-SCNC: 100 MMOL/L — SIGNIFICANT CHANGE UP (ref 98–107)
CHLORIDE SERPL-SCNC: 104 MMOL/L — SIGNIFICANT CHANGE UP (ref 98–107)
CO2 SERPL-SCNC: 18 MMOL/L — LOW (ref 22–31)
CO2 SERPL-SCNC: 20 MMOL/L — LOW (ref 22–31)
CREAT SERPL-MCNC: 3.58 MG/DL — HIGH (ref 0.5–1.3)
CREAT SERPL-MCNC: 3.66 MG/DL — HIGH (ref 0.5–1.3)
CULTURE RESULTS: SIGNIFICANT CHANGE UP
CULTURE RESULTS: SIGNIFICANT CHANGE UP
EOSINOPHIL # BLD AUTO: 0.64 K/UL — HIGH (ref 0–0.5)
EOSINOPHIL NFR BLD AUTO: 3.8 % — SIGNIFICANT CHANGE UP (ref 0–6)
GLUCOSE BLDC GLUCOMTR-MCNC: 140 MG/DL — HIGH (ref 70–99)
GLUCOSE BLDC GLUCOMTR-MCNC: 156 MG/DL — HIGH (ref 70–99)
GLUCOSE BLDC GLUCOMTR-MCNC: 210 MG/DL — HIGH (ref 70–99)
GLUCOSE BLDC GLUCOMTR-MCNC: 231 MG/DL — HIGH (ref 70–99)
GLUCOSE SERPL-MCNC: 159 MG/DL — HIGH (ref 70–99)
GLUCOSE SERPL-MCNC: 224 MG/DL — HIGH (ref 70–99)
GRAM STN FLD: SIGNIFICANT CHANGE UP
HCT VFR BLD CALC: 30.1 % — LOW (ref 34.5–45)
HGB BLD-MCNC: 9.1 G/DL — LOW (ref 11.5–15.5)
IANC: 13.45 K/UL — HIGH (ref 1.5–8.5)
IMM GRANULOCYTES NFR BLD AUTO: 0.6 % — SIGNIFICANT CHANGE UP (ref 0–1.5)
LYMPHOCYTES # BLD AUTO: 1.83 K/UL — SIGNIFICANT CHANGE UP (ref 1–3.3)
LYMPHOCYTES # BLD AUTO: 10.8 % — LOW (ref 13–44)
MAGNESIUM SERPL-MCNC: 2.2 MG/DL — SIGNIFICANT CHANGE UP (ref 1.6–2.6)
MCHC RBC-ENTMCNC: 26.6 PG — LOW (ref 27–34)
MCHC RBC-ENTMCNC: 30.2 GM/DL — LOW (ref 32–36)
MCV RBC AUTO: 88 FL — SIGNIFICANT CHANGE UP (ref 80–100)
MONOCYTES # BLD AUTO: 0.93 K/UL — HIGH (ref 0–0.9)
MONOCYTES NFR BLD AUTO: 5.5 % — SIGNIFICANT CHANGE UP (ref 2–14)
NEUTROPHILS # BLD AUTO: 13.45 K/UL — HIGH (ref 1.8–7.4)
NEUTROPHILS NFR BLD AUTO: 79.2 % — HIGH (ref 43–77)
NRBC # BLD: 0 /100 WBCS — SIGNIFICANT CHANGE UP
NRBC # FLD: 0 K/UL — SIGNIFICANT CHANGE UP
PHOSPHATE SERPL-MCNC: 4.1 MG/DL — SIGNIFICANT CHANGE UP (ref 2.5–4.5)
PLATELET # BLD AUTO: 160 K/UL — SIGNIFICANT CHANGE UP (ref 150–400)
POTASSIUM SERPL-MCNC: 3.9 MMOL/L — SIGNIFICANT CHANGE UP (ref 3.5–5.3)
POTASSIUM SERPL-MCNC: 4.1 MMOL/L — SIGNIFICANT CHANGE UP (ref 3.5–5.3)
POTASSIUM SERPL-SCNC: 3.9 MMOL/L — SIGNIFICANT CHANGE UP (ref 3.5–5.3)
POTASSIUM SERPL-SCNC: 4.1 MMOL/L — SIGNIFICANT CHANGE UP (ref 3.5–5.3)
RBC # BLD: 3.42 M/UL — LOW (ref 3.8–5.2)
RBC # FLD: 15.6 % — HIGH (ref 10.3–14.5)
SODIUM SERPL-SCNC: 136 MMOL/L — SIGNIFICANT CHANGE UP (ref 135–145)
SODIUM SERPL-SCNC: 138 MMOL/L — SIGNIFICANT CHANGE UP (ref 135–145)
SPECIMEN SOURCE: SIGNIFICANT CHANGE UP
VANCOMYCIN FLD-MCNC: 15.1 UG/ML — SIGNIFICANT CHANGE UP
WBC # BLD: 16.98 K/UL — HIGH (ref 3.8–10.5)
WBC # FLD AUTO: 16.98 K/UL — HIGH (ref 3.8–10.5)

## 2020-12-25 PROCEDURE — 99232 SBSQ HOSP IP/OBS MODERATE 35: CPT

## 2020-12-25 PROCEDURE — 99233 SBSQ HOSP IP/OBS HIGH 50: CPT | Mod: GC

## 2020-12-25 PROCEDURE — 71045 X-RAY EXAM CHEST 1 VIEW: CPT | Mod: 26

## 2020-12-25 RX ORDER — INSULIN GLARGINE 100 [IU]/ML
35 INJECTION, SOLUTION SUBCUTANEOUS AT BEDTIME
Refills: 0 | Status: DISCONTINUED | OUTPATIENT
Start: 2020-12-25 | End: 2020-12-26

## 2020-12-25 RX ORDER — IPRATROPIUM/ALBUTEROL SULFATE 18-103MCG
3 AEROSOL WITH ADAPTER (GRAM) INHALATION EVERY 6 HOURS
Refills: 0 | Status: DISCONTINUED | OUTPATIENT
Start: 2020-12-25 | End: 2020-12-31

## 2020-12-25 RX ORDER — LABETALOL HCL 100 MG
100 TABLET ORAL
Refills: 0 | Status: DISCONTINUED | OUTPATIENT
Start: 2020-12-25 | End: 2020-12-31

## 2020-12-25 RX ORDER — VANCOMYCIN HCL 1 G
1250 VIAL (EA) INTRAVENOUS ONCE
Refills: 0 | Status: COMPLETED | OUTPATIENT
Start: 2020-12-25 | End: 2020-12-25

## 2020-12-25 RX ADMIN — Medication 2: at 13:27

## 2020-12-25 RX ADMIN — Medication 2: at 09:21

## 2020-12-25 RX ADMIN — HEPARIN SODIUM 5000 UNIT(S): 5000 INJECTION INTRAVENOUS; SUBCUTANEOUS at 13:40

## 2020-12-25 RX ADMIN — SODIUM CHLORIDE 75 MILLILITER(S): 9 INJECTION, SOLUTION INTRAVENOUS at 17:38

## 2020-12-25 RX ADMIN — Medication 650 MILLIGRAM(S): at 05:14

## 2020-12-25 RX ADMIN — INSULIN GLARGINE 35 UNIT(S): 100 INJECTION, SOLUTION SUBCUTANEOUS at 22:46

## 2020-12-25 RX ADMIN — TAMSULOSIN HYDROCHLORIDE 0.4 MILLIGRAM(S): 0.4 CAPSULE ORAL at 22:49

## 2020-12-25 RX ADMIN — Medication 9 UNIT(S): at 13:28

## 2020-12-25 RX ADMIN — ATORVASTATIN CALCIUM 10 MILLIGRAM(S): 80 TABLET, FILM COATED ORAL at 22:45

## 2020-12-25 RX ADMIN — SENNA PLUS 2 TABLET(S): 8.6 TABLET ORAL at 22:47

## 2020-12-25 RX ADMIN — Medication 650 MILLIGRAM(S): at 17:38

## 2020-12-25 RX ADMIN — AMLODIPINE BESYLATE 10 MILLIGRAM(S): 2.5 TABLET ORAL at 05:14

## 2020-12-25 RX ADMIN — Medication 1 APPLICATION(S): at 13:28

## 2020-12-25 RX ADMIN — Medication 1 TABLET(S): at 13:35

## 2020-12-25 RX ADMIN — Medication 200 MILLIGRAM(S): at 05:14

## 2020-12-25 RX ADMIN — Medication 100 MILLIGRAM(S): at 18:23

## 2020-12-25 RX ADMIN — HEPARIN SODIUM 5000 UNIT(S): 5000 INJECTION INTRAVENOUS; SUBCUTANEOUS at 22:49

## 2020-12-25 RX ADMIN — SODIUM CHLORIDE 75 MILLILITER(S): 9 INJECTION, SOLUTION INTRAVENOUS at 05:17

## 2020-12-25 RX ADMIN — Medication 166.67 MILLIGRAM(S): at 17:42

## 2020-12-25 RX ADMIN — Medication 1: at 17:40

## 2020-12-25 RX ADMIN — Medication 1 DROP(S): at 13:33

## 2020-12-25 RX ADMIN — Medication 9 UNIT(S): at 17:40

## 2020-12-25 RX ADMIN — HEPARIN SODIUM 5000 UNIT(S): 5000 INJECTION INTRAVENOUS; SUBCUTANEOUS at 05:14

## 2020-12-25 NOTE — PROGRESS NOTE ADULT - SUBJECTIVE AND OBJECTIVE BOX
Patient is a 80y old  Female who presents with a chief complaint of Hyperglycemia (25 Dec 2020 07:30)       INTERVAL HPI/OVERNIGHT EVENTS:  Patient seen and evaluated at bedside.  Pt is resting comfortable in NAD. Denies N/V/F/C.  Pain rated at X/10    Allergies    No Known Allergies    Intolerances        Vital Signs Last 24 Hrs  T(C): 37.4 (25 Dec 2020 05:09), Max: 37.4 (25 Dec 2020 02:00)  T(F): 99.3 (25 Dec 2020 05:09), Max: 99.4 (25 Dec 2020 02:00)  HR: 82 (25 Dec 2020 05:09) (73 - 82)  BP: 130/55 (25 Dec 2020 05:09) (101/40 - 144/48)  BP(mean): 71 (24 Dec 2020 21:00) (48 - 71)  RR: 20 (25 Dec 2020 05:09) (17 - 22)  SpO2: 97% (25 Dec 2020 05:09) (92% - 100%)    LABS:                        9.1    16.98 )-----------( 160      ( 25 Dec 2020 05:09 )             30.1     12-25    136  |  100  |  68<H>  ----------------------------<  224<H>  4.1   |  18<L>  |  3.66<H>    Ca    8.3<L>      25 Dec 2020 05:09  Phos  4.1     12-25  Mg     2.2     12-25        Urinalysis Basic - ( 23 Dec 2020 19:01 )    Color: Yellow / Appearance: Slightly Turbid / S.018 / pH: x  Gluc: x / Ketone: Negative  / Bili: Negative / Urobili: <2 mg/dL   Blood: x / Protein: 100 mg/dL / Nitrite: Negative   Leuk Esterase: Large / RBC: 25-50 /HPF / WBC >50 /HPF   Sq Epi: x / Non Sq Epi: 0 /HPF / Bacteria: Negative      CAPILLARY BLOOD GLUCOSE      POCT Blood Glucose.: 210 mg/dL (25 Dec 2020 09:06)  POCT Blood Glucose.: 266 mg/dL (24 Dec 2020 22:26)  POCT Blood Glucose.: 261 mg/dL (24 Dec 2020 18:30)  POCT Blood Glucose.: 259 mg/dL (24 Dec 2020 17:33)  POCT Blood Glucose.: 230 mg/dL (24 Dec 2020 11:50)      Lower Extremity Physical Exam:  Vascular: DP 1/4, PT 0/4 B/L  Neuro: Epicritic sensation reduced to the level of digits, B/L.  Musculoskeletal/Ortho: s/p LF partial first ray resection, and 3rd toe amp  Wound #1: Left posterior heel wound to subQ w/ central eschar.   Location: posterior left heel  Size: 4.0cm x 5cm  Depth: 0.2cm, down to subQ  Wound bed: fibrogranular w/ central eschar  Drainage: minimal  Odor: none  Periwound: no periwound erythema, xerosis noted  Etiology: pressure and diabetic foot ulcer    RADIOLOGY & ADDITIONAL TESTS:

## 2020-12-25 NOTE — PROGRESS NOTE ADULT - PROBLEM SELECTOR PLAN 4
Initially presented with mild DKA in the setting of UTI and medication noncompliance; a1c 8.6  - c/w lantus 30 qhs, admelog 9 tidac, low ISS per endo  - appreciate endo recs  - will need glucometer/supplies on discharge (Rx sent to [facility])

## 2020-12-25 NOTE — PROGRESS NOTE ADULT - ASSESSMENT
Pt is 81yo who presents w/ Left heel wound to subQ  -pt was seen and examined  -Left posterior heel wound down to subQ, w/ fibrogranular wound bed and central eschar, wound does not track, or probe beyond SubQ, no erythema, no malodor, no local signs of infection  -LFXR: no gas, or OM, final   -Little to no suspicion for underlying bone infection. Elevated glucose 2/2 UTI.   -Pod plan for local wound care, santyl and DSD daily  -Will follow while inpatient

## 2020-12-25 NOTE — PROGRESS NOTE ADULT - ATTENDING COMMENTS
80F h/o HTN, DM2 c/b diabetic neuropathy and multiple toe amputations, HLD, PVD, CKD (baseline Cr 1.8-2), morbid obesity admitted for mild DKA 2/2 UTI and insulin noncompliance found to be in ACKD with obstructing ureteral stones s/p stent placement POD#1.    1. DKA:   -Endocrine following, appreciate recs, c/w basal/bolus regimen, monitor FS qac and qHS; will d/c with endo increasing lantus to 32 units qhs; FS likely elevated given underlying infection/UTI    2. UTI/Obstructing ureteral stones:  -CTAP with 2 obstructing R ureteral stones, mild R hydroureteronephrosis with worsening flank pain. Patient s/p R ureteral stent placement yesterday by urology with findings of purulent pus upon placement; f/u OR cultures  -Renal and Urology following, appreciate recs  -Urine culture growing staph epi: Zosyn switched to vancomycin dosed by level, will consult ID for PO options once patient improved  -c/w vanco IV; monitor closely and adjust abx based on oral cultures    3. ACKD:  -likely post-renal 2/2 obstructing ureteral stones  -now s/p stent placemement; monitor creatinine  -repeat BMP at 4pm    4. Left heel ulcer:   -Appreciate podiatry recs for left heel ulcer; per podiatry there is "Little to no suspicion for underlying bone infection"; does not appear to be infected at this time, X-ray with no evidence of OM; Pod plan for local wound care, santyl and DSD daily    5. Dispo:   -PT eval recommending QUAN- patient agreeable 80F h/o HTN, DM2 c/b diabetic neuropathy and multiple toe amputations, HLD, PVD, CKD (baseline Cr 1.8-2), morbid obesity admitted for mild DKA 2/2 UTI and insulin noncompliance found to be in ACKD with obstructing ureteral stones s/p stent placement POD#1.    1. DKA:   -Endocrine following, appreciate recs, c/w basal/bolus regimen, monitor FS qac and qHS; will d/c with endo increasing lantus to 32 units qhs; FS likely elevated given underlying infection/UTI    2. UTI/Obstructing ureteral stones:  -CTAP with 2 obstructing R ureteral stones, mild R hydroureteronephrosis with worsening flank pain. Patient s/p R ureteral stent placement yesterday by urology with findings of purulent pus upon placement; f/u OR cultures  -Renal and Urology following, appreciate recs  -Urine culture growing staph epi: Zosyn switched to vancomycin dosed by level, will consult ID for PO options once patient improved  -c/w vanco IV; monitor closely and adjust abx based on oral cultures  -vanco trough this am 15; f/u with ID regarding vanco dosing: currently Vanco at 1500mg BID.  -check trough    3. ACKD:  -likely post-renal 2/2 obstructing ureteral stones  -now s/p stent placement; monitor creatinine  -repeat BMP at 4pm; if increased call urology    4. Left heel ulcer:   -Appreciate podiatry recs for left heel ulcer; per podiatry there is "Little to no suspicion for underlying bone infection"; does not appear to be infected at this time, X-ray with no evidence of OM; Pod plan for local wound care, santyl and DSD daily    5. Dispo:   -PT eval recommending QUAN- patient agreeable

## 2020-12-25 NOTE — PROGRESS NOTE ADULT - PROBLEM SELECTOR PLAN 2
CTAP 12/23 showing right-sided obstructive uropathy with mild right hydroureteronephrosis to the level of two distal right ureteral stones measuring 2 and 6 mm, respectively  - s/p R ureteral stent placement on 12/24 and placement of indwelling riggins   - appreciate urology recs  - tylenol 975 q6 for mild pain, dilaudid 2mg PO q3 for mod pain, dilaudid 1 IV q3 for severe pain, titrate as needed

## 2020-12-25 NOTE — PROGRESS NOTE ADULT - SUBJECTIVE AND OBJECTIVE BOX
Subjective    Seen and examined.  S/p right ureteral stent placement yesterday evening. ***Gross purulence noted in right collecting system, culture sent.  Feeling well, no complaints.    Objective    Vital signs  T(F): , Max: 99.4 (12-25-20 @ 02:00)  HR: 82 (12-25-20 @ 05:09)  BP: 130/55 (12-25-20 @ 05:09)  SpO2: 97% (12-25-20 @ 05:09)  Wt(kg): --    Output     OUT:    Indwelling Catheter - Urethral (mL): 275 mL    Voided (mL): 700 mL  Total OUT: 975 mL    Total NET: -975 mL          Gen: NAD  Abd: soft NT ND  : riggins draining clear yellow urine     Labs      12-25 @ 05:09    WBC 16.98 / Hct 30.1  / SCr 3.66     12-24 @ 08:01    WBC 15.45 / Hct 29.8  / SCr 3.42       Urine Cx: pending      Imaging  < from: CT Abdomen and Pelvis No Cont (12.23.20 @ 10:44) >  EXAM:  CT ABDOMEN AND PELVIS        PROCEDURE DATE:  Dec 23 2020         INTERPRETATION:  CLINICAL INFORMATION: Fever. Acute kidney injury.    COMPARISON: None.    PROCEDURE:  CT of the Abdomen and Pelvis was performed without intravenous contrast.  Intravenous contrast: None.  Oral contrast: None.  Sagittal and coronal reformats were performed.    FINDINGS:  LOWER CHEST: Small bilateral pleural effusions, right greater than left. Mitral annular calcification.    LIVER: Within normal limits.  BILE DUCTS: Normal caliber.  GALLBLADDER: Within normal limits.  SPLEEN: Within normal limits.  PANCREAS: Fatty atrophy.  ADRENALS: Within normal limits.  KIDNEYS/URETERS: Mild right hydroureteronephrosis with 2 stones in the distal ureter measuring 2 mm and 6 mm, respectively. Nonobstructing stones in the right kidney. Bilateral renal cortical thinning. Exophytic left upper pole renal cyst measuring 1.7 cm.    BLADDER: Within normal limits.  REPRODUCTIVE ORGANS: Uterus and adnexa within normal limits.    BOWEL: No bowel obstruction. Appendix is normal. Diverticulosis.  PERITONEUM: No ascites.  VESSELS: Atherosclerotic calcifications.  RETROPERITONEUM/LYMPH NODES: No lymphadenopathy.  ABDOMINAL WALL: Within normal limits.  BONES: Degenerative changes.    IMPRESSION:  Right-sided obstructive uropathy with mild right hydroureteronephrosis to the level of two distal right ureteral stones measuring 2 and 6 mm, respectively.

## 2020-12-25 NOTE — PROGRESS NOTE ADULT - SUBJECTIVE AND OBJECTIVE BOX
Chief Complaint: DM2    History: Was NPO for procedure yesterday.  Today also not eating, sleepy.  No hypoglycemia events.    MEDICATIONS  (STANDING):  amLODIPine   Tablet 10 milliGRAM(s) Oral daily  atorvastatin 10 milliGRAM(s) Oral at bedtime  collagenase Ointment 1 Application(s) Topical daily  dextrose 40% Gel 15 Gram(s) Oral once  dextrose 5%. 1000 milliLiter(s) (50 mL/Hr) IV Continuous <Continuous>  dextrose 5%. 1000 milliLiter(s) (100 mL/Hr) IV Continuous <Continuous>  dextrose 50% Injectable 25 Gram(s) IV Push once  dextrose 50% Injectable 12.5 Gram(s) IV Push once  dextrose 50% Injectable 25 Gram(s) IV Push once  glucagon  Injectable 1 milliGRAM(s) IntraMuscular once  heparin   Injectable 5000 Unit(s) SubCutaneous every 8 hours  influenza   Vaccine 0.5 milliLiter(s) IntraMuscular once  insulin glargine Injectable (LANTUS) 30 Unit(s) SubCutaneous at bedtime  insulin lispro (ADMELOG) corrective regimen sliding scale   SubCutaneous at bedtime  insulin lispro (ADMELOG) corrective regimen sliding scale   SubCutaneous three times a day before meals  insulin lispro Injectable (ADMELOG) 9 Unit(s) SubCutaneous three times a day before meals  labetalol 200 milliGRAM(s) Oral two times a day  lactated ringers. 1000 milliLiter(s) (75 mL/Hr) IV Continuous <Continuous>  lactobacillus acidophilus 1 Tablet(s) Oral daily  polyethylene glycol 3350 17 Gram(s) Oral daily  senna 2 Tablet(s) Oral at bedtime  sodium bicarbonate 650 milliGRAM(s) Oral two times a day  tamsulosin 0.4 milliGRAM(s) Oral at bedtime  timolol 0.5% Solution 1 Drop(s) Both EYES daily    MEDICATIONS  (PRN):  acetaminophen   Tablet .. 975 milliGRAM(s) Oral every 6 hours PRN Mild Pain (1 - 3)  bisacodyl Suppository 10 milliGRAM(s) Rectal daily PRN Constipation  HYDROmorphone   Tablet 2 milliGRAM(s) Oral every 3 hours PRN Moderate Pain (4 - 6) refractory to tylenol  HYDROmorphone  Injectable 1 milliGRAM(s) IV Push every 3 hours PRN Severe Pain (7 - 10)  simethicone 80 milliGRAM(s) Chew every 8 hours PRN Gas      Allergies    No Known Allergies    Intolerances      Review of Systems:    UNABLE TO OBTAIN    PHYSICAL EXAM:  VITALS: T(C): 37.4 (12-25-20 @ 05:09)  T(F): 99.3 (12-25-20 @ 05:09), Max: 99.4 (12-25-20 @ 02:00)  HR: 82 (12-25-20 @ 05:09) (73 - 82)  BP: 130/55 (12-25-20 @ 05:09) (101/40 - 144/48)  RR:  (17 - 22)  SpO2:  (92% - 100%)  Wt(kg): --  GENERAL: NAD, well-groomed, well-developed  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, blind  HEENT:  Atraumatic, Normocephalic, moist mucous membranes  RESPIRATORY: nonlabored respirations        CAPILLARY BLOOD GLUCOSE      POCT Blood Glucose.: 210 mg/dL (25 Dec 2020 09:06)  POCT Blood Glucose.: 266 mg/dL (24 Dec 2020 22:26)  POCT Blood Glucose.: 261 mg/dL (24 Dec 2020 18:30)  POCT Blood Glucose.: 259 mg/dL (24 Dec 2020 17:33)      12-25    136  |  100  |  68<H>  ----------------------------<  224<H>  4.1   |  18<L>  |  3.66<H>    EGFR if : 13<L>  EGFR if non : 11<L>    Ca    8.3<L>      12-25  Mg     2.2     12-25  Phos  4.1     12-25    TPro  6.3  /  Alb  2.4<L>  /  TBili  0.3  /  DBili  x   /  AST  20  /  ALT  16  /  AlkPhos  112  12-23      A1C with Estimated Average Glucose Result: 8.6 % (12-20-20 @ 15:17)  A1C with Estimated Average Glucose: 9.3 % (09-17-20 @ 05:52)      Thyroid Function Tests:

## 2020-12-25 NOTE — PROGRESS NOTE ADULT - PROBLEM SELECTOR PLAN 3
Mixed pre-renal in setting of DKA and poor oral intake over the past few days in the setting of UTI vs component of R-sided obstruction from stone vs sepsis related ATN  - on LR @75cc/hr  - hold home losartan  - appreciate renal recs  - started bicarb 650 BID  - monitor BMP, I/Os, renally dose meds, avoid nephrotoxins

## 2020-12-25 NOTE — PROGRESS NOTE ADULT - PROBLEM SELECTOR PLAN 1
Presents with several days of urinary frequency and positive UA, broadened from CTX to zosyn on 12/22 for fever and persistent leukocytosis. Found to have obstructive R ureteral stones.  - UCx with >100k staph epi resistant to zosyn; switched zosyn to vanc by level on 12/24  - vanc level 15 this AM, will check level tomorrow  - f/u intra-op urine culture from 12/24  - BCx 12/20 NGTD  - f/u ID recs

## 2020-12-25 NOTE — PROGRESS NOTE ADULT - SUBJECTIVE AND OBJECTIVE BOX
Andrés Malhotra, PGY3  Pager [phone]      Patient is a 80y old  Female who presents with a chief complaint of Hyperglycemia (25 Dec 2020 09:18)      SUBJECTIVE/INTERVAL EVENTS: Patient seen and examined at bedside. Sleeping. Only complaint is wanting to go back to sleep. Pain controlled this AM.    MEDICATIONS  (STANDING):  amLODIPine   Tablet 10 milliGRAM(s) Oral daily  atorvastatin 10 milliGRAM(s) Oral at bedtime  collagenase Ointment 1 Application(s) Topical daily  dextrose 40% Gel 15 Gram(s) Oral once  dextrose 5%. 1000 milliLiter(s) (50 mL/Hr) IV Continuous <Continuous>  dextrose 5%. 1000 milliLiter(s) (100 mL/Hr) IV Continuous <Continuous>  dextrose 50% Injectable 25 Gram(s) IV Push once  dextrose 50% Injectable 12.5 Gram(s) IV Push once  dextrose 50% Injectable 25 Gram(s) IV Push once  glucagon  Injectable 1 milliGRAM(s) IntraMuscular once  heparin   Injectable 5000 Unit(s) SubCutaneous every 8 hours  influenza   Vaccine 0.5 milliLiter(s) IntraMuscular once  insulin glargine Injectable (LANTUS) 30 Unit(s) SubCutaneous at bedtime  insulin lispro (ADMELOG) corrective regimen sliding scale   SubCutaneous at bedtime  insulin lispro (ADMELOG) corrective regimen sliding scale   SubCutaneous three times a day before meals  insulin lispro Injectable (ADMELOG) 9 Unit(s) SubCutaneous three times a day before meals  labetalol 200 milliGRAM(s) Oral two times a day  lactated ringers. 1000 milliLiter(s) (75 mL/Hr) IV Continuous <Continuous>  lactobacillus acidophilus 1 Tablet(s) Oral daily  polyethylene glycol 3350 17 Gram(s) Oral daily  senna 2 Tablet(s) Oral at bedtime  sodium bicarbonate 650 milliGRAM(s) Oral two times a day  tamsulosin 0.4 milliGRAM(s) Oral at bedtime  timolol 0.5% Solution 1 Drop(s) Both EYES daily    MEDICATIONS  (PRN):  acetaminophen   Tablet .. 975 milliGRAM(s) Oral every 6 hours PRN Mild Pain (1 - 3)  bisacodyl Suppository 10 milliGRAM(s) Rectal daily PRN Constipation  HYDROmorphone   Tablet 2 milliGRAM(s) Oral every 3 hours PRN Moderate Pain (4 - 6) refractory to tylenol  HYDROmorphone  Injectable 1 milliGRAM(s) IV Push every 3 hours PRN Severe Pain (7 - 10)  simethicone 80 milliGRAM(s) Chew every 8 hours PRN Gas      VITAL SIGNS:  T(F): 99.3 (20 @ 05:09), Max: 99.4 (20 @ 02:00)  HR: 82 (20 @ 05:09) (73 - 82)  BP: 130/55 (20 @ 05:09) (101/40 - 144/48)  RR: 20 (20 @ 05:09) (17 - 22)  SpO2: 97% (20 @ 05:09) (92% - 100%)    I&O's Summary    24 Dec 2020 07:01  -  25 Dec 2020 07:00  --------------------------------------------------------  IN: 480 mL / OUT: 975 mL / NET: -495 mL      Daily Height in cm: 162.56 (24 Dec 2020 17:25)    Daily     PHYSICAL EXAM:  Gen: Alert, NAD  HEENT: NCAT, conjunctiva clear, sclera anicteric  Neck: Supple, no JVD  CV: RRR, S1S2, no m/r/g  Resp: CTAB, normal respiratory effort  Abd: Soft, nontender, nondistended, bowel sounds intact  Ext: no edema, no clubbing or cyanosis  Neuro: Alert, appropriate speech, CN2-12 grossly intact, IGNACIO  SKIN: warm, perfused, L heel ulcer dressing c/d/i    LABS:                        9.1    16.98 )-----------( 160      ( 25 Dec 2020 05:09 )             30.1     Hgb Trend: 9.1<--, 9.2<--, 9.6<--, 9.7<--      136  |  100  |  68<H>  ----------------------------<  224<H>  4.1   |  18<L>  |  3.66<H>    Ca    8.3<L>      25 Dec 2020 05:09  Phos  4.1     12-25  Mg     2.2     12-25      Creatinine Trend: 3.66<--, 3.42<--, 3.24<--, 3.09<--, 3.10<--, 3.04<--          Urinalysis Basic - ( 23 Dec 2020 19:01 )    Color: Yellow / Appearance: Slightly Turbid / S.018 / pH: x  Gluc: x / Ketone: Negative  / Bili: Negative / Urobili: <2 mg/dL   Blood: x / Protein: 100 mg/dL / Nitrite: Negative   Leuk Esterase: Large / RBC: 25-50 /HPF / WBC >50 /HPF   Sq Epi: x / Non Sq Epi: 0 /HPF / Bacteria: Negative        CAPILLARY BLOOD GLUCOSE      POCT Blood Glucose.: 210 mg/dL (25 Dec 2020 09:06)  POCT Blood Glucose.: 266 mg/dL (24 Dec 2020 22:26)  POCT Blood Glucose.: 261 mg/dL (24 Dec 2020 18:30)  POCT Blood Glucose.: 259 mg/dL (24 Dec 2020 17:33)  POCT Blood Glucose.: 230 mg/dL (24 Dec 2020 11:50)      RADIOLOGY & ADDITIONAL TESTS: Reviewed    Imaging Personally Reviewed:    Consultant(s) Notes Reviewed:      Care Discussed with Consultants/Other Providers:

## 2020-12-25 NOTE — PROGRESS NOTE ADULT - ASSESSMENT
81 yo F with history of uncontrolled DM Type 2 c/b diabetic neuropathy and multiple toes amputations, HTN, HLD, PVD, CKD3(Baseline crt 1.8-2), morbid Obesity, presenting w/ hyperglycemia. Patient did not take her evening dose of Novolin N 30units qhs. Endocrine consult requested for the management of hyperglycemia in patient with DM2.       1) Uncontrolled DM2 c/b Neuropathy, PVD, retinopathy and CKD3  A1c 8.6% in setting of ARABELLA on CKD  FS premeal and qhs   FS goal 100-200  While inpatient: BG have been in 200s despite not eating.  Recommend increase Lantus to 35 units qhs   Recommend continue Admelog 9units TIDac (doses have been held recently for now eating)  Recommend continue low correctional scale TIDac and low qhs scale.    On discharge will resume home insulin. Recommend Novolin N 40units qam and 30units qhs.  We acknowledge this regimen is not ideal, however given patient visual deficits limited in options therefore recommend continue current regimen. Also discharge on Tradjenta 5mg daily PO for better glycemic control without risk of hypoglycemia.   Patient states she tried insulin pens in the past, however unable to visualize the numbers on the pen to adjust doses.   Patient does not want to do basal/bolus insulin as it would require four times daily injections. Has audible glucometer, however has difficulty inserting test strips.   Patient can continue to follow up with PCP whom does house calls on discharge, as patient unable to make it to office visit .     2) HTN   goal 130/80  At goal. Continue Amlodipine and hold losartan in light of ARABELLA     3) HLD   Continue atorvastatin

## 2020-12-25 NOTE — PROGRESS NOTE ADULT - ASSESSMENT
80F with distal right ureteral stones s/p right ureteral stent placement, POD#1.    - **Would recommend continued antibiotics given gross purulence proximal to stone  - Rec empiric treatment for complicated UTI  - OR culture pending, will follow up  - May continue with riggins catheter today to maximize drainage, management after that per primary team

## 2020-12-25 NOTE — PROGRESS NOTE ADULT - ATTENDING COMMENTS
Jean Paul Ogden MD  Division of Endocrinology  Pager: [phone]    If after 6PM or before 9AM, or on weekends/holidays, please call endocrine answering service for assistance ([phone]).  For nonurgent matters email [email or web address] for assistance.

## 2020-12-25 NOTE — PROGRESS NOTE ADULT - ATTENDING COMMENTS
urine draining cloudy debris  afebrile postop  she is feeling better  stone procedure to be done electively

## 2020-12-25 NOTE — PROGRESS NOTE ADULT - PROBLEM SELECTOR PLAN 7
DVT ppx: HSQ 5000 q8  Diet: carb consistent regular  Dispo: lives at home alone with regular assistance from her nephew; unclear if patient has home care services; PT recommending rehab; awaiting [facility] accepting facility  Code status: full code

## 2020-12-26 DIAGNOSIS — N30.00 ACUTE CYSTITIS WITHOUT HEMATURIA: ICD-10-CM

## 2020-12-26 DIAGNOSIS — R09.02 HYPOXEMIA: ICD-10-CM

## 2020-12-26 LAB
ALBUMIN SERPL ELPH-MCNC: 2.4 G/DL — LOW (ref 3.3–5)
ALP SERPL-CCNC: 118 U/L — SIGNIFICANT CHANGE UP (ref 40–120)
ALT FLD-CCNC: 11 U/L — SIGNIFICANT CHANGE UP (ref 4–33)
ANION GAP SERPL CALC-SCNC: 13 MMOL/L — SIGNIFICANT CHANGE UP (ref 7–14)
AST SERPL-CCNC: 15 U/L — SIGNIFICANT CHANGE UP (ref 4–32)
BASOPHILS # BLD AUTO: 0.03 K/UL — SIGNIFICANT CHANGE UP (ref 0–0.2)
BASOPHILS NFR BLD AUTO: 0.2 % — SIGNIFICANT CHANGE UP (ref 0–2)
BILIRUB DIRECT SERPL-MCNC: <0.2 MG/DL — SIGNIFICANT CHANGE UP (ref 0–0.2)
BILIRUB INDIRECT FLD-MCNC: >0 MG/DL — SIGNIFICANT CHANGE UP (ref 0–1)
BILIRUB SERPL-MCNC: 0.2 MG/DL — SIGNIFICANT CHANGE UP (ref 0.2–1.2)
BUN SERPL-MCNC: 62 MG/DL — HIGH (ref 7–23)
CALCIUM SERPL-MCNC: 8 MG/DL — LOW (ref 8.4–10.5)
CHLORIDE SERPL-SCNC: 104 MMOL/L — SIGNIFICANT CHANGE UP (ref 98–107)
CO2 SERPL-SCNC: 19 MMOL/L — LOW (ref 22–31)
CREAT SERPL-MCNC: 3.36 MG/DL — HIGH (ref 0.5–1.3)
CULTURE RESULTS: SIGNIFICANT CHANGE UP
EOSINOPHIL # BLD AUTO: 0.23 K/UL — SIGNIFICANT CHANGE UP (ref 0–0.5)
EOSINOPHIL NFR BLD AUTO: 1.5 % — SIGNIFICANT CHANGE UP (ref 0–6)
GLUCOSE BLDC GLUCOMTR-MCNC: 108 MG/DL — HIGH (ref 70–99)
GLUCOSE BLDC GLUCOMTR-MCNC: 156 MG/DL — HIGH (ref 70–99)
GLUCOSE BLDC GLUCOMTR-MCNC: 163 MG/DL — HIGH (ref 70–99)
GLUCOSE BLDC GLUCOMTR-MCNC: 85 MG/DL — SIGNIFICANT CHANGE UP (ref 70–99)
GLUCOSE BLDC GLUCOMTR-MCNC: 96 MG/DL — SIGNIFICANT CHANGE UP (ref 70–99)
GLUCOSE SERPL-MCNC: 154 MG/DL — HIGH (ref 70–99)
HCT VFR BLD CALC: 29.8 % — LOW (ref 34.5–45)
HGB BLD-MCNC: 9.4 G/DL — LOW (ref 11.5–15.5)
IANC: 12.7 K/UL — HIGH (ref 1.5–8.5)
IMM GRANULOCYTES NFR BLD AUTO: 1.5 % — SIGNIFICANT CHANGE UP (ref 0–1.5)
LYMPHOCYTES # BLD AUTO: 1.91 K/UL — SIGNIFICANT CHANGE UP (ref 1–3.3)
LYMPHOCYTES # BLD AUTO: 12.1 % — LOW (ref 13–44)
MAGNESIUM SERPL-MCNC: 2.1 MG/DL — SIGNIFICANT CHANGE UP (ref 1.6–2.6)
MCHC RBC-ENTMCNC: 27 PG — SIGNIFICANT CHANGE UP (ref 27–34)
MCHC RBC-ENTMCNC: 31.5 GM/DL — LOW (ref 32–36)
MCV RBC AUTO: 85.6 FL — SIGNIFICANT CHANGE UP (ref 80–100)
MONOCYTES # BLD AUTO: 0.72 K/UL — SIGNIFICANT CHANGE UP (ref 0–0.9)
MONOCYTES NFR BLD AUTO: 4.6 % — SIGNIFICANT CHANGE UP (ref 2–14)
NEUTROPHILS # BLD AUTO: 12.7 K/UL — HIGH (ref 1.8–7.4)
NEUTROPHILS NFR BLD AUTO: 80.1 % — HIGH (ref 43–77)
NRBC # BLD: 0 /100 WBCS — SIGNIFICANT CHANGE UP
NRBC # FLD: 0 K/UL — SIGNIFICANT CHANGE UP
PHOSPHATE SERPL-MCNC: 3.3 MG/DL — SIGNIFICANT CHANGE UP (ref 2.5–4.5)
PLATELET # BLD AUTO: 169 K/UL — SIGNIFICANT CHANGE UP (ref 150–400)
POTASSIUM SERPL-MCNC: 3.7 MMOL/L — SIGNIFICANT CHANGE UP (ref 3.5–5.3)
POTASSIUM SERPL-SCNC: 3.7 MMOL/L — SIGNIFICANT CHANGE UP (ref 3.5–5.3)
PROT SERPL-MCNC: 6.3 G/DL — SIGNIFICANT CHANGE UP (ref 6–8.3)
RBC # BLD: 3.48 M/UL — LOW (ref 3.8–5.2)
RBC # FLD: 15.7 % — HIGH (ref 10.3–14.5)
SODIUM SERPL-SCNC: 136 MMOL/L — SIGNIFICANT CHANGE UP (ref 135–145)
SPECIMEN SOURCE: SIGNIFICANT CHANGE UP
VANCOMYCIN FLD-MCNC: 21.4 UG/ML — SIGNIFICANT CHANGE UP
WBC # BLD: 15.82 K/UL — HIGH (ref 3.8–10.5)
WBC # FLD AUTO: 15.82 K/UL — HIGH (ref 3.8–10.5)

## 2020-12-26 PROCEDURE — 99233 SBSQ HOSP IP/OBS HIGH 50: CPT | Mod: GC

## 2020-12-26 RX ORDER — SODIUM CHLORIDE 9 MG/ML
4 INJECTION INTRAMUSCULAR; INTRAVENOUS; SUBCUTANEOUS EVERY 6 HOURS
Refills: 0 | Status: DISCONTINUED | OUTPATIENT
Start: 2020-12-26 | End: 2020-12-31

## 2020-12-26 RX ORDER — FLUCONAZOLE 150 MG/1
200 TABLET ORAL EVERY 24 HOURS
Refills: 0 | Status: DISCONTINUED | OUTPATIENT
Start: 2020-12-27 | End: 2020-12-28

## 2020-12-26 RX ORDER — INSULIN GLARGINE 100 [IU]/ML
28 INJECTION, SOLUTION SUBCUTANEOUS AT BEDTIME
Refills: 0 | Status: DISCONTINUED | OUTPATIENT
Start: 2020-12-26 | End: 2020-12-27

## 2020-12-26 RX ORDER — FLUCONAZOLE 150 MG/1
TABLET ORAL
Refills: 0 | Status: DISCONTINUED | OUTPATIENT
Start: 2020-12-26 | End: 2020-12-28

## 2020-12-26 RX ORDER — FLUCONAZOLE 150 MG/1
200 TABLET ORAL ONCE
Refills: 0 | Status: COMPLETED | OUTPATIENT
Start: 2020-12-26 | End: 2020-12-26

## 2020-12-26 RX ADMIN — HEPARIN SODIUM 5000 UNIT(S): 5000 INJECTION INTRAVENOUS; SUBCUTANEOUS at 13:01

## 2020-12-26 RX ADMIN — Medication 100 MILLIGRAM(S): at 18:19

## 2020-12-26 RX ADMIN — Medication 1 APPLICATION(S): at 13:02

## 2020-12-26 RX ADMIN — Medication 3 MILLILITER(S): at 10:02

## 2020-12-26 RX ADMIN — HEPARIN SODIUM 5000 UNIT(S): 5000 INJECTION INTRAVENOUS; SUBCUTANEOUS at 06:25

## 2020-12-26 RX ADMIN — Medication 1 DROP(S): at 13:00

## 2020-12-26 RX ADMIN — Medication 650 MILLIGRAM(S): at 06:26

## 2020-12-26 RX ADMIN — HEPARIN SODIUM 5000 UNIT(S): 5000 INJECTION INTRAVENOUS; SUBCUTANEOUS at 23:22

## 2020-12-26 RX ADMIN — Medication 650 MILLIGRAM(S): at 18:19

## 2020-12-26 RX ADMIN — HYDROMORPHONE HYDROCHLORIDE 2 MILLIGRAM(S): 2 INJECTION INTRAMUSCULAR; INTRAVENOUS; SUBCUTANEOUS at 14:56

## 2020-12-26 RX ADMIN — AMLODIPINE BESYLATE 10 MILLIGRAM(S): 2.5 TABLET ORAL at 06:25

## 2020-12-26 RX ADMIN — Medication 100 MILLIGRAM(S): at 06:26

## 2020-12-26 RX ADMIN — Medication 1: at 13:01

## 2020-12-26 RX ADMIN — HYDROMORPHONE HYDROCHLORIDE 2 MILLIGRAM(S): 2 INJECTION INTRAMUSCULAR; INTRAVENOUS; SUBCUTANEOUS at 18:51

## 2020-12-26 RX ADMIN — Medication 9 UNIT(S): at 13:01

## 2020-12-26 RX ADMIN — Medication 1: at 09:11

## 2020-12-26 RX ADMIN — SODIUM CHLORIDE 4 MILLILITER(S): 9 INJECTION INTRAMUSCULAR; INTRAVENOUS; SUBCUTANEOUS at 10:02

## 2020-12-26 RX ADMIN — FLUCONAZOLE 100 MILLIGRAM(S): 150 TABLET ORAL at 13:00

## 2020-12-26 RX ADMIN — Medication 9 UNIT(S): at 09:11

## 2020-12-26 RX ADMIN — Medication 9 UNIT(S): at 18:20

## 2020-12-26 RX ADMIN — HYDROMORPHONE HYDROCHLORIDE 2 MILLIGRAM(S): 2 INJECTION INTRAMUSCULAR; INTRAVENOUS; SUBCUTANEOUS at 19:51

## 2020-12-26 RX ADMIN — Medication 1 TABLET(S): at 13:00

## 2020-12-26 RX ADMIN — ATORVASTATIN CALCIUM 10 MILLIGRAM(S): 80 TABLET, FILM COATED ORAL at 23:22

## 2020-12-26 RX ADMIN — TAMSULOSIN HYDROCHLORIDE 0.4 MILLIGRAM(S): 0.4 CAPSULE ORAL at 23:21

## 2020-12-26 RX ADMIN — HYDROMORPHONE HYDROCHLORIDE 2 MILLIGRAM(S): 2 INJECTION INTRAMUSCULAR; INTRAVENOUS; SUBCUTANEOUS at 13:56

## 2020-12-26 NOTE — PROGRESS NOTE ADULT - PROBLEM SELECTOR PLAN 1
Mixed pre-renal in setting of DKA and poor oral intake over the past few days in the setting of UTI vs component of R-sided obstruction from stone vs sepsis related ATN  - hold home losartan  - appreciate renal recs  - started bicarb 650 BID  - monitor BMP, I/Os, renally dose meds, avoid nephrotoxins Mixed pre-renal in setting of DKA and poor oral intake over the past few days in the setting of UTI vs component of R-sided obstruction from stone vs sepsis related ATN  - hold home losartan  - appreciate renal recs  - started bicarb 650 BID  - check US kidney/bladder  - monitor BMP, I/Os, renally dose meds, avoid nephrotoxins

## 2020-12-26 NOTE — PROGRESS NOTE ADULT - SUBJECTIVE AND OBJECTIVE BOX
UROLOGY DAILY PROGRESS NOTE:     Subjective:    No events. Feels well. Mild right abdominal pain.    Objective:    NAD, awake and alert  Respirations nonlabored  Abdomen soft, nontender, nondistended  No CVAT  Wylie clear    MEDICATIONS  (STANDING):  amLODIPine   Tablet 10 milliGRAM(s) Oral daily  atorvastatin 10 milliGRAM(s) Oral at bedtime  collagenase Ointment 1 Application(s) Topical daily  dextrose 40% Gel 15 Gram(s) Oral once  dextrose 5%. 1000 milliLiter(s) (50 mL/Hr) IV Continuous <Continuous>  dextrose 5%. 1000 milliLiter(s) (100 mL/Hr) IV Continuous <Continuous>  dextrose 50% Injectable 25 Gram(s) IV Push once  dextrose 50% Injectable 12.5 Gram(s) IV Push once  dextrose 50% Injectable 25 Gram(s) IV Push once  fluconAZOLE IVPB      glucagon  Injectable 1 milliGRAM(s) IntraMuscular once  heparin   Injectable 5000 Unit(s) SubCutaneous every 8 hours  influenza   Vaccine 0.5 milliLiter(s) IntraMuscular once  insulin glargine Injectable (LANTUS) 35 Unit(s) SubCutaneous at bedtime  insulin lispro (ADMELOG) corrective regimen sliding scale   SubCutaneous at bedtime  insulin lispro (ADMELOG) corrective regimen sliding scale   SubCutaneous three times a day before meals  insulin lispro Injectable (ADMELOG) 9 Unit(s) SubCutaneous three times a day before meals  labetalol 100 milliGRAM(s) Oral two times a day  lactobacillus acidophilus 1 Tablet(s) Oral daily  polyethylene glycol 3350 17 Gram(s) Oral daily  senna 2 Tablet(s) Oral at bedtime  sodium bicarbonate 650 milliGRAM(s) Oral two times a day  tamsulosin 0.4 milliGRAM(s) Oral at bedtime  timolol 0.5% Solution 1 Drop(s) Both EYES daily    MEDICATIONS  (PRN):  acetaminophen   Tablet .. 975 milliGRAM(s) Oral every 6 hours PRN Mild Pain (1 - 3)  albuterol/ipratropium for Nebulization 3 milliLiter(s) Nebulizer every 6 hours PRN Shortness of Breath and/or Wheezing  bisacodyl Suppository 10 milliGRAM(s) Rectal daily PRN Constipation  HYDROmorphone   Tablet 2 milliGRAM(s) Oral every 3 hours PRN Moderate Pain (4 - 6) refractory to tylenol  HYDROmorphone  Injectable 1 milliGRAM(s) IV Push every 3 hours PRN Severe Pain (7 - 10)  simethicone 80 milliGRAM(s) Chew every 8 hours PRN Gas  sodium chloride 3%  Inhalation 4 milliLiter(s) Inhalation every 6 hours PRN airway clearance per respiratory therapy      Vital Signs Last 24 Hrs  T(C): 37.2 (26 Dec 2020 06:02), Max: 37.3 (25 Dec 2020 13:38)  T(F): 99 (26 Dec 2020 06:02), Max: 99.1 (25 Dec 2020 13:38)  HR: 70 (26 Dec 2020 10:03) (70 - 84)  BP: 124/46 (26 Dec 2020 06:02) (121/88 - 136/41)  BP(mean): --  RR: 18 (26 Dec 2020 06:02) (18 - 20)  SpO2: 96% (26 Dec 2020 10:03) (95% - 96%)    I&O's Detail    25 Dec 2020 07:01  -  26 Dec 2020 07:00  --------------------------------------------------------  IN:    Lactated Ringers: 800 mL    Oral Fluid: 230 mL  Total IN: 1030 mL    OUT:    Indwelling Catheter - Urethral (mL): 1150 mL    Stool (mL): 1 mL  Total OUT: 1151 mL    Total NET: -121 mL          Daily     Daily     LABS:                        9.4    15.82 )-----------( 169      ( 26 Dec 2020 06:27 )             29.8     12-26    136  |  104  |  62<H>  ----------------------------<  154<H>  3.7   |  19<L>  |  3.36<H>    Ca    8.0<L>      26 Dec 2020 06:27  Phos  3.3     12-26  Mg     2.1     12-26

## 2020-12-26 NOTE — PROGRESS NOTE ADULT - ASSESSMENT
80F with distal right ureteral stones s/p right ureteral stent placement, POD#2    OR culture with yeast  - Recommend antifungals given OR culture findings in the setting of gross purulence  - F/u final culture results  - As SCr still elevated despite stent, does not seem to be postrenal in etiology  - Wylie catheter per primary team  - Patient will need to followup at [facility] postop for elective removal of stones    Dr. Brian Enriquez  [facility]  [address] [place] [address], [place] [id number]  [place] [address] [phone]

## 2020-12-26 NOTE — PROGRESS NOTE ADULT - PROBLEM SELECTOR PLAN 5
Controlled  - c/w home labetalol 200mg BID  - restarted previous home med of amlodipine 10mg qd while holding home losartan in setting of ARABELLA Initially presented with mild DKA in the setting of UTI and medication noncompliance; a1c 8.6  - c/w lantus 30 qhs, admelog 9 tidac, low ISS per endo  - appreciate endo recs  - will need glucometer/supplies on discharge (Rx sent to [facility])

## 2020-12-26 NOTE — PROGRESS NOTE ADULT - PROBLEM SELECTOR PLAN 3
Presents with several days of urinary frequency and positive UA, broadened from CTX to zosyn on 12/22 for fever and persistent leukocytosis. Found to have obstructive R ureteral stones.  - UCx with >100k staph epi resistant to zosyn; switched zosyn to vanc by level on 12/24  - vanc level 15 this AM, will check level tomorrow  - f/u intra-op urine culture from 12/24  - BCx 12/20 NGTD  - f/u ID recs Presented with several days of urinary frequency and positive UA. Was initially started on CTX. Was broadened to zosyn on 12/22 for fever and persistent leukocytosis. Found to have obstructive R ureteral stones on further workup with CTAP 12/23. s/p R ureteral stent and riggins placement on 12/24. UCx from admission eventually grew >100k staph epi resistant to zosyn; switched zosyn to vanc by level on 12/24.  - c/w vanc by level; vanc level 21 this AM; will hold dose today; check level tmrw AM and order vanc x1 tmrw accordingly; goal level of 15-20  - f/u intra-op urine culture from 12/24  - BCx 12/20 NGTD  - f/u ID recs Presented with several days of urinary frequency and positive UA. Was initially started on CTX. Was broadened to zosyn on 12/22 for fever and persistent leukocytosis. Found to have obstructive R ureteral stones on further workup with CTAP 12/23. s/p R ureteral stent and riggins placement on 12/24. UCx from admission eventually grew >100k staph epi resistant to zosyn; switched zosyn to vanc by level on 12/24. Intra-op R ureteral urine gram stain 11/24 showed  - c/w vanc by level; vanc level 21 this AM; will hold dose today; check level tmrw AM and order vanc x1 tmrw accordingly; goal level of 15-20  - f/u intra-op urine culture from 12/24  - BCx 12/20 NGTD  - f/u ID recs Presented with several days of urinary frequency and positive UA. Was initially started on CTX. Was broadened to zosyn on 12/22 for fever and persistent leukocytosis. Found to have obstructive R ureteral stones on further workup with CTAP 12/23. s/p R ureteral stent and riggins placement on 12/24. UCx from admission eventually grew >100k staph epi resistant to zosyn; switched zosyn to vanc by level on 12/24. Intra-op R ureteral urine gram stain 11/24 showed moderate yeast.  - c/w vanc by level; vanc level 21 this AM; will hold dose today; check level tmrw AM and order vanc x1 tmrw accordingly; goal level of 15-20  - start diflucan, monitor liver chemistry, recheck QTc on Monday  - f/u intra-op urine culture from 12/24  - BCx 12/20 NGTD  - f/u ID recs

## 2020-12-26 NOTE — PROGRESS NOTE ADULT - SUBJECTIVE AND OBJECTIVE BOX
Andrés Malhotra, PGY3  Pager [phone]      Patient is a 80y old  Female who presents with a chief complaint of Hyperglycemia (26 Dec 2020 06:59)      SUBJECTIVE/INTERVAL EVENTS: Patient seen and examined at bedside. ADRIANA o/n. Denies any active complaints this AM.    MEDICATIONS  (STANDING):  amLODIPine   Tablet 10 milliGRAM(s) Oral daily  atorvastatin 10 milliGRAM(s) Oral at bedtime  collagenase Ointment 1 Application(s) Topical daily  dextrose 40% Gel 15 Gram(s) Oral once  dextrose 5%. 1000 milliLiter(s) (50 mL/Hr) IV Continuous <Continuous>  dextrose 5%. 1000 milliLiter(s) (100 mL/Hr) IV Continuous <Continuous>  dextrose 50% Injectable 25 Gram(s) IV Push once  dextrose 50% Injectable 12.5 Gram(s) IV Push once  dextrose 50% Injectable 25 Gram(s) IV Push once  glucagon  Injectable 1 milliGRAM(s) IntraMuscular once  heparin   Injectable 5000 Unit(s) SubCutaneous every 8 hours  influenza   Vaccine 0.5 milliLiter(s) IntraMuscular once  insulin glargine Injectable (LANTUS) 35 Unit(s) SubCutaneous at bedtime  insulin lispro (ADMELOG) corrective regimen sliding scale   SubCutaneous at bedtime  insulin lispro (ADMELOG) corrective regimen sliding scale   SubCutaneous three times a day before meals  insulin lispro Injectable (ADMELOG) 9 Unit(s) SubCutaneous three times a day before meals  labetalol 100 milliGRAM(s) Oral two times a day  lactated ringers. 1000 milliLiter(s) (75 mL/Hr) IV Continuous <Continuous>  lactobacillus acidophilus 1 Tablet(s) Oral daily  polyethylene glycol 3350 17 Gram(s) Oral daily  senna 2 Tablet(s) Oral at bedtime  sodium bicarbonate 650 milliGRAM(s) Oral two times a day  tamsulosin 0.4 milliGRAM(s) Oral at bedtime  timolol 0.5% Solution 1 Drop(s) Both EYES daily    MEDICATIONS  (PRN):  acetaminophen   Tablet .. 975 milliGRAM(s) Oral every 6 hours PRN Mild Pain (1 - 3)  albuterol/ipratropium for Nebulization 3 milliLiter(s) Nebulizer every 6 hours PRN Shortness of Breath and/or Wheezing  bisacodyl Suppository 10 milliGRAM(s) Rectal daily PRN Constipation  HYDROmorphone   Tablet 2 milliGRAM(s) Oral every 3 hours PRN Moderate Pain (4 - 6) refractory to tylenol  HYDROmorphone  Injectable 1 milliGRAM(s) IV Push every 3 hours PRN Severe Pain (7 - 10)  simethicone 80 milliGRAM(s) Chew every 8 hours PRN Gas      VITAL SIGNS:  T(F): 99 (12-26-20 @ 06:02), Max: 99.1 (12-25-20 @ 13:38)  HR: 82 (12-26-20 @ 06:02) (81 - 84)  BP: 124/46 (12-26-20 @ 06:02) (121/88 - 136/41)  RR: 18 (12-26-20 @ 06:02) (18 - 20)  SpO2: 96% (12-26-20 @ 06:02) (95% - 96%)    I&O's Summary    25 Dec 2020 07:01  -  26 Dec 2020 07:00  --------------------------------------------------------  IN: 1030 mL / OUT: 1151 mL / NET: -121 mL      Daily     Daily     PHYSICAL EXAM:  Gen: Alert, NAD  HEENT: NCAT, conjunctiva clear, sclera anicteric  Neck: Supple, no JVD  CV: RRR, S1S2, no m/r/g  Resp: bibasilar crackles, no wheezing or ronchi, normal respiratory effort on RA sating 93-95% with good waveform  Abd: Soft, nontender, nondistended, bowel sounds intact  Ext: no edema, no clubbing or cyanosis  Neuro: Alert, appropriate speech, CN2-12 grossly intact, IGNACIO  SKIN: warm, perfused, L heel ulcer dressing c/d/i    LABS:                        9.4    15.82 )-----------( 169      ( 26 Dec 2020 06:27 )             29.8     Hgb Trend: 9.4<--, 9.1<--, 9.2<--, 9.6<--, 9.7<--  12-26    136  |  104  |  62<H>  ----------------------------<  154<H>  3.7   |  19<L>  |  3.36<H>    Ca    8.0<L>      26 Dec 2020 06:27  Phos  3.3     12-26  Mg     2.1     12-26      Creatinine Trend: 3.36<--, 3.58<--, 3.66<--, 3.42<--, 3.24<--, 3.09<--              CAPILLARY BLOOD GLUCOSE      POCT Blood Glucose.: 140 mg/dL (25 Dec 2020 22:40)  POCT Blood Glucose.: 156 mg/dL (25 Dec 2020 17:32)  POCT Blood Glucose.: 231 mg/dL (25 Dec 2020 12:36)  POCT Blood Glucose.: 210 mg/dL (25 Dec 2020 09:06)      RADIOLOGY & ADDITIONAL TESTS: Reviewed    Imaging Personally Reviewed:    Consultant(s) Notes Reviewed:      Care Discussed with Consultants/Other Providers:

## 2020-12-26 NOTE — PROGRESS NOTE ADULT - PROBLEM SELECTOR PLAN 2
CTAP 12/23 showing right-sided obstructive uropathy with mild right hydroureteronephrosis to the level of two distal right ureteral stones measuring 2 and 6 mm, respectively  - s/p R ureteral stent placement on 12/24 and placement of indwelling riggins   - appreciate urology recs  - tylenol 975 q6 for mild pain, dilaudid 2mg PO q3 for mod pain, dilaudid 1 IV q3 for severe pain, titrate as needed Hypoxemic to upper 80s to low 90s on RA. Improves to 100% on 2L NC. No known prior history of hypoxemia or need for supplemental oxygen per patient. Former smoker for about 10 years who quit decades ago.  - CXR 12/25 with possible small pulmonary edema  - bibasilar crackles on exam  - suspect multifactorial including possible small pulm edema in setting of ARABELLA on CKD vs atelectasis vs mucus plugging  - is mid 90s on RA this AM, and denying any SOB; will monitor off O2  - airway clearance with respiratory therapy  - incentive spirometry; patient displayed ability to use; encouraged use

## 2020-12-26 NOTE — PROGRESS NOTE ADULT - PROBLEM SELECTOR PROBLEM 4
Type 2 diabetes mellitus with hyperglycemia, with long-term current use of insulin Ureteral stone with hydronephrosis

## 2020-12-26 NOTE — PROGRESS NOTE ADULT - PROBLEM SELECTOR PLAN 6
L heel ulcer not appearing infected on exam; XR without gas or OM  - local wound care per podiatry Controlled  - decreased home labetalol from 200mg to 100mg BID on 12/15 for persistent diastolic hypotension  - restarted previous home med of amlodipine 10mg qd while holding home losartan in setting of ARABELLA

## 2020-12-26 NOTE — PROGRESS NOTE ADULT - ATTENDING COMMENTS
80F h/o HTN, DM2 c/b diabetic neuropathy and multiple toe amputations, HLD, PVD, CKD (baseline Cr 1.8-2), morbid obesity admitted for mild DKA 2/2 UTI and insulin noncompliance found to be in ACKD with obstructing ureteral stones s/p stent placement POD#2.    1. DKA:   -Endocrine following, appreciate recs, c/w basal/bolus regimen, monitor FS qac and qHS; c/w lantus to 35 units qhs and admelog 9units qAC  -FS better controlled this morning    2. UTI/Obstructing ureteral stones:  -CTAP with 2 obstructing R ureteral stones, mild R hydroureteronephrosis with worsening flank pain. Patient s/p R ureteral stent placement yesterday by urology with findings of purulent pus upon placement; f/u OR cultures  -Renal and Urology following, appreciate recs  -Urine culture growing staph epi: Zosyn switched to vancomycin dosed by level, will consult ID for PO options once patient improved  -c/w vanco IV; monitor closely and adjust abx based on OR cultures  -vanco trough this am 21.4; check trough prior to next dose  -f/u with ID regarding vanco dosing: received Vanco 1250mg x1 yesterday evening    3. ACKD:  -likely post-renal 2/2 obstructing ureteral stones  -now s/p stent placement; monitor creatinine  -creatining slowly downtrending since stent placement on 12/24:   BUN/Creatinine  12-26 @ 06:27  BUN  62     Creatinine  3.36  12-25 @ 17:22  BUN  66     Creatinine  3.58  12-25 @ 05:09  BUN  68     Creatinine  3.66  12-24 @ 08:01  BUN  65     Creatinine  3.42  12-23 @ 08:37  BUN  65     Creatinine  3.24  12-22 @ 07:15  BUN  63     Creatinine  3.09  12-21 @ 14:41  BUN  63     Creatinine  3.10    4. Left heel ulcer:   -Appreciate podiatry recs for left heel ulcer; per podiatry there is "Little to no suspicion for underlying bone infection"; does not appear to be infected at this time, X-ray with no evidence of OM; Pod plan for local wound care, santyl and DSD daily    5. Dispo:   -PT eval recommending [facility]- patient agreeable 80F h/o HTN, DM2 c/b diabetic neuropathy and multiple toe amputations, HLD, PVD, CKD (baseline Cr 1.8-2), morbid obesity admitted for mild DKA 2/2 UTI and insulin noncompliance found to be in ACKD with obstructing ureteral stones s/p stent placement POD#2.    1. DKA:   -Endocrine following, appreciate recs, c/w basal/bolus regimen, monitor FS qac and qHS; c/w lantus to 35 units qhs and admelog 9units qAC  -FS better controlled this morning    2. UTI/Obstructing ureteral stones:  -CTAP with 2 obstructing R ureteral stones, mild R hydroureteronephrosis with worsening flank pain. Patient s/p R ureteral stent placement yesterday by urology with findings of purulent pus upon placement; f/u OR cultures  -Renal and Urology following, appreciate recs  -Urine culture growing staph epi: Zosyn switched to vancomycin dosed by level, will consult ID for PO options once patient improved  -c/w vanco IV; monitor closely and adjust abx based on OR cultures  -vanco trough this am 21.4; check trough prior to next dose  -f/u with ID regarding vanco dosing: received Vanco 1250mg x1 yesterday evening    3. ACKD:  -likely post-renal 2/2 obstructing ureteral stones  -now s/p stent placement; monitor creatinine  -creatining slowly downtrending since stent placement on 12/24:   BUN/Creatinine  12-26 @ 06:27  BUN  62     Creatinine  3.36  12-25 @ 17:22  BUN  66     Creatinine  3.58  12-25 @ 05:09  BUN  68     Creatinine  3.66  12-24 @ 08:01  BUN  65     Creatinine  3.42  12-23 @ 08:37  BUN  65     Creatinine  3.24  12-22 @ 07:15  BUN  63     Creatinine  3.09  12-21 @ 14:41  BUN  63     Creatinine  3.10    4. Left heel ulcer:   -Appreciate podiatry recs for left heel ulcer; per podiatry there is "Little to no suspicion for underlying bone infection"; does not appear to be infected at this time, X-ray with no evidence of OM; Pod plan for local wound care, santyl and DSD daily    5. Hypoxemia:  -oxygen was placed during OR on Thursday  -patient being weaned off oxygen now on room air and monitor  -CXR reviewed: combination of atelectasis, some pulm edema and mucous plugging  -c/w IS at bedside (pt educated on use) and [place], duonebs, hypertonic saline nebs    6. Dispo:   -PT eval recommending [facility]- patient agreeable 80F h/o HTN, DM2 c/b diabetic neuropathy and multiple toe amputations, HLD, PVD, CKD (baseline Cr 1.8-2), morbid obesity admitted for mild DKA 2/2 UTI and insulin noncompliance found to be in ACKD with obstructing ureteral stones s/p stent placement POD#2.    1. DKA:   -Endocrine following, appreciate recs, c/w basal/bolus regimen, monitor FS qac and qHS; c/w lantus to 35 units qhs and admelog 9units qAC  -FS better controlled this morning    2. UTI/Obstructing ureteral stones:  -CTAP with 2 obstructing R ureteral stones, mild R hydroureteronephrosis with worsening flank pain. Patient s/p R ureteral stent placement yesterday by urology with findings of purulent pus upon placement; f/u OR cultures  -Renal and Urology following, appreciate recs  -Urine culture growing staph epi: Zosyn switched to vancomycin dosed by level, will consult ID for PO options once patient improved  -c/w vanco IV; monitor closely and adjust abx based on OR cultures  -vanco trough this am 21.4; check level prior to dose in am; hold dose today   -gram stain growing some yeast -- will treat with diflucan 200mg IV   -f/u with ID recs    3. ACKD:  -likely post-renal 2/2 obstructing ureteral stones  -now s/p stent placement; monitor creatinine  -creatining slowly downtrending since stent placement on 12/24:   BUN/Creatinine  12-26 @ 06:27  BUN  62     Creatinine  3.36  12-25 @ 17:22  BUN  66     Creatinine  3.58  12-25 @ 05:09  BUN  68     Creatinine  3.66  12-24 @ 08:01  BUN  65     Creatinine  3.42  12-23 @ 08:37  BUN  65     Creatinine  3.24  12-22 @ 07:15  BUN  63     Creatinine  3.09  12-21 @ 14:41  BUN  63     Creatinine  3.10  -consult renal today --> will check kidney/bladder ultrasound    4. Left heel ulcer:   -Appreciate podiatry recs for left heel ulcer; per podiatry there is "Little to no suspicion for underlying bone infection"; does not appear to be infected at this time, X-ray with no evidence of OM; Pod plan for local wound care, santyl and DSD daily    5. Hypoxemia:  -oxygen was placed during OR on Thursday  -patient being weaned off oxygen now on room air and monitor  -CXR reviewed: combination of atelectasis, some pulm edema and mucous plugging  -c/w IS at bedside (pt educated on use) and [place], duonebs, hypertonic saline nebs    6. Dispo:   -PT eval recommending [facility]- patient agreeable

## 2020-12-26 NOTE — PROGRESS NOTE ADULT - PROBLEM SELECTOR PLAN 7
DVT ppx: HSQ 5000 q8  Diet: carb consistent regular  Dispo: lives at home alone with regular assistance from her nephew; unclear if patient has home care services; PT recommending rehab; awaiting [facility] accepting facility  Code status: full code L heel ulcer not appearing infected on exam; XR without gas or OM  - local wound care per podiatry

## 2020-12-26 NOTE — PROGRESS NOTE ADULT - ASSESSMENT
80y F w/ HTN, DM2 c/b diabetic neuropathy and multiple toes amputations, HLD, PVD, CKD (baseline Cr 1.8-2), morbid obesity presenting with several days of generalized weakness, decreased PO intake (and subsequent holding of PM dose of home novolin), and urinary frequency admitted for mild DKA in the setting of likely UTI with additional issues of ARABELLA and obstructive R ureteral stone s/p R ureteral stent.

## 2020-12-26 NOTE — PROGRESS NOTE ADULT - PROBLEM SELECTOR PLAN 4
Initially presented with mild DKA in the setting of UTI and medication noncompliance; a1c 8.6  - c/w lantus 30 qhs, admelog 9 tidac, low ISS per endo  - appreciate endo recs  - will need glucometer/supplies on discharge (Rx sent to [facility]) CTAP 12/23 showed right-sided obstructive uropathy with mild right hydroureteronephrosis to the level of two distal right ureteral stones measuring 2 and 6 mm, respectively  - s/p R ureteral stent placement on 12/24 and placement of indwelling riggins   - check US kidney/bladder for follow up  - appreciate urology recs  - tylenol 975 q6 for mild pain, dilaudid 2mg PO q3 for mod pain, dilaudid 1 IV q3 for severe pain, titrate as needed

## 2020-12-27 LAB
ALBUMIN SERPL ELPH-MCNC: 2.5 G/DL — LOW (ref 3.3–5)
ALP SERPL-CCNC: 109 U/L — SIGNIFICANT CHANGE UP (ref 40–120)
ALT FLD-CCNC: 10 U/L — SIGNIFICANT CHANGE UP (ref 4–33)
ANION GAP SERPL CALC-SCNC: 13 MMOL/L — SIGNIFICANT CHANGE UP (ref 7–14)
AST SERPL-CCNC: 14 U/L — SIGNIFICANT CHANGE UP (ref 4–32)
BASOPHILS # BLD AUTO: 0.03 K/UL — SIGNIFICANT CHANGE UP (ref 0–0.2)
BASOPHILS NFR BLD AUTO: 0.2 % — SIGNIFICANT CHANGE UP (ref 0–2)
BILIRUB SERPL-MCNC: 0.2 MG/DL — SIGNIFICANT CHANGE UP (ref 0.2–1.2)
BUN SERPL-MCNC: 64 MG/DL — HIGH (ref 7–23)
CALCIUM SERPL-MCNC: 8.2 MG/DL — LOW (ref 8.4–10.5)
CHLORIDE SERPL-SCNC: 106 MMOL/L — SIGNIFICANT CHANGE UP (ref 98–107)
CO2 SERPL-SCNC: 21 MMOL/L — LOW (ref 22–31)
CREAT SERPL-MCNC: 3.49 MG/DL — HIGH (ref 0.5–1.3)
EOSINOPHIL # BLD AUTO: 0.33 K/UL — SIGNIFICANT CHANGE UP (ref 0–0.5)
EOSINOPHIL NFR BLD AUTO: 2.2 % — SIGNIFICANT CHANGE UP (ref 0–6)
GLUCOSE BLDC GLUCOMTR-MCNC: 112 MG/DL — HIGH (ref 70–99)
GLUCOSE BLDC GLUCOMTR-MCNC: 115 MG/DL — HIGH (ref 70–99)
GLUCOSE BLDC GLUCOMTR-MCNC: 117 MG/DL — HIGH (ref 70–99)
GLUCOSE BLDC GLUCOMTR-MCNC: 86 MG/DL — SIGNIFICANT CHANGE UP (ref 70–99)
GLUCOSE BLDC GLUCOMTR-MCNC: 96 MG/DL — SIGNIFICANT CHANGE UP (ref 70–99)
GLUCOSE SERPL-MCNC: 107 MG/DL — HIGH (ref 70–99)
HCT VFR BLD CALC: 29.5 % — LOW (ref 34.5–45)
HGB BLD-MCNC: 9.1 G/DL — LOW (ref 11.5–15.5)
IANC: 11.3 K/UL — HIGH (ref 1.5–8.5)
IMM GRANULOCYTES NFR BLD AUTO: 2.4 % — HIGH (ref 0–1.5)
LYMPHOCYTES # BLD AUTO: 13.6 % — SIGNIFICANT CHANGE UP (ref 13–44)
LYMPHOCYTES # BLD AUTO: 2.01 K/UL — SIGNIFICANT CHANGE UP (ref 1–3.3)
MAGNESIUM SERPL-MCNC: 2.1 MG/DL — SIGNIFICANT CHANGE UP (ref 1.6–2.6)
MCHC RBC-ENTMCNC: 26.7 PG — LOW (ref 27–34)
MCHC RBC-ENTMCNC: 30.8 GM/DL — LOW (ref 32–36)
MCV RBC AUTO: 86.5 FL — SIGNIFICANT CHANGE UP (ref 80–100)
MONOCYTES # BLD AUTO: 0.76 K/UL — SIGNIFICANT CHANGE UP (ref 0–0.9)
MONOCYTES NFR BLD AUTO: 5.1 % — SIGNIFICANT CHANGE UP (ref 2–14)
NEUTROPHILS # BLD AUTO: 11.3 K/UL — HIGH (ref 1.8–7.4)
NEUTROPHILS NFR BLD AUTO: 76.5 % — SIGNIFICANT CHANGE UP (ref 43–77)
NRBC # BLD: 0 /100 WBCS — SIGNIFICANT CHANGE UP
NRBC # FLD: 0.02 K/UL — HIGH
PHOSPHATE SERPL-MCNC: 3.6 MG/DL — SIGNIFICANT CHANGE UP (ref 2.5–4.5)
PLATELET # BLD AUTO: 204 K/UL — SIGNIFICANT CHANGE UP (ref 150–400)
POTASSIUM SERPL-MCNC: 3.8 MMOL/L — SIGNIFICANT CHANGE UP (ref 3.5–5.3)
POTASSIUM SERPL-SCNC: 3.8 MMOL/L — SIGNIFICANT CHANGE UP (ref 3.5–5.3)
PROT SERPL-MCNC: 6.2 G/DL — SIGNIFICANT CHANGE UP (ref 6–8.3)
RBC # BLD: 3.41 M/UL — LOW (ref 3.8–5.2)
RBC # FLD: 15.9 % — HIGH (ref 10.3–14.5)
SODIUM SERPL-SCNC: 140 MMOL/L — SIGNIFICANT CHANGE UP (ref 135–145)
VANCOMYCIN FLD-MCNC: 17.8 UG/ML — SIGNIFICANT CHANGE UP
WBC # BLD: 14.78 K/UL — HIGH (ref 3.8–10.5)
WBC # FLD AUTO: 14.78 K/UL — HIGH (ref 3.8–10.5)

## 2020-12-27 PROCEDURE — 99233 SBSQ HOSP IP/OBS HIGH 50: CPT | Mod: GC

## 2020-12-27 PROCEDURE — 76770 US EXAM ABDO BACK WALL COMP: CPT | Mod: 26

## 2020-12-27 PROCEDURE — 99232 SBSQ HOSP IP/OBS MODERATE 35: CPT

## 2020-12-27 RX ORDER — INSULIN GLARGINE 100 [IU]/ML
26 INJECTION, SOLUTION SUBCUTANEOUS AT BEDTIME
Refills: 0 | Status: DISCONTINUED | OUTPATIENT
Start: 2020-12-27 | End: 2020-12-29

## 2020-12-27 RX ORDER — INSULIN LISPRO 100/ML
8 VIAL (ML) SUBCUTANEOUS
Refills: 0 | Status: DISCONTINUED | OUTPATIENT
Start: 2020-12-27 | End: 2020-12-29

## 2020-12-27 RX ADMIN — HYDROMORPHONE HYDROCHLORIDE 2 MILLIGRAM(S): 2 INJECTION INTRAMUSCULAR; INTRAVENOUS; SUBCUTANEOUS at 13:17

## 2020-12-27 RX ADMIN — Medication 100 MILLIGRAM(S): at 06:28

## 2020-12-27 RX ADMIN — TAMSULOSIN HYDROCHLORIDE 0.4 MILLIGRAM(S): 0.4 CAPSULE ORAL at 21:55

## 2020-12-27 RX ADMIN — Medication 100 MILLIGRAM(S): at 18:59

## 2020-12-27 RX ADMIN — Medication 8 UNIT(S): at 13:18

## 2020-12-27 RX ADMIN — Medication 1 TABLET(S): at 13:18

## 2020-12-27 RX ADMIN — HEPARIN SODIUM 5000 UNIT(S): 5000 INJECTION INTRAVENOUS; SUBCUTANEOUS at 21:54

## 2020-12-27 RX ADMIN — Medication 1 DROP(S): at 13:19

## 2020-12-27 RX ADMIN — INSULIN GLARGINE 26 UNIT(S): 100 INJECTION, SOLUTION SUBCUTANEOUS at 22:43

## 2020-12-27 RX ADMIN — AMLODIPINE BESYLATE 10 MILLIGRAM(S): 2.5 TABLET ORAL at 06:28

## 2020-12-27 RX ADMIN — ATORVASTATIN CALCIUM 10 MILLIGRAM(S): 80 TABLET, FILM COATED ORAL at 21:54

## 2020-12-27 RX ADMIN — HYDROMORPHONE HYDROCHLORIDE 2 MILLIGRAM(S): 2 INJECTION INTRAMUSCULAR; INTRAVENOUS; SUBCUTANEOUS at 14:17

## 2020-12-27 RX ADMIN — Medication 650 MILLIGRAM(S): at 06:28

## 2020-12-27 RX ADMIN — SENNA PLUS 2 TABLET(S): 8.6 TABLET ORAL at 21:54

## 2020-12-27 RX ADMIN — Medication 1 APPLICATION(S): at 13:19

## 2020-12-27 RX ADMIN — HYDROMORPHONE HYDROCHLORIDE 2 MILLIGRAM(S): 2 INJECTION INTRAMUSCULAR; INTRAVENOUS; SUBCUTANEOUS at 21:59

## 2020-12-27 RX ADMIN — HEPARIN SODIUM 5000 UNIT(S): 5000 INJECTION INTRAVENOUS; SUBCUTANEOUS at 06:28

## 2020-12-27 RX ADMIN — Medication 9 UNIT(S): at 09:31

## 2020-12-27 RX ADMIN — HEPARIN SODIUM 5000 UNIT(S): 5000 INJECTION INTRAVENOUS; SUBCUTANEOUS at 13:18

## 2020-12-27 RX ADMIN — HYDROMORPHONE HYDROCHLORIDE 2 MILLIGRAM(S): 2 INJECTION INTRAMUSCULAR; INTRAVENOUS; SUBCUTANEOUS at 22:59

## 2020-12-27 RX ADMIN — FLUCONAZOLE 100 MILLIGRAM(S): 150 TABLET ORAL at 13:18

## 2020-12-27 RX ADMIN — Medication 8 UNIT(S): at 18:59

## 2020-12-27 RX ADMIN — Medication 650 MILLIGRAM(S): at 18:59

## 2020-12-27 RX ADMIN — INSULIN GLARGINE 28 UNIT(S): 100 INJECTION, SOLUTION SUBCUTANEOUS at 01:07

## 2020-12-27 NOTE — PROGRESS NOTE ADULT - PROBLEM SELECTOR PLAN 2
Hypoxemic to upper 80s to low 90s on RA. Improves to 100% on 2L NC. No known prior history of hypoxemia or need for supplemental oxygen per patient. Former smoker for about 10 years who quit decades ago.  - CXR 12/25 with possible small pulmonary edema  - bibasilar crackles on exam  - suspect multifactorial including possible small pulm edema in setting of ARABELLA on CKD vs atelectasis vs mucus plugging  - is mid 90s on RA this AM, and denying any SOB; will monitor off O2  - airway clearance with respiratory therapy  - incentive spirometry; patient displayed ability to use; encouraged use Hypoxemic to upper 80s to low 90s on RA. Improves to 100% on 2L NC. No known prior history of hypoxemia or need for supplemental oxygen per patient. Former smoker for about 10 years who quit decades ago.  - CXR 12/25 with possible small pulmonary edema  - bibasilar crackles on exam  - suspect multifactorial including possible small pulm edema in setting of ARABELLA on CKD vs atelectasis vs mucus plugging  - is 98% on 2L NC this AM, and denying any SOB  - airway clearance with respiratory therapy  - incentive spirometry; patient displayed ability to use; encouraged use

## 2020-12-27 NOTE — PROGRESS NOTE ADULT - ASSESSMENT
81 yo F with history of uncontrolled DM Type 2 c/b diabetic neuropathy and multiple toes amputations, HTN, HLD, PVD, CKD3(Baseline crt 1.8-2), morbid Obesity, presenting w/ hyperglycemia. Patient did not take her evening dose of Novolin N 30units qhs. Endocrine consult requested for the management of hyperglycemia in patient with DM2.       1) Uncontrolled DM2 c/b Neuropathy, PVD, retinopathy and CKD3  A1c 8.6% in setting of ARABELLA on CKD  FS premeal and qhs   FS goal 100-200  While inpatient: BG improving but too tightly controlled  Reduce Lantus to 26 units qhs   Reduce Admelog to 8 units TIDac (doses have been held recently for now eating)  Recommend continue low correctional scale TIDac and low qhs scale.    On discharge will resume home insulin. Recommend Novolin N 40units qam and 30units qhs.  We acknowledge this regimen is not ideal, however given patient visual deficits limited in options therefore recommend continue current regimen. Also discharge on Tradjenta 5mg daily PO for better glycemic control without risk of hypoglycemia.   Patient states she tried insulin pens in the past, however unable to visualize the numbers on the pen to adjust doses.   Patient does not want to do basal/bolus insulin as it would require four times daily injections. Has audible glucometer, however has difficulty inserting test strips.   Patient can continue to follow up with PCP whom does house calls on discharge, as patient unable to make it to office visit .     2) HTN   goal 130/80  At goal. Continue Amlodipine and hold losartan in light of ARABELLA     3) HLD   Continue atorvastatin      79 yo F with history of uncontrolled DM Type 2 c/b diabetic neuropathy and multiple toes amputations, HTN, HLD, PVD, CKD3(Baseline crt 1.8-2), morbid Obesity, presenting w/ hyperglycemia. Patient did not take her evening dose of Novolin N 30units qhs. Endocrine consult requested for the management of hyperglycemia in patient with DM2.       1) Uncontrolled DM2 c/b Neuropathy, PVD, retinopathy and CKD3  A1c 8.6% in setting of ARABELLA on CKD  FS premeal and qhs   FS goal 100-200  While inpatient: BG improving but too tightly controlled  Reduce Lantus to 26 units qhs   Reduce Admelog to 8 units TIDac (doses have been held recently for now eating)  Recommend continue low correctional scale TIDac and low qhs scale.    On discharge will resume home insulin. Recommend Novolin N vial  40 units qam and 30 units qhs (nephew helps her at home).  We acknowledge this regimen is not ideal, however given patient visual deficits limited in options therefore recommend continue current regimen. Also discharge on Tradjenta 5mg daily PO for better glycemic control without risk of hypoglycemia.   Patient states she tried insulin pens in the past, however unable to visualize the numbers on the pen to adjust doses.   Patient does not want to do basal/bolus insulin as it would require four times daily injections. Has audible glucometer, however has difficulty inserting test strips.   Patient can continue to follow up with PCP whom does house calls on discharge, as patient unable to make it to office visit .     2) HTN   goal 130/80  At goal. Continue Amlodipine and hold losartan in light of ARABELLA     3) HLD   Continue atorvastatin

## 2020-12-27 NOTE — PROGRESS NOTE ADULT - PROBLEM SELECTOR PLAN 3
Presented with several days of urinary frequency and positive UA. Was initially started on CTX. Was broadened to zosyn on 12/22 for fever and persistent leukocytosis. Found to have obstructive R ureteral stones on further workup with CTAP 12/23. s/p R ureteral stent and riggins placement on 12/24. UCx from admission eventually grew >100k staph epi resistant to zosyn; switched zosyn to vanc by level on 12/24. Intra-op R ureteral urine gram stain 11/24 showed moderate yeast.  - c/w vanc by level; vanc level 21 this AM; will hold dose today; check level tmrw AM and order vanc x1 tmrw accordingly; goal level of 15-20  - start diflucan, monitor liver chemistry, recheck QTc on Monday  - f/u intra-op urine culture from 12/24  - BCx 12/20 NGTD  - f/u ID recs

## 2020-12-27 NOTE — PROGRESS NOTE ADULT - PROBLEM SELECTOR PLAN 4
CTAP 12/23 showed right-sided obstructive uropathy with mild right hydroureteronephrosis to the level of two distal right ureteral stones measuring 2 and 6 mm, respectively  - s/p R ureteral stent placement on 12/24 and placement of indwelling riggins   - check US kidney/bladder for follow up  - appreciate urology recs  - tylenol 975 q6 for mild pain, dilaudid 2mg PO q3 for mod pain, dilaudid 1 IV q3 for severe pain, titrate as needed

## 2020-12-27 NOTE — PROGRESS NOTE ADULT - PROBLEM SELECTOR PLAN 6
Controlled  - decreased home labetalol from 200mg to 100mg BID on 12/15 for persistent diastolic hypotension  - restarted previous home med of amlodipine 10mg qd while holding home losartan in setting of ARABELLA

## 2020-12-27 NOTE — PROGRESS NOTE ADULT - SUBJECTIVE AND OBJECTIVE BOX
Chief Complaint: DM2    History: reports tolerating po  Some borderline low glucose values yesterday but no overt hypoglycemia events    MEDICATIONS  (STANDING):  amLODIPine   Tablet 10 milliGRAM(s) Oral daily  atorvastatin 10 milliGRAM(s) Oral at bedtime  collagenase Ointment 1 Application(s) Topical daily  dextrose 40% Gel 15 Gram(s) Oral once  dextrose 5%. 1000 milliLiter(s) (50 mL/Hr) IV Continuous <Continuous>  dextrose 5%. 1000 milliLiter(s) (100 mL/Hr) IV Continuous <Continuous>  dextrose 50% Injectable 25 Gram(s) IV Push once  dextrose 50% Injectable 12.5 Gram(s) IV Push once  dextrose 50% Injectable 25 Gram(s) IV Push once  fluconAZOLE IVPB 200 milliGRAM(s) IV Intermittent every 24 hours  fluconAZOLE IVPB      glucagon  Injectable 1 milliGRAM(s) IntraMuscular once  heparin   Injectable 5000 Unit(s) SubCutaneous every 8 hours  influenza   Vaccine 0.5 milliLiter(s) IntraMuscular once  insulin glargine Injectable (LANTUS) 26 Unit(s) SubCutaneous at bedtime  insulin lispro (ADMELOG) corrective regimen sliding scale   SubCutaneous at bedtime  insulin lispro (ADMELOG) corrective regimen sliding scale   SubCutaneous three times a day before meals  insulin lispro Injectable (ADMELOG) 8 Unit(s) SubCutaneous three times a day before meals  labetalol 100 milliGRAM(s) Oral two times a day  lactobacillus acidophilus 1 Tablet(s) Oral daily  polyethylene glycol 3350 17 Gram(s) Oral daily  senna 2 Tablet(s) Oral at bedtime  sodium bicarbonate 650 milliGRAM(s) Oral two times a day  tamsulosin 0.4 milliGRAM(s) Oral at bedtime  timolol 0.5% Solution 1 Drop(s) Both EYES daily    MEDICATIONS  (PRN):  acetaminophen   Tablet .. 975 milliGRAM(s) Oral every 6 hours PRN Mild Pain (1 - 3)  albuterol/ipratropium for Nebulization 3 milliLiter(s) Nebulizer every 6 hours PRN Shortness of Breath and/or Wheezing  bisacodyl Suppository 10 milliGRAM(s) Rectal daily PRN Constipation  HYDROmorphone   Tablet 2 milliGRAM(s) Oral every 3 hours PRN Moderate Pain (4 - 6) refractory to tylenol  HYDROmorphone  Injectable 1 milliGRAM(s) IV Push every 3 hours PRN Severe Pain (7 - 10)  simethicone 80 milliGRAM(s) Chew every 8 hours PRN Gas  sodium chloride 3%  Inhalation 4 milliLiter(s) Inhalation every 6 hours PRN airway clearance per respiratory therapy      Allergies    No Known Allergies    Intolerances      Review of Systems:    ALL OTHER SYSTEMS REVIEWED AND NEGATIVE      PHYSICAL EXAM:  VITALS: T(C): 36.9 (12-27-20 @ 13:22)  T(F): 98.4 (12-27-20 @ 13:22), Max: 98.6 (12-26-20 @ 23:00)  HR: 76 (12-27-20 @ 13:22) (76 - 80)  BP: 129/52 (12-27-20 @ 13:22) (118/52 - 131/40)  RR:  (18 - 20)  SpO2:  (93% - 98%)  Wt(kg): --  GENERAL: NAD, well-groomed, well-developed  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, blind,  anicteric  HEENT:  Atraumatic, Normocephalic, moist mucous membranes  RESPIRATORY: nonlabored respirations  PSYCH: Alert and oriented x 3, normal affect, normal mood    CAPILLARY BLOOD GLUCOSE      POCT Blood Glucose.: 112 mg/dL (27 Dec 2020 13:03)  POCT Blood Glucose.: 117 mg/dL (27 Dec 2020 08:43)  POCT Blood Glucose.: 115 mg/dL (27 Dec 2020 01:05)  POCT Blood Glucose.: 108 mg/dL (26 Dec 2020 23:45)  POCT Blood Glucose.: 85 mg/dL (26 Dec 2020 22:07)  POCT Blood Glucose.: 96 mg/dL (26 Dec 2020 17:55)      12-27    140  |  106  |  64<H>  ----------------------------<  107<H>  3.8   |  21<L>  |  3.49<H>    EGFR if : 14<L>  EGFR if non : 12<L>    Ca    8.2<L>      12-27  Mg     2.1     12-27  Phos  3.6     12-27    TPro  6.2  /  Alb  2.5<L>  /  TBili  0.2  /  DBili  x   /  AST  14  /  ALT  10  /  AlkPhos  109  12-27      A1C with Estimated Average Glucose Result: 8.6 % (12-20-20 @ 15:17)  A1C with Estimated Average Glucose: 9.3 % (09-17-20 @ 05:52)      Thyroid Function Tests:

## 2020-12-27 NOTE — PROGRESS NOTE ADULT - PROBLEM SELECTOR PLAN 1
Mixed pre-renal in setting of DKA and poor oral intake over the past few days in the setting of UTI vs component of R-sided obstruction from stone vs sepsis related ATN  - hold home losartan  - appreciate renal recs  - started bicarb 650 BID  - check US kidney/bladder  - monitor BMP, I/Os, renally dose meds, avoid nephrotoxins

## 2020-12-27 NOTE — SWALLOW BEDSIDE ASSESSMENT ADULT - SWALLOW EVAL: DIAGNOSIS
Patient presents with functional oropharyngeal swallow marked by adequate stripping of bolus from utensil, adequate oral containment, functional mastication for regular solids, adequate bolus manipulation and adequate anterior to posterior transport. There was laryngeal elevation upon digital palpation with initiation of the pharyngeal swallow. There were no overt s/s of laryngeal penetration/aspiration for puree consistency, regular solids and thin liquids.

## 2020-12-27 NOTE — PROGRESS NOTE ADULT - ATTENDING COMMENTS
Jean Paul Ogden MD  Division of Endocrinology  Pager: [phone]    If after 6PM or before 9AM, or on weekends/holidays, please call endocrine answering service for assistance ([phone]).  For nonurgent matters email [email or web address] for assistance.

## 2020-12-27 NOTE — PROGRESS NOTE ADULT - ATTENDING COMMENTS
80F h/o HTN, DM2 c/b diabetic neuropathy and multiple toe amputations, HLD, PVD, CKD (baseline Cr 1.8-2), morbid obesity admitted for mild DKA 2/2 UTI and insulin noncompliance found to be in ACKD with obstructing ureteral stones s/p stent placement POD#2.    1. DKA:   -patient hypoglycemic this morning  -Endocrine following, appreciate recs, c/w basal/bolus regimen, monitor FS qac and qHS; decreased lantus to 26 units qhs and admelog to 8units qAC  -FS better controlled this morning    2. UTI/Obstructing ureteral stones:  -CTAP with 2 obstructing R ureteral stones, mild R hydroureteronephrosis with worsening flank pain. Patient s/p R ureteral stent placement yesterday by urology with findings of purulent pus upon placement; f/u OR cultures  -Renal and Urology following, appreciate recs  -Urine culture growing staph epi: Zosyn switched to vancomycin dosed by level, will consult ID for PO options once patient improved  -c/w vanco IV; monitor closely and adjust abx based on OR cultures  -vanco trough this am 17.8; hold dose today; check random level in am  -gram stain growing some yeast -- will treat with diflucan 200mg IV   -f/u with ID recs    3. ACKD:  -likely post-renal 2/2 obstructing ureteral stones  -now s/p stent placement; monitor creatinine  -creatinine still elevated and not improving as expected since stent placement on 12/24  BUN/Creatinine  12-27 @ 06:47  BUN  64     Creatinine  3.49  12-26 @ 06:27  BUN  62     Creatinine  3.36  12-25 @ 17:22  BUN  66     Creatinine  3.58  12-25 @ 05:09  BUN  68     Creatinine  3.66  12-24 @ 08:01  BUN  65     Creatinine  3.42  12-23 @ 08:37  BUN  65     Creatinine  3.24  -spoke with renal attending and this is most likely 2/2 to stones still present; per urology plan for outpatient stone extraction  -per renal f/u kidney/bladder ultrasound to check for persistent hydronephrosis    4. Left heel ulcer:   -Appreciate podiatry recs for left heel ulcer; per podiatry there is "Little to no suspicion for underlying bone infection"; does not appear to be infected at this time, X-ray with no evidence of OM; Pod plan for local wound care, santyl and DSD daily    5. Hypoxemia:  -oxygen was placed during OR on Thursday  -patient being weaned off oxygen slowly  -CXR reviewed: combination of atelectasis, some pulm edema and mucous plugging  -c/w IS at bedside (pt educated on use) and [place], duonebs, hypertonic saline nebs    6. Dispo:   -PT eval recommending [facility]- patient agreeable

## 2020-12-27 NOTE — SWALLOW BEDSIDE ASSESSMENT ADULT - COMMENTS
Progress Note 12/26/20: 80y F w/ HTN, DM2 c/b diabetic neuropathy and multiple toes amputations, HLD, PVD, CKD (baseline Cr 1.8-2), morbid obesity presenting with several days of generalized weakness, decreased PO intake (and subsequent holding of PM dose of home novolin), and urinary frequency admitted for mild DKA in the setting of likely UTI with additional issues of ARABELLA and obstructive R ureteral stone s/p R ureteral stent.    CXR 12/25/20: There is stable cardiomegaly. Mild interstitial-type pulmonary edema changes. Trace right pleural effusion. No bilateral focal infiltrates. No pneumothorax.    Patient was seen upright at bedside with nasal canula in place. Patient was alert/awake and easily engaged in conversation. Patient able to follow simple directions. Patient denies oropharyngeal dysphagia symptoms upon questioning.

## 2020-12-27 NOTE — PROGRESS NOTE ADULT - SUBJECTIVE AND OBJECTIVE BOX
Kirit Hebert MD    Internal Medicine Resident (PGY-2)  Pager: [phone] (NS) / [phone] ([place])  Please see "resident assignment" column on [place] for coverage info  ___________________________________________________________________________________________________      MARIANO ZAVALA 80y Female    Overnight events/subjective: No acute overnight events. Patient seen and examined at bedside. No complaints. Denies fever, chills, chest pain, shortness of breath.    Vital Signs Last 24 Hrs  T(C): 36.9 (27 Dec 2020 06:25), Max: 37 (26 Dec 2020 23:00)  T(F): 98.4 (27 Dec 2020 06:25), Max: 98.6 (26 Dec 2020 23:00)  HR: 79 (27 Dec 2020 06:25) (77 - 80)  BP: 131/40 (27 Dec 2020 06:25) (118/52 - 131/40)  BP(mean): --  RR: 18 (27 Dec 2020 06:25) (18 - 20)  SpO2: 96% (27 Dec 2020 06:25) (93% - 98%)    PHYSICAL EXAM:  Gen: Alert, NAD  HEENT: NCAT, conjunctiva clear, sclera anicteric  Neck: Supple, no JVD  CV: RRR, S1S2, no m/r/g  Resp: bibasilar crackles, no wheezing or ronchi, normal respiratory effort on RA sating 93-95% with good waveform  Abd: Soft, nontender, nondistended, bowel sounds intact  Ext: no edema, no clubbing or cyanosis  Neuro: Alert, appropriate speech, CN2-12 grossly intact, IGNACIO  SKIN: warm, perfused, L heel ulcer dressing c/d/i    HOSPITAL MEDICATIONS:  MEDICATIONS  (STANDING):  amLODIPine   Tablet 10 milliGRAM(s) Oral daily  atorvastatin 10 milliGRAM(s) Oral at bedtime  collagenase Ointment 1 Application(s) Topical daily  dextrose 40% Gel 15 Gram(s) Oral once  dextrose 5%. 1000 milliLiter(s) (50 mL/Hr) IV Continuous <Continuous>  dextrose 5%. 1000 milliLiter(s) (100 mL/Hr) IV Continuous <Continuous>  dextrose 50% Injectable 25 Gram(s) IV Push once  dextrose 50% Injectable 12.5 Gram(s) IV Push once  dextrose 50% Injectable 25 Gram(s) IV Push once  fluconAZOLE IVPB 200 milliGRAM(s) IV Intermittent every 24 hours  fluconAZOLE IVPB      glucagon  Injectable 1 milliGRAM(s) IntraMuscular once  heparin   Injectable 5000 Unit(s) SubCutaneous every 8 hours  influenza   Vaccine 0.5 milliLiter(s) IntraMuscular once  insulin glargine Injectable (LANTUS) 28 Unit(s) SubCutaneous at bedtime  insulin lispro (ADMELOG) corrective regimen sliding scale   SubCutaneous at bedtime  insulin lispro (ADMELOG) corrective regimen sliding scale   SubCutaneous three times a day before meals  insulin lispro Injectable (ADMELOG) 9 Unit(s) SubCutaneous three times a day before meals  labetalol 100 milliGRAM(s) Oral two times a day  lactobacillus acidophilus 1 Tablet(s) Oral daily  polyethylene glycol 3350 17 Gram(s) Oral daily  senna 2 Tablet(s) Oral at bedtime  sodium bicarbonate 650 milliGRAM(s) Oral two times a day  tamsulosin 0.4 milliGRAM(s) Oral at bedtime  timolol 0.5% Solution 1 Drop(s) Both EYES daily    MEDICATIONS  (PRN):  acetaminophen   Tablet .. 975 milliGRAM(s) Oral every 6 hours PRN Mild Pain (1 - 3)  albuterol/ipratropium for Nebulization 3 milliLiter(s) Nebulizer every 6 hours PRN Shortness of Breath and/or Wheezing  bisacodyl Suppository 10 milliGRAM(s) Rectal daily PRN Constipation  HYDROmorphone   Tablet 2 milliGRAM(s) Oral every 3 hours PRN Moderate Pain (4 - 6) refractory to tylenol  HYDROmorphone  Injectable 1 milliGRAM(s) IV Push every 3 hours PRN Severe Pain (7 - 10)  simethicone 80 milliGRAM(s) Chew every 8 hours PRN Gas  sodium chloride 3%  Inhalation 4 milliLiter(s) Inhalation every 6 hours PRN airway clearance per respiratory therapy      LABS:                        9.1    14.78 )-----------( 204      ( 27 Dec 2020 06:47 )             29.5     12-27    140  |  106  |  64<H>  ----------------------------<  107<H>  3.8   |  21<L>  |  3.49<H>    Ca    8.2<L>      27 Dec 2020 06:47  Phos  3.6     12-27  Mg     2.1     12-27    TPro  6.2  /  Alb  2.5<L>  /  TBili  0.2  /  DBili  x   /  AST  14  /  ALT  10  /  AlkPhos  109  12-27           Kirit Hebert MD    Internal Medicine Resident (PGY-2)  Pager: [phone] (NS) / [phone] ([place])  Please see "resident assignment" column on [place] for coverage info  ___________________________________________________________________________________________________      MARIANO ZAVALA 80y Female    Overnight events/subjective: No acute overnight events. Patient seen and examined at bedside. No complaints. Denies fever, chills, chest pain, shortness of breath.    Vital Signs Last 24 Hrs  T(C): 36.9 (27 Dec 2020 06:25), Max: 37 (26 Dec 2020 23:00)  T(F): 98.4 (27 Dec 2020 06:25), Max: 98.6 (26 Dec 2020 23:00)  HR: 79 (27 Dec 2020 06:25) (77 - 80)  BP: 131/40 (27 Dec 2020 06:25) (118/52 - 131/40)  BP(mean): --  RR: 18 (27 Dec 2020 06:25) (18 - 20)  SpO2: 96% (27 Dec 2020 06:25) (93% - 98%)    PHYSICAL EXAM:  Gen: Alert, NAD  HEENT: NCAT, conjunctiva clear, sclera anicteric  Neck: Supple, no JVD  CV: RRR, S1S2, no m/r/g  Resp: bibasilar crackles, no wheezing or ronchi, normal respiratory effort, satting 98% on 2L NC  Abd: Soft, nontender, nondistended, bowel sounds intact  Ext: no edema, no clubbing or cyanosis  Neuro: Alert, appropriate speech, CN2-12 grossly intact, IGNACIO  SKIN: warm, perfused, L heel ulcer dressing c/d/i    HOSPITAL MEDICATIONS:  MEDICATIONS  (STANDING):  amLODIPine   Tablet 10 milliGRAM(s) Oral daily  atorvastatin 10 milliGRAM(s) Oral at bedtime  collagenase Ointment 1 Application(s) Topical daily  dextrose 40% Gel 15 Gram(s) Oral once  dextrose 5%. 1000 milliLiter(s) (50 mL/Hr) IV Continuous <Continuous>  dextrose 5%. 1000 milliLiter(s) (100 mL/Hr) IV Continuous <Continuous>  dextrose 50% Injectable 25 Gram(s) IV Push once  dextrose 50% Injectable 12.5 Gram(s) IV Push once  dextrose 50% Injectable 25 Gram(s) IV Push once  fluconAZOLE IVPB 200 milliGRAM(s) IV Intermittent every 24 hours  fluconAZOLE IVPB      glucagon  Injectable 1 milliGRAM(s) IntraMuscular once  heparin   Injectable 5000 Unit(s) SubCutaneous every 8 hours  influenza   Vaccine 0.5 milliLiter(s) IntraMuscular once  insulin glargine Injectable (LANTUS) 28 Unit(s) SubCutaneous at bedtime  insulin lispro (ADMELOG) corrective regimen sliding scale   SubCutaneous at bedtime  insulin lispro (ADMELOG) corrective regimen sliding scale   SubCutaneous three times a day before meals  insulin lispro Injectable (ADMELOG) 9 Unit(s) SubCutaneous three times a day before meals  labetalol 100 milliGRAM(s) Oral two times a day  lactobacillus acidophilus 1 Tablet(s) Oral daily  polyethylene glycol 3350 17 Gram(s) Oral daily  senna 2 Tablet(s) Oral at bedtime  sodium bicarbonate 650 milliGRAM(s) Oral two times a day  tamsulosin 0.4 milliGRAM(s) Oral at bedtime  timolol 0.5% Solution 1 Drop(s) Both EYES daily    MEDICATIONS  (PRN):  acetaminophen   Tablet .. 975 milliGRAM(s) Oral every 6 hours PRN Mild Pain (1 - 3)  albuterol/ipratropium for Nebulization 3 milliLiter(s) Nebulizer every 6 hours PRN Shortness of Breath and/or Wheezing  bisacodyl Suppository 10 milliGRAM(s) Rectal daily PRN Constipation  HYDROmorphone   Tablet 2 milliGRAM(s) Oral every 3 hours PRN Moderate Pain (4 - 6) refractory to tylenol  HYDROmorphone  Injectable 1 milliGRAM(s) IV Push every 3 hours PRN Severe Pain (7 - 10)  simethicone 80 milliGRAM(s) Chew every 8 hours PRN Gas  sodium chloride 3%  Inhalation 4 milliLiter(s) Inhalation every 6 hours PRN airway clearance per respiratory therapy      LABS:                        9.1    14.78 )-----------( 204      ( 27 Dec 2020 06:47 )             29.5     12-27    140  |  106  |  64<H>  ----------------------------<  107<H>  3.8   |  21<L>  |  3.49<H>    Ca    8.2<L>      27 Dec 2020 06:47  Phos  3.6     12-27  Mg     2.1     12-27    TPro  6.2  /  Alb  2.5<L>  /  TBili  0.2  /  DBili  x   /  AST  14  /  ALT  10  /  AlkPhos  109  12-27

## 2020-12-28 DIAGNOSIS — R19.7 DIARRHEA, UNSPECIFIED: ICD-10-CM

## 2020-12-28 LAB
ALBUMIN SERPL ELPH-MCNC: 2.5 G/DL — LOW (ref 3.3–5)
ALP SERPL-CCNC: 105 U/L — SIGNIFICANT CHANGE UP (ref 40–120)
ALT FLD-CCNC: 8 U/L — SIGNIFICANT CHANGE UP (ref 4–33)
ANION GAP SERPL CALC-SCNC: 15 MMOL/L — HIGH (ref 7–14)
AST SERPL-CCNC: 14 U/L — SIGNIFICANT CHANGE UP (ref 4–32)
BASOPHILS # BLD AUTO: 0.02 K/UL — SIGNIFICANT CHANGE UP (ref 0–0.2)
BASOPHILS NFR BLD AUTO: 0.2 % — SIGNIFICANT CHANGE UP (ref 0–2)
BILIRUB SERPL-MCNC: 0.2 MG/DL — SIGNIFICANT CHANGE UP (ref 0.2–1.2)
BUN SERPL-MCNC: 60 MG/DL — HIGH (ref 7–23)
CALCIUM SERPL-MCNC: 8.1 MG/DL — LOW (ref 8.4–10.5)
CHLORIDE SERPL-SCNC: 102 MMOL/L — SIGNIFICANT CHANGE UP (ref 98–107)
CO2 SERPL-SCNC: 20 MMOL/L — LOW (ref 22–31)
CREAT SERPL-MCNC: 2.97 MG/DL — HIGH (ref 0.5–1.3)
EOSINOPHIL # BLD AUTO: 0.26 K/UL — SIGNIFICANT CHANGE UP (ref 0–0.5)
EOSINOPHIL NFR BLD AUTO: 2 % — SIGNIFICANT CHANGE UP (ref 0–6)
GLUCOSE BLDC GLUCOMTR-MCNC: 103 MG/DL — HIGH (ref 70–99)
GLUCOSE BLDC GLUCOMTR-MCNC: 104 MG/DL — HIGH (ref 70–99)
GLUCOSE BLDC GLUCOMTR-MCNC: 125 MG/DL — HIGH (ref 70–99)
GLUCOSE BLDC GLUCOMTR-MCNC: 87 MG/DL — SIGNIFICANT CHANGE UP (ref 70–99)
GLUCOSE BLDC GLUCOMTR-MCNC: 99 MG/DL — SIGNIFICANT CHANGE UP (ref 70–99)
GLUCOSE SERPL-MCNC: 109 MG/DL — HIGH (ref 70–99)
HCT VFR BLD CALC: 29.6 % — LOW (ref 34.5–45)
HGB BLD-MCNC: 9.1 G/DL — LOW (ref 11.5–15.5)
IANC: 10.51 K/UL — HIGH (ref 1.5–8.5)
IMM GRANULOCYTES NFR BLD AUTO: 2 % — HIGH (ref 0–1.5)
LYMPHOCYTES # BLD AUTO: 1.65 K/UL — SIGNIFICANT CHANGE UP (ref 1–3.3)
LYMPHOCYTES # BLD AUTO: 12.4 % — LOW (ref 13–44)
MAGNESIUM SERPL-MCNC: 2 MG/DL — SIGNIFICANT CHANGE UP (ref 1.6–2.6)
MCHC RBC-ENTMCNC: 26.7 PG — LOW (ref 27–34)
MCHC RBC-ENTMCNC: 30.7 GM/DL — LOW (ref 32–36)
MCV RBC AUTO: 86.8 FL — SIGNIFICANT CHANGE UP (ref 80–100)
MONOCYTES # BLD AUTO: 0.62 K/UL — SIGNIFICANT CHANGE UP (ref 0–0.9)
MONOCYTES NFR BLD AUTO: 4.7 % — SIGNIFICANT CHANGE UP (ref 2–14)
NEUTROPHILS # BLD AUTO: 10.51 K/UL — HIGH (ref 1.8–7.4)
NEUTROPHILS NFR BLD AUTO: 78.7 % — HIGH (ref 43–77)
NRBC # BLD: 0 /100 WBCS — SIGNIFICANT CHANGE UP
NRBC # FLD: 0 K/UL — SIGNIFICANT CHANGE UP
PHOSPHATE SERPL-MCNC: 3.4 MG/DL — SIGNIFICANT CHANGE UP (ref 2.5–4.5)
PLATELET # BLD AUTO: 188 K/UL — SIGNIFICANT CHANGE UP (ref 150–400)
POTASSIUM SERPL-MCNC: 3.7 MMOL/L — SIGNIFICANT CHANGE UP (ref 3.5–5.3)
POTASSIUM SERPL-SCNC: 3.7 MMOL/L — SIGNIFICANT CHANGE UP (ref 3.5–5.3)
PROT SERPL-MCNC: 6.4 G/DL — SIGNIFICANT CHANGE UP (ref 6–8.3)
RBC # BLD: 3.41 M/UL — LOW (ref 3.8–5.2)
RBC # FLD: 15.9 % — HIGH (ref 10.3–14.5)
SODIUM SERPL-SCNC: 137 MMOL/L — SIGNIFICANT CHANGE UP (ref 135–145)
VANCOMYCIN FLD-MCNC: 15.3 UG/ML — SIGNIFICANT CHANGE UP
WBC # BLD: 13.33 K/UL — HIGH (ref 3.8–10.5)
WBC # FLD AUTO: 13.33 K/UL — HIGH (ref 3.8–10.5)

## 2020-12-28 PROCEDURE — 99233 SBSQ HOSP IP/OBS HIGH 50: CPT | Mod: GC

## 2020-12-28 PROCEDURE — 99232 SBSQ HOSP IP/OBS MODERATE 35: CPT

## 2020-12-28 PROCEDURE — 93010 ELECTROCARDIOGRAM REPORT: CPT

## 2020-12-28 PROCEDURE — 99233 SBSQ HOSP IP/OBS HIGH 50: CPT

## 2020-12-28 RX ORDER — VANCOMYCIN HCL 1 G
1000 VIAL (EA) INTRAVENOUS ONCE
Refills: 0 | Status: DISCONTINUED | OUTPATIENT
Start: 2020-12-28 | End: 2020-12-28

## 2020-12-28 RX ORDER — FLUCONAZOLE 150 MG/1
200 TABLET ORAL DAILY
Refills: 0 | Status: DISCONTINUED | OUTPATIENT
Start: 2020-12-28 | End: 2020-12-29

## 2020-12-28 RX ORDER — SODIUM CHLORIDE 9 MG/ML
1000 INJECTION, SOLUTION INTRAVENOUS
Refills: 0 | Status: DISCONTINUED | OUTPATIENT
Start: 2020-12-28 | End: 2020-12-29

## 2020-12-28 RX ORDER — BENZOCAINE AND MENTHOL 5; 1 G/100ML; G/100ML
1 LIQUID ORAL
Refills: 0 | Status: DISCONTINUED | OUTPATIENT
Start: 2020-12-28 | End: 2020-12-31

## 2020-12-28 RX ADMIN — Medication 8 UNIT(S): at 13:44

## 2020-12-28 RX ADMIN — BENZOCAINE AND MENTHOL 1 LOZENGE: 5; 1 LIQUID ORAL at 16:12

## 2020-12-28 RX ADMIN — AMLODIPINE BESYLATE 10 MILLIGRAM(S): 2.5 TABLET ORAL at 05:43

## 2020-12-28 RX ADMIN — HEPARIN SODIUM 5000 UNIT(S): 5000 INJECTION INTRAVENOUS; SUBCUTANEOUS at 13:45

## 2020-12-28 RX ADMIN — INSULIN GLARGINE 26 UNIT(S): 100 INJECTION, SOLUTION SUBCUTANEOUS at 22:37

## 2020-12-28 RX ADMIN — Medication 100 MILLIGRAM(S): at 05:43

## 2020-12-28 RX ADMIN — Medication 8 UNIT(S): at 09:51

## 2020-12-28 RX ADMIN — Medication 1 TABLET(S): at 11:54

## 2020-12-28 RX ADMIN — Medication 650 MILLIGRAM(S): at 05:43

## 2020-12-28 RX ADMIN — ATORVASTATIN CALCIUM 10 MILLIGRAM(S): 80 TABLET, FILM COATED ORAL at 22:37

## 2020-12-28 RX ADMIN — TAMSULOSIN HYDROCHLORIDE 0.4 MILLIGRAM(S): 0.4 CAPSULE ORAL at 22:37

## 2020-12-28 RX ADMIN — Medication 100 MILLIGRAM(S): at 18:15

## 2020-12-28 RX ADMIN — FLUCONAZOLE 200 MILLIGRAM(S): 150 TABLET ORAL at 16:13

## 2020-12-28 RX ADMIN — HEPARIN SODIUM 5000 UNIT(S): 5000 INJECTION INTRAVENOUS; SUBCUTANEOUS at 22:37

## 2020-12-28 RX ADMIN — Medication 8 UNIT(S): at 18:27

## 2020-12-28 RX ADMIN — Medication 650 MILLIGRAM(S): at 18:15

## 2020-12-28 RX ADMIN — Medication 1 DROP(S): at 11:54

## 2020-12-28 RX ADMIN — HEPARIN SODIUM 5000 UNIT(S): 5000 INJECTION INTRAVENOUS; SUBCUTANEOUS at 05:43

## 2020-12-28 RX ADMIN — SODIUM CHLORIDE 100 MILLILITER(S): 9 INJECTION, SOLUTION INTRAVENOUS at 12:53

## 2020-12-28 RX ADMIN — Medication 1 APPLICATION(S): at 11:56

## 2020-12-28 NOTE — PROGRESS NOTE ADULT - PROBLEM SELECTOR PLAN 2
Presented with several days of urinary frequency and positive UA. Was initially started on CTX. Was broadened to zosyn on 12/22 for fever and persistent leukocytosis. Found to have obstructive R ureteral stones on further workup with CTAP 12/23. s/p R ureteral stent and riggins placement on 12/24. UCx from admission eventually grew >100k staph epi resistant to zosyn; switched zosyn to vanc by level on 12/24. Intra-op R ureteral urine gram stain and UCx 11/24 showed yeast. BCx 12/20 NGTD.  - c/w vanc by level (12/24 - ); goal level of 15-20  - c/w diflucan (12/26 -), monitor liver chemistry, recheck QTc on Monday  - f/u ID recs Reports loose stool over the weekend with progression to watery stool today  - will check stool for cdiff

## 2020-12-28 NOTE — PROGRESS NOTE ADULT - ATTENDING COMMENTS
Patient is s/p ureteral stent.  Prolonged recovery may be due to 1) component of pyelonephritis 2) hypovolemia (diarrhea poor appetite).  No residual hydro.  Recommend continue intravenous fluids for today and re evaluate tomorrow.  Creatinine improving.

## 2020-12-28 NOTE — PROGRESS NOTE ADULT - ASSESSMENT
Pt is 81yo who presents w/ Left heel wound to subQ  -pt was seen and examined  -Left posterior heel wound down to subQ, w/ fibrogranular wound bed and central eschar, wound does not track, or probe beyond SubQ, no erythema, no malodor, no local signs of infection  -LFXR: no gas, or OM, final   -Little to no suspicion for underlying bone infection. Elevated glucose 2/2 UTI.   -Pod plan for local wound care, santyl and DSD daily  -Will follow while in house  -Z floats all times

## 2020-12-28 NOTE — PROGRESS NOTE ADULT - SUBJECTIVE AND OBJECTIVE BOX
Follow Up:  UTI    Interval History: the urine from the kidney came back with candida    ROS:      All other systems negative    Constitutional: no fever, no chills  Cardiovascular:  no chest pain, no palpitation  Respiratory:  no SOB, no cough  GI:  no abd pain, no vomiting, no diarrhea  urinary: no dysuria, no hematuria, no flank pain  musculoskeletal:  no joint pain, no joint swelling  skin:  no rash, chronic heel wound  neurology:  no headache, no seizure      Allergies  No Known Allergies        ANTIMICROBIALS:  fluconAZOLE   Tablet 200 daily      OTHER MEDS:  acetaminophen   Tablet .. 975 milliGRAM(s) Oral every 6 hours PRN  albuterol/ipratropium for Nebulization 3 milliLiter(s) Nebulizer every 6 hours PRN  amLODIPine   Tablet 10 milliGRAM(s) Oral daily  atorvastatin 10 milliGRAM(s) Oral at bedtime  benzocaine 15 mG/menthol 3.6 mG (Sugar-Free) Lozenge 1 Lozenge Oral every 3 hours PRN  bisacodyl Suppository 10 milliGRAM(s) Rectal daily PRN  collagenase Ointment 1 Application(s) Topical daily  dextrose 40% Gel 15 Gram(s) Oral once  dextrose 5%. 1000 milliLiter(s) IV Continuous <Continuous>  dextrose 5%. 1000 milliLiter(s) IV Continuous <Continuous>  dextrose 50% Injectable 12.5 Gram(s) IV Push once  dextrose 50% Injectable 25 Gram(s) IV Push once  dextrose 50% Injectable 25 Gram(s) IV Push once  glucagon  Injectable 1 milliGRAM(s) IntraMuscular once  heparin   Injectable 5000 Unit(s) SubCutaneous every 8 hours  HYDROmorphone   Tablet 2 milliGRAM(s) Oral every 3 hours PRN  HYDROmorphone  Injectable 1 milliGRAM(s) IV Push every 3 hours PRN  influenza   Vaccine 0.5 milliLiter(s) IntraMuscular once  insulin glargine Injectable (LANTUS) 26 Unit(s) SubCutaneous at bedtime  insulin lispro (ADMELOG) corrective regimen sliding scale   SubCutaneous three times a day before meals  insulin lispro (ADMELOG) corrective regimen sliding scale   SubCutaneous at bedtime  insulin lispro Injectable (ADMELOG) 8 Unit(s) SubCutaneous three times a day before meals  labetalol 100 milliGRAM(s) Oral two times a day  lactated ringers. 1000 milliLiter(s) IV Continuous <Continuous>  lactobacillus acidophilus 1 Tablet(s) Oral daily  simethicone 80 milliGRAM(s) Chew every 8 hours PRN  sodium bicarbonate 650 milliGRAM(s) Oral two times a day  sodium chloride 3%  Inhalation 4 milliLiter(s) Inhalation every 6 hours PRN  tamsulosin 0.4 milliGRAM(s) Oral at bedtime  timolol 0.5% Solution 1 Drop(s) Both EYES daily      Vital Signs Last 24 Hrs  T(C): 36.6 (28 Dec 2020 15:03), Max: 36.8 (28 Dec 2020 06:12)  T(F): 97.8 (28 Dec 2020 15:03), Max: 98.2 (28 Dec 2020 06:12)  HR: 76 (28 Dec 2020 15:03) (72 - 80)  BP: 128/54 (28 Dec 2020 15:03) (124/52 - 134/51)  BP(mean): --  RR: 18 (28 Dec 2020 15:03) (18 - 18)  SpO2: 97% (28 Dec 2020 15:03) (97% - 98%)    Physical Exam:  General:    NAD, non toxic  Cardio:    regular S1,S2, no murmur  Respiratory:   clear b/l, no wheezing  abd:   soft, BS +, not tender  :     no CVAT, no suprapubic tenderness,  riggins  Musculoskeletal : multiple toe amputations, L heel ulcer with eschar, no purulence  Skin:    no rash  vascular: no phlebitis                        9.1    13.33 )-----------( 188      ( 28 Dec 2020 07:58 )             29.6       12-28    137  |  102  |  60<H>  ----------------------------<  109<H>  3.7   |  20<L>  |  2.97<H>    Ca    8.1<L>      28 Dec 2020 07:58  Phos  3.4     12-28  Mg     2.0     12-28    TPro  6.4  /  Alb  2.5<L>  /  TBili  0.2  /  DBili  x   /  AST  14  /  ALT  8   /  AlkPhos  105  12-28          MICROBIOLOGY:  Vancomycin Level, Random: 15.3 ug/mL (12-28-20 @ 08:16)  v  .Smear RIGHT PROXIMAL URETERAL URINE  CULTURE  12-24-20   Numerous Yeast  --    Moderate polymorphonuclear leukocytes seen per low power field  Moderate Yeast like cells seen per oil power field      .Urine RIGHT PROXIMEAL URETERAL URINE CULTURE  12-24-20   10,000 - 49,000 CFU/mL Yeast-like cells, presumptively not Candida  albicans "Susceptibilities not performed"  --  --      .Urine Clean Catch (Midstream)  12-20-20   >100,000 CFU/ml Staphylococcus epidermidis  --  Staphylococcus epidermidis      .Blood Blood-Peripheral  12-20-20   No Growth Final  --  --                RADIOLOGY:  Images independently visualized and reviewed personally, findings as below  < from: US Kidney and Bladder (12.27.20 @ 12:15) >  IMPRESSION:    Normal renal ultrasound.      < end of copied text >  < from: CT Abdomen and Pelvis No Cont (12.23.20 @ 10:44) >  IMPRESSION:  Right-sided obstructive uropathy with mild right hydroureteronephrosis to the level of two distal right ureteral stones measuring 2 and 6 mm, respectively.

## 2020-12-28 NOTE — PROGRESS NOTE ADULT - PROBLEM SELECTOR PLAN 5
Initially presented with mild DKA in the setting of UTI and medication noncompliance; a1c 8.6  - c/w lantus 26 qhs, admelog 8 tidac, low ISS per endo  - appreciate endo recs  - will need glucometer/supplies on discharge (Rx sent to [facility]) Resolved hypoxemia to upper 80s to low 90s on RA suspected multifactorial including possible small pulm edema (seen on CXR 12/25) in setting of ARABELLA on CKD vs atelectasis vs mucus plugging. No known prior history of hypoxemia or need for supplemental oxygen per patient. Former smoker for about 10 years who quit decades ago.  - now on RA with improved bibasilar crackles on exam  - c/w airway clearance with respiratory therapy  - c/w incentive spirometry; patient displayed ability to use; encouraged use

## 2020-12-28 NOTE — PROGRESS NOTE ADULT - PROBLEM SELECTOR PLAN 6
Controlled  - decreased home labetalol from 200mg to 100mg BID on 12/15 for persistent diastolic hypotension  - restarted previous home med of amlodipine 10mg qd while holding home losartan in setting of ARABELLA Initially presented with mild DKA in the setting of UTI and medication noncompliance; a1c 8.6  - c/w lantus 26 qhs, admelog 8 tidac, low ISS per endo  - appreciate endo recs  - will need glucometer/supplies on discharge (Rx sent to [facility])

## 2020-12-28 NOTE — PROGRESS NOTE ADULT - PROBLEM SELECTOR PLAN 4
Resolved hypoxemia to upper 80s to low 90s on RA suspected multifactorial including possible small pulm edema (seen on CXR 12/25) in setting of ARABELLA on CKD vs atelectasis vs mucus plugging. No known prior history of hypoxemia or need for supplemental oxygen per patient. Former smoker for about 10 years who quit decades ago.  - now on RA with improved bibasilar crackles on exam  - c/w airway clearance with respiratory therapy  - c/w incentive spirometry; patient displayed ability to use; encouraged use CTAP 12/23 showed right-sided obstructive uropathy with mild right hydroureteronephrosis to the level of two distal right ureteral stones measuring 2 and 6 mm, respectively  - s/p R ureteral stent placement on 12/24 and placement of indwelling riggins   - repeat US kidney/bladder showed resolution of R hydro  - appreciate urology recs; follow up outpt for elective stone extraction  - tylenol 975 q6 for mild pain, dilaudid 2mg PO q3 for mod pain, dilaudid 1 IV q3 for severe pain, titrate as needed  - urology appointment scheduled with Dr. Brian Enriquez on 1/14/21 at 11AM at [address] [place] [address]

## 2020-12-28 NOTE — PROGRESS NOTE ADULT - PROBLEM SELECTOR PLAN 1
Pt. with multifactorial ARABELLA on CKD in the setting of dehydration and obstructive uropathy. Upon lab review on [facility], Scr was 2.01 on 11/23/20. Scr on arrival was 3.46. Pt. received fluid resuscitation however Scr remained elevated at 3.46 on 12/24/20. CT Abdomen showed R hydronephrosis with obstructing stone and L exophytic mass. Pt. underwent stent placement by urology team on 12/24/20. Scr improved today to 2.97. Pt. with CKD likely secondary to longstanding DM. Pt. with multifactorial ARABELLA on CKD, now resolving. Monitor labs and urine output. Avoid potential nephrotoxins. Dose medications as per eGFR.

## 2020-12-28 NOTE — PROGRESS NOTE ADULT - ATTENDING COMMENTS
80F h/o HTN, DM2 c/b diabetic neuropathy and multiple toe amputations, HLD, PVD, CKD (baseline Cr 1.8-2), morbid obesity admitted for mild DKA 2/2 UTI and insulin noncompliance found to be in ACKD with obstructing ureteral stones s/p stent placement POD#2.    1. DKA:   -patient hypoglycemic this morning  -Endocrine following, appreciate recs, c/w basal/bolus regimen, monitor FS qac and qHS; c/w lantus to 26 units qhs and admelog to 8units qAC  -FS better controlled this morning    2. UTI/Obstructing ureteral stones:  -CTAP with 2 obstructing R ureteral stones, mild R hydroureteronephrosis with worsening flank pain. Patient s/p R ureteral stent placement yesterday by urology with findings of purulent pus upon placement; f/u OR cultures  -Renal and Urology following, appreciate recs  -Urine culture growing staph epi: Zosyn switched to vancomycin dosed by level, will consult ID for PO options once patient improved  -c/w vanco IV; monitor closely and adjust abx based on OR cultures  -vanco random level this am 15; will give vanco 1gm IV daily; check random level in am  -gram stain growing some yeast -- c/w diflucan 200mg IV day #3  -f/u with ID recs re- course    3. ACKD:  -likely post-renal 2/2 obstructing ureteral stones  -now s/p stent placement; monitor creatinine  -creatinine now downtrending since stent placement on 12/24  BUN/Creatinine  12-28 @ 07:58  BUN  60     Creatinine  2.97  12-27 @ 06:47  BUN  64     Creatinine  3.49  12-26 @ 06:27  BUN  62     Creatinine  3.36  12-25 @ 17:22  BUN  66     Creatinine  3.58  12-25 @ 05:09  BUN  68     Creatinine  3.66  12-24 @ 08:01  BUN  65     Creatinine  3.42  -spoke with renal attending and this is most likely 2/2 to stones still present and will take time to downtrend; per urology plan for outpatient stone extraction  -f/u renal kidney/bladder ultrasound reviewed with hydronephrosis now resolved    4. Left heel ulcer:   -Appreciate podiatry recs for left heel ulcer; per podiatry there is "Little to no suspicion for underlying bone infection"; does not appear to be infected at this time, X-ray with no evidence of OM; Pod plan for local wound care, santyl and DSD daily    5. Hypoxemia:  -oxygen was placed during OR on Thursday  -patient being weaned off oxygen slowly, on room air today   -CXR reviewed: combination of atelectasis, some pulm edema and mucous plugging  -c/w IS at bedside (pt educated on use) and [place], duonebs, hypertonic saline nebs    6. Dispo:   -PT eval recommending QUAN- patient agreeable

## 2020-12-28 NOTE — PROGRESS NOTE ADULT - PROBLEM SELECTOR PLAN 1
Mixed pre-renal in setting of DKA and poor oral intake over the past few days in the setting of UTI vs component of R-sided obstruction from stone vs sepsis related ATN  - hold home losartan  - appreciate renal recs; will take some time for kidney function to recover from obstructive stones, will assist in hydration with IVF given small PO intake and loose stools  - started bicarb 650 BID  - monitor BMP, I/Os, renally dose meds, avoid nephrotoxins Mixed etiology including hypovolemia (diarrhea and poor oral intake) vs component of pyelo vs sepsis related ATN  - hold home losartan  - appreciate renal recs; will take some time for kidney function to recover from obstructive stones, will assist in hydration with IVF given small PO intake and loose stools  - started bicarb 650 BID  - monitor BMP, I/Os, renally dose meds, avoid nephrotoxins

## 2020-12-28 NOTE — PROGRESS NOTE ADULT - SUBJECTIVE AND OBJECTIVE BOX
[facility] DIVISION OF KIDNEY DISEASES AND HYPERTENSION -- FOLLOW UP NOTE  --------------------------------------------------------------------------------  HPI: 80-year-old female with long standing DM, HTN, legally blind, obesity, and diabetic foot ulcer was admitted to [facility] on 12/20/20 for hyperglycemia. Nephrology team consulted for elevated serum creatinine. Upon review of labs on [facility], Scr was 2.01 on 11/23/20. On admission, Scr was 3.46. Of note, pt. went for CT Abdomen which shows mild R hydronephrosis and 2 stones in the distal ureter. Scr was to elevated at 3.42 on 12/24/20. Pt. underwent stent placement by urology team on 12/24/20. Scr has improved to 2.97 today.    Pt. evaluated at bedside, in no acute distress. Offers no complaints.    PAST HISTORY  --------------------------------------------------------------------------------  No significant changes to PMH, PSH, FHx, SHx, unless otherwise noted    ALLERGIES & MEDICATIONS  --------------------------------------------------------------------------------  Allergies    No Known Allergies    Intolerances      Standing Inpatient Medications  amLODIPine   Tablet 10 milliGRAM(s) Oral daily  atorvastatin 10 milliGRAM(s) Oral at bedtime  collagenase Ointment 1 Application(s) Topical daily  fluconAZOLE IVPB 200 milliGRAM(s) IV Intermittent every 24 hours  fluconAZOLE IVPB      glucagon  Injectable 1 milliGRAM(s) IntraMuscular once  heparin   Injectable 5000 Unit(s) SubCutaneous every 8 hours  influenza   Vaccine 0.5 milliLiter(s) IntraMuscular once  insulin glargine Injectable (LANTUS) 26 Unit(s) SubCutaneous at bedtime  insulin lispro (ADMELOG) corrective regimen sliding scale   SubCutaneous at bedtime  insulin lispro (ADMELOG) corrective regimen sliding scale   SubCutaneous three times a day before meals  insulin lispro Injectable (ADMELOG) 8 Unit(s) SubCutaneous three times a day before meals  labetalol 100 milliGRAM(s) Oral two times a day  lactated ringers. 1000 milliLiter(s) IV Continuous <Continuous>  lactobacillus acidophilus 1 Tablet(s) Oral daily  polyethylene glycol 3350 17 Gram(s) Oral daily  senna 2 Tablet(s) Oral at bedtime  sodium bicarbonate 650 milliGRAM(s) Oral two times a day  tamsulosin 0.4 milliGRAM(s) Oral at bedtime  timolol 0.5% Solution 1 Drop(s) Both EYES daily  vancomycin  IVPB 1000 milliGRAM(s) IV Intermittent once    REVIEW OF SYSTEMS  --------------------------------------------------------------------------------  Gen: no lethargy  Respiratory: No dyspnea  CV: No chest pain  GI: No abdominal pain  MSK: no LE edema  Neuro: + legally blind  Heme: No bleeding    All other systems were reviewed and are negative, except as noted.    VITALS/PHYSICAL EXAM  --------------------------------------------------------------------------------  T(C): 36.8 (12-28-20 @ 06:12), Max: 36.9 (12-27-20 @ 13:22)  HR: 78 (12-28-20 @ 06:12) (72 - 80)  BP: 131/47 (12-28-20 @ 06:12) (124/52 - 134/51)  RR: 18 (12-28-20 @ 06:12) (18 - 20)  SpO2: 98% (12-28-20 @ 06:12) (98% - 98%)  Wt(kg): --    12-27-20 @ 07:01  -  12-28-20 @ 07:00  --------------------------------------------------------  IN: 0 mL / OUT: 300 mL / NET: -300 mL    Physical Exam:  	Gen: NAD, obese  	HEENT: MMM  	Pulm: good air entry B/L  	CV: S1S2  	Abd: Soft, +BS               : riggins catheter +  	Ext: No LE edema B/L, left heel ulcer  	Neuro: Awake  	Skin: Warm and dry    LABS/STUDIES  --------------------------------------------------------------------------------              9.1    13.33 >-----------<  188      [12-28-20 @ 07:58]              29.6     137  |  102  |  60  ----------------------------<  109      [12-28-20 @ 07:58]  3.7   |  20  |  2.97        Ca     8.1     [12-28-20 @ 07:58]      Mg     2.0     [12-28-20 @ 07:58]      Phos  3.4     [12-28-20 @ 07:58]    Creatinine Trend:  SCr 2.97 [12-28 @ 07:58]  SCr 3.49 [12-27 @ 06:47]  SCr 3.36 [12-26 @ 06:27]  SCr 3.58 [12-25 @ 17:22]  SCr 3.66 [12-25 @ 05:09]    Urine Creatinine 135      [12-23-20 @ 15:49]  Urine Protein 185      [12-23-20 @ 15:49]  Urine Sodium 44      [12-23-20 @ 15:49]  Urine Potassium 46.9      [12-23-20 @ 15:49]  Urine Chloride 39      [12-23-20 @ 15:49]

## 2020-12-28 NOTE — PROGRESS NOTE ADULT - ASSESSMENT
80 f with DM c/b diabetic neuropathy and multiple toes amputations, HTN, HLD, PVD, CKD (Baseline crt 1.8-2), morbid Obesity, presenting w/ hyperglycemia.   podiatry evaluated her for L heel chronic ulcer with eschar, no evidence of infection, xray no osteo  WBC initially 11 now 15 but no symptoms just has occasional R flank pain  abd/pelvis CT: Right-sided obstructive uropathy with mild right hydroureteronephrosis to the level of two distal right ureteral stones measuring 2 and 6 mm, respectively.  blood cx negative, urine cx with staph epi  s/p zosyn and ceftriaxone 12/20-12/24  plan for IR stent  pt was dizzy today and an episode of desaturation to 80s which improved after coughing    obstructive uropathy, nephrolithiasis  positive urine cx with staph epi, likely contaminant or colonization, s/p stent placement 12/24 with yasir purulence in OR and urine cx from kidney showed candida    * c/w fluconazole 200 qd  * f/u the candida identification and sensitives   * monitor the renal function, it started to improve  * will need a 10-14 day course for the fungal UTI    The above assessment and plan was discussed with the primary team    Malini Ayers MD  Pager [phone]  After 5pm and on weekends call [phone]

## 2020-12-28 NOTE — PROGRESS NOTE ADULT - PROBLEM SELECTOR PLAN 2
In setting of CKD. Serum CO2 noted improved to 20 today. Pt. on sodium bicarbonate 650mg PO BID. Monitor serum CO2.    If any questions, please feel free to contact me  Wilberto Gooden   Nephrology Fellow  [phone]  (After 5 pm or on weekends please page the on-call fellow).

## 2020-12-28 NOTE — PROGRESS NOTE ADULT - SUBJECTIVE AND OBJECTIVE BOX
Andrés Malhotra, PGY3  Pager [phone]      Patient is a 80y old  Female who presents with a chief complaint of Hyperglycemia (28 Dec 2020 07:09)      SUBJECTIVE/INTERVAL EVENTS: Patient seen and examined at bedside. ADRIANA. States feels tired. Endorses some loose stools. Denies other active complaints.    MEDICATIONS  (STANDING):  amLODIPine   Tablet 10 milliGRAM(s) Oral daily  atorvastatin 10 milliGRAM(s) Oral at bedtime  collagenase Ointment 1 Application(s) Topical daily  dextrose 40% Gel 15 Gram(s) Oral once  dextrose 5%. 1000 milliLiter(s) (50 mL/Hr) IV Continuous <Continuous>  dextrose 5%. 1000 milliLiter(s) (100 mL/Hr) IV Continuous <Continuous>  dextrose 50% Injectable 25 Gram(s) IV Push once  dextrose 50% Injectable 25 Gram(s) IV Push once  dextrose 50% Injectable 12.5 Gram(s) IV Push once  fluconAZOLE IVPB 200 milliGRAM(s) IV Intermittent every 24 hours  fluconAZOLE IVPB      glucagon  Injectable 1 milliGRAM(s) IntraMuscular once  heparin   Injectable 5000 Unit(s) SubCutaneous every 8 hours  influenza   Vaccine 0.5 milliLiter(s) IntraMuscular once  insulin glargine Injectable (LANTUS) 26 Unit(s) SubCutaneous at bedtime  insulin lispro (ADMELOG) corrective regimen sliding scale   SubCutaneous at bedtime  insulin lispro (ADMELOG) corrective regimen sliding scale   SubCutaneous three times a day before meals  insulin lispro Injectable (ADMELOG) 8 Unit(s) SubCutaneous three times a day before meals  labetalol 100 milliGRAM(s) Oral two times a day  lactobacillus acidophilus 1 Tablet(s) Oral daily  polyethylene glycol 3350 17 Gram(s) Oral daily  senna 2 Tablet(s) Oral at bedtime  sodium bicarbonate 650 milliGRAM(s) Oral two times a day  tamsulosin 0.4 milliGRAM(s) Oral at bedtime  timolol 0.5% Solution 1 Drop(s) Both EYES daily    MEDICATIONS  (PRN):  acetaminophen   Tablet .. 975 milliGRAM(s) Oral every 6 hours PRN Mild Pain (1 - 3)  albuterol/ipratropium for Nebulization 3 milliLiter(s) Nebulizer every 6 hours PRN Shortness of Breath and/or Wheezing  bisacodyl Suppository 10 milliGRAM(s) Rectal daily PRN Constipation  HYDROmorphone   Tablet 2 milliGRAM(s) Oral every 3 hours PRN Moderate Pain (4 - 6) refractory to tylenol  HYDROmorphone  Injectable 1 milliGRAM(s) IV Push every 3 hours PRN Severe Pain (7 - 10)  simethicone 80 milliGRAM(s) Chew every 8 hours PRN Gas  sodium chloride 3%  Inhalation 4 milliLiter(s) Inhalation every 6 hours PRN airway clearance per respiratory therapy      VITAL SIGNS:  T(F): 98.2 (12-28-20 @ 06:12), Max: 98.4 (12-27-20 @ 13:22)  HR: 78 (12-28-20 @ 06:12) (72 - 80)  BP: 131/47 (12-28-20 @ 06:12) (124/52 - 134/51)  RR: 18 (12-28-20 @ 06:12) (18 - 20)  SpO2: 98% (12-28-20 @ 06:12) (98% - 98%)    I&O's Summary    27 Dec 2020 07:01  -  28 Dec 2020 07:00  --------------------------------------------------------  IN: 0 mL / OUT: 300 mL / NET: -300 mL      Daily     Daily     PHYSICAL EXAM:  Gen: Alert, NAD  HEENT: NCAT, conjunctiva clear, sclera anicteric  Neck: Supple, no JVD  CV: RRR, S1S2, no m/r/g  Resp: CTAB, normal respiratory effort  Abd: Soft, nontender, nondistended, bowel sounds intact  Ext: no edema, no clubbing or cyanosis  Neuro: Alert, appropriate speech, CN2-12 grossly intact, IGNACIO  SKIN: warm, perfused, L heel ulcer dressing c/d/i    LABS:                        9.1    14.78 )-----------( 204      ( 27 Dec 2020 06:47 )             29.5     Hgb Trend: 9.1<--, 9.4<--, 9.1<--, 9.2<--, 9.6<--  12-27    140  |  106  |  64<H>  ----------------------------<  107<H>  3.8   |  21<L>  |  3.49<H>    Ca    8.2<L>      27 Dec 2020 06:47  Phos  3.6     12-27  Mg     2.1     12-27    TPro  6.2  /  Alb  2.5<L>  /  TBili  0.2  /  DBili  x   /  AST  14  /  ALT  10  /  AlkPhos  109  12-27    Creatinine Trend: 3.49<--, 3.36<--, 3.58<--, 3.66<--, 3.42<--, 3.24<--  LIVER FUNCTIONS - ( 27 Dec 2020 06:47 )  Alb: 2.5 g/dL / Pro: 6.2 g/dL / ALK PHOS: 109 U/L / ALT: 10 U/L / AST: 14 U/L / GGT: x                     CAPILLARY BLOOD GLUCOSE      POCT Blood Glucose.: 99 mg/dL (28 Dec 2020 03:11)  POCT Blood Glucose.: 96 mg/dL (27 Dec 2020 22:44)  POCT Blood Glucose.: 86 mg/dL (27 Dec 2020 18:34)  POCT Blood Glucose.: 112 mg/dL (27 Dec 2020 13:03)  POCT Blood Glucose.: 117 mg/dL (27 Dec 2020 08:43)      RADIOLOGY & ADDITIONAL TESTS: Reviewed    Imaging Personally Reviewed:    Consultant(s) Notes Reviewed:      Care Discussed with Consultants/Other Providers:

## 2020-12-28 NOTE — PROGRESS NOTE ADULT - PROBLEM SELECTOR PLAN 3
CTAP 12/23 showed right-sided obstructive uropathy with mild right hydroureteronephrosis to the level of two distal right ureteral stones measuring 2 and 6 mm, respectively  - s/p R ureteral stent placement on 12/24 and placement of indwelling riggins   - repeat US kidney/bladder showed resolution of R hydro  - appreciate urology recs; follow up outpt for elective stone extraction  - tylenol 975 q6 for mild pain, dilaudid 2mg PO q3 for mod pain, dilaudid 1 IV q3 for severe pain, titrate as needed CTAP 12/23 showed right-sided obstructive uropathy with mild right hydroureteronephrosis to the level of two distal right ureteral stones measuring 2 and 6 mm, respectively  - s/p R ureteral stent placement on 12/24 and placement of indwelling riggins   - repeat US kidney/bladder showed resolution of R hydro  - appreciate urology recs; follow up outpt for elective stone extraction  - tylenol 975 q6 for mild pain, dilaudid 2mg PO q3 for mod pain, dilaudid 1 IV q3 for severe pain, titrate as needed  - urology appointment scheduled with Dr. Brian Enriquez on 1/14/21 at 11AM at [address] [place] [address] Presented with several days of urinary frequency and positive UA. Was initially started on CTX. Was broadened to zosyn on 12/22 for fever and persistent leukocytosis. Found to have obstructive R ureteral stones on further workup with CTAP 12/23. s/p R ureteral stent and riggins placement on 12/24. UCx from admission eventually grew >100k staph epi resistant to zosyn; switched zosyn to vanc by level on 12/24. Intra-op R ureteral urine gram stain and UCx 11/24 showed yeast. BCx 12/20 NGTD.  - c/w vanc by level (12/24 - ); goal level of 15-20  - c/w diflucan (12/26 -), monitor liver chemistry, recheck QTc on Monday  - f/u ID recs

## 2020-12-29 LAB
ALBUMIN SERPL ELPH-MCNC: 2.7 G/DL — LOW (ref 3.3–5)
ALP SERPL-CCNC: 120 U/L — SIGNIFICANT CHANGE UP (ref 40–120)
ALT FLD-CCNC: 11 U/L — SIGNIFICANT CHANGE UP (ref 4–33)
ANION GAP SERPL CALC-SCNC: 15 MMOL/L — HIGH (ref 7–14)
AST SERPL-CCNC: 16 U/L — SIGNIFICANT CHANGE UP (ref 4–32)
B PERT DNA SPEC QL NAA+PROBE: SIGNIFICANT CHANGE UP
BASOPHILS # BLD AUTO: 0.02 K/UL — SIGNIFICANT CHANGE UP (ref 0–0.2)
BASOPHILS NFR BLD AUTO: 0.1 % — SIGNIFICANT CHANGE UP (ref 0–2)
BILIRUB SERPL-MCNC: 0.3 MG/DL — SIGNIFICANT CHANGE UP (ref 0.2–1.2)
BUN SERPL-MCNC: 48 MG/DL — HIGH (ref 7–23)
C DIFF BY PCR RESULT: SIGNIFICANT CHANGE UP
C DIFF TOX GENS STL QL NAA+PROBE: SIGNIFICANT CHANGE UP
C PNEUM DNA SPEC QL NAA+PROBE: SIGNIFICANT CHANGE UP
CALCIUM SERPL-MCNC: 8.6 MG/DL — SIGNIFICANT CHANGE UP (ref 8.4–10.5)
CHLORIDE SERPL-SCNC: 104 MMOL/L — SIGNIFICANT CHANGE UP (ref 98–107)
CO2 SERPL-SCNC: 22 MMOL/L — SIGNIFICANT CHANGE UP (ref 22–31)
CREAT SERPL-MCNC: 2.66 MG/DL — HIGH (ref 0.5–1.3)
CULTURE RESULTS: SIGNIFICANT CHANGE UP
CULTURE RESULTS: SIGNIFICANT CHANGE UP
EOSINOPHIL # BLD AUTO: 0.11 K/UL — SIGNIFICANT CHANGE UP (ref 0–0.5)
EOSINOPHIL NFR BLD AUTO: 0.8 % — SIGNIFICANT CHANGE UP (ref 0–6)
FLUAV H1 2009 PAND RNA SPEC QL NAA+PROBE: SIGNIFICANT CHANGE UP
FLUAV H1 RNA SPEC QL NAA+PROBE: SIGNIFICANT CHANGE UP
FLUAV H3 RNA SPEC QL NAA+PROBE: SIGNIFICANT CHANGE UP
FLUAV SUBTYP SPEC NAA+PROBE: SIGNIFICANT CHANGE UP
FLUBV RNA SPEC QL NAA+PROBE: SIGNIFICANT CHANGE UP
GLUCOSE BLDC GLUCOMTR-MCNC: 102 MG/DL — HIGH (ref 70–99)
GLUCOSE BLDC GLUCOMTR-MCNC: 105 MG/DL — HIGH (ref 70–99)
GLUCOSE BLDC GLUCOMTR-MCNC: 117 MG/DL — HIGH (ref 70–99)
GLUCOSE BLDC GLUCOMTR-MCNC: 122 MG/DL — HIGH (ref 70–99)
GLUCOSE BLDC GLUCOMTR-MCNC: 133 MG/DL — HIGH (ref 70–99)
GLUCOSE SERPL-MCNC: 98 MG/DL — SIGNIFICANT CHANGE UP (ref 70–99)
HADV DNA SPEC QL NAA+PROBE: SIGNIFICANT CHANGE UP
HCOV PNL SPEC NAA+PROBE: SIGNIFICANT CHANGE UP
HCT VFR BLD CALC: 31.6 % — LOW (ref 34.5–45)
HGB BLD-MCNC: 9.8 G/DL — LOW (ref 11.5–15.5)
HMPV RNA SPEC QL NAA+PROBE: SIGNIFICANT CHANGE UP
HPIV1 RNA SPEC QL NAA+PROBE: SIGNIFICANT CHANGE UP
HPIV2 RNA SPEC QL NAA+PROBE: SIGNIFICANT CHANGE UP
HPIV3 RNA SPEC QL NAA+PROBE: SIGNIFICANT CHANGE UP
HPIV4 RNA SPEC QL NAA+PROBE: SIGNIFICANT CHANGE UP
IANC: 10.87 K/UL — HIGH (ref 1.5–8.5)
IMM GRANULOCYTES NFR BLD AUTO: 2.3 % — HIGH (ref 0–1.5)
LYMPHOCYTES # BLD AUTO: 1.59 K/UL — SIGNIFICANT CHANGE UP (ref 1–3.3)
LYMPHOCYTES # BLD AUTO: 11.8 % — LOW (ref 13–44)
MAGNESIUM SERPL-MCNC: 2 MG/DL — SIGNIFICANT CHANGE UP (ref 1.6–2.6)
MCHC RBC-ENTMCNC: 26.8 PG — LOW (ref 27–34)
MCHC RBC-ENTMCNC: 31 GM/DL — LOW (ref 32–36)
MCV RBC AUTO: 86.3 FL — SIGNIFICANT CHANGE UP (ref 80–100)
MONOCYTES # BLD AUTO: 0.63 K/UL — SIGNIFICANT CHANGE UP (ref 0–0.9)
MONOCYTES NFR BLD AUTO: 4.7 % — SIGNIFICANT CHANGE UP (ref 2–14)
NEUTROPHILS # BLD AUTO: 10.87 K/UL — HIGH (ref 1.8–7.4)
NEUTROPHILS NFR BLD AUTO: 80.3 % — HIGH (ref 43–77)
NRBC # BLD: 0 /100 WBCS — SIGNIFICANT CHANGE UP
NRBC # FLD: 0.02 K/UL — HIGH
PHOSPHATE SERPL-MCNC: 3.7 MG/DL — SIGNIFICANT CHANGE UP (ref 2.5–4.5)
PLATELET # BLD AUTO: 212 K/UL — SIGNIFICANT CHANGE UP (ref 150–400)
POTASSIUM SERPL-MCNC: 3.6 MMOL/L — SIGNIFICANT CHANGE UP (ref 3.5–5.3)
POTASSIUM SERPL-SCNC: 3.6 MMOL/L — SIGNIFICANT CHANGE UP (ref 3.5–5.3)
PROT SERPL-MCNC: 7.1 G/DL — SIGNIFICANT CHANGE UP (ref 6–8.3)
RAPID RVP RESULT: SIGNIFICANT CHANGE UP
RBC # BLD: 3.66 M/UL — LOW (ref 3.8–5.2)
RBC # FLD: 15.6 % — HIGH (ref 10.3–14.5)
RSV RNA SPEC QL NAA+PROBE: SIGNIFICANT CHANGE UP
RV+EV RNA SPEC QL NAA+PROBE: SIGNIFICANT CHANGE UP
SARS-COV-2 RNA SPEC QL NAA+PROBE: SIGNIFICANT CHANGE UP
SODIUM SERPL-SCNC: 141 MMOL/L — SIGNIFICANT CHANGE UP (ref 135–145)
SPECIMEN SOURCE: SIGNIFICANT CHANGE UP
WBC # BLD: 13.53 K/UL — HIGH (ref 3.8–10.5)
WBC # FLD AUTO: 13.53 K/UL — HIGH (ref 3.8–10.5)

## 2020-12-29 PROCEDURE — 99233 SBSQ HOSP IP/OBS HIGH 50: CPT

## 2020-12-29 PROCEDURE — 99233 SBSQ HOSP IP/OBS HIGH 50: CPT | Mod: GC

## 2020-12-29 PROCEDURE — 99232 SBSQ HOSP IP/OBS MODERATE 35: CPT

## 2020-12-29 RX ORDER — INSULIN GLARGINE 100 [IU]/ML
24 INJECTION, SOLUTION SUBCUTANEOUS AT BEDTIME
Refills: 0 | Status: DISCONTINUED | OUTPATIENT
Start: 2020-12-29 | End: 2020-12-31

## 2020-12-29 RX ORDER — CASPOFUNGIN ACETATE 7 MG/ML
50 INJECTION, POWDER, LYOPHILIZED, FOR SOLUTION INTRAVENOUS EVERY 24 HOURS
Refills: 0 | Status: DISCONTINUED | OUTPATIENT
Start: 2020-12-30 | End: 2020-12-30

## 2020-12-29 RX ORDER — CASPOFUNGIN ACETATE 7 MG/ML
INJECTION, POWDER, LYOPHILIZED, FOR SOLUTION INTRAVENOUS
Refills: 0 | Status: DISCONTINUED | OUTPATIENT
Start: 2020-12-29 | End: 2020-12-30

## 2020-12-29 RX ORDER — CASPOFUNGIN ACETATE 7 MG/ML
70 INJECTION, POWDER, LYOPHILIZED, FOR SOLUTION INTRAVENOUS ONCE
Refills: 0 | Status: COMPLETED | OUTPATIENT
Start: 2020-12-29 | End: 2020-12-29

## 2020-12-29 RX ORDER — SODIUM CHLORIDE 9 MG/ML
1000 INJECTION, SOLUTION INTRAVENOUS
Refills: 0 | Status: DISCONTINUED | OUTPATIENT
Start: 2020-12-29 | End: 2020-12-30

## 2020-12-29 RX ORDER — LANOLIN ALCOHOL/MO/W.PET/CERES
3 CREAM (GRAM) TOPICAL ONCE
Refills: 0 | Status: COMPLETED | OUTPATIENT
Start: 2020-12-29 | End: 2020-12-29

## 2020-12-29 RX ORDER — INSULIN LISPRO 100/ML
6 VIAL (ML) SUBCUTANEOUS
Refills: 0 | Status: DISCONTINUED | OUTPATIENT
Start: 2020-12-29 | End: 2020-12-31

## 2020-12-29 RX ADMIN — INSULIN GLARGINE 24 UNIT(S): 100 INJECTION, SOLUTION SUBCUTANEOUS at 23:25

## 2020-12-29 RX ADMIN — Medication 3 MILLIGRAM(S): at 02:40

## 2020-12-29 RX ADMIN — Medication 8 UNIT(S): at 09:21

## 2020-12-29 RX ADMIN — BENZOCAINE AND MENTHOL 1 LOZENGE: 5; 1 LIQUID ORAL at 02:40

## 2020-12-29 RX ADMIN — Medication 100 MILLIGRAM(S): at 05:45

## 2020-12-29 RX ADMIN — Medication 650 MILLIGRAM(S): at 05:45

## 2020-12-29 RX ADMIN — Medication 100 MILLIGRAM(S): at 17:39

## 2020-12-29 RX ADMIN — HEPARIN SODIUM 5000 UNIT(S): 5000 INJECTION INTRAVENOUS; SUBCUTANEOUS at 05:45

## 2020-12-29 RX ADMIN — HEPARIN SODIUM 5000 UNIT(S): 5000 INJECTION INTRAVENOUS; SUBCUTANEOUS at 23:25

## 2020-12-29 RX ADMIN — HEPARIN SODIUM 5000 UNIT(S): 5000 INJECTION INTRAVENOUS; SUBCUTANEOUS at 14:04

## 2020-12-29 RX ADMIN — SODIUM CHLORIDE 75 MILLILITER(S): 9 INJECTION, SOLUTION INTRAVENOUS at 12:58

## 2020-12-29 RX ADMIN — Medication 1 DROP(S): at 12:46

## 2020-12-29 RX ADMIN — TAMSULOSIN HYDROCHLORIDE 0.4 MILLIGRAM(S): 0.4 CAPSULE ORAL at 23:26

## 2020-12-29 RX ADMIN — Medication 1 TABLET(S): at 12:46

## 2020-12-29 RX ADMIN — Medication 650 MILLIGRAM(S): at 17:39

## 2020-12-29 RX ADMIN — ATORVASTATIN CALCIUM 10 MILLIGRAM(S): 80 TABLET, FILM COATED ORAL at 23:25

## 2020-12-29 RX ADMIN — AMLODIPINE BESYLATE 10 MILLIGRAM(S): 2.5 TABLET ORAL at 05:45

## 2020-12-29 RX ADMIN — BENZOCAINE AND MENTHOL 1 LOZENGE: 5; 1 LIQUID ORAL at 17:39

## 2020-12-29 RX ADMIN — CASPOFUNGIN ACETATE 260 MILLIGRAM(S): 7 INJECTION, POWDER, LYOPHILIZED, FOR SOLUTION INTRAVENOUS at 12:47

## 2020-12-29 NOTE — DIETITIAN INITIAL EVALUATION ADULT. - PROBLEM SELECTOR PLAN 1
BG =585 initially;  (HbA1c 8.6) in the setting of missing insulin;  Mild DKA criteria, w/ pH 7.3, beta-hydroxy butarate 0.9, intial AG 20  - s/p 3L fluids at the ED, c/w maintenance fluids w/ NS @ 100cc/hr while NPO, w/ close respiratory monitoring   - monitor on low dose ISS  - start w/ 6 units premeal, 30 units lantus at bedtime  - repeat BMP w/ closed AG, trend vbg w/ lactate  - Endocrinology c/s in AM  - Diabetes education in AM

## 2020-12-29 NOTE — PROVIDER CONTACT NOTE (OTHER) - SITUATION
Patient's blood pressure: 128/54.
Patient is a 80 year old female with an admitting diagnosis of altered mental status.
Patient is a 80 year old female with an admitting diagnosis of altered mental status and a UTI.
pt /33
Patient is a 80 year old female with an admitting diagnosis of altered mental status.
Patient's oxygen was: 90.

## 2020-12-29 NOTE — PROGRESS NOTE ADULT - ATTENDING COMMENTS
Jean Paul Ogden MD  Division of Endocrinology  Pager: [phone]    If after 6PM or before 9AM, or on weekends/holidays, please call endocrine answering service for assistance ([phone]).  For nonurgent matters email [email or web address] for assistance.

## 2020-12-29 NOTE — DIETITIAN INITIAL EVALUATION ADULT. - PROBLEM SELECTOR PLAN 2
(+) UA for bacteria, large LE, (+) RBCs; Symptoms: (+) urinary frequency;   - f/u Ucx  - Ceftriaxone IV

## 2020-12-29 NOTE — PROGRESS NOTE ADULT - PROBLEM SELECTOR PLAN 2
Reports loose stool over the past weekend with progression to watery stool on 12/28  - will check stool for cdiff  - c/w lactobacillus qd

## 2020-12-29 NOTE — PROGRESS NOTE ADULT - PROBLEM SELECTOR PLAN 1
Pt. with multifactorial ARABELLA on CKD in the setting of dehydration and obstructive uropathy. Upon lab review on [facility], Scr was 2.01 on 11/23/20. Scr on arrival was 3.46. Scr remained elevated at 3.46 on 12/24/20. CT Abdomen showed R hydronephrosis with obstructing stone and L exophytic mass. Pt. underwent stent placement by urology team on 12/24/20. Scr improved today to 2.66. Pt. with CKD likely secondary to longstanding DM. Pt. with multifactorial ARABELLA on CKD, now resolving. Encourage PO intake, can supplement IVF if pt. not tolerating PO. Monitor labs and urine output. Avoid potential nephrotoxins. Dose medications as per eGFR.

## 2020-12-29 NOTE — PROGRESS NOTE ADULT - SUBJECTIVE AND OBJECTIVE BOX
Patient is a 80y old  Female who presents with a chief complaint of Hyperglycemia (29 Dec 2020 07:02)       INTERVAL HPI/OVERNIGHT EVENTS:  Patient seen and evaluated at bedside.  Pt is resting comfortable in NAD. Denies N/V/F/C.     Allergies    No Known Allergies    Intolerances        Vital Signs Last 24 Hrs  T(C): 37.4 (29 Dec 2020 05:41), Max: 37.4 (29 Dec 2020 05:41)  T(F): 99.3 (29 Dec 2020 05:41), Max: 99.3 (29 Dec 2020 05:41)  HR: 95 (29 Dec 2020 05:41) (76 - 95)  BP: 145/46 (29 Dec 2020 05:41) (128/54 - 145/46)  BP(mean): --  RR: 18 (29 Dec 2020 05:41) (16 - 18)  SpO2: 95% (29 Dec 2020 05:41) (88% - 97%)    LABS:                        9.8    13.53 )-----------( 212      ( 29 Dec 2020 08:16 )             31.6     12-28    137  |  102  |  60<H>  ----------------------------<  109<H>  3.7   |  20<L>  |  2.97<H>    Ca    8.1<L>      28 Dec 2020 07:58  Phos  3.4     12-28  Mg     2.0     12-28    TPro  6.4  /  Alb  2.5<L>  /  TBili  0.2  /  DBili  x   /  AST  14  /  ALT  8   /  AlkPhos  105  12-28        CAPILLARY BLOOD GLUCOSE      POCT Blood Glucose.: 102 mg/dL (29 Dec 2020 01:47)  POCT Blood Glucose.: 87 mg/dL (28 Dec 2020 22:12)  POCT Blood Glucose.: 104 mg/dL (28 Dec 2020 18:20)  POCT Blood Glucose.: 103 mg/dL (28 Dec 2020 12:17)  POCT Blood Glucose.: 125 mg/dL (28 Dec 2020 09:23)      Lower Extremity Physical Exam:  Vascular: DP 1/4, PT 0/4 B/L  Neuro: Epicritic sensation reduced to the level of digits, B/L.  Musculoskeletal/Ortho: s/p LF partial first ray resection, and 3rd toe amp  Wound #1: Left posterior heel wound to subQ w/ central eschar.   Location: posterior left heel  Size: 4.0cm x 5cm  Depth: 0.2cm, down to subQ  Wound bed: fibrogranular w/ central eschar  Drainage: minimal  Odor: none  Periwound: no periwound erythema, xerosis noted  Etiology: pressure and diabetic foot ulcer

## 2020-12-29 NOTE — PROGRESS NOTE ADULT - ASSESSMENT
80 f with DM c/b diabetic neuropathy and multiple toes amputations, HTN, HLD, PVD, CKD (Baseline crt 1.8-2), morbid Obesity, presenting w/ hyperglycemia.   podiatry evaluated her for L heel chronic ulcer with eschar, no evidence of infection, xray no osteo  WBC initially 11 now 15 but no symptoms just has occasional R flank pain  abd/pelvis CT: Right-sided obstructive uropathy with mild right hydroureteronephrosis to the level of two distal right ureteral stones measuring 2 and 6 mm, respectively.  blood cx negative, urine cx with staph epi  s/p zosyn and ceftriaxone 12/20-12/24  plan for IR stent  pt was dizzy today and an episode of desaturation to 80s which improved after coughing    obstructive uropathy, nephrolithiasis  positive urine cx with staph epi, likely contaminant or colonization, s/p stent placement 12/24 with yasir purulence in OR and urine cx from kidney showed candida glabrata     * DC fluconazole and start caspofungin  * I asked the lab to do sensitivities on the glabrata if it is fluconazole dose dependent then will switch but for now will c/w caspo, there are a few articles supporting use of echinocandins for pyelonephritis   * monitor the renal function, it started to improve  * will need a 10-14 day course for the fungal UTI    The above assessment and plan was discussed with the primary team    Malini Ayers MD  Pager [phone]  After 5pm and on weekends call [phone]

## 2020-12-29 NOTE — DIETITIAN INITIAL EVALUATION ADULT. - ETIOLOGY
Endocrine dysfunction, DM, limited interest in implementing diet changes for DM Likely related to GI distress

## 2020-12-29 NOTE — PROGRESS NOTE ADULT - PROBLEM SELECTOR PLAN 6
scheduled chemotherapy Initially presented with mild DKA in the setting of UTI and medication noncompliance; a1c 8.6  - c/w lantus 26 qhs, admelog 8 tidac, low ISS per endo  - appreciate endo recs  - will need glucometer/supplies on discharge (Rx sent to [facility])

## 2020-12-29 NOTE — DIETITIAN INITIAL EVALUATION ADULT. - PROBLEM SELECTOR PLAN 4
Found w/ L heel ulcer  s/p vanc + zosyn in ED  Evaluated by Podiatry Sx, no concern for infection at this time  - Xray of heel showing no signs of OM  - wound care   - dressing per nursing staff, frequent turns

## 2020-12-29 NOTE — DIETITIAN INITIAL EVALUATION ADULT. - PERTINENT LABORATORY DATA
12-29 Na141 mmol/L Glu 98 mg/dL K+ 3.6 mmol/L Cr  2.66 mg/dL<H> BUN 48 mg/dL<H> 12-29 Phos 3.7 mg/dL 12-29 Alb 2.7 g/dL<L>        CAPILLARY BLOOD GLUCOSE  POCT Blood Glucose.: 105 mg/dL (29 Dec 2020 12:11)  POCT Blood Glucose.: 117 mg/dL (29 Dec 2020 09:08)  POCT Blood Glucose.: 102 mg/dL (29 Dec 2020 01:47)  POCT Blood Glucose.: 87 mg/dL (28 Dec 2020 22:12)  POCT Blood Glucose.: 104 mg/dL (28 Dec 2020 18:20)    A1C with Estimated Average Glucose (12.20.20 @ 15:17)    A1C with Estimated Average Glucose Result: 8.6: High Risk (prediabetic)    5.7 - 6.4 %  Diabetic, diagnostic           > 6.5 %  ADA diabetic treatment goal    < 7.0 %  HbA1C values may not accurately reflect mean blood glucose in patients  with Hb variants.  Suggest clinical correlation. %    Estimated Average Glucose: 200 mg/dL

## 2020-12-29 NOTE — PROGRESS NOTE ADULT - PROBLEM SELECTOR PLAN 9
DVT ppx: HSQ 5000 q8  Diet: carb consistent regular  Dispo: lives at home alone with regular assistance from her nephew; unclear if patient has home care services; PT recommending rehab; awaiting [facility] accepting facility  Code status: full code
DVT ppx: HSQ 5000 q8  Diet: carb consistent regular  Dispo: lives at home alone with regular assistance from her nephew; unclear if patient has home care services; PT recommending rehab; awaiting [facility] accepting facility  Code status: full code

## 2020-12-29 NOTE — DIETITIAN INITIAL EVALUATION ADULT. - PROBLEM SELECTOR PLAN 5
Found to be taking labetalol 200mg twice daily and losartan 100mg daily (was taking amlodipine 10mg but dc'ed herself)  - hold losartan in setting of ARABELLA  - c/w amlodipine and labetalol for now  - can check renin/aldosterone ratio in AM  - can persue further work-up for possible JOSY as outpatient

## 2020-12-29 NOTE — PROGRESS NOTE ADULT - SUBJECTIVE AND OBJECTIVE BOX
Chief Complaint: DM2    History: poor po intake, ate small amount of eggs for breakfast  no overt hypoglycemia events noted.    MEDICATIONS  (STANDING):  amLODIPine   Tablet 10 milliGRAM(s) Oral daily  atorvastatin 10 milliGRAM(s) Oral at bedtime  caspofungin IVPB      collagenase Ointment 1 Application(s) Topical daily  dextrose 40% Gel 15 Gram(s) Oral once  dextrose 5%. 1000 milliLiter(s) (50 mL/Hr) IV Continuous <Continuous>  dextrose 5%. 1000 milliLiter(s) (100 mL/Hr) IV Continuous <Continuous>  dextrose 50% Injectable 25 Gram(s) IV Push once  dextrose 50% Injectable 12.5 Gram(s) IV Push once  dextrose 50% Injectable 25 Gram(s) IV Push once  glucagon  Injectable 1 milliGRAM(s) IntraMuscular once  heparin   Injectable 5000 Unit(s) SubCutaneous every 8 hours  influenza   Vaccine 0.5 milliLiter(s) IntraMuscular once  insulin glargine Injectable (LANTUS) 24 Unit(s) SubCutaneous at bedtime  insulin lispro (ADMELOG) corrective regimen sliding scale   SubCutaneous three times a day before meals  insulin lispro (ADMELOG) corrective regimen sliding scale   SubCutaneous at bedtime  insulin lispro Injectable (ADMELOG) 6 Unit(s) SubCutaneous three times a day before meals  labetalol 100 milliGRAM(s) Oral two times a day  lactated ringers. 1000 milliLiter(s) (75 mL/Hr) IV Continuous <Continuous>  lactobacillus acidophilus 1 Tablet(s) Oral daily  sodium bicarbonate 650 milliGRAM(s) Oral two times a day  tamsulosin 0.4 milliGRAM(s) Oral at bedtime  timolol 0.5% Solution 1 Drop(s) Both EYES daily    MEDICATIONS  (PRN):  acetaminophen   Tablet .. 975 milliGRAM(s) Oral every 6 hours PRN Mild Pain (1 - 3)  albuterol/ipratropium for Nebulization 3 milliLiter(s) Nebulizer every 6 hours PRN Shortness of Breath and/or Wheezing  benzocaine 15 mG/menthol 3.6 mG (Sugar-Free) Lozenge 1 Lozenge Oral every 3 hours PRN Sore Throat  bisacodyl Suppository 10 milliGRAM(s) Rectal daily PRN Constipation  guaiFENesin   Syrup  (Sugar-Free) 200 milliGRAM(s) Oral every 6 hours PRN Cough/chest congestion  HYDROmorphone   Tablet 2 milliGRAM(s) Oral every 3 hours PRN Moderate Pain (4 - 6) refractory to tylenol  HYDROmorphone  Injectable 1 milliGRAM(s) IV Push every 3 hours PRN Severe Pain (7 - 10)  simethicone 80 milliGRAM(s) Chew every 8 hours PRN Gas  sodium chloride 3%  Inhalation 4 milliLiter(s) Inhalation every 6 hours PRN airway clearance per respiratory therapy      Allergies    No Known Allergies    Intolerances      Review of Systems:    ALL OTHER SYSTEMS REVIEWED AND NEGATIVE      PHYSICAL EXAM:  VITALS: T(C): 37.4 (12-29-20 @ 05:41)  T(F): 99.3 (12-29-20 @ 05:41), Max: 99.3 (12-29-20 @ 05:41)  HR: 95 (12-29-20 @ 05:41) (76 - 95)  BP: 145/46 (12-29-20 @ 05:41) (128/54 - 145/46)  RR:  (16 - 18)  SpO2:  (88% - 97%)  Wt(kg): --  GENERAL: NAD, well-groomed, well-developed  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, blind , anicteric  HEENT:  Atraumatic, Normocephalic, moist mucous membranes  RESPIRATORY: nonlabored respirations  PSYCH: Alert and oriented x 3, normal affect, normal mood    CAPILLARY BLOOD GLUCOSE      POCT Blood Glucose.: 105 mg/dL (29 Dec 2020 12:11)  POCT Blood Glucose.: 117 mg/dL (29 Dec 2020 09:08)  POCT Blood Glucose.: 102 mg/dL (29 Dec 2020 01:47)  POCT Blood Glucose.: 87 mg/dL (28 Dec 2020 22:12)  POCT Blood Glucose.: 104 mg/dL (28 Dec 2020 18:20)      12-29    141  |  104  |  48<H>  ----------------------------<  98  3.6   |  22  |  2.66<H>    EGFR if : 19<L>  EGFR if non : 16<L>    Ca    8.6      12-29  Mg     2.0     12-29  Phos  3.7     12-29    TPro  7.1  /  Alb  2.7<L>  /  TBili  0.3  /  DBili  x   /  AST  16  /  ALT  11  /  AlkPhos  120  12-29      A1C with Estimated Average Glucose Result: 8.6 % (12-20-20 @ 15:17)  A1C with Estimated Average Glucose: 9.3 % (09-17-20 @ 05:52)      Thyroid Function Tests:

## 2020-12-29 NOTE — PROVIDER CONTACT NOTE (OTHER) - REASON
Patient's blood pressure: 128/54.
pt /33
Patient's oxygen was: 90.
Patients BP /52 and fingerstick was 85.
Patients FS was 87, 02 SAT was 88%, and blood pressure was 150/51.
Patient BP is 101.7 and BP is 148/44

## 2020-12-29 NOTE — PROGRESS NOTE ADULT - PROBLEM SELECTOR PLAN 1
Mixed etiology including hypovolemia (diarrhea and poor oral intake) vs component of pyelo vs sepsis related ATN  - hold home losartan  - appreciate renal recs; will take some time for kidney function to recover from obstructive stones, will assist in hydration with IVF given small PO intake and loose stools  - started bicarb 650 BID  - monitor BMP, I/Os, renally dose meds, avoid nephrotoxins

## 2020-12-29 NOTE — PROGRESS NOTE ADULT - PROBLEM SELECTOR PLAN 4
CTAP 12/23 showed right-sided obstructive uropathy with mild right hydroureteronephrosis to the level of two distal right ureteral stones measuring 2 and 6 mm, respectively  - s/p R ureteral stent placement on 12/24 and placement of indwelling riggins   - repeat US kidney/bladder showed resolution of R hydro  - appreciate urology recs; follow up outpt for elective stone extraction  - tylenol 975 q6 for mild pain, dilaudid 2mg PO q3 for mod pain, dilaudid 1 IV q3 for severe pain, titrate as needed  - urology appointment scheduled with Dr. Brian Enriquez on 1/14/21 at 11AM at [address] [place] [address]

## 2020-12-29 NOTE — PROGRESS NOTE ADULT - SUBJECTIVE AND OBJECTIVE BOX
Follow Up:  UTI    Interval History: the urine from the kidney came back with candida glabrata    ROS:      All other systems negative    Constitutional: no fever, no chills  Cardiovascular:  no chest pain, no palpitation  Respiratory:  no SOB, no cough  GI:  no abd pain, no vomiting, no diarrhea  urinary: no dysuria, no hematuria, no flank pain  musculoskeletal:  no joint pain, no joint swelling  skin:  no rash, chronic heel wound  neurology:  no headache, no seizure        Allergies  No Known Allergies        ANTIMICROBIALS:  caspofungin IVPB        OTHER MEDS:  acetaminophen   Tablet .. 975 milliGRAM(s) Oral every 6 hours PRN  albuterol/ipratropium for Nebulization 3 milliLiter(s) Nebulizer every 6 hours PRN  amLODIPine   Tablet 10 milliGRAM(s) Oral daily  atorvastatin 10 milliGRAM(s) Oral at bedtime  benzocaine 15 mG/menthol 3.6 mG (Sugar-Free) Lozenge 1 Lozenge Oral every 3 hours PRN  bisacodyl Suppository 10 milliGRAM(s) Rectal daily PRN  collagenase Ointment 1 Application(s) Topical daily  dextrose 40% Gel 15 Gram(s) Oral once  dextrose 5%. 1000 milliLiter(s) IV Continuous <Continuous>  dextrose 5%. 1000 milliLiter(s) IV Continuous <Continuous>  dextrose 50% Injectable 25 Gram(s) IV Push once  dextrose 50% Injectable 12.5 Gram(s) IV Push once  dextrose 50% Injectable 25 Gram(s) IV Push once  glucagon  Injectable 1 milliGRAM(s) IntraMuscular once  guaiFENesin   Syrup  (Sugar-Free) 200 milliGRAM(s) Oral every 6 hours PRN  heparin   Injectable 5000 Unit(s) SubCutaneous every 8 hours  HYDROmorphone   Tablet 2 milliGRAM(s) Oral every 3 hours PRN  HYDROmorphone  Injectable 1 milliGRAM(s) IV Push every 3 hours PRN  influenza   Vaccine 0.5 milliLiter(s) IntraMuscular once  insulin glargine Injectable (LANTUS) 24 Unit(s) SubCutaneous at bedtime  insulin lispro (ADMELOG) corrective regimen sliding scale   SubCutaneous three times a day before meals  insulin lispro (ADMELOG) corrective regimen sliding scale   SubCutaneous at bedtime  insulin lispro Injectable (ADMELOG) 6 Unit(s) SubCutaneous three times a day before meals  labetalol 100 milliGRAM(s) Oral two times a day  lactated ringers. 1000 milliLiter(s) IV Continuous <Continuous>  lactobacillus acidophilus 1 Tablet(s) Oral daily  simethicone 80 milliGRAM(s) Chew every 8 hours PRN  sodium bicarbonate 650 milliGRAM(s) Oral two times a day  sodium chloride 3%  Inhalation 4 milliLiter(s) Inhalation every 6 hours PRN  tamsulosin 0.4 milliGRAM(s) Oral at bedtime  timolol 0.5% Solution 1 Drop(s) Both EYES daily      Vital Signs Last 24 Hrs  T(C): 37.4 (29 Dec 2020 05:41), Max: 37.4 (29 Dec 2020 05:41)  T(F): 99.3 (29 Dec 2020 05:41), Max: 99.3 (29 Dec 2020 05:41)  HR: 95 (29 Dec 2020 05:41) (76 - 95)  BP: 145/46 (29 Dec 2020 05:41) (128/54 - 145/46)  BP(mean): --  RR: 18 (29 Dec 2020 05:41) (16 - 18)  SpO2: 95% (29 Dec 2020 05:41) (88% - 97%)    Physical Exam:  General:    NAD, non toxic  Cardio:    regular S1,S2, no murmur  Respiratory:   clear b/l, no wheezing  abd:   soft, BS +, not tender  :     no CVAT, no suprapubic tenderness,  riggins  Musculoskeletal : multiple toe amputations, L heel ulcer with eschar, no purulence  Skin:    no rash  vascular: no phlebitis                        9.8    13.53 )-----------( 212      ( 29 Dec 2020 08:16 )             31.6       12-29    141  |  104  |  48<H>  ----------------------------<  98  3.6   |  22  |  2.66<H>    Ca    8.6      29 Dec 2020 08:16  Phos  3.7     12-29  Mg     2.0     12-29    TPro  7.1  /  Alb  2.7<L>  /  TBili  0.3  /  DBili  x   /  AST  16  /  ALT  11  /  AlkPhos  120  12-29          MICROBIOLOGY:  v  .Smear RIGHT PROXIMAL URETERAL URINE  CULTURE  12-24-20   Numerous Candida glabrata  Susceptibility to follow.  --    Moderate polymorphonuclear leukocytes seen per low power field  Moderate Yeast like cells seen per oil power field      .Urine RIGHT PROXIMEAL URETERAL URINE CULTURE  12-24-20   10,000 - 49,000 CFU/mL Yeast-like cells, presumptively not Candida  albicans "Susceptibilities not performed"  --  --      .Urine Clean Catch (Midstream)  12-20-20   >100,000 CFU/ml Staphylococcus epidermidis  --  Staphylococcus epidermidis      .Blood Blood-Peripheral  12-20-20   No Growth Final  --  --          Rapid RVP Result: NotDete (12-29 @ 08:32)        RADIOLOGY:  Images independently visualized and reviewed personally, findings as below  < from: US Kidney and Bladder (12.27.20 @ 12:15) >  IMPRESSION:    Normal renal ultrasound.      < end of copied text >  < from: CT Abdomen and Pelvis No Cont (12.23.20 @ 10:44) >  IMPRESSION:  Right-sided obstructive uropathy with mild right hydroureteronephrosis to the level of two distal right ureteral stones measuring 2 and 6 mm, respectively.

## 2020-12-29 NOTE — PROGRESS NOTE ADULT - PROBLEM SELECTOR PLAN 3
Presented with several days of urinary frequency and positive UA. Was initially started on CTX. Was broadened to zosyn on 12/22 for fever and persistent leukocytosis. Found to have obstructive R ureteral stones on further workup with CTAP 12/23. s/p R ureteral stent and riggins placement on 12/24. UCx from admission eventually grew >100k staph epi resistant to zosyn; switched zosyn to vanc by level on 12/24. Intra-op R ureteral urine gram stain and UCx 11/24 showed yeast. BCx 12/20 NGTD.  - s/p 5d vanc by level 12/24 - 12/28  - c/w diflucan 12/26 - 1/8, monitor liver chemistry, QTc on 12/28 453  - appreciate ID recs Presented with several days of urinary frequency and positive UA. Was initially started on CTX. Was broadened to zosyn on 12/22 for fever and persistent leukocytosis. Found to have obstructive R ureteral stones on further workup with CTAP 12/23. s/p R ureteral stent and riggins placement on 12/24. UCx from admission eventually grew >100k staph epi resistant to zosyn; switched zosyn to vanc by level on 12/24. s/p 5d vanc by level 12/24 - 12/28. Intra-op R ureteral urine gram stain and UCx 12/24 speciated candida glabrata. BCx 12/20 NGTD.  - c/w diflucan 12/26 - 1/8, monitor liver chemistry, QTc on 12/28 453  - f/u UCx sensitivities  - appreciate ID recs Presented with several days of urinary frequency and positive UA. Was initially started on CTX. Was broadened to zosyn on 12/22 for fever and persistent leukocytosis. Found to have obstructive R ureteral stones on further workup with CTAP 12/23. s/p R ureteral stent and riggins placement on 12/24. UCx from admission eventually grew >100k staph epi resistant to zosyn; switched zosyn to vanc by level on 12/24. s/p 5d vanc by level 12/24 - 12/28. Intra-op R ureteral urine gram stain and UCx 12/24 speciated candida glabrata. BCx 12/20 NGTD.  - s/p diflucan 12/26 - 12/28; switched to caspofungin through 1/8  - f/u UCx sensitivities  - appreciate ID recs Presented with several days of urinary frequency and positive UA. Was initially started on CTX. Was broadened to zosyn on 12/22 for fever and persistent leukocytosis. Found to have obstructive R ureteral stones on further workup with CTAP 12/23. s/p R ureteral stent and riggins placement on 12/24. UCx from admission eventually grew >100k staph epi resistant to zosyn; switched zosyn to vanc by level on 12/24. s/p 5d vanc by level 12/24 - 12/28. Intra-op R ureteral urine gram stain and UCx 12/24 speciated candida glabrata. BCx 12/20 NGTD.  - s/p diflucan 12/26 - 12/28; switched to caspofungin on 12/29 for a 10-day course due to speciation of candida glabata (tends to be resistant to fluconazole; capso also has poor urine penetration; depending on sensitivities may switch back to fluconazole at a higher dose)  - f/u UCx sensitivities  - appreciate ID recs

## 2020-12-29 NOTE — DIETITIAN INITIAL EVALUATION ADULT. - PROBLEM SELECTOR PLAN 3
Likely pre-renal due to glycosuria and DKA, poor oral intake;  - s/p 3L fluids, so urine lytes will be unreliable  - monitor renal function  - glycemic control, plan as above   - hold losartan  - US kidney/bladder showing no hydronephrosis  - strict I+O's  - trend CMP

## 2020-12-29 NOTE — DIETITIAN INITIAL EVALUATION ADULT. - OTHER INFO
RD visited with patient for length of stay nutrition assessment. Patient reported overall poor appetite over course of admission which she attributes to persistent diarrhea.  Denies nausea, vomiting.  Patient pending w/u for C-diff infection. Patient did not wish to receive diet education re: consistent CHO diet at time of visit; stated she was previously educated in the past, however, has not made any diet modifications in the past, and did not seem motivated to pursue diet modifications.  Informed patient that RD remains available should she wish to receive additional education and/or diet education materials in regard to consistent CHO diet recommendations.  She stated that she prepares most meals at home and indicated that she eats 3 meals per day "sometimes". Encouraged patient to try her best to consume 3 meals per day with a snack to help promote glycemic control. Patient stated she is unsure of usual body weight - does not recall when she was last weighed.  Per last RD assessment - weight 9/16/20 - 113.3kg; during that time, patient reported to RD that weight loss achieved via diet modifications made to support weight loss.

## 2020-12-29 NOTE — PROVIDER CONTACT NOTE (OTHER) - DATE AND TIME:
28-Dec-2020 22:25
26-Dec-2020 23:13
28-Dec-2020 18:15
21-Dec-2020 23:10
28-Dec-2020 15:10
22-Dec-2020 21:50

## 2020-12-29 NOTE — PROVIDER CONTACT NOTE (OTHER) - ASSESSMENT
Patient BP is 101.7 and BP is 148/44. Patient is currently complaining of constipation and shows no s/s of acute distress.
Patients BP /52 and fingerstick was 85. Patient shows no s/s of acute distress.
Patient's oxygen was: 90.
Patient's blood pressure: 128/54.
Patients FS was 87, 02 SAT was 88%, and blood pressure was 150/51. Pt currently shows no s/s of acute distress. Pt is sitting up and in an upright position.
pt asymptomatic

## 2020-12-29 NOTE — PROVIDER CONTACT NOTE (OTHER) - BACKGROUND
admitted for altered mental status
Patient has a h/o PVD, diabetes, and a urolithiasis.
Patient has a h/o peripheral vascular disease, diabetes, and hypertension.
Patient was admitted for Altered Mental Status.
Patient was admitted for altered mental status.
Patient has a h/o PVD, hypertension, and Type 2 DM.

## 2020-12-29 NOTE — DIETITIAN INITIAL EVALUATION ADULT. - ORAL NUTRITION SUPPLEMENTS
May consider adding Glucerna Therapeutic Nutrition 240mls 1x daily (220kcals, 10g protein) and may further increase if well tolerated.

## 2020-12-29 NOTE — PROGRESS NOTE ADULT - SUBJECTIVE AND OBJECTIVE BOX
[facility] DIVISION OF KIDNEY DISEASES AND HYPERTENSION -- FOLLOW UP NOTE  --------------------------------------------------------------------------------  HPI: 80-year-old female with long standing DM, HTN, legally blind, obesity, and diabetic foot ulcer was admitted to [facility] on 12/20/20 for hyperglycemia. Nephrology team consulted for elevated serum creatinine. Upon review of labs on [facility], Scr was 2.01 on 11/23/20. On admission, Scr was 3.46. Of note, pt. went for CT Abdomen which shows mild R hydronephrosis and 2 stones in the distal ureter. Scr was to elevated at 3.42 on 12/24/20. Pt. underwent stent placement by urology team on 12/24/20. Scr has improved to 2.66 today.    Pt. evaluated at bedside, in no acute distress. Complains of diarrhea, currently undergoing testing for C.diff.    PAST HISTORY  --------------------------------------------------------------------------------  No significant changes to PMH, PSH, FHx, SHx, unless otherwise noted    ALLERGIES & MEDICATIONS  --------------------------------------------------------------------------------  Allergies    No Known Allergies    Intolerances    Standing Inpatient Medications  amLODIPine   Tablet 10 milliGRAM(s) Oral daily  atorvastatin 10 milliGRAM(s) Oral at bedtime  caspofungin IVPB      collagenase Ointment 1 Application(s) Topical daily  glucagon  Injectable 1 milliGRAM(s) IntraMuscular once  heparin   Injectable 5000 Unit(s) SubCutaneous every 8 hours  influenza   Vaccine 0.5 milliLiter(s) IntraMuscular once  insulin glargine Injectable (LANTUS) 26 Unit(s) SubCutaneous at bedtime  insulin lispro (ADMELOG) corrective regimen sliding scale   SubCutaneous three times a day before meals  insulin lispro (ADMELOG) corrective regimen sliding scale   SubCutaneous at bedtime  insulin lispro Injectable (ADMELOG) 8 Unit(s) SubCutaneous three times a day before meals  labetalol 100 milliGRAM(s) Oral two times a day  lactated ringers. 1000 milliLiter(s) IV Continuous <Continuous>  lactobacillus acidophilus 1 Tablet(s) Oral daily  sodium bicarbonate 650 milliGRAM(s) Oral two times a day  tamsulosin 0.4 milliGRAM(s) Oral at bedtime  timolol 0.5% Solution 1 Drop(s) Both EYES daily    REVIEW OF SYSTEMS  --------------------------------------------------------------------------------  Gen: no lethargy  Respiratory: No dyspnea  CV: No chest pain  GI: No abdominal pain  MSK: no LE edema  Neuro: + legally blind  Heme: No bleeding    All other systems were reviewed and are negative, except as noted.    VITALS/PHYSICAL EXAM  --------------------------------------------------------------------------------  T(C): 37.4 (12-29-20 @ 05:41), Max: 37.4 (12-29-20 @ 05:41)  HR: 95 (12-29-20 @ 05:41) (76 - 95)  BP: 145/46 (12-29-20 @ 05:41) (128/54 - 145/46)  RR: 18 (12-29-20 @ 05:41) (16 - 18)  SpO2: 95% (12-29-20 @ 05:41) (88% - 97%)  Wt(kg): --    12-28-20 @ 07:01  -  12-29-20 @ 07:00  --------------------------------------------------------  IN: 0 mL / OUT: 800 mL / NET: -800 mL    Physical Exam:  	Gen: NAD, obese  	HEENT: MMM  	Pulm: good air entry B/L  	CV: S1S2  	Abd: Soft, +BS               : riggins catheter +  	Ext: No LE edema B/L, left heel ulcer  	Neuro: Awake  	Skin: Warm and dry    LABS/STUDIES  --------------------------------------------------------------------------------              9.8    13.53 >-----------<  212      [12-29-20 @ 08:16]              31.6     141  |  104  |  48  ----------------------------<  98      [12-29-20 @ 08:16]  3.6   |  22  |  2.66        Ca     8.6     [12-29-20 @ 08:16]      Mg     2.0     [12-29-20 @ 08:16]      Phos  3.7     [12-29-20 @ 08:16]    Creatinine Trend:  SCr 2.66 [12-29 @ 08:16]  SCr 2.97 [12-28 @ 07:58]  SCr 3.49 [12-27 @ 06:47]  SCr 3.36 [12-26 @ 06:27]  SCr 3.58 [12-25 @ 17:22]

## 2020-12-29 NOTE — PROGRESS NOTE ADULT - ASSESSMENT
Pt is 79yo who presents w/ Left heel wound to subQ    -pt was seen and examined  -Left posterior heel wound down to subQ, w/ fibrogranular wound bed and central eschar, wound does not track, or probe beyond SubQ, no erythema, no malodor, no local signs of infection  -LFXR: no gas, or OM, final   -Little to no suspicion for underlying bone infection  -Pod plan for local wound care, santyl and DSD daily  -Will follow while in house  -Z floats all times

## 2020-12-29 NOTE — PROVIDER CONTACT NOTE (OTHER) - RECOMMENDATIONS
Recheck pt 02 SAT, in half an hr, and continue to administer pt 26 units of Lantus as ordered.
notify MD
Waiting on further recommendations from notified MD.
Please come and see patient.
Waiting on further recommendations from notified MD.
Patient's is asymptomatic, continue to monitor.

## 2020-12-29 NOTE — PROVIDER CONTACT NOTE (OTHER) - ACTION/TREATMENT ORDERED:
Recheck pt 02 SAT, in half an hr, and continue to administer pt 26 units of Lantus as ordered.
Waiting on further recommendations from notified MD.
No further evaluation at this time.
Will come and see patient.
receck BP in 1 hour
Waiting on further recommendations from notified MD.

## 2020-12-29 NOTE — PROGRESS NOTE ADULT - PROBLEM SELECTOR PLAN 5
Resolved hypoxemia to upper 80s to low 90s on RA suspected multifactorial including possible small pulm edema (seen on CXR 12/25) in setting of ARABELLA on CKD vs atelectasis vs mucus plugging. No known prior history of hypoxemia or need for supplemental oxygen per patient. Former smoker for about 10 years who quit decades ago.  - now on RA with improved bibasilar crackles on exam  - c/w airway clearance with respiratory therapy  - c/w incentive spirometry; patient displayed ability to use; encouraged use  - guafenesin PRN  - check RVP/COVID PCR

## 2020-12-29 NOTE — PROGRESS NOTE ADULT - PROBLEM SELECTOR PLAN 2
In setting of CKD. Serum CO2 noted improved to 22 today. Pt. on sodium bicarbonate 650mg PO BID. Monitor serum CO2.    If any questions, please feel free to contact me  Wilberto Gooden   Nephrology Fellow  [phone]  (After 5 pm or on weekends please page the on-call fellow).

## 2020-12-29 NOTE — PROGRESS NOTE ADULT - SUBJECTIVE AND OBJECTIVE BOX
Andrés Malhotra, PGY3  Pager [phone]      Patient is a 80y old  Female who presents with a chief complaint of Hyperglycemia (29 Dec 2020 07:02)      SUBJECTIVE/INTERVAL EVENTS: Patient seen and examined at bedside. ADRIANA. Reports stable sore throat and intermittent cough. Reports 4-5 loose stools over the past day that evolved from watery to slightly formed. Denies other active complaints.    MEDICATIONS  (STANDING):  amLODIPine   Tablet 10 milliGRAM(s) Oral daily  atorvastatin 10 milliGRAM(s) Oral at bedtime  collagenase Ointment 1 Application(s) Topical daily  dextrose 40% Gel 15 Gram(s) Oral once  dextrose 5%. 1000 milliLiter(s) (50 mL/Hr) IV Continuous <Continuous>  dextrose 5%. 1000 milliLiter(s) (100 mL/Hr) IV Continuous <Continuous>  dextrose 50% Injectable 25 Gram(s) IV Push once  dextrose 50% Injectable 12.5 Gram(s) IV Push once  dextrose 50% Injectable 25 Gram(s) IV Push once  fluconAZOLE   Tablet 200 milliGRAM(s) Oral daily  glucagon  Injectable 1 milliGRAM(s) IntraMuscular once  heparin   Injectable 5000 Unit(s) SubCutaneous every 8 hours  influenza   Vaccine 0.5 milliLiter(s) IntraMuscular once  insulin glargine Injectable (LANTUS) 26 Unit(s) SubCutaneous at bedtime  insulin lispro (ADMELOG) corrective regimen sliding scale   SubCutaneous three times a day before meals  insulin lispro (ADMELOG) corrective regimen sliding scale   SubCutaneous at bedtime  insulin lispro Injectable (ADMELOG) 8 Unit(s) SubCutaneous three times a day before meals  labetalol 100 milliGRAM(s) Oral two times a day  lactated ringers. 1000 milliLiter(s) (100 mL/Hr) IV Continuous <Continuous>  lactobacillus acidophilus 1 Tablet(s) Oral daily  sodium bicarbonate 650 milliGRAM(s) Oral two times a day  tamsulosin 0.4 milliGRAM(s) Oral at bedtime  timolol 0.5% Solution 1 Drop(s) Both EYES daily    MEDICATIONS  (PRN):  acetaminophen   Tablet .. 975 milliGRAM(s) Oral every 6 hours PRN Mild Pain (1 - 3)  albuterol/ipratropium for Nebulization 3 milliLiter(s) Nebulizer every 6 hours PRN Shortness of Breath and/or Wheezing  benzocaine 15 mG/menthol 3.6 mG (Sugar-Free) Lozenge 1 Lozenge Oral every 3 hours PRN Sore Throat  bisacodyl Suppository 10 milliGRAM(s) Rectal daily PRN Constipation  guaiFENesin   Syrup  (Sugar-Free) 200 milliGRAM(s) Oral every 6 hours PRN Cough/chest congestion  HYDROmorphone   Tablet 2 milliGRAM(s) Oral every 3 hours PRN Moderate Pain (4 - 6) refractory to tylenol  HYDROmorphone  Injectable 1 milliGRAM(s) IV Push every 3 hours PRN Severe Pain (7 - 10)  simethicone 80 milliGRAM(s) Chew every 8 hours PRN Gas  sodium chloride 3%  Inhalation 4 milliLiter(s) Inhalation every 6 hours PRN airway clearance per respiratory therapy      VITAL SIGNS:  T(F): 99.3 (12-29-20 @ 05:41), Max: 99.3 (12-29-20 @ 05:41)  HR: 95 (12-29-20 @ 05:41) (76 - 95)  BP: 145/46 (12-29-20 @ 05:41) (128/54 - 145/46)  RR: 18 (12-29-20 @ 05:41) (16 - 18)  SpO2: 95% (12-29-20 @ 05:41) (88% - 97%)    I&O's Summary    28 Dec 2020 07:01  -  29 Dec 2020 07:00  --------------------------------------------------------  IN: 0 mL / OUT: 800 mL / NET: -800 mL      Daily     Daily     PHYSICAL EXAM:  Gen: Alert, NAD  HEENT: NCAT, conjunctiva clear, sclera anicteric, no erythema or exudates in the oropharynx  Neck: Supple, no JVD  CV: RRR, S1S2, no m/r/g  Resp: CTAB, normal respiratory effort on RA  Abd: Soft, mild generalized abdominal tenderness, nondistended, bowel sounds intact  Ext: no edema, no clubbing or cyanosis  Neuro: Alert, appropriate speech, CN2-12 grossly intact, IGNACIO  SKIN: warm, perfused, L heel ulcer dressing c/d/i    LABS:                        9.1    13.33 )-----------( 188      ( 28 Dec 2020 07:58 )             29.6     Hgb Trend: 9.1<--, 9.1<--, 9.4<--, 9.1<--, 9.2<--  12-28    137  |  102  |  60<H>  ----------------------------<  109<H>  3.7   |  20<L>  |  2.97<H>    Ca    8.1<L>      28 Dec 2020 07:58  Phos  3.4     12-28  Mg     2.0     12-28    TPro  6.4  /  Alb  2.5<L>  /  TBili  0.2  /  DBili  x   /  AST  14  /  ALT  8   /  AlkPhos  105  12-28    Creatinine Trend: 2.97<--, 3.49<--, 3.36<--, 3.58<--, 3.66<--, 3.42<--  LIVER FUNCTIONS - ( 28 Dec 2020 07:58 )  Alb: 2.5 g/dL / Pro: 6.4 g/dL / ALK PHOS: 105 U/L / ALT: 8 U/L / AST: 14 U/L / GGT: x                     CAPILLARY BLOOD GLUCOSE      POCT Blood Glucose.: 102 mg/dL (29 Dec 2020 01:47)  POCT Blood Glucose.: 87 mg/dL (28 Dec 2020 22:12)  POCT Blood Glucose.: 104 mg/dL (28 Dec 2020 18:20)  POCT Blood Glucose.: 103 mg/dL (28 Dec 2020 12:17)  POCT Blood Glucose.: 125 mg/dL (28 Dec 2020 09:23)      RADIOLOGY & ADDITIONAL TESTS: Reviewed    Imaging Personally Reviewed:    Consultant(s) Notes Reviewed:      Care Discussed with Consultants/Other Providers:

## 2020-12-29 NOTE — PROGRESS NOTE ADULT - ASSESSMENT
81 yo F with history of uncontrolled DM Type 2 c/b diabetic neuropathy and multiple toes amputations, HTN, HLD, PVD, CKD3(Baseline crt 1.8-2), morbid Obesity, presenting w/ hyperglycemia. Endocrine consult requested for the management of hyperglycemia in patient with DM2.       1) Uncontrolled DM2 c/b Neuropathy, PVD, retinopathy and CKD3  A1c 8.6% in setting of ARABELLA on CKD  FS premeal and qhs   FS goal 100-200  While inpatient: BG too tightly controlled  Reduce Lantus to 24 units qhs   Reduce Admelog to 6 units TIDac (doses have been held recently for now eating)  Recommend continue low correctional scale TIDac and low qhs scale.    On discharge will resume home insulin (Novolin N vial and syringes), finalize dose before dc.   Prior regimen was Novolin N vial  40 units qam and 30 units qhs (nephew helps her at home).    Currently requirements are lower due to decreased po intake.  We acknowledge this regimen is not ideal, however given patient visual deficits limited in options therefore recommend continue current regimen. Also will consider adding Tradjenta 5mg daily PO at discharge.  Patient states she tried insulin pens in the past, however unable to visualize the numbers on the pen to adjust doses.   Patient does not want to do basal/bolus insulin as it would require four times daily injections. Has audible glucometer, however has difficulty inserting test strips.   Patient can continue to follow up with PCP whom does house calls on discharge, as patient unable to make it to office visit .     2) HTN   goal 130/80  Continue Amlodipine, labetalol  held losartan in light of ARABELLA     3) HLD   Continue atorvastatin

## 2020-12-29 NOTE — DIETITIAN INITIAL EVALUATION ADULT. - PERTINENT MEDS FT
MEDICATIONS  (STANDING):  amLODIPine   Tablet 10 milliGRAM(s) Oral daily  atorvastatin 10 milliGRAM(s) Oral at bedtime  caspofungin IVPB      collagenase Ointment 1 Application(s) Topical daily  dextrose 40% Gel 15 Gram(s) Oral once  dextrose 5%. 1000 milliLiter(s) (50 mL/Hr) IV Continuous <Continuous>  dextrose 5%. 1000 milliLiter(s) (100 mL/Hr) IV Continuous <Continuous>  dextrose 50% Injectable 25 Gram(s) IV Push once  dextrose 50% Injectable 12.5 Gram(s) IV Push once  dextrose 50% Injectable 25 Gram(s) IV Push once  glucagon  Injectable 1 milliGRAM(s) IntraMuscular once  heparin   Injectable 5000 Unit(s) SubCutaneous every 8 hours  influenza   Vaccine 0.5 milliLiter(s) IntraMuscular once  insulin glargine Injectable (LANTUS) 24 Unit(s) SubCutaneous at bedtime  insulin lispro (ADMELOG) corrective regimen sliding scale   SubCutaneous three times a day before meals  insulin lispro (ADMELOG) corrective regimen sliding scale   SubCutaneous at bedtime  insulin lispro Injectable (ADMELOG) 6 Unit(s) SubCutaneous three times a day before meals  labetalol 100 milliGRAM(s) Oral two times a day  lactated ringers. 1000 milliLiter(s) (75 mL/Hr) IV Continuous <Continuous>  lactobacillus acidophilus 1 Tablet(s) Oral daily  sodium bicarbonate 650 milliGRAM(s) Oral two times a day  tamsulosin 0.4 milliGRAM(s) Oral at bedtime  timolol 0.5% Solution 1 Drop(s) Both EYES daily    MEDICATIONS  (PRN):  acetaminophen   Tablet .. 975 milliGRAM(s) Oral every 6 hours PRN Mild Pain (1 - 3)  albuterol/ipratropium for Nebulization 3 milliLiter(s) Nebulizer every 6 hours PRN Shortness of Breath and/or Wheezing  benzocaine 15 mG/menthol 3.6 mG (Sugar-Free) Lozenge 1 Lozenge Oral every 3 hours PRN Sore Throat  bisacodyl Suppository 10 milliGRAM(s) Rectal daily PRN Constipation  guaiFENesin   Syrup  (Sugar-Free) 200 milliGRAM(s) Oral every 6 hours PRN Cough/chest congestion  HYDROmorphone   Tablet 2 milliGRAM(s) Oral every 3 hours PRN Moderate Pain (4 - 6) refractory to tylenol  HYDROmorphone  Injectable 1 milliGRAM(s) IV Push every 3 hours PRN Severe Pain (7 - 10)  simethicone 80 milliGRAM(s) Chew every 8 hours PRN Gas  sodium chloride 3%  Inhalation 4 milliLiter(s) Inhalation every 6 hours PRN airway clearance per respiratory therapy

## 2020-12-30 LAB
-  5-FLUCYTOSINE: SIGNIFICANT CHANGE UP
-  AMPHOTERICIN B: SIGNIFICANT CHANGE UP
-  ANIDULAFUNGIN: SIGNIFICANT CHANGE UP
-  CASPOFUNGIN: SIGNIFICANT CHANGE UP
-  FLUCONAZOLE: SIGNIFICANT CHANGE UP
-  INTERPRETATION: SIGNIFICANT CHANGE UP
-  ITRACONAZOLE: SIGNIFICANT CHANGE UP
-  MICAFUNGIN: SIGNIFICANT CHANGE UP
-  POSACONAZOLE: SIGNIFICANT CHANGE UP
-  VORICONAZOLE: SIGNIFICANT CHANGE UP
ALBUMIN SERPL ELPH-MCNC: 2.6 G/DL — LOW (ref 3.3–5)
ALP SERPL-CCNC: 102 U/L — SIGNIFICANT CHANGE UP (ref 40–120)
ALT FLD-CCNC: 11 U/L — SIGNIFICANT CHANGE UP (ref 4–33)
ANION GAP SERPL CALC-SCNC: 15 MMOL/L — HIGH (ref 7–14)
AST SERPL-CCNC: 15 U/L — SIGNIFICANT CHANGE UP (ref 4–32)
BASOPHILS # BLD AUTO: 0.02 K/UL — SIGNIFICANT CHANGE UP (ref 0–0.2)
BASOPHILS NFR BLD AUTO: 0.2 % — SIGNIFICANT CHANGE UP (ref 0–2)
BILIRUB SERPL-MCNC: 0.3 MG/DL — SIGNIFICANT CHANGE UP (ref 0.2–1.2)
BUN SERPL-MCNC: 40 MG/DL — HIGH (ref 7–23)
CALCIUM SERPL-MCNC: 8.4 MG/DL — SIGNIFICANT CHANGE UP (ref 8.4–10.5)
CHLORIDE SERPL-SCNC: 106 MMOL/L — SIGNIFICANT CHANGE UP (ref 98–107)
CO2 SERPL-SCNC: 22 MMOL/L — SIGNIFICANT CHANGE UP (ref 22–31)
CREAT SERPL-MCNC: 2.32 MG/DL — HIGH (ref 0.5–1.3)
CULTURE RESULTS: SIGNIFICANT CHANGE UP
EOSINOPHIL # BLD AUTO: 0.21 K/UL — SIGNIFICANT CHANGE UP (ref 0–0.5)
EOSINOPHIL NFR BLD AUTO: 1.6 % — SIGNIFICANT CHANGE UP (ref 0–6)
GLUCOSE BLDC GLUCOMTR-MCNC: 100 MG/DL — HIGH (ref 70–99)
GLUCOSE BLDC GLUCOMTR-MCNC: 115 MG/DL — HIGH (ref 70–99)
GLUCOSE BLDC GLUCOMTR-MCNC: 145 MG/DL — HIGH (ref 70–99)
GLUCOSE BLDC GLUCOMTR-MCNC: 83 MG/DL — SIGNIFICANT CHANGE UP (ref 70–99)
GLUCOSE SERPL-MCNC: 121 MG/DL — HIGH (ref 70–99)
HCT VFR BLD CALC: 29.2 % — LOW (ref 34.5–45)
HGB BLD-MCNC: 8.9 G/DL — LOW (ref 11.5–15.5)
IANC: 10.33 K/UL — HIGH (ref 1.5–8.5)
IMM GRANULOCYTES NFR BLD AUTO: 1.3 % — SIGNIFICANT CHANGE UP (ref 0–1.5)
LYMPHOCYTES # BLD AUTO: 1.46 K/UL — SIGNIFICANT CHANGE UP (ref 1–3.3)
LYMPHOCYTES # BLD AUTO: 11.4 % — LOW (ref 13–44)
MAGNESIUM SERPL-MCNC: 2 MG/DL — SIGNIFICANT CHANGE UP (ref 1.6–2.6)
MCHC RBC-ENTMCNC: 26.3 PG — LOW (ref 27–34)
MCHC RBC-ENTMCNC: 30.5 GM/DL — LOW (ref 32–36)
MCV RBC AUTO: 86.1 FL — SIGNIFICANT CHANGE UP (ref 80–100)
METHOD TYPE: SIGNIFICANT CHANGE UP
MONOCYTES # BLD AUTO: 0.65 K/UL — SIGNIFICANT CHANGE UP (ref 0–0.9)
MONOCYTES NFR BLD AUTO: 5.1 % — SIGNIFICANT CHANGE UP (ref 2–14)
NEUTROPHILS # BLD AUTO: 10.33 K/UL — HIGH (ref 1.8–7.4)
NEUTROPHILS NFR BLD AUTO: 80.4 % — HIGH (ref 43–77)
NRBC # BLD: 0 /100 WBCS — SIGNIFICANT CHANGE UP
NRBC # FLD: 0 K/UL — SIGNIFICANT CHANGE UP
ORGANISM # SPEC MICROSCOPIC CNT: SIGNIFICANT CHANGE UP
ORGANISM # SPEC MICROSCOPIC CNT: SIGNIFICANT CHANGE UP
PHOSPHATE SERPL-MCNC: 3 MG/DL — SIGNIFICANT CHANGE UP (ref 2.5–4.5)
PLATELET # BLD AUTO: 233 K/UL — SIGNIFICANT CHANGE UP (ref 150–400)
POTASSIUM SERPL-MCNC: 3.6 MMOL/L — SIGNIFICANT CHANGE UP (ref 3.5–5.3)
POTASSIUM SERPL-SCNC: 3.6 MMOL/L — SIGNIFICANT CHANGE UP (ref 3.5–5.3)
PROT SERPL-MCNC: 6.9 G/DL — SIGNIFICANT CHANGE UP (ref 6–8.3)
RBC # BLD: 3.39 M/UL — LOW (ref 3.8–5.2)
RBC # FLD: 15.6 % — HIGH (ref 10.3–14.5)
SODIUM SERPL-SCNC: 143 MMOL/L — SIGNIFICANT CHANGE UP (ref 135–145)
WBC # BLD: 12.84 K/UL — HIGH (ref 3.8–10.5)
WBC # FLD AUTO: 12.84 K/UL — HIGH (ref 3.8–10.5)

## 2020-12-30 PROCEDURE — 99233 SBSQ HOSP IP/OBS HIGH 50: CPT | Mod: GC

## 2020-12-30 PROCEDURE — 99232 SBSQ HOSP IP/OBS MODERATE 35: CPT

## 2020-12-30 PROCEDURE — 99233 SBSQ HOSP IP/OBS HIGH 50: CPT

## 2020-12-30 RX ORDER — FLUCONAZOLE 150 MG/1
200 TABLET ORAL DAILY
Refills: 0 | Status: DISCONTINUED | OUTPATIENT
Start: 2020-12-31 | End: 2020-12-31

## 2020-12-30 RX ORDER — FUROSEMIDE 40 MG
80 TABLET ORAL ONCE
Refills: 0 | Status: COMPLETED | OUTPATIENT
Start: 2020-12-30 | End: 2020-12-30

## 2020-12-30 RX ADMIN — Medication 100 MILLIGRAM(S): at 17:09

## 2020-12-30 RX ADMIN — ATORVASTATIN CALCIUM 10 MILLIGRAM(S): 80 TABLET, FILM COATED ORAL at 22:54

## 2020-12-30 RX ADMIN — Medication 1 TABLET(S): at 11:53

## 2020-12-30 RX ADMIN — BENZOCAINE AND MENTHOL 1 LOZENGE: 5; 1 LIQUID ORAL at 11:52

## 2020-12-30 RX ADMIN — Medication 6 UNIT(S): at 13:34

## 2020-12-30 RX ADMIN — Medication 650 MILLIGRAM(S): at 06:37

## 2020-12-30 RX ADMIN — CASPOFUNGIN ACETATE 260 MILLIGRAM(S): 7 INJECTION, POWDER, LYOPHILIZED, FOR SOLUTION INTRAVENOUS at 11:52

## 2020-12-30 RX ADMIN — HEPARIN SODIUM 5000 UNIT(S): 5000 INJECTION INTRAVENOUS; SUBCUTANEOUS at 06:37

## 2020-12-30 RX ADMIN — Medication 650 MILLIGRAM(S): at 17:12

## 2020-12-30 RX ADMIN — Medication 6 UNIT(S): at 09:29

## 2020-12-30 RX ADMIN — BENZOCAINE AND MENTHOL 1 LOZENGE: 5; 1 LIQUID ORAL at 22:55

## 2020-12-30 RX ADMIN — TAMSULOSIN HYDROCHLORIDE 0.4 MILLIGRAM(S): 0.4 CAPSULE ORAL at 22:54

## 2020-12-30 RX ADMIN — INSULIN GLARGINE 24 UNIT(S): 100 INJECTION, SOLUTION SUBCUTANEOUS at 22:55

## 2020-12-30 RX ADMIN — Medication 100 MILLIGRAM(S): at 06:37

## 2020-12-30 RX ADMIN — Medication 80 MILLIGRAM(S): at 17:07

## 2020-12-30 RX ADMIN — HEPARIN SODIUM 5000 UNIT(S): 5000 INJECTION INTRAVENOUS; SUBCUTANEOUS at 13:35

## 2020-12-30 RX ADMIN — Medication 1 DROP(S): at 11:57

## 2020-12-30 RX ADMIN — AMLODIPINE BESYLATE 10 MILLIGRAM(S): 2.5 TABLET ORAL at 06:37

## 2020-12-30 RX ADMIN — Medication 6 UNIT(S): at 18:28

## 2020-12-30 RX ADMIN — BENZOCAINE AND MENTHOL 1 LOZENGE: 5; 1 LIQUID ORAL at 06:39

## 2020-12-30 RX ADMIN — HEPARIN SODIUM 5000 UNIT(S): 5000 INJECTION INTRAVENOUS; SUBCUTANEOUS at 22:54

## 2020-12-30 RX ADMIN — Medication 1 APPLICATION(S): at 17:07

## 2020-12-30 NOTE — PROGRESS NOTE ADULT - PROBLEM SELECTOR PLAN 1
Mixed etiology including hypovolemia (diarrhea and poor oral intake) vs component of pyelo vs sepsis related ATN  - improving, now near baseline sCr of 2  - holding home losartan  - c/w bicarb 650 BID  - appreciate renal recs  - monitor BMP, I/Os, renally dose meds, avoid nephrotoxins

## 2020-12-30 NOTE — PROGRESS NOTE ADULT - PROBLEM SELECTOR PLAN 3
CTAP 12/23 showed right-sided obstructive uropathy with mild right hydroureteronephrosis to the level of two distal right ureteral stones measuring 2 and 6 mm, respectively  - s/p R ureteral stent placement on 12/24 and placement of indwelling riggins   - repeat US kidney/bladder showed resolution of R hydro  - appreciate urology recs; follow up outpt for elective stone extraction  - tylenol 975 q6 for mild pain, dilaudid 2mg PO q3 for mod pain, dilaudid 1 IV q3 for severe pain, titrate as needed  - urology appointment scheduled with Dr. Brian Enriquez on 1/14/21 at 11AM at [address] [place] [address]

## 2020-12-30 NOTE — CONSULT NOTE ADULT - CONSULT REQUESTED DATE/TIME
23-Dec-2020 13:09
24-Dec-2020 16:13
20-Dec-2020 16:51
30-Dec-2020 13:22
21-Dec-2020 12:29
23-Dec-2020

## 2020-12-30 NOTE — PROGRESS NOTE ADULT - PROBLEM SELECTOR PLAN 5
Initially presented with mild DKA in the setting of UTI and medication noncompliance; a1c 8.6  - c/w lantus 24qhs, admelog 6 tidac, low ISS per endo  - appreciate endo recs  - will need glucometer/supplies on discharge (Rx sent to [facility])

## 2020-12-30 NOTE — PROGRESS NOTE ADULT - ATTENDING COMMENTS
80F h/o HTN, DM2 c/b diabetic neuropathy and multiple toe amputations, HLD, PVD, CKD (baseline Cr 1.8-2), morbid obesity admitted for mild DKA 2/2 UTI and insulin noncompliance found to be in ACKD with obstructing ureteral stones s/p stent placement POD#4.    1. DKA:   -resolved  -Endocrine following, appreciate recs, c/w basal/bolus regimen, monitor FS qac and qHS; c/w lantus to 24 units qhs and admelog 6units qAC  -FS better controlled this morning    2. UTI/Obstructing ureteral stones:  -CTAP with 2 obstructing R ureteral stones, mild R hydroureteronephrosis with worsening flank pain. Patient s/p R ureteral stent placement yesterday by urology with findings of purulent pus upon placement; OR cultures growing candida glabrata  -c/w capsofungin for 10 day course; f/u sensitivities   -f/u with ID recs re- course  -Renal and Urology following, appreciate recs    3. ACKD:  -likely post-renal 2/2 obstructing ureteral stones  -now s/p stent placement; monitor creatinine (baseline around 2)  -creatinine now downtrending since stent placement on 12/24  BUN/Creatinine  12-30 @ 07:42  BUN  40     Creatinine  2.32  12-29 @ 08:16  BUN  48     Creatinine  2.66  12-28 @ 07:58  BUN  60     Creatinine  2.97  12-27 @ 06:47  BUN  64     Creatinine  3.49  12-26 @ 06:27  BUN  62     Creatinine  3.36  12-25 @ 17:22  BUN  66     Creatinine  3.58  -spoke with renal attending and this is most likely 2/2 to stones still present and will take time to downtrend; per urology plan for outpatient stone extraction  -repeat renal kidney/bladder ultrasound reviewed with hydronephrosis now resolved    4. Left heel ulcer:   -Appreciate podiatry recs for left heel ulcer; per podiatry there is "Little to no suspicion for underlying bone infection"; does not appear to be infected at this time, X-ray with no evidence of OM; Pod plan for local wound care, santyl and DSD daily    5. Hypoxemia:  -oxygen was placed during OR on Thursday  -patient being weaned off oxygen slowly, still on n.c   -CXR reviewed: combination of atelectasis, some pulm edema and mucous plugging  -c/w IS at bedside (pt educated on use) and [place], archie, hypertonic saline nebs  -c/w pulm -- evaluate for underlying lung disease (former smoker) -- may need outpatient PFTs vs. obesity hypoventilation syndrome vs. CHIDI vs. less likely PE  -once patient repositions, hypoxia improves somewhat    6. Dispo:   -PT eval recommending QUAN- patient agreeable

## 2020-12-30 NOTE — PROGRESS NOTE ADULT - PROBLEM SELECTOR PLAN 4
Intermittent hypoxemia as low as mid 80s to low 90s on RA suspected multifactorial including possible small pulm edema (seen on CXR 12/25) in setting of ARABELLA on CKD vs atelectasis vs mucus plugging vs obesity hypoventilation vs PE is also a possibility. RVP/COVID PCR 12/29 negative. No known prior history of hypoxemia or need for supplemental oxygen per patient. Former smoker for about 10 years who quit decades ago.  - back on 2L this AM  - improved bibasilar crackles on exam  - appreciate pulm recs  - c/w airway clearance with respiratory therapy  - c/w incentive spirometry; patient displayed ability to use; encouraged use  - guafenesin PRN

## 2020-12-30 NOTE — PROGRESS NOTE ADULT - SUBJECTIVE AND OBJECTIVE BOX
Chief Complaint: DM 2    History: Patient seen at bedside. Reports she is feeling ok, eating meals, denies n/v, denies s/s of hypoglycemia  BG trending within target range with most recent 115 mg/dl     MEDICATIONS  (STANDING):  amLODIPine   Tablet 10 milliGRAM(s) Oral daily  atorvastatin 10 milliGRAM(s) Oral at bedtime  caspofungin IVPB      caspofungin IVPB 50 milliGRAM(s) IV Intermittent every 24 hours  collagenase Ointment 1 Application(s) Topical daily  dextrose 40% Gel 15 Gram(s) Oral once  dextrose 5%. 1000 milliLiter(s) (50 mL/Hr) IV Continuous <Continuous>  dextrose 5%. 1000 milliLiter(s) (100 mL/Hr) IV Continuous <Continuous>  dextrose 50% Injectable 25 Gram(s) IV Push once  dextrose 50% Injectable 12.5 Gram(s) IV Push once  dextrose 50% Injectable 25 Gram(s) IV Push once  glucagon  Injectable 1 milliGRAM(s) IntraMuscular once  heparin   Injectable 5000 Unit(s) SubCutaneous every 8 hours  influenza   Vaccine 0.5 milliLiter(s) IntraMuscular once  insulin glargine Injectable (LANTUS) 24 Unit(s) SubCutaneous at bedtime  insulin lispro (ADMELOG) corrective regimen sliding scale   SubCutaneous at bedtime  insulin lispro (ADMELOG) corrective regimen sliding scale   SubCutaneous three times a day before meals  insulin lispro Injectable (ADMELOG) 6 Unit(s) SubCutaneous three times a day before meals  labetalol 100 milliGRAM(s) Oral two times a day  lactated ringers. 1000 milliLiter(s) (75 mL/Hr) IV Continuous <Continuous>  lactobacillus acidophilus 1 Tablet(s) Oral daily  sodium bicarbonate 650 milliGRAM(s) Oral two times a day  tamsulosin 0.4 milliGRAM(s) Oral at bedtime  timolol 0.5% Solution 1 Drop(s) Both EYES daily    MEDICATIONS  (PRN):  acetaminophen   Tablet .. 975 milliGRAM(s) Oral every 6 hours PRN Mild Pain (1 - 3)  albuterol/ipratropium for Nebulization 3 milliLiter(s) Nebulizer every 6 hours PRN Shortness of Breath and/or Wheezing  benzocaine 15 mG/menthol 3.6 mG (Sugar-Free) Lozenge 1 Lozenge Oral every 3 hours PRN Sore Throat  bisacodyl Suppository 10 milliGRAM(s) Rectal daily PRN Constipation  guaiFENesin   Syrup  (Sugar-Free) 200 milliGRAM(s) Oral every 6 hours PRN Cough/chest congestion  HYDROmorphone   Tablet 2 milliGRAM(s) Oral every 3 hours PRN Moderate Pain (4 - 6) refractory to tylenol  HYDROmorphone  Injectable 1 milliGRAM(s) IV Push every 3 hours PRN Severe Pain (7 - 10)  simethicone 80 milliGRAM(s) Chew every 8 hours PRN Gas  sodium chloride 3%  Inhalation 4 milliLiter(s) Inhalation every 6 hours PRN airway clearance per respiratory therapy    No Known Allergies    Review of Systems:  Cardiovascular: No chest pain  Respiratory: No SOB  GI: No nausea, vomiting  Endocrine: no hypoglycemia symptoms     PHYSICAL EXAM:  VITALS: T(C): 37.2 (12-30-20 @ 15:39)  T(F): 98.9 (12-30-20 @ 15:39), Max: 98.9 (12-30-20 @ 15:39)  HR: 82 (12-30-20 @ 15:39) (80 - 90)  BP: 132/43 (12-30-20 @ 15:39) (131/86 - 141/50)  RR:  (18 - 20)  SpO2:  (88% - 95%)  Wt(kg): --  GENERAL: NAD  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, no lid lag, anicteric  HEENT:  Atraumatic, Normocephalic, moist mucous membranes  RESPIRATORY: unlabored respirations     CAPILLARY BLOOD GLUCOSE    POCT Blood Glucose.: 115 mg/dL (30 Dec 2020 13:04)  POCT Blood Glucose.: 145 mg/dL (30 Dec 2020 09:20)  POCT Blood Glucose.: 133 mg/dL (29 Dec 2020 22:35)  POCT Blood Glucose.: 122 mg/dL (29 Dec 2020 17:39)      12-30    143  |  106  |  40<H>  ----------------------------<  121<H>  3.6   |  22  |  2.32<H>    EGFR if : 22<L>  EGFR if non : 19<L>    Ca    8.4      12-30  Mg     2.0     12-30  Phos  3.0     12-30    TPro  6.9  /  Alb  2.6<L>  /  TBili  0.3  /  DBili  x   /  AST  15  /  ALT  11  /  AlkPhos  102  12-30      Thyroid Function Tests:      A1C with Estimated Average Glucose Result: 8.6 % (12-20-20 @ 15:17)  A1C with Estimated Average Glucose: 9.3 % (09-17-20 @ 05:52)

## 2020-12-30 NOTE — CONSULT NOTE ADULT - ASSESSMENT
Assessment    80F with uncontrolled DMII, peripheral vascular disease, CKD. Initially with hyperglycemia then with UTI and obstructing stone requiring ureteral stent and antifungals now.   Pulmonary consulted for Hypoxemia at rest to 88%.     Likely due to combination volume overload state AND atelectasis from obesity and immobility for 10 days. Hypoxemia improved with 10 deep breaths. Given obesity, she has low expiratory reserve volume, so with persistent immobility is at risk of FRC dropping below her closing capacity, leading to atelectasis when she is breathing tidal volumes and persistent shunt which will cause hypoxemia even with supplemental O2, which she has.   She has pulmonary edema related to volume overload from her CKD.         Recs    - Out of bed to chair at all times except when sleeping at night  - Incentive spirometry 10x per hour  - Give one dose lasix 80mg now, she is overloaded   Assessment    80F with uncontrolled DMII, peripheral vascular disease, CKD. Initially with hyperglycemia then with UTI and obstructing stone requiring ureteral stent and antifungals now.   Pulmonary consulted for Hypoxemia at rest to 88%.     Likely due to combination volume overload state AND atelectasis from obesity and immobility for 10 days. Hypoxemia improved with 10 deep breaths. Given obesity, she has low expiratory reserve volume, so with persistent immobility is at risk of FRC dropping below her closing capacity, leading to atelectasis when she is breathing tidal volumes and persistent shunt which will cause hypoxemia even with supplemental O2, which she has.   She has pulmonary edema related to volume overload from her CKD.       Recs  - Out of bed to chair at all times except when sleeping at night  - Incentive spirometry 10x per hour  - Give one dose lasix 80mg now, she is overloaded

## 2020-12-30 NOTE — CONSULT NOTE ADULT - CONSULT REASON
Hypoxemia
urolithiasis
Elevated serum creatinine
Hyperglycemia
LF heel wound
positive urine cx, obstructive uropathy

## 2020-12-30 NOTE — PROGRESS NOTE ADULT - ASSESSMENT
Pt is 81yo who presents w/ Left heel wound to subQ  -Left posterior heel wound down to subQ, w/ fibrogranular wound bed and central eschar, wound does not track, or probe beyond SubQ, no erythema, no malodor, no local signs of infection  -LFXR: no gas, or OM, final   -Little to no suspicion for underlying bone infection  -Pod plan for local wound care, santyl and DSD daily  -Will follow while in house  -Z floats all times

## 2020-12-30 NOTE — CONSULT NOTE ADULT - ATTENDING COMMENTS
80F hx DMII, PVD, CKD p/w HHS and  UTI from obstructing (s/p ureteral stent) now consulting pulmonary for hypoxia. US assessment c/w volume overload and agree with diuresis. Has component of atelectasis and encourage PT and ambulation and IS use. Pt had remote PSG which was negative and does not have significant CHIDI sx and abg not c/w chronic hypercapnea.
Uncontrolled DM2 complicated by heel ulcer, legally blind, ARABELLA presented with glucose 500s and mild ketosis.  Inpatient agree with basal bolus plan as outlined.  Need to clarify why she is on NPH vs. 70/30 and vial vs. pen. Nephew helps set up insulin at home.  A1c 8.6% not too much above goal for this patient but maybe could optimize a little.  Patient declines 4 injections per day for dc.  Endocrine team consulted for uncontrolled diabetes. Patient is high risk with high level decision making due to uncontrolled diabetes which places patient at high risk for cardiovascular and cerebrovascular events. Patient with lability of glucose requiring close monitoring and insulin adjustments.    Jean Paul Ogden MD  Division of Endocrinology  Pager: [phone]    If after 6PM or before 9AM, or on weekends/holidays, please call endocrine answering service for assistance ([phone]).  For nonurgent matters email [email or web address] for assistance.

## 2020-12-30 NOTE — CONSULT NOTE ADULT - SUBJECTIVE AND OBJECTIVE BOX
CHIEF COMPLAINT:  Hyperglycemia    HPI:    80F with uncontrolled DMII, peripheral vascular disease, CKD. Initially with hyperglycemia then with UTI and obstructing stone requiring ureteral stent and antifungals now.   Pulmonary consulted for Hypoxemia at rest to 88%.     Pt reports feeling a little weak and dyspneic with exertion.   Says she has not gotten out of bed in 10 days since admission. At baseline walks with a walker but no shortness of breath.       No snoring, wakes up refreshed, no excessive daytime sleepiness.       PAST MEDICAL & SURGICAL HISTORY:  Obesity    PVD (peripheral vascular disease)    Glaucoma    Hypertension    Diabetic ulcer of heel    Neuropathy    PVD (peripheral vascular disease)    Diabetes  type 2    History of cataract surgery    Toe amputation status        FAMILY HISTORY:  Family hx of lung cancer    Family history of CHF (congestive heart failure)        SOCIAL HISTORY:  Smoking: [X ] Never Smoked [ ] Former Smoker (__ packs x ___ years) [ ] Current Smoker  (__ packs x ___ years)  Substance Use: X[ ] Never Used [ ] Used ____  EtOH Use:  Marital Status: [ ] Single [X ]  [ ]  [ ]   Occupation: retired     Allergies    No Known Allergies    Intolerances        HOME MEDICATIONS:  Home Medications:  bisacodyl 10 mg rectal suppository: 1 suppository(ies) rectal once a day, As needed, Constipation (21 Dec 2020 08:08)  collagenase 250 units/g topical ointment: 1 application topically once a day left heel (21 Dec 2020 08:08)  labetalol 200 mg oral tablet: 1 tab(s) orally 2 times a day (21 Dec 2020 08:08)  lactobacillus acidophilus oral capsule: 1 cap(s) orally 2 times a day (21 Dec 2020 08:08)  Lipitor 10 mg oral tablet: 1 tab(s) orally once a day (at bedtime) (21 Dec 2020 08:08)  losartan 100 mg oral tablet: 1 tab(s) orally once a day (21 Dec 2020 08:08)  NovoLIN N 100 units/mL subcutaneous suspension: 30 unit(s) subcutaneous once a day (at bedtime) (21 Dec 2020 08:08)  NovoLIN N 100 units/mL subcutaneous suspension: 40 unit(s) subcutaneous once a day in AM (21 Dec 2020 08:08)  polyethylene glycol 3350 oral powder for reconstitution: 17 gram(s) orally once a day, As needed, Constipation (21 Dec 2020 08:08)  senna oral tablet: 1 tab(s) orally once a day (21 Dec 2020 08:08)  timolol hemihydrate 0.5% ophthalmic solution: 1 drop(s) to each affected eye once a day (21 Dec 2020 08:08)      REVIEW OF SYSTEMS:  Constitutional: [X] negative [ ] fevers [ ] chills [ ] weight loss [ ] weight gain  HEENT: [X] negative [ ] dry eyes [ ] eye irritation [ ] postnasal drip [ ] nasal congestion  CV: [X] negative  [ ] chest pain [ ] orthopnea [ ] palpitations [ ] murmur  Resp: [ ] negative [ ] cough [X] shortness of breath [ ] dyspnea [ ] wheezing [ ] sputum [ ] hemoptysis  GI: [X] negative [ ] nausea [ ] vomiting [ ] diarrhea [ ] constipation [ ] abd pain [ ] dysphagia   : [X] negative [ ] dysuria [ ] nocturia [ ] hematuria [ ] increased urinary frequency  Musculoskeletal: [X] negative [ ] back pain [ ] myalgias [ ] arthralgias [ ] fracture  Skin: [X] negative [ ] rash [ ] itch  Neurological: [X] negative [ ] headache [ ] dizziness [ ] syncope [ ] weakness [ ] numbness  Psychiatric: [ ] negative [ ] anxiety [ ] depression  Endocrine: [ ] negative [ ] diabetes [ ] thyroid problem  Hematologic/Lymphatic: [ ] negative [ ] anemia [ ] bleeding problem  Allergic/Immunologic: [ ] negative [ ] itchy eyes [ ] nasal discharge [ ] hives [ ] angioedema  [X] All other systems negative  [ ] Unable to assess ROS because ________    OBJECTIVE:  ICU Vital Signs Last 24 Hrs  T(C): 37.1 (30 Dec 2020 06:19), Max: 37.1 (30 Dec 2020 06:19)  T(F): 98.8 (30 Dec 2020 06:19), Max: 98.8 (30 Dec 2020 06:19)  HR: 90 (30 Dec 2020 06:19) (80 - 90)  BP: 141/50 (30 Dec 2020 06:19) (131/86 - 141/50)  RR: 19 (30 Dec 2020 06:19) (18 - 20)  SpO2: 95% (30 Dec 2020 06:19) (88% - 95%)        12-29 @ 07:01  -  12-30 @ 07:00  --------------------------------------------------------  IN: 0 mL / OUT: 700 mL / NET: -700 mL      CAPILLARY BLOOD GLUCOSE      POCT Blood Glucose.: 115 mg/dL (30 Dec 2020 13:04)      PHYSICAL EXAM:  General: ill appearing obese lady in bed  Respiratory: normal effort on 3L NC  Good air entry throughout  No wheeze  Abdomen: soft, obese, NT  Extremities: some pitting edema  Neurological: awake, alert, oriented      HOSPITAL MEDICATIONS:  Standing Meds:  amLODIPine   Tablet 10 milliGRAM(s) Oral daily  atorvastatin 10 milliGRAM(s) Oral at bedtime  caspofungin IVPB      caspofungin IVPB 50 milliGRAM(s) IV Intermittent every 24 hours  collagenase Ointment 1 Application(s) Topical daily  dextrose 40% Gel 15 Gram(s) Oral once  dextrose 5%. 1000 milliLiter(s) IV Continuous <Continuous>  dextrose 5%. 1000 milliLiter(s) IV Continuous <Continuous>  dextrose 50% Injectable 25 Gram(s) IV Push once  dextrose 50% Injectable 12.5 Gram(s) IV Push once  dextrose 50% Injectable 25 Gram(s) IV Push once  glucagon  Injectable 1 milliGRAM(s) IntraMuscular once  heparin   Injectable 5000 Unit(s) SubCutaneous every 8 hours  influenza   Vaccine 0.5 milliLiter(s) IntraMuscular once  insulin glargine Injectable (LANTUS) 24 Unit(s) SubCutaneous at bedtime  insulin lispro (ADMELOG) corrective regimen sliding scale   SubCutaneous at bedtime  insulin lispro (ADMELOG) corrective regimen sliding scale   SubCutaneous three times a day before meals  insulin lispro Injectable (ADMELOG) 6 Unit(s) SubCutaneous three times a day before meals  labetalol 100 milliGRAM(s) Oral two times a day  lactated ringers. 1000 milliLiter(s) IV Continuous <Continuous>  lactobacillus acidophilus 1 Tablet(s) Oral daily  sodium bicarbonate 650 milliGRAM(s) Oral two times a day  tamsulosin 0.4 milliGRAM(s) Oral at bedtime  timolol 0.5% Solution 1 Drop(s) Both EYES daily      PRN Meds:  acetaminophen   Tablet .. 975 milliGRAM(s) Oral every 6 hours PRN  albuterol/ipratropium for Nebulization 3 milliLiter(s) Nebulizer every 6 hours PRN  benzocaine 15 mG/menthol 3.6 mG (Sugar-Free) Lozenge 1 Lozenge Oral every 3 hours PRN  bisacodyl Suppository 10 milliGRAM(s) Rectal daily PRN  guaiFENesin   Syrup  (Sugar-Free) 200 milliGRAM(s) Oral every 6 hours PRN  HYDROmorphone   Tablet 2 milliGRAM(s) Oral every 3 hours PRN  HYDROmorphone  Injectable 1 milliGRAM(s) IV Push every 3 hours PRN  simethicone 80 milliGRAM(s) Chew every 8 hours PRN  sodium chloride 3%  Inhalation 4 milliLiter(s) Inhalation every 6 hours PRN      LABS:                        8.9    12.84 )-----------( 233      ( 30 Dec 2020 07:42 )             29.2     Hgb Trend: 8.9<--, 9.8<--, 9.1<--, 9.1<--, 9.4<--  12-30    143  |  106  |  40<H>  ----------------------------<  121<H>  3.6   |  22  |  2.32<H>    Ca    8.4      30 Dec 2020 07:42  Phos  3.0     12-30  Mg     2.0     12-30    TPro  6.9  /  Alb  2.6<L>  /  TBili  0.3  /  DBili  x   /  AST  15  /  ALT  11  /  AlkPhos  102  12-30    Creatinine Trend: 2.32<--, 2.66<--, 2.97<--, 3.49<--, 3.36<--, 3.58<--         CHIEF COMPLAINT:  Hyperglycemia    HPI:    80F with uncontrolled DMII, peripheral vascular disease, CKD. Initially with hyperglycemia then with UTI and obstructing stone requiring ureteral stent and antifungals now.   Pulmonary consulted for Hypoxemia at rest to 88%.     Pt reports feeling a little weak and dyspneic with exertion.   Says she has not gotten out of bed in 10 days since admission. At baseline walks with a walker but no shortness of breath.       No snoring, wakes up refreshed, no excessive daytime sleepiness.       PAST MEDICAL & SURGICAL HISTORY:  Obesity  PVD (peripheral vascular disease)  Glaucoma  Hypertension  Diabetic ulcer of heel  Neuropathy  PVD (peripheral vascular disease)  Diabetes  type 2  History of cataract surgery  Toe amputation status    FAMILY HISTORY:  Family hx of lung cancer    Family history of CHF (congestive heart failure)        SOCIAL HISTORY:  Smoking: [X ] Never Smoked [ ] Former Smoker (__ packs x ___ years) [ ] Current Smoker  (__ packs x ___ years)  Substance Use: X[ ] Never Used [ ] Used ____  EtOH Use:  Marital Status: [ ] Single [X ]  [ ]  [ ]   Occupation: retired     Allergies    No Known Allergies    Intolerances        HOME MEDICATIONS:  Home Medications:  bisacodyl 10 mg rectal suppository: 1 suppository(ies) rectal once a day, As needed, Constipation (21 Dec 2020 08:08)  collagenase 250 units/g topical ointment: 1 application topically once a day left heel (21 Dec 2020 08:08)  labetalol 200 mg oral tablet: 1 tab(s) orally 2 times a day (21 Dec 2020 08:08)  lactobacillus acidophilus oral capsule: 1 cap(s) orally 2 times a day (21 Dec 2020 08:08)  Lipitor 10 mg oral tablet: 1 tab(s) orally once a day (at bedtime) (21 Dec 2020 08:08)  losartan 100 mg oral tablet: 1 tab(s) orally once a day (21 Dec 2020 08:08)  NovoLIN N 100 units/mL subcutaneous suspension: 30 unit(s) subcutaneous once a day (at bedtime) (21 Dec 2020 08:08)  NovoLIN N 100 units/mL subcutaneous suspension: 40 unit(s) subcutaneous once a day in AM (21 Dec 2020 08:08)  polyethylene glycol 3350 oral powder for reconstitution: 17 gram(s) orally once a day, As needed, Constipation (21 Dec 2020 08:08)  senna oral tablet: 1 tab(s) orally once a day (21 Dec 2020 08:08)  timolol hemihydrate 0.5% ophthalmic solution: 1 drop(s) to each affected eye once a day (21 Dec 2020 08:08)      REVIEW OF SYSTEMS:  Constitutional: [X] negative [ ] fevers [ ] chills [ ] weight loss [ ] weight gain  HEENT: [X] negative [ ] dry eyes [ ] eye irritation [ ] postnasal drip [ ] nasal congestion  CV: [X] negative  [ ] chest pain [ ] orthopnea [ ] palpitations [ ] murmur  Resp: [ ] negative [ ] cough [X] shortness of breath [ ] dyspnea [ ] wheezing [ ] sputum [ ] hemoptysis  GI: [X] negative [ ] nausea [ ] vomiting [ ] diarrhea [ ] constipation [ ] abd pain [ ] dysphagia   : [X] negative [ ] dysuria [ ] nocturia [ ] hematuria [ ] increased urinary frequency  Musculoskeletal: [X] negative [ ] back pain [ ] myalgias [ ] arthralgias [ ] fracture  Skin: [X] negative [ ] rash [ ] itch  Neurological: [X] negative [ ] headache [ ] dizziness [ ] syncope [ ] weakness [ ] numbness  Psychiatric: [ ] negative [ ] anxiety [ ] depression  Endocrine: [ ] negative [ ] diabetes [ ] thyroid problem  Hematologic/Lymphatic: [ ] negative [ ] anemia [ ] bleeding problem  Allergic/Immunologic: [ ] negative [ ] itchy eyes [ ] nasal discharge [ ] hives [ ] angioedema  [X] All other systems negative  [ ] Unable to assess ROS because ________    OBJECTIVE:  ICU Vital Signs Last 24 Hrs  T(C): 37.1 (30 Dec 2020 06:19), Max: 37.1 (30 Dec 2020 06:19)  T(F): 98.8 (30 Dec 2020 06:19), Max: 98.8 (30 Dec 2020 06:19)  HR: 90 (30 Dec 2020 06:19) (80 - 90)  BP: 141/50 (30 Dec 2020 06:19) (131/86 - 141/50)  RR: 19 (30 Dec 2020 06:19) (18 - 20)  SpO2: 95% (30 Dec 2020 06:19) (88% - 95%)        12-29 @ 07:01  -  12-30 @ 07:00  --------------------------------------------------------  IN: 0 mL / OUT: 700 mL / NET: -700 mL      CAPILLARY BLOOD GLUCOSE      POCT Blood Glucose.: 115 mg/dL (30 Dec 2020 13:04)      PHYSICAL EXAM:  General: ill appearing obese lady in bed  Respiratory: normal effort on 3L NC  Good air entry throughout  No wheeze  Abdomen: soft, obese, NT  Extremities: some pitting edema  Neurological: awake, alert, oriented      HOSPITAL MEDICATIONS:  Standing Meds:  amLODIPine   Tablet 10 milliGRAM(s) Oral daily  atorvastatin 10 milliGRAM(s) Oral at bedtime  caspofungin IVPB      caspofungin IVPB 50 milliGRAM(s) IV Intermittent every 24 hours  collagenase Ointment 1 Application(s) Topical daily  dextrose 40% Gel 15 Gram(s) Oral once  dextrose 5%. 1000 milliLiter(s) IV Continuous <Continuous>  dextrose 5%. 1000 milliLiter(s) IV Continuous <Continuous>  dextrose 50% Injectable 25 Gram(s) IV Push once  dextrose 50% Injectable 12.5 Gram(s) IV Push once  dextrose 50% Injectable 25 Gram(s) IV Push once  glucagon  Injectable 1 milliGRAM(s) IntraMuscular once  heparin   Injectable 5000 Unit(s) SubCutaneous every 8 hours  influenza   Vaccine 0.5 milliLiter(s) IntraMuscular once  insulin glargine Injectable (LANTUS) 24 Unit(s) SubCutaneous at bedtime  insulin lispro (ADMELOG) corrective regimen sliding scale   SubCutaneous at bedtime  insulin lispro (ADMELOG) corrective regimen sliding scale   SubCutaneous three times a day before meals  insulin lispro Injectable (ADMELOG) 6 Unit(s) SubCutaneous three times a day before meals  labetalol 100 milliGRAM(s) Oral two times a day  lactated ringers. 1000 milliLiter(s) IV Continuous <Continuous>  lactobacillus acidophilus 1 Tablet(s) Oral daily  sodium bicarbonate 650 milliGRAM(s) Oral two times a day  tamsulosin 0.4 milliGRAM(s) Oral at bedtime  timolol 0.5% Solution 1 Drop(s) Both EYES daily      PRN Meds:  acetaminophen   Tablet .. 975 milliGRAM(s) Oral every 6 hours PRN  albuterol/ipratropium for Nebulization 3 milliLiter(s) Nebulizer every 6 hours PRN  benzocaine 15 mG/menthol 3.6 mG (Sugar-Free) Lozenge 1 Lozenge Oral every 3 hours PRN  bisacodyl Suppository 10 milliGRAM(s) Rectal daily PRN  guaiFENesin   Syrup  (Sugar-Free) 200 milliGRAM(s) Oral every 6 hours PRN  HYDROmorphone   Tablet 2 milliGRAM(s) Oral every 3 hours PRN  HYDROmorphone  Injectable 1 milliGRAM(s) IV Push every 3 hours PRN  simethicone 80 milliGRAM(s) Chew every 8 hours PRN  sodium chloride 3%  Inhalation 4 milliLiter(s) Inhalation every 6 hours PRN      LABS:                        8.9    12.84 )-----------( 233      ( 30 Dec 2020 07:42 )             29.2     Hgb Trend: 8.9<--, 9.8<--, 9.1<--, 9.1<--, 9.4<--  12-30    143  |  106  |  40<H>  ----------------------------<  121<H>  3.6   |  22  |  2.32<H>    Ca    8.4      30 Dec 2020 07:42  Phos  3.0     12-30  Mg     2.0     12-30    TPro  6.9  /  Alb  2.6<L>  /  TBili  0.3  /  DBili  x   /  AST  15  /  ALT  11  /  AlkPhos  102  12-30    Creatinine Trend: 2.32<--, 2.66<--, 2.97<--, 3.49<--, 3.36<--, 3.58<--

## 2020-12-30 NOTE — PROGRESS NOTE ADULT - PROBLEM SELECTOR PLAN 2
Presented with several days of urinary frequency and positive UA. Was initially started on CTX. Was broadened to zosyn on 12/22 for fever and persistent leukocytosis. Found to have obstructive R ureteral stones on further workup with CTAP 12/23. s/p R ureteral stent and riggins placement on 12/24. UCx from admission eventually grew >100k staph epi resistant to zosyn; switched zosyn to vanc by level on 12/24. s/p 5d vanc by level 12/24 - 12/28. Intra-op R ureteral urine gram stain and UCx 12/24 speciated candida glabrata. BCx 12/20 NGTD.  - s/p diflucan 12/26 - 12/28; switched to caspofungin on 12/29 for a 10-day course due to speciation of candida glabata (tends to be resistant to fluconazole; capso also has poor urine penetration; depending on sensitivities may switch back to fluconazole at a higher dose)  - f/u UCx sensitivities  - appreciate ID recs

## 2020-12-30 NOTE — PROGRESS NOTE ADULT - ASSESSMENT
79 yo F with history of uncontrolled DM Type 2 c/b diabetic neuropathy and multiple toes amputations, HTN, HLD, PVD, CKD3(Baseline crt 1.8-2), morbid obesity, presenting with hyperglycemia. Endocrine consult requested for the management of hyperglycemia in patient with DM2.     1. Uncontrolled DM2 c/b Neuropathy, PVD, retinopathy and CKD3  A1c 8.6% in setting of ARABELLA on CKD  FS goal 100-200 mg/dl, patient within target  While inpatient:  Continue Lantus 24 units SQ qHS  Continue Admelog 6 units SQ TID before meals (Hold if NPO/not eating meal)   Continue Admelog LOW dose correctional scale TID before meals and Admelog LOW dose scale at bedtime  Check BG before meals and bedtime  Consistent carbohydrate diet    Discharge Plan:  On discharge will resume home insulin (Novolin N vial and syringes), finalize dose before dc.   Prior regimen was Novolin N vial  40 units qAM and 30 units qHS (nephew helps her at home).    Currently requirements are lower due to decreased po intake.  We acknowledge this regimen is not ideal, however given patient visual deficits limited in options, therefore recommend continue current regimen. Also will consider adding Tradjenta 5mg daily PO at discharge.  Patient states she tried insulin pens in the past, however unable to visualize the numbers on the pen to adjust doses.   Patient does not want to do basal/bolus insulin as it would require four times daily injections. Has audible glucometer, however has difficulty inserting test strips.   Patient can continue to follow up with PCP whom does house calls on discharge, as patient unable to make it to office visit .     2. HTN   Goal less than 130/80  Management per primary team    3. HLD   LDL goal less than 70  Continue Atorvastatin if no contraindications     Brenda Pagan  Nurse Practitioner  Division of Endocrinology & Diabetes  In house pager #[phone]/long range pager #[phone]    If before 9AM or after 6PM, or on weekends/holidays, please call endocrine answering service for assistance ([phone]).  For nonurgent matters email [email or web address] for assistance.

## 2020-12-30 NOTE — PROGRESS NOTE ADULT - SUBJECTIVE AND OBJECTIVE BOX
Andrés Malhotra, PGY3  Pager [phone]      Patient is a 80y old  Female who presents with a chief complaint of Hyperglycemia (30 Dec 2020 08:20)      SUBJECTIVE/INTERVAL EVENTS: Patient seen and examined at bedside. Passed TOV. Temporarily on 3L overnight for desat to 88 on RA. Diarrhea resolved, 2-3 BM in past 24 hrs.    MEDICATIONS  (STANDING):  amLODIPine   Tablet 10 milliGRAM(s) Oral daily  atorvastatin 10 milliGRAM(s) Oral at bedtime  caspofungin IVPB      caspofungin IVPB 50 milliGRAM(s) IV Intermittent every 24 hours  collagenase Ointment 1 Application(s) Topical daily  dextrose 40% Gel 15 Gram(s) Oral once  dextrose 5%. 1000 milliLiter(s) (50 mL/Hr) IV Continuous <Continuous>  dextrose 5%. 1000 milliLiter(s) (100 mL/Hr) IV Continuous <Continuous>  dextrose 50% Injectable 25 Gram(s) IV Push once  dextrose 50% Injectable 12.5 Gram(s) IV Push once  dextrose 50% Injectable 25 Gram(s) IV Push once  glucagon  Injectable 1 milliGRAM(s) IntraMuscular once  heparin   Injectable 5000 Unit(s) SubCutaneous every 8 hours  influenza   Vaccine 0.5 milliLiter(s) IntraMuscular once  insulin glargine Injectable (LANTUS) 24 Unit(s) SubCutaneous at bedtime  insulin lispro (ADMELOG) corrective regimen sliding scale   SubCutaneous at bedtime  insulin lispro (ADMELOG) corrective regimen sliding scale   SubCutaneous three times a day before meals  insulin lispro Injectable (ADMELOG) 6 Unit(s) SubCutaneous three times a day before meals  labetalol 100 milliGRAM(s) Oral two times a day  lactated ringers. 1000 milliLiter(s) (75 mL/Hr) IV Continuous <Continuous>  lactobacillus acidophilus 1 Tablet(s) Oral daily  sodium bicarbonate 650 milliGRAM(s) Oral two times a day  tamsulosin 0.4 milliGRAM(s) Oral at bedtime  timolol 0.5% Solution 1 Drop(s) Both EYES daily    MEDICATIONS  (PRN):  acetaminophen   Tablet .. 975 milliGRAM(s) Oral every 6 hours PRN Mild Pain (1 - 3)  albuterol/ipratropium for Nebulization 3 milliLiter(s) Nebulizer every 6 hours PRN Shortness of Breath and/or Wheezing  benzocaine 15 mG/menthol 3.6 mG (Sugar-Free) Lozenge 1 Lozenge Oral every 3 hours PRN Sore Throat  bisacodyl Suppository 10 milliGRAM(s) Rectal daily PRN Constipation  guaiFENesin   Syrup  (Sugar-Free) 200 milliGRAM(s) Oral every 6 hours PRN Cough/chest congestion  HYDROmorphone   Tablet 2 milliGRAM(s) Oral every 3 hours PRN Moderate Pain (4 - 6) refractory to tylenol  HYDROmorphone  Injectable 1 milliGRAM(s) IV Push every 3 hours PRN Severe Pain (7 - 10)  simethicone 80 milliGRAM(s) Chew every 8 hours PRN Gas  sodium chloride 3%  Inhalation 4 milliLiter(s) Inhalation every 6 hours PRN airway clearance per respiratory therapy      VITAL SIGNS:  T(F): 98.8 (12-30-20 @ 06:19), Max: 98.8 (12-30-20 @ 06:19)  HR: 90 (12-30-20 @ 06:19) (80 - 90)  BP: 141/50 (12-30-20 @ 06:19) (131/86 - 141/50)  RR: 19 (12-30-20 @ 06:19) (18 - 20)  SpO2: 95% (12-30-20 @ 06:19) (88% - 95%)    I&O's Summary    29 Dec 2020 07:01  -  30 Dec 2020 07:00  --------------------------------------------------------  IN: 0 mL / OUT: 700 mL / NET: -700 mL      Daily     Daily     PHYSICAL EXAM:  Gen: Alert, NAD  HEENT: NCAT, conjunctiva clear, sclera anicteric  Neck: Supple, no JVD  CV: RRR, S1S2, no m/r/g  Resp: CTAB, normal respiratory effort  Abd: Soft, nontender, nondistended, bowel sounds intact  Ext: no edema, no clubbing or cyanosis  Neuro: Alert, appropriate speech, CN2-12 grossly intact, IGNACIO  SKIN: warm, perfused, L heel ulcer dressing c/d/i    LABS:                        8.9    12.84 )-----------( 233      ( 30 Dec 2020 07:42 )             29.2     Hgb Trend: 8.9<--, 9.8<--, 9.1<--, 9.1<--, 9.4<--  12-30    143  |  106  |  40<H>  ----------------------------<  121<H>  3.6   |  22  |  2.32<H>    Ca    8.4      30 Dec 2020 07:42  Phos  3.0     12-30  Mg     2.0     12-30    TPro  6.9  /  Alb  2.6<L>  /  TBili  0.3  /  DBili  x   /  AST  15  /  ALT  11  /  AlkPhos  102  12-30    Creatinine Trend: 2.32<--, 2.66<--, 2.97<--, 3.49<--, 3.36<--, 3.58<--  LIVER FUNCTIONS - ( 30 Dec 2020 07:42 )  Alb: 2.6 g/dL / Pro: 6.9 g/dL / ALK PHOS: 102 U/L / ALT: 11 U/L / AST: 15 U/L / GGT: x                     CAPILLARY BLOOD GLUCOSE      POCT Blood Glucose.: 133 mg/dL (29 Dec 2020 22:35)  POCT Blood Glucose.: 122 mg/dL (29 Dec 2020 17:39)  POCT Blood Glucose.: 105 mg/dL (29 Dec 2020 12:11)      RADIOLOGY & ADDITIONAL TESTS: Reviewed    Imaging Personally Reviewed:    Consultant(s) Notes Reviewed:      Care Discussed with Consultants/Other Providers:

## 2020-12-31 ENCOUNTER — TRANSCRIPTION ENCOUNTER (OUTPATIENT)
Age: 80
End: 2020-12-31

## 2020-12-31 VITALS
TEMPERATURE: 98 F | HEART RATE: 82 BPM | DIASTOLIC BLOOD PRESSURE: 45 MMHG | OXYGEN SATURATION: 96 % | SYSTOLIC BLOOD PRESSURE: 136 MMHG | RESPIRATION RATE: 18 BRPM

## 2020-12-31 DIAGNOSIS — E87.70 FLUID OVERLOAD, UNSPECIFIED: ICD-10-CM

## 2020-12-31 LAB
ANION GAP SERPL CALC-SCNC: 14 MMOL/L — SIGNIFICANT CHANGE UP (ref 7–14)
BASOPHILS # BLD AUTO: 0.02 K/UL — SIGNIFICANT CHANGE UP (ref 0–0.2)
BASOPHILS NFR BLD AUTO: 0.2 % — SIGNIFICANT CHANGE UP (ref 0–2)
BUN SERPL-MCNC: 38 MG/DL — HIGH (ref 7–23)
CALCIUM SERPL-MCNC: 8.8 MG/DL — SIGNIFICANT CHANGE UP (ref 8.4–10.5)
CHLORIDE SERPL-SCNC: 105 MMOL/L — SIGNIFICANT CHANGE UP (ref 98–107)
CO2 SERPL-SCNC: 24 MMOL/L — SIGNIFICANT CHANGE UP (ref 22–31)
CREAT SERPL-MCNC: 2.37 MG/DL — HIGH (ref 0.5–1.3)
EOSINOPHIL # BLD AUTO: 0.14 K/UL — SIGNIFICANT CHANGE UP (ref 0–0.5)
EOSINOPHIL NFR BLD AUTO: 1.2 % — SIGNIFICANT CHANGE UP (ref 0–6)
GLUCOSE BLDC GLUCOMTR-MCNC: 111 MG/DL — HIGH (ref 70–99)
GLUCOSE BLDC GLUCOMTR-MCNC: 112 MG/DL — HIGH (ref 70–99)
GLUCOSE BLDC GLUCOMTR-MCNC: 126 MG/DL — HIGH (ref 70–99)
GLUCOSE SERPL-MCNC: 108 MG/DL — HIGH (ref 70–99)
HCT VFR BLD CALC: 33.6 % — LOW (ref 34.5–45)
HGB BLD-MCNC: 9.9 G/DL — LOW (ref 11.5–15.5)
IANC: 8.69 K/UL — HIGH (ref 1.5–8.5)
IMM GRANULOCYTES NFR BLD AUTO: 1 % — SIGNIFICANT CHANGE UP (ref 0–1.5)
LYMPHOCYTES # BLD AUTO: 1.87 K/UL — SIGNIFICANT CHANGE UP (ref 1–3.3)
LYMPHOCYTES # BLD AUTO: 16.3 % — SIGNIFICANT CHANGE UP (ref 13–44)
MCHC RBC-ENTMCNC: 26.2 PG — LOW (ref 27–34)
MCHC RBC-ENTMCNC: 29.5 GM/DL — LOW (ref 32–36)
MCV RBC AUTO: 88.9 FL — SIGNIFICANT CHANGE UP (ref 80–100)
MONOCYTES # BLD AUTO: 0.61 K/UL — SIGNIFICANT CHANGE UP (ref 0–0.9)
MONOCYTES NFR BLD AUTO: 5.3 % — SIGNIFICANT CHANGE UP (ref 2–14)
NEUTROPHILS # BLD AUTO: 8.69 K/UL — HIGH (ref 1.8–7.4)
NEUTROPHILS NFR BLD AUTO: 76 % — SIGNIFICANT CHANGE UP (ref 43–77)
NRBC # BLD: 0 /100 WBCS — SIGNIFICANT CHANGE UP
NRBC # FLD: 0 K/UL — SIGNIFICANT CHANGE UP
PLATELET # BLD AUTO: 255 K/UL — SIGNIFICANT CHANGE UP (ref 150–400)
POTASSIUM SERPL-MCNC: 3.8 MMOL/L — SIGNIFICANT CHANGE UP (ref 3.5–5.3)
POTASSIUM SERPL-SCNC: 3.8 MMOL/L — SIGNIFICANT CHANGE UP (ref 3.5–5.3)
RBC # BLD: 3.78 M/UL — LOW (ref 3.8–5.2)
RBC # FLD: 15.7 % — HIGH (ref 10.3–14.5)
SODIUM SERPL-SCNC: 143 MMOL/L — SIGNIFICANT CHANGE UP (ref 135–145)
WBC # BLD: 11.44 K/UL — HIGH (ref 3.8–10.5)
WBC # FLD AUTO: 11.44 K/UL — HIGH (ref 3.8–10.5)

## 2020-12-31 PROCEDURE — 99233 SBSQ HOSP IP/OBS HIGH 50: CPT

## 2020-12-31 PROCEDURE — 99239 HOSP IP/OBS DSCHRG MGMT >30: CPT

## 2020-12-31 PROCEDURE — 99232 SBSQ HOSP IP/OBS MODERATE 35: CPT

## 2020-12-31 RX ORDER — LABETALOL HCL 100 MG
1 TABLET ORAL
Qty: 0 | Refills: 0 | DISCHARGE
Start: 2020-12-31

## 2020-12-31 RX ORDER — BENZOCAINE AND MENTHOL 5; 1 G/100ML; G/100ML
1 LIQUID ORAL
Qty: 0 | Refills: 0 | DISCHARGE
Start: 2020-12-31

## 2020-12-31 RX ORDER — COLLAGENASE CLOSTRIDIUM HIST. 250 UNIT/G
1 OINTMENT (GRAM) TOPICAL
Qty: 0 | Refills: 0 | DISCHARGE
Start: 2020-12-31

## 2020-12-31 RX ORDER — HEPARIN SODIUM 5000 [USP'U]/ML
5000 INJECTION INTRAVENOUS; SUBCUTANEOUS
Qty: 0 | Refills: 0 | DISCHARGE
Start: 2020-12-31

## 2020-12-31 RX ORDER — INSULIN LISPRO 100/ML
5 VIAL (ML) SUBCUTANEOUS
Refills: 0 | Status: DISCONTINUED | OUTPATIENT
Start: 2020-12-31 | End: 2020-12-31

## 2020-12-31 RX ORDER — IPRATROPIUM/ALBUTEROL SULFATE 18-103MCG
3 AEROSOL WITH ADAPTER (GRAM) INHALATION
Qty: 0 | Refills: 0 | DISCHARGE
Start: 2020-12-31

## 2020-12-31 RX ORDER — HYDROMORPHONE HYDROCHLORIDE 2 MG/ML
1 INJECTION INTRAMUSCULAR; INTRAVENOUS; SUBCUTANEOUS
Qty: 0 | Refills: 0 | DISCHARGE
Start: 2020-12-31

## 2020-12-31 RX ORDER — AMLODIPINE BESYLATE 2.5 MG/1
1 TABLET ORAL
Qty: 0 | Refills: 0 | DISCHARGE
Start: 2020-12-31

## 2020-12-31 RX ORDER — FLUCONAZOLE 150 MG/1
1 TABLET ORAL
Qty: 0 | Refills: 0 | DISCHARGE
Start: 2020-12-31

## 2020-12-31 RX ORDER — TAMSULOSIN HYDROCHLORIDE 0.4 MG/1
1 CAPSULE ORAL
Qty: 0 | Refills: 0 | DISCHARGE
Start: 2020-12-31

## 2020-12-31 RX ORDER — SODIUM BICARBONATE 1 MEQ/ML
1 SYRINGE (ML) INTRAVENOUS
Qty: 0 | Refills: 0 | DISCHARGE
Start: 2020-12-31

## 2020-12-31 RX ORDER — TIMOLOL 0.5 %
1 DROPS OPHTHALMIC (EYE)
Qty: 0 | Refills: 0 | DISCHARGE
Start: 2020-12-31

## 2020-12-31 RX ORDER — INSULIN GLARGINE 100 [IU]/ML
20 INJECTION, SOLUTION SUBCUTANEOUS AT BEDTIME
Refills: 0 | Status: DISCONTINUED | OUTPATIENT
Start: 2020-12-31 | End: 2020-12-31

## 2020-12-31 RX ORDER — SIMETHICONE 80 MG/1
1 TABLET, CHEWABLE ORAL
Qty: 0 | Refills: 0 | DISCHARGE
Start: 2020-12-31

## 2020-12-31 RX ORDER — ACETAMINOPHEN 500 MG
2 TABLET ORAL
Qty: 0 | Refills: 0 | DISCHARGE
Start: 2020-12-31

## 2020-12-31 RX ORDER — ATORVASTATIN CALCIUM 80 MG/1
1 TABLET, FILM COATED ORAL
Qty: 0 | Refills: 0 | DISCHARGE
Start: 2020-12-31

## 2020-12-31 RX ADMIN — Medication 650 MILLIGRAM(S): at 18:56

## 2020-12-31 RX ADMIN — HYDROMORPHONE HYDROCHLORIDE 2 MILLIGRAM(S): 2 INJECTION INTRAMUSCULAR; INTRAVENOUS; SUBCUTANEOUS at 07:45

## 2020-12-31 RX ADMIN — BENZOCAINE AND MENTHOL 1 LOZENGE: 5; 1 LIQUID ORAL at 06:55

## 2020-12-31 RX ADMIN — Medication 5 UNIT(S): at 18:56

## 2020-12-31 RX ADMIN — Medication 200 MILLIGRAM(S): at 06:55

## 2020-12-31 RX ADMIN — Medication 650 MILLIGRAM(S): at 06:56

## 2020-12-31 RX ADMIN — Medication 1 APPLICATION(S): at 12:51

## 2020-12-31 RX ADMIN — Medication 3 MILLILITER(S): at 11:37

## 2020-12-31 RX ADMIN — Medication 6 UNIT(S): at 09:33

## 2020-12-31 RX ADMIN — BENZOCAINE AND MENTHOL 1 LOZENGE: 5; 1 LIQUID ORAL at 12:51

## 2020-12-31 RX ADMIN — HYDROMORPHONE HYDROCHLORIDE 2 MILLIGRAM(S): 2 INJECTION INTRAMUSCULAR; INTRAVENOUS; SUBCUTANEOUS at 06:55

## 2020-12-31 RX ADMIN — Medication 1 TABLET(S): at 12:52

## 2020-12-31 RX ADMIN — BENZOCAINE AND MENTHOL 1 LOZENGE: 5; 1 LIQUID ORAL at 18:57

## 2020-12-31 RX ADMIN — HEPARIN SODIUM 5000 UNIT(S): 5000 INJECTION INTRAVENOUS; SUBCUTANEOUS at 06:56

## 2020-12-31 RX ADMIN — Medication 1 DROP(S): at 12:53

## 2020-12-31 RX ADMIN — FLUCONAZOLE 200 MILLIGRAM(S): 150 TABLET ORAL at 12:52

## 2020-12-31 RX ADMIN — Medication 100 MILLIGRAM(S): at 06:56

## 2020-12-31 RX ADMIN — Medication 100 MILLIGRAM(S): at 18:56

## 2020-12-31 RX ADMIN — Medication 5 UNIT(S): at 12:51

## 2020-12-31 RX ADMIN — AMLODIPINE BESYLATE 10 MILLIGRAM(S): 2.5 TABLET ORAL at 06:55

## 2020-12-31 NOTE — PROGRESS NOTE ADULT - PROBLEM SELECTOR PLAN 4
Intermittent hypoxemia as low as mid 80s to low 90s on RA suspected multifactorial including possible small pulm edema (seen on CXR 12/25) in setting of ARABELLA on CKD vs atelectasis vs mucus plugging vs obesity hypoventilation vs PE is also a possibility. RVP/COVID PCR 12/29 negative. No known prior history of hypoxemia or need for supplemental oxygen per patient. Former smoker for about 10 years who quit decades ago.  - on 2L, wean as tolerated  - improved bibasilar crackles on exam  - appreciate pulm recs  - c/w airway clearance with respiratory therapy  - c/w incentive spirometry; patient displayed ability to use; encouraged use  - OOB to chair  - guaifenesin PRN

## 2020-12-31 NOTE — PROGRESS NOTE ADULT - ATTENDING COMMENTS
80F h/o HTN, DM2 c/b diabetic neuropathy and multiple toe amputations, HLD, PVD, CKD (baseline Cr 1.8-2), morbid obesity admitted for mild DKA 2/2 UTI and insulin noncompliance found to be in ACKD with obstructing ureteral stones s/p stent placement POD#5.    1. DKA:   -resolved  -Endocrine following, appreciate recs, c/w basal/bolus regimen, monitor FS qac and qHS; c/w lantus to 20 units qhs and admelog 5units qAC  -FS better controlled this morning    2. UTI/Obstructing ureteral stones:  -CTAP with 2 obstructing R ureteral stones, mild R hydroureteronephrosis with worsening flank pain. Patient s/p R ureteral stent placement yesterday by urology with findings of purulent pus upon placement; OR cultures growing candida glabrata  -c/w diflucan  -f/u with ID recs   -Renal and Urology following, appreciate recs    3. ACKD:  -likely post-renal 2/2 obstructing ureteral stones  -now s/p stent placement; monitor creatinine (baseline around 2)  -creatinine now downtrending since stent placement on 12/24  BUN/Creatinine  12-31 @ 06:46  BUN  38     Creatinine  2.37  12-30 @ 07:42  BUN  40     Creatinine  2.32  12-29 @ 08:16  BUN  48     Creatinine  2.66  12-28 @ 07:58  BUN  60     Creatinine  2.97  12-27 @ 06:47  BUN  64     Creatinine  3.49  -spoke with renal attending and this is most likely 2/2 to stones still present and will take time to downtrend; per urology plan for outpatient stone extraction  -repeat renal kidney/bladder ultrasound reviewed with hydronephrosis now resolved    4. Left heel ulcer:   -Appreciate podiatry recs for left heel ulcer; per podiatry there is "Little to no suspicion for underlying bone infection"; does not appear to be infected at this time, X-ray with no evidence of OM; Pod plan for local wound care, santyl and DSD daily    5. Hypoxemia:  -CXR reviewed: combination of atelectasis, some pulm edema and mucous plugging  -c/w IS at bedside (pt educated on use) and [place], duonebs, hypertonic saline nebs  -f/u pulm recs  -c/w nasal canula    6. Dispo:   -PT eval recommending QUAN- patient agreeable  -possible discharge to rehab  -see discharge summary in sunrise  -time spent discharging patient = 39min

## 2020-12-31 NOTE — PROGRESS NOTE ADULT - PROBLEM SELECTOR PLAN 3
CTAP 12/23 showed right-sided obstructive uropathy with mild right hydroureteronephrosis to the level of two distal right ureteral stones measuring 2 and 6 mm, respectively  - s/p R ureteral stent placement on 12/24 and placement of indwelling riggins   - repeat US kidney/bladder showed resolution of R hydro  - appreciate urology recs; follow up outpt for elective stone extraction  - tylenol 975 q6 for mild pain, dilaudid 2mg PO q3 for mod pain; has not been having any severe pain during this admission  - urology appointment scheduled with Dr. Brian Enriquez on 1/14/21 at 11AM at [address] [place] [address]

## 2020-12-31 NOTE — PROGRESS NOTE ADULT - SUBJECTIVE AND OBJECTIVE BOX
Andrés Malhotra, PGY3  Pager [phone]      Patient is a 80y old  Female who presents with a chief complaint of Hyperglycemia (31 Dec 2020 07:31)      SUBJECTIVE/INTERVAL EVENTS: Patient seen and examined at bedside. ADRIANA overnight. Reports good UOP to lasix and doesn't want more lasix. States that her breathing has "always been good". Denies acute issues.    MEDICATIONS  (STANDING):  amLODIPine   Tablet 10 milliGRAM(s) Oral daily  atorvastatin 10 milliGRAM(s) Oral at bedtime  collagenase Ointment 1 Application(s) Topical daily  dextrose 40% Gel 15 Gram(s) Oral once  dextrose 5%. 1000 milliLiter(s) (50 mL/Hr) IV Continuous <Continuous>  dextrose 5%. 1000 milliLiter(s) (100 mL/Hr) IV Continuous <Continuous>  dextrose 50% Injectable 25 Gram(s) IV Push once  dextrose 50% Injectable 12.5 Gram(s) IV Push once  dextrose 50% Injectable 25 Gram(s) IV Push once  fluconAZOLE   Tablet 200 milliGRAM(s) Oral daily  glucagon  Injectable 1 milliGRAM(s) IntraMuscular once  heparin   Injectable 5000 Unit(s) SubCutaneous every 8 hours  influenza   Vaccine 0.5 milliLiter(s) IntraMuscular once  insulin glargine Injectable (LANTUS) 24 Unit(s) SubCutaneous at bedtime  insulin lispro (ADMELOG) corrective regimen sliding scale   SubCutaneous at bedtime  insulin lispro (ADMELOG) corrective regimen sliding scale   SubCutaneous three times a day before meals  insulin lispro Injectable (ADMELOG) 6 Unit(s) SubCutaneous three times a day before meals  labetalol 100 milliGRAM(s) Oral two times a day  lactobacillus acidophilus 1 Tablet(s) Oral daily  sodium bicarbonate 650 milliGRAM(s) Oral two times a day  tamsulosin 0.4 milliGRAM(s) Oral at bedtime  timolol 0.5% Solution 1 Drop(s) Both EYES daily    MEDICATIONS  (PRN):  acetaminophen   Tablet .. 975 milliGRAM(s) Oral every 6 hours PRN Mild Pain (1 - 3)  albuterol/ipratropium for Nebulization 3 milliLiter(s) Nebulizer every 6 hours PRN Shortness of Breath and/or Wheezing  benzocaine 15 mG/menthol 3.6 mG (Sugar-Free) Lozenge 1 Lozenge Oral every 3 hours PRN Sore Throat  bisacodyl Suppository 10 milliGRAM(s) Rectal daily PRN Constipation  guaiFENesin   Syrup  (Sugar-Free) 200 milliGRAM(s) Oral every 6 hours PRN Cough/chest congestion  HYDROmorphone   Tablet 2 milliGRAM(s) Oral every 3 hours PRN Moderate Pain (4 - 6) refractory to tylenol  simethicone 80 milliGRAM(s) Chew every 8 hours PRN Gas  sodium chloride 3%  Inhalation 4 milliLiter(s) Inhalation every 6 hours PRN airway clearance per respiratory therapy      VITAL SIGNS:  T(F): 98.7 (12-31-20 @ 06:37), Max: 99.7 (12-30-20 @ 21:31)  HR: 84 (12-31-20 @ 06:37) (79 - 84)  BP: 130/44 (12-31-20 @ 06:37) (109/81 - 132/43)  RR: 20 (12-31-20 @ 06:37) (18 - 20)  SpO2: 94% (12-31-20 @ 06:37) (94% - 97%)    I&O's Summary    30 Dec 2020 07:01  -  31 Dec 2020 07:00  --------------------------------------------------------  IN: 0 mL / OUT: 800 mL / NET: -800 mL      Daily     Daily     PHYSICAL EXAM:  Gen: Alert, NAD  HEENT: NCAT, conjunctiva clear, sclera anicteric  Neck: Supple, no JVD  CV: RRR, S1S2, no m/r/g  Resp: mild bibasilar crackles, scattered mild exp wheeze, normal respiratory effort on 2L NC  Abd: Soft, nontender, nondistended, bowel sounds intact  Ext: no edema, no clubbing or cyanosis  Neuro: Alert, appropriate speech, CN2-12 grossly intact, IGNACIO  SKIN: warm, perfused, L heel ulcer dressing c/d/i    LABS:                        9.9    11.44 )-----------( 255      ( 31 Dec 2020 06:46 )             33.6     Hgb Trend: 9.9<--, 8.9<--, 9.8<--, 9.1<--, 9.1<--  12-31    143  |  105  |  38<H>  ----------------------------<  108<H>  3.8   |  24  |  2.37<H>    Ca    8.8      31 Dec 2020 06:46  Phos  3.0     12-30  Mg     2.0     12-30    TPro  6.9  /  Alb  2.6<L>  /  TBili  0.3  /  DBili  x   /  AST  15  /  ALT  11  /  AlkPhos  102  12-30    Creatinine Trend: 2.37<--, 2.32<--, 2.66<--, 2.97<--, 3.49<--, 3.36<--  LIVER FUNCTIONS - ( 30 Dec 2020 07:42 )  Alb: 2.6 g/dL / Pro: 6.9 g/dL / ALK PHOS: 102 U/L / ALT: 11 U/L / AST: 15 U/L / GGT: x                     CAPILLARY BLOOD GLUCOSE      POCT Blood Glucose.: 112 mg/dL (31 Dec 2020 08:50)  POCT Blood Glucose.: 100 mg/dL (30 Dec 2020 22:25)  POCT Blood Glucose.: 83 mg/dL (30 Dec 2020 18:17)  POCT Blood Glucose.: 115 mg/dL (30 Dec 2020 13:04)      RADIOLOGY & ADDITIONAL TESTS: Reviewed    Imaging Personally Reviewed:    Consultant(s) Notes Reviewed:      Care Discussed with Consultants/Other Providers:

## 2020-12-31 NOTE — PROGRESS NOTE ADULT - PROBLEM SELECTOR PLAN 2
In setting of CKD. Serum CO2 noted improved to 24 today. Pt. on sodium bicarbonate 650mg PO BID. Monitor serum CO2.

## 2020-12-31 NOTE — PROGRESS NOTE ADULT - PROBLEM SELECTOR PLAN 1
Pt. with multifactorial ARABELLA on CKD in the setting of dehydration and obstructive uropathy. Upon lab review on [facility], Scr was 2.01 on 11/23/20. Scr on arrival was 3.46. Scr remained elevated at 3.46 on 12/24/20. CT Abdomen showed R hydronephrosis with obstructing stone and L exophytic mass. Pt. underwent stent placement by urology team on 12/24/20. Scr improved today to 2.37. Pt. with CKD likely secondary to longstanding DM. Pt. with multifactorial ARABELLA on CKD, now resolving. Monitor labs and urine output. Avoid potential nephrotoxins. Dose medications as per eGFR.

## 2020-12-31 NOTE — DISCHARGE NOTE NURSING/CASE MANAGEMENT/SOCIAL WORK - PATIENT PORTAL LINK FT
You can access the FollowMyHealth Patient Portal offered by [facility] by registering at the following website: [email or web address]. By joining [facility]’s FollowMyHealth portal, you will also be able to view your health information using other applications (apps) compatible with our system.

## 2020-12-31 NOTE — PROGRESS NOTE ADULT - ASSESSMENT
Pt is 79yo who presents w/ Left heel wound to subQ  -Left posterior heel wound down to subQ, w/ fibrogranular wound bed and central eschar, wound does not track, or probe beyond SubQ, no erythema, no malodor, no local signs of infection  -LFXR: no gas, or OM, final   -Little to no suspicion for underlying bone infection  -Pod plan for local wound care, santyl and DSD daily  -Will follow while in house  -Z floats all times

## 2020-12-31 NOTE — PROGRESS NOTE ADULT - ASSESSMENT
79 yo F with history of uncontrolled DM Type 2 c/b diabetic neuropathy and multiple toes amputations, HTN, HLD, PVD, CKD3(Baseline crt 1.8-2), morbid obesity, presenting with hyperglycemia. Endocrine consult requested for the management of hyperglycemia in patient with DM2.     1. Uncontrolled DM2 c/b Neuropathy, PVD, retinopathy and CKD3  A1c 8.6% in setting of ARABELLA on CKD  FS goal 100-200 mg/dl, patient overall within target with some tight control noted   While inpatient:  Reduced Lantus to 21 units SQ qHS   Reduced Admelog to 5 units SQ TID before meals (Hold if NPO/not eating meal)   Continue Admelog LOW dose correctional scale TID before meals and Admelog LOW dose scale at bedtime  Check BG before meals and bedtime  Consistent carbohydrate diet    Discharge Plan:  On discharge will resume home insulin (Novolin N vial and syringes), finalize dose before dc.   Prior regimen was Novolin N vial  40 units qAM and 30 units qHS (nephew helps her at home).    Currently requirements are lower due to decreased po intake.  We acknowledge this regimen is not ideal, however given patient visual deficits limited in options, therefore recommend continue current regimen. Also will consider adding Tradjenta 5mg daily PO at discharge.  Patient states she tried insulin pens in the past, however unable to visualize the numbers on the pen to adjust doses.   Patient does not want to do basal/bolus insulin as it would require four times daily injections. Has audible glucometer, however has difficulty inserting test strips.   Patient can continue to follow up with PCP whom does house calls on discharge, as patient unable to make it to office visit.     2. HTN   Goal less than 130/80  Management per primary team    3. HLD   LDL goal less than 70  Continue Atorvastatin if no contraindications     Brenda Pagan  Nurse Practitioner  Division of Endocrinology & Diabetes  In house pager #[phone]/long range pager #[phone]    If before 9AM or after 6PM, or on weekends/holidays, please call endocrine answering service for assistance ([phone]).  For nonurgent matters email [email or web address] for assistance.      79 yo F with history of uncontrolled DM Type 2 c/b diabetic neuropathy and multiple toes amputations, HTN, HLD, PVD, CKD3(Baseline crt 1.8-2), morbid obesity, presenting with hyperglycemia. Endocrine consult requested for the management of hyperglycemia in patient with DM2.     1. Uncontrolled DM2 c/b Neuropathy, PVD, retinopathy and CKD3  A1c 8.6% in setting of ARABELLA on CKD  FS goal 100-200 mg/dl, patient overall within target with some tight control noted   While inpatient:  Reduced Lantus to 21 units SQ qHS   Reduced Admelog to 5 units SQ TID before meals (Hold if NPO/not eating meal)   Continue Admelog LOW dose correctional scale TID before meals and Admelog LOW dose scale at bedtime  Check BG before meals and bedtime  Consistent carbohydrate diet    Discharge Plan:  On discharge will resume home insulin (Novolin N vial and syringes), finalize dose before dc.   Prior regimen was Novolin N vial  40 units qAM and 30 units qHS (nephew helps her at home).    Currently requirements are lower due to decreased po intake.  We acknowledge this regimen is not ideal, however given patient visual deficits limited in options, therefore recommend continue current regimen. Also will consider adding Tradjenta 5mg daily PO at discharge.  Patient states she tried insulin pens in the past, however unable to visualize the numbers on the pen to adjust doses.   Patient does not want to do basal/bolus insulin as it would require four times daily injections. Has audible glucometer, however has difficulty inserting test strips.   Patient can continue to follow up with PCP whom does house calls on discharge, as patient unable to make it to office visit.     2. HTN   Goal less than 130/80  Management per primary team    3. HLD   LDL goal less than 70  Continue Atorvastatin if no contraindications     Brenda Pagan  Nurse Practitioner  Division of Endocrinology & Diabetes  In house pager #[phone]/long range pager #[phone]    If before 9AM or after 6PM, or on weekends/holidays, please call endocrine answering service for assistance ([phone]).  For nonurgent matters email [email or web address] for assistance.     ADDENDUM 1650  Contacted by primary team for discharge recommendations for today  Recommend resuming prior regimen Novolin N vial 40 units qAM and 30 units qHS (nephew helps her at home. Uses vial and syringe).   Currently requirements likely lower due to decreased po intake.  We acknowledge this regimen is not ideal, however given patient visual deficits limited in options, unable to use insulin pen, therefore recommend continue current regimen. Add Tradjenta 5 mg daily PO at discharge.  Patient can continue to follow up with PCP whom does house calls on discharge, as patient unable to make it to office visit.   Reviewed with primary team MD Malhotra (HS 6)

## 2020-12-31 NOTE — PROGRESS NOTE ADULT - NSHPATTENDINGPLANDISCUSS_GEN_ALL_CORE
Dr. Westfall
Dr. Malhotra
Dr. Hebert
Dr. Malhotra

## 2020-12-31 NOTE — PROGRESS NOTE ADULT - PROBLEM SELECTOR PLAN 5
Initially presented with mild DKA in the setting of UTI and medication noncompliance; a1c 8.6  - c/w lantus 24qhs, admelog 6 tidac, low ISS per endo  - appreciate endo recs  - will need glucometer/supplies on discharge (Rx sent to [facility]) Initially presented with mild DKA in the setting of UTI and medication noncompliance; a1c 8.6  - c/w lantus 20 qhs, admelog 5 tidac, low ISS per endo  - appreciate endo recs  - will need glucometer/supplies on discharge (Rx sent to [facility])

## 2020-12-31 NOTE — PROGRESS NOTE ADULT - PROBLEM SELECTOR PLAN 2
Presented with several days of urinary frequency and positive UA. Was initially started on CTX. Was broadened to zosyn on 12/22 for fever and persistent leukocytosis. Found to have obstructive R ureteral stones on further workup with CTAP 12/23. s/p R ureteral stent and riggins placement on 12/24. UCx from admission eventually grew >100k staph epi resistant to zosyn; switched zosyn to vanc by level on 12/24. s/p 5d vanc by level 12/24 - 12/28. Intra-op R ureteral urine gram stain and UCx 12/24 speciated candida glabrata. BCx 12/20 NGTD.  - s/p diflucan 12/26 - 12/28 for yeast in urine; switched to caspofungin on 12/29 - 12/30 while awaiting sensitivities for candida glabrata; switched back to diflucan 200mg PO 12/31 - 1/8 to complete a 14-day course per ID  - appreciate ID recs

## 2020-12-31 NOTE — PROGRESS NOTE ADULT - SUBJECTIVE AND OBJECTIVE BOX
[facility] DIVISION OF KIDNEY DISEASES AND HYPERTENSION -- FOLLOW UP NOTE  --------------------------------------------------------------------------------  HPI: 80-year-old female with long standing DM, HTN, legally blind, obesity, and diabetic foot ulcer was admitted to [facility] on 12/20/20 for hyperglycemia. Nephrology team consulted for elevated serum creatinine. Upon review of labs on [facility], Scr was 2.01 on 11/23/20. On admission, Scr was 3.46. Of note, pt. went for CT Abdomen which shows mild R hydronephrosis and 2 stones in the distal ureter. Scr was to elevated at 3.42 on 12/24/20. Pt. underwent stent placement by urology team on 12/24/20. Scr has improved to 2.37 today.    Pt. evaluated at bedside, remains on O2. Pt. developed pulmonary edema yesterday which responded to IV Lasix.    PAST HISTORY  --------------------------------------------------------------------------------  No significant changes to PMH, PSH, FHx, SHx, unless otherwise noted    ALLERGIES & MEDICATIONS  --------------------------------------------------------------------------------  Allergies    No Known Allergies    Intolerances    Standing Inpatient Medications  amLODIPine   Tablet 10 milliGRAM(s) Oral daily  atorvastatin 10 milliGRAM(s) Oral at bedtime  collagenase Ointment 1 Application(s) Topical daily  fluconAZOLE   Tablet 200 milliGRAM(s) Oral daily  glucagon  Injectable 1 milliGRAM(s) IntraMuscular once  heparin   Injectable 5000 Unit(s) SubCutaneous every 8 hours  influenza   Vaccine 0.5 milliLiter(s) IntraMuscular once  insulin glargine Injectable (LANTUS) 20 Unit(s) SubCutaneous at bedtime  insulin lispro (ADMELOG) corrective regimen sliding scale   SubCutaneous at bedtime  insulin lispro (ADMELOG) corrective regimen sliding scale   SubCutaneous three times a day before meals  insulin lispro Injectable (ADMELOG) 5 Unit(s) SubCutaneous three times a day before meals  labetalol 100 milliGRAM(s) Oral two times a day  lactobacillus acidophilus 1 Tablet(s) Oral daily  sodium bicarbonate 650 milliGRAM(s) Oral two times a day  tamsulosin 0.4 milliGRAM(s) Oral at bedtime  timolol 0.5% Solution 1 Drop(s) Both EYES daily    REVIEW OF SYSTEMS  --------------------------------------------------------------------------------  Gen: no lethargy  Respiratory: No dyspnea  CV: No chest pain  GI: No abdominal pain  MSK: no LE edema  Neuro: + legally blind  Heme: No bleeding    All other systems were reviewed and are negative, except as noted.    VITALS/PHYSICAL EXAM  --------------------------------------------------------------------------------  T(C): 37.1 (12-31-20 @ 06:37), Max: 37.6 (12-30-20 @ 21:31)  HR: 84 (12-31-20 @ 06:37) (79 - 84)  BP: 130/44 (12-31-20 @ 06:37) (109/81 - 132/43)  RR: 20 (12-31-20 @ 06:37) (18 - 20)  SpO2: 94% (12-31-20 @ 06:37) (94% - 97%)  Wt(kg): --    12-30-20 @ 07:01  -  12-31-20 @ 07:00  --------------------------------------------------------  IN: 0 mL / OUT: 800 mL / NET: -800 mL    Physical Exam:  	Gen: NAD, obese  	HEENT: MMM  	Pulm: good air entry B/L  	CV: S1S2  	Abd: Soft, +BS               : riggins catheter +  	Ext: No LE edema B/L, left heel ulcer  	Neuro: Awake  	Skin: Warm and dry    LABS/STUDIES  --------------------------------------------------------------------------------              9.9    11.44 >-----------<  255      [12-31-20 @ 06:46]              33.6     143  |  105  |  38  ----------------------------<  108      [12-31-20 @ 06:46]  3.8   |  24  |  2.37        Ca     8.8     [12-31-20 @ 06:46]      Mg     2.0     [12-30-20 @ 07:42]      Phos  3.0     [12-30-20 @ 07:42]    Creatinine Trend:  SCr 2.37 [12-31 @ 06:46]  SCr 2.32 [12-30 @ 07:42]  SCr 2.66 [12-29 @ 08:16]  SCr 2.97 [12-28 @ 07:58]  SCr 3.49 [12-27 @ 06:47]   [facility] DIVISION OF KIDNEY DISEASES AND HYPERTENSION -- FOLLOW UP NOTE  --------------------------------------------------------------------------------  HPI: 80-year-old female with long standing DM, HTN, legally blind, obesity, and diabetic foot ulcer was admitted to [facility] on 12/20/20 for hyperglycemia. Nephrology team consulted for elevated serum creatinine. Upon review of labs on [facility], Scr was 2.01 on 11/23/20. On admission, Scr was 3.46. Of note, pt. went for CT Abdomen which shows mild R hydronephrosis and 2 stones in the distal ureter. Scr was to elevated at 3.42 on 12/24/20. Pt. underwent stent placement by urology team on 12/24/20. Scr has improved to 2.37 today.    Pt. evaluated at bedside, remains on O2. Pt. developed pulmonary edema yesterday which responded to IV Lasix.    PAST HISTORY  --------------------------------------------------------------------------------  No significant changes to PMH, PSH, FHx, SHx, unless otherwise noted    ALLERGIES & MEDICATIONS  --------------------------------------------------------------------------------  Allergies    No Known Allergies    Intolerances    Standing Inpatient Medications  amLODIPine   Tablet 10 milliGRAM(s) Oral daily  atorvastatin 10 milliGRAM(s) Oral at bedtime  collagenase Ointment 1 Application(s) Topical daily  fluconAZOLE   Tablet 200 milliGRAM(s) Oral daily  glucagon  Injectable 1 milliGRAM(s) IntraMuscular once  heparin   Injectable 5000 Unit(s) SubCutaneous every 8 hours  influenza   Vaccine 0.5 milliLiter(s) IntraMuscular once  insulin glargine Injectable (LANTUS) 20 Unit(s) SubCutaneous at bedtime  insulin lispro (ADMELOG) corrective regimen sliding scale   SubCutaneous at bedtime  insulin lispro (ADMELOG) corrective regimen sliding scale   SubCutaneous three times a day before meals  insulin lispro Injectable (ADMELOG) 5 Unit(s) SubCutaneous three times a day before meals  labetalol 100 milliGRAM(s) Oral two times a day  lactobacillus acidophilus 1 Tablet(s) Oral daily  sodium bicarbonate 650 milliGRAM(s) Oral two times a day  tamsulosin 0.4 milliGRAM(s) Oral at bedtime  timolol 0.5% Solution 1 Drop(s) Both EYES daily    REVIEW OF SYSTEMS  --------------------------------------------------------------------------------  Gen: no lethargy  Respiratory: No dyspnea  CV: No chest pain  GI: No abdominal pain  MSK: no LE edema  Neuro: + legally blind  Heme: No bleeding    All other systems were reviewed and are negative, except as noted.    VITALS/PHYSICAL EXAM  --------------------------------------------------------------------------------  T(C): 37.1 (12-31-20 @ 06:37), Max: 37.6 (12-30-20 @ 21:31)  HR: 84 (12-31-20 @ 06:37) (79 - 84)  BP: 130/44 (12-31-20 @ 06:37) (109/81 - 132/43)  RR: 20 (12-31-20 @ 06:37) (18 - 20)  SpO2: 94% (12-31-20 @ 06:37) (94% - 97%)  Wt(kg): --    12-30-20 @ 07:01  -  12-31-20 @ 07:00  --------------------------------------------------------  IN: 0 mL / OUT: 800 mL / NET: -800 mL    Physical Exam:  	Gen: NAD, obese  	HEENT: MMM  	Pulm: good air entry B/L  	CV: S1S2  	Abd: Soft, +BS               Ext: No LE edema B/L, left heel ulcer  	Neuro: Awake  	Skin: Warm and dry    LABS/STUDIES  --------------------------------------------------------------------------------              9.9    11.44 >-----------<  255      [12-31-20 @ 06:46]              33.6     143  |  105  |  38  ----------------------------<  108      [12-31-20 @ 06:46]  3.8   |  24  |  2.37        Ca     8.8     [12-31-20 @ 06:46]      Mg     2.0     [12-30-20 @ 07:42]      Phos  3.0     [12-30-20 @ 07:42]    Creatinine Trend:  SCr 2.37 [12-31 @ 06:46]  SCr 2.32 [12-30 @ 07:42]  SCr 2.66 [12-29 @ 08:16]  SCr 2.97 [12-28 @ 07:58]  SCr 3.49 [12-27 @ 06:47]

## 2020-12-31 NOTE — PROGRESS NOTE ADULT - REASON FOR ADMISSION
Hyperglycemia

## 2020-12-31 NOTE — PROGRESS NOTE ADULT - PROVIDER SPECIALTY LIST ADULT
Endocrinology
Internal Medicine
Endocrinology
Infectious Disease
Internal Medicine
Internal Medicine
Nephrology
Nephrology
Podiatry
Podiatry
Urology
Infectious Disease
Podiatry
Urology
Urology
Podiatry
Podiatry
Endocrinology
Nephrology
Endocrinology
Internal Medicine
Internal Medicine
Nephrology
Internal Medicine
Internal Medicine

## 2020-12-31 NOTE — PROGRESS NOTE ADULT - PROBLEM SELECTOR PLAN 3
Pt. with hypervolemia in the setting of IV fluids. Pt. responded to IV Lasix  80mg once yesterday. Start IV Lasix 40 mg BID today. Check CXR for pulmonary edema. Monitor daily weights.     If any questions, please feel free to contact me  Wilberto Gooden   Nephrology Fellow  [phone]  (After 5 pm or on weekends please page the on-call fellow)

## 2020-12-31 NOTE — PROGRESS NOTE ADULT - PROBLEM SELECTOR PROBLEM 1
Acute kidney injury superimposed on CKD
Type 2 diabetes mellitus with hyperglycemia, with long-term current use of insulin
Acute kidney injury superimposed on CKD
Type 2 diabetes mellitus with hyperglycemia, with long-term current use of insulin
Acute cystitis with hematuria
Acute kidney injury superimposed on CKD
Acute kidney injury superimposed on CKD
DKA (diabetic ketoacidoses)
Acute cystitis with hematuria
Acute kidney injury superimposed on CKD
Type 2 diabetes mellitus with hyperglycemia, with long-term current use of insulin
Acute kidney injury superimposed on CKD
DKA (diabetic ketoacidoses)
Acute cystitis with hematuria
Acute kidney injury superimposed on CKD

## 2020-12-31 NOTE — PROGRESS NOTE ADULT - SUBJECTIVE AND OBJECTIVE BOX
Chief Complaint: DM 2    History: Patient seen at bedside. Reports she is feeling fine. Endorses low PO intake, noted with tightly controlled FS last night at dinnertime, 83 mg/dl. Denies s/s of hypoglycemia, no nausea/vomiting. Most recent glucose 126 mg/dl     MEDICATIONS  (STANDING):  amLODIPine   Tablet 10 milliGRAM(s) Oral daily  atorvastatin 10 milliGRAM(s) Oral at bedtime  collagenase Ointment 1 Application(s) Topical daily  dextrose 40% Gel 15 Gram(s) Oral once  dextrose 5%. 1000 milliLiter(s) (50 mL/Hr) IV Continuous <Continuous>  dextrose 5%. 1000 milliLiter(s) (100 mL/Hr) IV Continuous <Continuous>  dextrose 50% Injectable 25 Gram(s) IV Push once  dextrose 50% Injectable 25 Gram(s) IV Push once  dextrose 50% Injectable 12.5 Gram(s) IV Push once  fluconAZOLE   Tablet 200 milliGRAM(s) Oral daily  glucagon  Injectable 1 milliGRAM(s) IntraMuscular once  heparin   Injectable 5000 Unit(s) SubCutaneous every 8 hours  insulin glargine Injectable (LANTUS) 20 Unit(s) SubCutaneous at bedtime  insulin lispro (ADMELOG) corrective regimen sliding scale   SubCutaneous at bedtime  insulin lispro (ADMELOG) corrective regimen sliding scale   SubCutaneous three times a day before meals  insulin lispro Injectable (ADMELOG) 5 Unit(s) SubCutaneous three times a day before meals  labetalol 100 milliGRAM(s) Oral two times a day  lactobacillus acidophilus 1 Tablet(s) Oral daily  sodium bicarbonate 650 milliGRAM(s) Oral two times a day  tamsulosin 0.4 milliGRAM(s) Oral at bedtime  timolol 0.5% Solution 1 Drop(s) Both EYES daily    MEDICATIONS  (PRN):  acetaminophen   Tablet .. 975 milliGRAM(s) Oral every 6 hours PRN Mild Pain (1 - 3)  albuterol/ipratropium for Nebulization 3 milliLiter(s) Nebulizer every 6 hours PRN Shortness of Breath and/or Wheezing  benzocaine 15 mG/menthol 3.6 mG (Sugar-Free) Lozenge 1 Lozenge Oral every 3 hours PRN Sore Throat  bisacodyl Suppository 10 milliGRAM(s) Rectal daily PRN Constipation  guaiFENesin   Syrup  (Sugar-Free) 200 milliGRAM(s) Oral every 6 hours PRN Cough/chest congestion  HYDROmorphone   Tablet 2 milliGRAM(s) Oral every 3 hours PRN Moderate Pain (4 - 6) refractory to tylenol  simethicone 80 milliGRAM(s) Chew every 8 hours PRN Gas  sodium chloride 3%  Inhalation 4 milliLiter(s) Inhalation every 6 hours PRN airway clearance per respiratory therapy    No Known Allergies    Review of Systems:  Cardiovascular: No chest pain  Respiratory: No SOB  GI: No nausea, vomiting  Endocrine: no hypoglycemia symptoms     PHYSICAL EXAM:  VITALS: T(C): 36.7 (12-31-20 @ 14:52)  T(F): 98 (12-31-20 @ 14:52), Max: 99.7 (12-30-20 @ 21:31)  HR: 83 (12-31-20 @ 14:52) (79 - 86)  BP: 129/53 (12-31-20 @ 14:52) (109/81 - 132/43)  RR:  (18 - 20)  SpO2:  (94% - 97%)  Wt(kg): --  GENERAL: NAD  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, no lid lag, anicteric  HEENT:  Atraumatic, Normocephalic, moist mucous membranes  RESPIRATORY: unlabored respirations   PSYCH: Alert and oriented x 3    CAPILLARY BLOOD GLUCOSE    POCT Blood Glucose.: 126 mg/dL (31 Dec 2020 12:17)  POCT Blood Glucose.: 112 mg/dL (31 Dec 2020 08:50)  POCT Blood Glucose.: 100 mg/dL (30 Dec 2020 22:25)  POCT Blood Glucose.: 83 mg/dL (30 Dec 2020 18:17)      12-31    143  |  105  |  38<H>  ----------------------------<  108<H>  3.8   |  24  |  2.37<H>    EGFR if : 22<L>  EGFR if non : 19<L>    Ca    8.8      12-31  Mg     2.0     12-30  Phos  3.0     12-30    TPro  6.9  /  Alb  2.6<L>  /  TBili  0.3  /  DBili  x   /  AST  15  /  ALT  11  /  AlkPhos  102  12-30        A1C with Estimated Average Glucose Result: 8.6 % (12-20-20 @ 15:17)  A1C with Estimated Average Glucose: 9.3 % (09-17-20 @ 05:52)

## 2021-01-06 ENCOUNTER — NON-APPOINTMENT (OUTPATIENT)
Age: 81
End: 2021-01-06

## 2021-01-13 ENCOUNTER — NON-APPOINTMENT (OUTPATIENT)
Age: 81
End: 2021-01-13

## 2021-01-14 ENCOUNTER — APPOINTMENT (OUTPATIENT)
Dept: UROLOGY | Facility: CLINIC | Age: 81
End: 2021-01-14

## 2021-02-03 ENCOUNTER — NON-APPOINTMENT (OUTPATIENT)
Age: 81
End: 2021-02-03

## 2021-02-18 ENCOUNTER — NON-APPOINTMENT (OUTPATIENT)
Age: 81
End: 2021-02-18

## 2021-04-09 ENCOUNTER — NON-APPOINTMENT (OUTPATIENT)
Age: 81
End: 2021-04-09

## 2021-04-22 ENCOUNTER — NON-APPOINTMENT (OUTPATIENT)
Age: 81
End: 2021-04-22

## 2021-04-30 ENCOUNTER — APPOINTMENT (OUTPATIENT)
Dept: HOME HEALTH SERVICES | Facility: HOME HEALTH | Age: 81
End: 2021-04-30
Payer: MEDICARE

## 2021-04-30 VITALS
RESPIRATION RATE: 14 BRPM | HEART RATE: 74 BPM | SYSTOLIC BLOOD PRESSURE: 140 MMHG | TEMPERATURE: 97.6 F | DIASTOLIC BLOOD PRESSURE: 74 MMHG | OXYGEN SATURATION: 100 %

## 2021-04-30 DIAGNOSIS — R26.81 UNSTEADINESS ON FEET: ICD-10-CM

## 2021-04-30 DIAGNOSIS — E55.9 VITAMIN D DEFICIENCY, UNSPECIFIED: ICD-10-CM

## 2021-04-30 PROCEDURE — 99350 HOME/RES VST EST HIGH MDM 60: CPT

## 2021-05-06 ENCOUNTER — FORM ENCOUNTER (OUTPATIENT)
Age: 81
End: 2021-05-06

## 2021-05-10 ENCOUNTER — RX RENEWAL (OUTPATIENT)
Age: 81
End: 2021-05-10

## 2021-05-14 ENCOUNTER — APPOINTMENT (OUTPATIENT)
Dept: HOME HEALTH SERVICES | Facility: HOME HEALTH | Age: 81
End: 2021-05-14

## 2021-05-17 ENCOUNTER — NON-APPOINTMENT (OUTPATIENT)
Age: 81
End: 2021-05-17

## 2021-05-17 ENCOUNTER — TRANSCRIPTION ENCOUNTER (OUTPATIENT)
Age: 81
End: 2021-05-17

## 2021-05-18 ENCOUNTER — LABORATORY RESULT (OUTPATIENT)
Age: 81
End: 2021-05-18

## 2021-05-20 ENCOUNTER — RX RENEWAL (OUTPATIENT)
Age: 81
End: 2021-05-20

## 2021-05-20 ENCOUNTER — NON-APPOINTMENT (OUTPATIENT)
Age: 81
End: 2021-05-20

## 2021-05-27 ENCOUNTER — NON-APPOINTMENT (OUTPATIENT)
Age: 81
End: 2021-05-27

## 2021-05-28 ENCOUNTER — NON-APPOINTMENT (OUTPATIENT)
Age: 81
End: 2021-05-28

## 2021-05-28 ENCOUNTER — TRANSCRIPTION ENCOUNTER (OUTPATIENT)
Age: 81
End: 2021-05-28

## 2021-05-28 LAB
ANION GAP SERPL CALC-SCNC: 14 MMOL/L
BASOPHILS # BLD AUTO: 0.05 K/UL
BASOPHILS NFR BLD AUTO: 0.5 %
BUN SERPL-MCNC: 72 MG/DL
CALCIUM SERPL-MCNC: 9 MG/DL
CHLORIDE SERPL-SCNC: 104 MMOL/L
CO2 SERPL-SCNC: 21 MMOL/L
CREAT SERPL-MCNC: 2.46 MG/DL
EOSINOPHIL # BLD AUTO: 0.16 K/UL
EOSINOPHIL NFR BLD AUTO: 1.5 %
GLUCOSE SERPL-MCNC: 311 MG/DL
HCT VFR BLD CALC: 34.8 %
HGB BLD-MCNC: 10.7 G/DL
IMM GRANULOCYTES NFR BLD AUTO: 0.3 %
LYMPHOCYTES # BLD AUTO: 2.29 K/UL
LYMPHOCYTES NFR BLD AUTO: 22.1 %
MAN DIFF?: NORMAL
MCHC RBC-ENTMCNC: 28.8 PG
MCHC RBC-ENTMCNC: 30.7 GM/DL
MCV RBC AUTO: 93.5 FL
MONOCYTES # BLD AUTO: 0.53 K/UL
MONOCYTES NFR BLD AUTO: 5.1 %
NEUTROPHILS # BLD AUTO: 7.32 K/UL
NEUTROPHILS NFR BLD AUTO: 70.5 %
PLATELET # BLD AUTO: 141 K/UL
POTASSIUM SERPL-SCNC: 6.2 MMOL/L
RBC # BLD: 3.72 M/UL
RBC # FLD: 14.6 %
SODIUM SERPL-SCNC: 139 MMOL/L
WBC # FLD AUTO: 10.38 K/UL

## 2021-05-31 ENCOUNTER — NON-APPOINTMENT (OUTPATIENT)
Age: 81
End: 2021-05-31

## 2021-05-31 ENCOUNTER — TRANSCRIPTION ENCOUNTER (OUTPATIENT)
Age: 81
End: 2021-05-31

## 2021-06-01 ENCOUNTER — TRANSCRIPTION ENCOUNTER (OUTPATIENT)
Age: 81
End: 2021-06-01

## 2021-06-02 ENCOUNTER — LABORATORY RESULT (OUTPATIENT)
Age: 81
End: 2021-06-02

## 2021-06-02 DIAGNOSIS — Z23 ENCOUNTER FOR IMMUNIZATION: ICD-10-CM

## 2021-06-02 LAB
BASOPHILS # BLD AUTO: 0.04 K/UL
BASOPHILS NFR BLD AUTO: 0.4 %
EOSINOPHIL # BLD AUTO: 0.11 K/UL
EOSINOPHIL NFR BLD AUTO: 1.1 %
HCT VFR BLD CALC: 34.3 %
HGB BLD-MCNC: 10.6 G/DL
IMM GRANULOCYTES NFR BLD AUTO: 0.6 %
LYMPHOCYTES # BLD AUTO: 2.4 K/UL
LYMPHOCYTES NFR BLD AUTO: 23.7 %
MAN DIFF?: NORMAL
MCHC RBC-ENTMCNC: 29.2 PG
MCHC RBC-ENTMCNC: 30.9 GM/DL
MCV RBC AUTO: 94.5 FL
MONOCYTES # BLD AUTO: 0.66 K/UL
MONOCYTES NFR BLD AUTO: 6.5 %
NEUTROPHILS # BLD AUTO: 6.87 K/UL
NEUTROPHILS NFR BLD AUTO: 67.7 %
PLATELET # BLD AUTO: 230 K/UL
RBC # BLD: 3.63 M/UL
RBC # FLD: 14.7 %
WBC # FLD AUTO: 10.14 K/UL

## 2021-06-03 LAB
ALBUMIN SERPL ELPH-MCNC: 3.7 G/DL
ALP BLD-CCNC: 106 U/L
ALT SERPL-CCNC: 34 U/L
ANION GAP SERPL CALC-SCNC: 17 MMOL/L
AST SERPL-CCNC: 23 U/L
BILIRUB SERPL-MCNC: 0.2 MG/DL
BUN SERPL-MCNC: 58 MG/DL
CALCIUM SERPL-MCNC: 9 MG/DL
CHLORIDE SERPL-SCNC: 102 MMOL/L
CO2 SERPL-SCNC: 22 MMOL/L
CREAT SERPL-MCNC: 2.18 MG/DL
POTASSIUM SERPL-SCNC: 4.6 MMOL/L
PROT SERPL-MCNC: 6.4 G/DL
SODIUM SERPL-SCNC: 140 MMOL/L
TSH SERPL-ACNC: 5.41 UIU/ML

## 2021-06-11 ENCOUNTER — APPOINTMENT (OUTPATIENT)
Dept: HOME HEALTH SERVICES | Facility: HOME HEALTH | Age: 81
End: 2021-06-11

## 2021-06-22 ENCOUNTER — NON-APPOINTMENT (OUTPATIENT)
Age: 81
End: 2021-06-22

## 2021-06-22 LAB
ANION GAP SERPL CALC-SCNC: 17 MMOL/L
BUN SERPL-MCNC: 65 MG/DL
CALCIUM SERPL-MCNC: 9.2 MG/DL
CHLORIDE SERPL-SCNC: 105 MMOL/L
CO2 SERPL-SCNC: 21 MMOL/L
CREAT SERPL-MCNC: 2.42 MG/DL
GLUCOSE SERPL-MCNC: 107 MG/DL
POTASSIUM SERPL-SCNC: 4.9 MMOL/L
SODIUM SERPL-SCNC: 142 MMOL/L

## 2021-07-20 ENCOUNTER — NON-APPOINTMENT (OUTPATIENT)
Age: 81
End: 2021-07-20

## 2021-07-20 ENCOUNTER — RX RENEWAL (OUTPATIENT)
Age: 81
End: 2021-07-20

## 2021-07-21 ENCOUNTER — NON-APPOINTMENT (OUTPATIENT)
Age: 81
End: 2021-07-21

## 2021-07-22 ENCOUNTER — APPOINTMENT (OUTPATIENT)
Dept: HOME HEALTH SERVICES | Facility: HOME HEALTH | Age: 81
End: 2021-07-22
Payer: MEDICARE

## 2021-07-22 VITALS
TEMPERATURE: 96.4 F | OXYGEN SATURATION: 100 % | RESPIRATION RATE: 14 BRPM | DIASTOLIC BLOOD PRESSURE: 60 MMHG | HEART RATE: 75 BPM | SYSTOLIC BLOOD PRESSURE: 122 MMHG

## 2021-07-22 PROCEDURE — 99349 HOME/RES VST EST MOD MDM 40: CPT

## 2021-07-23 LAB
ANION GAP SERPL CALC-SCNC: 21 MMOL/L
BUN SERPL-MCNC: 80 MG/DL
CALCIUM SERPL-MCNC: 9 MG/DL
CHLORIDE SERPL-SCNC: 104 MMOL/L
CO2 SERPL-SCNC: 18 MMOL/L
CREAT SERPL-MCNC: 3.03 MG/DL
ESTIMATED AVERAGE GLUCOSE: 157 MG/DL
GLUCOSE SERPL-MCNC: 275 MG/DL
HBA1C MFR BLD HPLC: 7.1 %
POTASSIUM SERPL-SCNC: 5.6 MMOL/L
SODIUM SERPL-SCNC: 143 MMOL/L

## 2021-07-28 ENCOUNTER — NON-APPOINTMENT (OUTPATIENT)
Age: 81
End: 2021-07-28

## 2021-07-30 ENCOUNTER — NON-APPOINTMENT (OUTPATIENT)
Age: 81
End: 2021-07-30

## 2021-08-02 ENCOUNTER — NON-APPOINTMENT (OUTPATIENT)
Age: 81
End: 2021-08-02

## 2021-08-03 LAB — SARS-COV-2 N GENE NPH QL NAA+PROBE: NOT DETECTED

## 2021-08-04 ENCOUNTER — TRANSCRIPTION ENCOUNTER (OUTPATIENT)
Age: 81
End: 2021-08-04

## 2021-08-09 ENCOUNTER — NON-APPOINTMENT (OUTPATIENT)
Age: 81
End: 2021-08-09

## 2021-08-09 ENCOUNTER — TRANSCRIPTION ENCOUNTER (OUTPATIENT)
Age: 81
End: 2021-08-09

## 2021-08-16 ENCOUNTER — NON-APPOINTMENT (OUTPATIENT)
Age: 81
End: 2021-08-16

## 2021-08-20 ENCOUNTER — LABORATORY RESULT (OUTPATIENT)
Age: 81
End: 2021-08-20

## 2021-08-20 ENCOUNTER — NON-APPOINTMENT (OUTPATIENT)
Age: 81
End: 2021-08-20

## 2021-08-20 RX ORDER — AMOXICILLIN AND CLAVULANATE POTASSIUM 500; 125 MG/1; MG/1
500-125 TABLET, FILM COATED ORAL
Qty: 14 | Refills: 0 | Status: COMPLETED | COMMUNITY
Start: 2021-08-20 | End: 2021-08-27

## 2021-08-20 RX ORDER — AZITHROMYCIN 250 MG/1
250 TABLET, FILM COATED ORAL
Qty: 1 | Refills: 0 | Status: DISCONTINUED | COMMUNITY
Start: 2021-08-16 | End: 2021-08-20

## 2021-08-23 ENCOUNTER — TRANSCRIPTION ENCOUNTER (OUTPATIENT)
Age: 81
End: 2021-08-23

## 2021-08-23 ENCOUNTER — NON-APPOINTMENT (OUTPATIENT)
Age: 81
End: 2021-08-23

## 2021-08-25 ENCOUNTER — NON-APPOINTMENT (OUTPATIENT)
Age: 81
End: 2021-08-25

## 2021-08-25 ENCOUNTER — APPOINTMENT (OUTPATIENT)
Dept: HOME HEALTH SERVICES | Facility: HOME HEALTH | Age: 81
End: 2021-08-25

## 2021-08-25 DIAGNOSIS — Z20.822 CONTACT WITH AND (SUSPECTED) EXPOSURE TO COVID-19: ICD-10-CM

## 2021-08-25 LAB
ALBUMIN SERPL ELPH-MCNC: 3.4 G/DL
ALP BLD-CCNC: 106 U/L
ALT SERPL-CCNC: 18 U/L
ANION GAP SERPL CALC-SCNC: 15 MMOL/L
APPEARANCE: ABNORMAL
AST SERPL-CCNC: 16 U/L
BASOPHILS # BLD AUTO: 0.05 K/UL
BASOPHILS NFR BLD AUTO: 0.4 %
BILIRUB SERPL-MCNC: 0.2 MG/DL
BILIRUBIN URINE: NEGATIVE
BLOOD URINE: ABNORMAL
BUN SERPL-MCNC: 54 MG/DL
CALCIUM SERPL-MCNC: 8.8 MG/DL
CHLORIDE SERPL-SCNC: 103 MMOL/L
CO2 SERPL-SCNC: 22 MMOL/L
COLOR: NORMAL
CREAT SERPL-MCNC: 2.81 MG/DL
EOSINOPHIL # BLD AUTO: 0.09 K/UL
EOSINOPHIL NFR BLD AUTO: 0.7 %
ESTIMATED AVERAGE GLUCOSE: 154 MG/DL
GLUCOSE QUALITATIVE U: NEGATIVE
GLUCOSE SERPL-MCNC: 231 MG/DL
HBA1C MFR BLD HPLC: 7 %
HCT VFR BLD CALC: 31.4 %
HGB BLD-MCNC: 9.9 G/DL
IMM GRANULOCYTES NFR BLD AUTO: 0.4 %
KETONES URINE: NEGATIVE
LEUKOCYTE ESTERASE URINE: ABNORMAL
LYMPHOCYTES # BLD AUTO: 2.83 K/UL
LYMPHOCYTES NFR BLD AUTO: 21.4 %
MAN DIFF?: NORMAL
MCHC RBC-ENTMCNC: 29.3 PG
MCHC RBC-ENTMCNC: 31.5 GM/DL
MCV RBC AUTO: 92.9 FL
MONOCYTES # BLD AUTO: 0.92 K/UL
MONOCYTES NFR BLD AUTO: 7 %
NEUTROPHILS # BLD AUTO: 9.29 K/UL
NEUTROPHILS NFR BLD AUTO: 70.1 %
NITRITE URINE: NEGATIVE
PH URINE: 6
PLATELET # BLD AUTO: 152 K/UL
POTASSIUM SERPL-SCNC: 4.3 MMOL/L
PROT SERPL-MCNC: 6.3 G/DL
PROTEIN URINE: ABNORMAL
RBC # BLD: 3.38 M/UL
RBC # FLD: 13.3 %
SARS-COV-2 N GENE NPH QL NAA+PROBE: NOT DETECTED
SODIUM SERPL-SCNC: 140 MMOL/L
SPECIFIC GRAVITY URINE: 1.01
TSH SERPL-ACNC: 1.73 UIU/ML
UROBILINOGEN URINE: NORMAL
WBC # FLD AUTO: 13.23 K/UL

## 2021-08-26 LAB
C DIFF TOX GENS STL QL NAA+PROBE: NORMAL
CDIFF BY PCR: DETECTED

## 2021-08-30 ENCOUNTER — NON-APPOINTMENT (OUTPATIENT)
Age: 81
End: 2021-08-30

## 2021-08-31 ENCOUNTER — TRANSCRIPTION ENCOUNTER (OUTPATIENT)
Age: 81
End: 2021-08-31

## 2021-08-31 ENCOUNTER — NON-APPOINTMENT (OUTPATIENT)
Age: 81
End: 2021-08-31

## 2021-09-01 ENCOUNTER — NON-APPOINTMENT (OUTPATIENT)
Age: 81
End: 2021-09-01

## 2021-09-01 LAB
BACTERIA STL CULT: NORMAL
DEPRECATED O AND P PREP STL: NORMAL

## 2021-09-08 ENCOUNTER — NON-APPOINTMENT (OUTPATIENT)
Age: 81
End: 2021-09-08

## 2021-09-08 ENCOUNTER — TRANSCRIPTION ENCOUNTER (OUTPATIENT)
Age: 81
End: 2021-09-08

## 2021-09-15 ENCOUNTER — NON-APPOINTMENT (OUTPATIENT)
Age: 81
End: 2021-09-15

## 2021-09-17 ENCOUNTER — RX RENEWAL (OUTPATIENT)
Age: 81
End: 2021-09-17

## 2021-09-17 ENCOUNTER — APPOINTMENT (OUTPATIENT)
Dept: HOME HEALTH SERVICES | Facility: HOME HEALTH | Age: 81
End: 2021-09-17

## 2021-09-17 ENCOUNTER — APPOINTMENT (OUTPATIENT)
Dept: HOME HEALTH SERVICES | Facility: HOME HEALTH | Age: 81
End: 2021-09-17
Payer: MEDICARE

## 2021-09-17 VITALS
RESPIRATION RATE: 14 BRPM | OXYGEN SATURATION: 95 % | SYSTOLIC BLOOD PRESSURE: 122 MMHG | HEART RATE: 58 BPM | TEMPERATURE: 97.5 F | DIASTOLIC BLOOD PRESSURE: 78 MMHG

## 2021-09-17 DIAGNOSIS — J45.998 OTHER ASTHMA: ICD-10-CM

## 2021-09-17 DIAGNOSIS — Z60.2 PROBLEMS RELATED TO LIVING ALONE: ICD-10-CM

## 2021-09-17 PROCEDURE — 99350 HOME/RES VST EST HIGH MDM 60: CPT

## 2021-09-17 SDOH — SOCIAL STABILITY - SOCIAL INSECURITY: PROBLEMS RELATED TO LIVING ALONE: Z60.2

## 2021-09-22 ENCOUNTER — NON-APPOINTMENT (OUTPATIENT)
Age: 81
End: 2021-09-22

## 2021-09-22 PROBLEM — J45.998 POST-VIRAL REACTIVE AIRWAY DISEASE: Status: ACTIVE | Noted: 2021-08-25

## 2021-09-23 ENCOUNTER — NON-APPOINTMENT (OUTPATIENT)
Age: 81
End: 2021-09-23

## 2021-09-30 ENCOUNTER — NON-APPOINTMENT (OUTPATIENT)
Age: 81
End: 2021-09-30

## 2021-10-01 ENCOUNTER — APPOINTMENT (OUTPATIENT)
Dept: HOME HEALTH SERVICES | Facility: HOME HEALTH | Age: 81
End: 2021-10-01
Payer: MEDICARE

## 2021-10-01 VITALS
HEART RATE: 73 BPM | OXYGEN SATURATION: 98 % | DIASTOLIC BLOOD PRESSURE: 58 MMHG | TEMPERATURE: 97.3 F | RESPIRATION RATE: 14 BRPM | SYSTOLIC BLOOD PRESSURE: 120 MMHG

## 2021-10-01 DIAGNOSIS — Z23 ENCOUNTER FOR IMMUNIZATION: ICD-10-CM

## 2021-10-01 PROCEDURE — 90662 IIV NO PRSV INCREASED AG IM: CPT

## 2021-10-01 PROCEDURE — G0008: CPT

## 2021-10-01 PROCEDURE — 99347 HOME/RES VST EST SF MDM 20: CPT | Mod: 25

## 2021-10-08 ENCOUNTER — NON-APPOINTMENT (OUTPATIENT)
Age: 81
End: 2021-10-08

## 2021-10-13 ENCOUNTER — NON-APPOINTMENT (OUTPATIENT)
Age: 81
End: 2021-10-13

## 2021-10-20 ENCOUNTER — NON-APPOINTMENT (OUTPATIENT)
Age: 81
End: 2021-10-20

## 2021-10-22 ENCOUNTER — NON-APPOINTMENT (OUTPATIENT)
Age: 81
End: 2021-10-22

## 2021-10-22 DIAGNOSIS — H10.9 UNSPECIFIED CONJUNCTIVITIS: ICD-10-CM

## 2021-10-27 ENCOUNTER — NON-APPOINTMENT (OUTPATIENT)
Age: 81
End: 2021-10-27

## 2021-10-28 ENCOUNTER — LABORATORY RESULT (OUTPATIENT)
Age: 81
End: 2021-10-28

## 2021-10-29 ENCOUNTER — NON-APPOINTMENT (OUTPATIENT)
Age: 81
End: 2021-10-29

## 2021-10-29 DIAGNOSIS — N39.0 URINARY TRACT INFECTION, SITE NOT SPECIFIED: ICD-10-CM

## 2021-10-29 LAB
APPEARANCE: ABNORMAL
BASOPHILS # BLD AUTO: 0.06 K/UL
BASOPHILS NFR BLD AUTO: 0.5 %
BILIRUBIN URINE: NEGATIVE
BLOOD URINE: ABNORMAL
COLOR: YELLOW
EOSINOPHIL # BLD AUTO: 0.17 K/UL
EOSINOPHIL NFR BLD AUTO: 1.4 %
GLUCOSE QUALITATIVE U: NEGATIVE
HCT VFR BLD CALC: 33.6 %
HGB BLD-MCNC: 9.8 G/DL
IMM GRANULOCYTES NFR BLD AUTO: 0.7 %
KETONES URINE: NEGATIVE
LEUKOCYTE ESTERASE URINE: ABNORMAL
LYMPHOCYTES # BLD AUTO: 2.18 K/UL
LYMPHOCYTES NFR BLD AUTO: 17.8 %
MAN DIFF?: NORMAL
MCHC RBC-ENTMCNC: 27.3 PG
MCHC RBC-ENTMCNC: 29.2 GM/DL
MCV RBC AUTO: 93.6 FL
MONOCYTES # BLD AUTO: 0.62 K/UL
MONOCYTES NFR BLD AUTO: 5.1 %
NEUTROPHILS # BLD AUTO: 9.14 K/UL
NEUTROPHILS NFR BLD AUTO: 74.5 %
NITRITE URINE: NEGATIVE
PH URINE: 5.5
PLATELET # BLD AUTO: 216 K/UL
PROTEIN URINE: ABNORMAL
RBC # BLD: 3.59 M/UL
RBC # FLD: 14.2 %
SPECIFIC GRAVITY URINE: 1.02
UROBILINOGEN URINE: NORMAL
WBC # FLD AUTO: 12.25 K/UL

## 2021-11-03 ENCOUNTER — NON-APPOINTMENT (OUTPATIENT)
Age: 81
End: 2021-11-03

## 2021-11-03 LAB — BACTERIA UR CULT: NORMAL

## 2021-11-16 ENCOUNTER — TRANSCRIPTION ENCOUNTER (OUTPATIENT)
Age: 81
End: 2021-11-16

## 2021-11-16 ENCOUNTER — NON-APPOINTMENT (OUTPATIENT)
Age: 81
End: 2021-11-16

## 2021-11-17 ENCOUNTER — RX RENEWAL (OUTPATIENT)
Age: 81
End: 2021-11-17

## 2021-11-17 ENCOUNTER — NON-APPOINTMENT (OUTPATIENT)
Age: 81
End: 2021-11-17

## 2021-11-18 ENCOUNTER — RX RENEWAL (OUTPATIENT)
Age: 81
End: 2021-11-18

## 2021-12-15 ENCOUNTER — NON-APPOINTMENT (OUTPATIENT)
Age: 81
End: 2021-12-15

## 2021-12-16 ENCOUNTER — NON-APPOINTMENT (OUTPATIENT)
Age: 81
End: 2021-12-16

## 2021-12-17 ENCOUNTER — NON-APPOINTMENT (OUTPATIENT)
Age: 81
End: 2021-12-17

## 2021-12-17 LAB
APPEARANCE: ABNORMAL
BACTERIA: NEGATIVE
BILIRUBIN URINE: NEGATIVE
BLOOD URINE: ABNORMAL
COLOR: COLORLESS
GLUCOSE QUALITATIVE U: NEGATIVE
HYALINE CASTS: 9 /LPF
KETONES URINE: NEGATIVE
LEUKOCYTE ESTERASE URINE: ABNORMAL
MICROSCOPIC-UA: NORMAL
NITRITE URINE: NEGATIVE
PH URINE: 5.5
PROTEIN URINE: ABNORMAL
RED BLOOD CELLS URINE: 23 /HPF
SPECIFIC GRAVITY URINE: 1.01
SQUAMOUS EPITHELIAL CELLS: 1 /HPF
UROBILINOGEN URINE: NORMAL
WHITE BLOOD CELLS URINE: >720 /HPF

## 2021-12-18 LAB — BACTERIA UR CULT: NORMAL

## 2021-12-30 ENCOUNTER — APPOINTMENT (OUTPATIENT)
Dept: HOME HEALTH SERVICES | Facility: HOME HEALTH | Age: 81
End: 2021-12-30

## 2021-12-30 ENCOUNTER — APPOINTMENT (OUTPATIENT)
Dept: HOME HEALTH SERVICES | Facility: HOME HEALTH | Age: 81
End: 2021-12-30
Payer: MEDICARE

## 2021-12-30 VITALS
HEART RATE: 75 BPM | OXYGEN SATURATION: 99 % | RESPIRATION RATE: 14 BRPM | SYSTOLIC BLOOD PRESSURE: 112 MMHG | TEMPERATURE: 97.5 F | DIASTOLIC BLOOD PRESSURE: 64 MMHG

## 2021-12-30 PROCEDURE — 99350 HOME/RES VST EST HIGH MDM 60: CPT

## 2021-12-30 NOTE — H&P ADULT - NSHPPOAURINARYCATHETER_GEN_ALL_CORE
Per case management, pt [id number]   no

## 2022-01-06 ENCOUNTER — NON-APPOINTMENT (OUTPATIENT)
Age: 82
End: 2022-01-06

## 2022-01-06 LAB
25(OH)D3 SERPL-MCNC: 60.9 NG/ML
ALBUMIN SERPL ELPH-MCNC: 3.9 G/DL
ALP BLD-CCNC: 106 U/L
ALT SERPL-CCNC: 19 U/L
ANION GAP SERPL CALC-SCNC: 18 MMOL/L
AST SERPL-CCNC: 12 U/L
BASOPHILS # BLD AUTO: 0.04 K/UL
BASOPHILS NFR BLD AUTO: 0.4 %
BILIRUB SERPL-MCNC: <0.2 MG/DL
BUN SERPL-MCNC: 62 MG/DL
CALCIUM SERPL-MCNC: 9.7 MG/DL
CHLORIDE SERPL-SCNC: 100 MMOL/L
CHOLEST SERPL-MCNC: 129 MG/DL
CO2 SERPL-SCNC: 21 MMOL/L
CREAT SERPL-MCNC: 2.97 MG/DL
EOSINOPHIL # BLD AUTO: 0.16 K/UL
EOSINOPHIL NFR BLD AUTO: 1.6 %
ESTIMATED AVERAGE GLUCOSE: 212 MG/DL
FOLATE SERPL-MCNC: >20 NG/ML
GLUCOSE SERPL-MCNC: 101 MG/DL
HBA1C MFR BLD HPLC: 9 %
HCT VFR BLD CALC: 35.3 %
HDLC SERPL-MCNC: 43 MG/DL
HGB BLD-MCNC: 10.6 G/DL
IMM GRANULOCYTES NFR BLD AUTO: 0.3 %
LDLC SERPL CALC-MCNC: 54 MG/DL
LYMPHOCYTES # BLD AUTO: 2.76 K/UL
LYMPHOCYTES NFR BLD AUTO: 26.9 %
MAN DIFF?: NORMAL
MCHC RBC-ENTMCNC: 27 PG
MCHC RBC-ENTMCNC: 30 GM/DL
MCV RBC AUTO: 90.1 FL
MONOCYTES # BLD AUTO: 0.67 K/UL
MONOCYTES NFR BLD AUTO: 6.5 %
NEUTROPHILS # BLD AUTO: 6.59 K/UL
NEUTROPHILS NFR BLD AUTO: 64.3 %
NONHDLC SERPL-MCNC: 86 MG/DL
PLATELET # BLD AUTO: 263 K/UL
POTASSIUM SERPL-SCNC: 4.9 MMOL/L
PROT SERPL-MCNC: 7.1 G/DL
RBC # BLD: 3.92 M/UL
RBC # FLD: 14.8 %
SODIUM SERPL-SCNC: 139 MMOL/L
TRIGL SERPL-MCNC: 161 MG/DL
TSH SERPL-ACNC: 3.7 UIU/ML
VIT B12 SERPL-MCNC: >2000 PG/ML
WBC # FLD AUTO: 10.25 K/UL

## 2022-01-24 ENCOUNTER — RX RENEWAL (OUTPATIENT)
Age: 82
End: 2022-01-24

## 2022-01-25 ENCOUNTER — RX RENEWAL (OUTPATIENT)
Age: 82
End: 2022-01-25

## 2022-02-11 ENCOUNTER — NON-APPOINTMENT (OUTPATIENT)
Age: 82
End: 2022-02-11

## 2022-02-16 ENCOUNTER — TRANSCRIPTION ENCOUNTER (OUTPATIENT)
Age: 82
End: 2022-02-16

## 2022-02-18 ENCOUNTER — APPOINTMENT (OUTPATIENT)
Dept: HOME HEALTH SERVICES | Facility: HOME HEALTH | Age: 82
End: 2022-02-18

## 2022-02-21 ENCOUNTER — TRANSCRIPTION ENCOUNTER (OUTPATIENT)
Age: 82
End: 2022-02-21

## 2022-02-23 ENCOUNTER — NON-APPOINTMENT (OUTPATIENT)
Age: 82
End: 2022-02-23

## 2022-02-23 ENCOUNTER — TRANSCRIPTION ENCOUNTER (OUTPATIENT)
Age: 82
End: 2022-02-23

## 2022-02-24 ENCOUNTER — NON-APPOINTMENT (OUTPATIENT)
Age: 82
End: 2022-02-24

## 2022-02-25 NOTE — PROGRESS NOTE ADULT - ATTENDING COMMENTS
Patient Education     Acute Bronchitis  Your healthcare provider has told you that you have acute bronchitis. Bronchitis is infection or inflammation of the airways in the lungs (bronchial tubes). Normally, air moves easily in and out of the airways. Bronchitis narrows the airways. This makes it harder for air to flow in and out of the lungs. This causes symptoms such as shortness of breath, coughing up yellow or green mucus, and wheezing.  Bronchitis can be acute or chronic. Acute means it happens quickly and goes away in a short time. Chronic means a condition lasts a long time and often comes back. Most people with acute bronchitis get better in 1 to 2 weeks.     What causes acute bronchitis?  Acute bronchitis is often caused by a virus such as a cold or the flu. In some cases, it may be caused by bacteria. Certain factors make it more likely for a cold or flu to turn into bronchitis. These include being very young, being elderly, having a heart or lung problem, or having a weak immune system. Cigarette smoking also makes bronchitis more likely.  When bronchitis develops, the airways become swollen. The airways may also become infected with bacteria. This is known as a secondary infection.  Symptoms of acute bronchitis  Symptoms can include:  · Coughing with mucus  · Wheezing  · Feeling short of breath  · Chest pain  · Fever  Diagnosing acute bronchitis  Your healthcare provider will ask about your symptoms and health history. He or she will give you a physical exam. This will include listening to your lungs while you breathe. You may have a chest X-ray to look for infection in the lungs (pneumonia) if you have had a fever. You may also have blood tests to check for infection.  Treating acute bronchitis  Bronchitis usually goes away in 1 to 2 weeks without treatment. You can help feel better by:  · Taking medicine as directed. Talk to your healthcare provider before taking any over-the-counter medicines (OTC).  Some OTC medicines help relieve inflammation in your bronchial tubes. They can also thin mucus. This makes it easier to cough up. Your healthcare provider may prescribe an inhaler to help open up the bronchial tubes. Most of the time, acute bronchitis is caused by a viral infection. Antibiotics are usually not prescribed for viral infections.  · Drinking plenty of fluids, such as water, juice, or warm soup. Fluids loosen mucus so that you can cough it up. This helps you breathe more easily. Fluids also prevent dehydration.  · Using a humidifier. This can help reduce coughing.  · Getting plenty of rest  · Not smoking. Also, don't let anyone else smoke in your home. In public places, move away from secondhand smoke.  Recovery and follow-up  Follow up with your doctor. You will likely feel better in 1 to 2 weeks. But you may have a dry cough for a longer time. Let your doctor know if you still have symptoms other than a dry cough after 2 weeks. Tell him or her if you get bronchial infections often.  Self-care tips  To get relief from your symptoms and prevent bronchitis:  · Stop smoking. Stopping smoking is the most important step you can take to treat bronchitis. If you need help stopping smoking, talk with your healthcare provider.  · Stay away from secondhand smoke and other irritants. Try to stay away from smoke, chemicals, fumes, and dust. Don’t let anyone smoke in your home. Stay indoors on smoggy days.  · Prevent lung infections. Ask your healthcare provider about the flu and pneumonia vaccines. Take steps to prevent colds and other lung infections.  · Wash your hands well. Wash your hands often with soap and water. Use hand  when you can’t wash your hands. Stay away from crowds during cold and flu season.  When to call your healthcare provider  Call the healthcare provider if you have any of these:  · Fever of 100.4°F ( 38.0°C) or higher, or as directed by your healthcare provider  · Symptoms that get  worse, or new symptoms  · Breathing not getting better with treatments  · Symptoms that don’t start to get better in 1 week  [employer] last reviewed this educational content on 8/1/2019  © 0312-1652 [employer]. [address] [place] [address], [place] [address]. All rights reserved. This information is not intended as a substitute for professional medical care. Always follow your healthcare professional's instructions.            80F h/o HTN, DM2 c/b diabetic neuropathy and multiple toe amputations, HLD, PVD, CKD (baseline Cr 1.8-2), morbid obesity admitted for mild DKA 2/2 UTI and insulin noncompliance found to be in ACKD with obstructing ureteral stones s/p stent placement POD#4.    1. DKA:   -patient hypoglycemic this morning  -Endocrine following, appreciate recs, c/w basal/bolus regimen, monitor FS qac and qHS; c/w lantus to 26 units qhs and admelog to 8units qAC  -FS better controlled this morning    2. UTI/Obstructing ureteral stones:  -CTAP with 2 obstructing R ureteral stones, mild R hydroureteronephrosis with worsening flank pain. Patient s/p R ureteral stent placement yesterday by urology with findings of purulent pus upon placement; OR cultures growing candida glabrata  -c/w capsofungin for 10 day course; f/u sensitivities   -f/u with ID recs re- course  -Renal and Urology following, appreciate recs    3. ACKD:  -likely post-renal 2/2 obstructing ureteral stones  -now s/p stent placement; monitor creatinine (baseline around 2)  -creatinine now downtrending since stent placement on 12/24  BUN/Creatinine  12-29 @ 08:16  BUN  48     Creatinine  2.66  12-28 @ 07:58  BUN  60     Creatinine  2.97  12-27 @ 06:47  BUN  64     Creatinine  3.49  12-26 @ 06:27  BUN  62     Creatinine  3.36  12-25 @ 17:22  BUN  66     Creatinine  3.58  12-25 @ 05:09  BUN  68     Creatinine  3.66  -spoke with renal attending and this is most likely 2/2 to stones still present and will take time to downtrend; per urology plan for outpatient stone extraction  -f/u renal kidney/bladder ultrasound reviewed with hydronephrosis now resolved    4. Left heel ulcer:   -Appreciate podiatry recs for left heel ulcer; per podiatry there is "Little to no suspicion for underlying bone infection"; does not appear to be infected at this time, X-ray with no evidence of OM; Pod plan for local wound care, santyl and DSD daily    5. Hypoxemia:  -oxygen was placed during OR on Thursday  -patient being weaned off oxygen slowly, now on room air   -CXR reviewed: combination of atelectasis, some pulm edema and mucous plugging  -c/w IS at bedside (pt educated on use) and [place], duonebs, hypertonic saline nebs    6. Dispo:   -PT eval recommending QUAN- patient agreeable

## 2022-03-02 ENCOUNTER — NON-APPOINTMENT (OUTPATIENT)
Age: 82
End: 2022-03-02

## 2022-03-02 ENCOUNTER — TRANSCRIPTION ENCOUNTER (OUTPATIENT)
Age: 82
End: 2022-03-02

## 2022-03-04 ENCOUNTER — NON-APPOINTMENT (OUTPATIENT)
Age: 82
End: 2022-03-04

## 2022-03-04 LAB
APPEARANCE: ABNORMAL
BACTERIA: NEGATIVE
BILIRUBIN URINE: NEGATIVE
BLOOD URINE: ABNORMAL
CALCIUM OXALATE CRYSTALS: NEGATIVE
COLOR: YELLOW
GLUCOSE QUALITATIVE U: NEGATIVE
GRANULAR CASTS: 0 /LPF
HYALINE CASTS: 0 /LPF
KETONES URINE: NEGATIVE
LEUKOCYTE ESTERASE URINE: ABNORMAL
MICROSCOPIC-UA: NORMAL
NITRITE URINE: NEGATIVE
PH URINE: 6
PROTEIN URINE: ABNORMAL
RED BLOOD CELLS URINE: 55 /HPF
SPECIFIC GRAVITY URINE: 1.02
SQUAMOUS EPITHELIAL CELLS: 3 /HPF
TRIPLE PHOSPHATE CRYSTALS: NEGATIVE
URIC ACID CRYSTALS: NEGATIVE
UROBILINOGEN URINE: NORMAL
WHITE BLOOD CELLS URINE: >720 /HPF

## 2022-03-07 LAB — BACTERIA UR CULT: ABNORMAL

## 2022-03-09 ENCOUNTER — APPOINTMENT (OUTPATIENT)
Dept: HOME HEALTH SERVICES | Facility: HOME HEALTH | Age: 82
End: 2022-03-09
Payer: MEDICARE

## 2022-03-09 VITALS
TEMPERATURE: 97 F | RESPIRATION RATE: 14 BRPM | HEART RATE: 77 BPM | DIASTOLIC BLOOD PRESSURE: 64 MMHG | SYSTOLIC BLOOD PRESSURE: 142 MMHG | OXYGEN SATURATION: 100 %

## 2022-03-09 PROCEDURE — 99350 HOME/RES VST EST HIGH MDM 60: CPT

## 2022-03-09 RX ORDER — BENZONATATE 100 MG/1
100 CAPSULE ORAL 3 TIMES DAILY
Qty: 30 | Refills: 0 | Status: DISCONTINUED | COMMUNITY
Start: 2021-09-08 | End: 2022-03-09

## 2022-03-09 RX ORDER — SODIUM POLYSTYRENE SULFONATE 15 G/60ML
15 SUSPENSION ORAL; RECTAL
Qty: 1 | Refills: 0 | Status: DISCONTINUED | COMMUNITY
Start: 2021-05-28 | End: 2022-03-09

## 2022-03-09 RX ORDER — FLUTICASONE PROPIONATE 50 UG/1
50 SPRAY, METERED NASAL
Qty: 1 | Refills: 3 | Status: DISCONTINUED | COMMUNITY
Start: 2020-04-15 | End: 2022-03-09

## 2022-03-09 RX ORDER — PANTOPRAZOLE 40 MG/1
40 TABLET, DELAYED RELEASE ORAL
Qty: 30 | Refills: 0 | Status: DISCONTINUED | COMMUNITY
Start: 2021-09-23 | End: 2022-03-09

## 2022-03-09 RX ORDER — MONTELUKAST 10 MG/1
10 TABLET, FILM COATED ORAL DAILY
Qty: 90 | Refills: 3 | Status: DISCONTINUED | COMMUNITY
Start: 2021-09-15 | End: 2022-03-09

## 2022-03-09 RX ORDER — CEPHALEXIN 500 MG/1
500 CAPSULE ORAL
Qty: 14 | Refills: 0 | Status: DISCONTINUED | COMMUNITY
Start: 2021-04-29 | End: 2022-03-09

## 2022-03-09 RX ORDER — BENZONATATE 100 MG/1
100 CAPSULE ORAL 3 TIMES DAILY
Qty: 30 | Refills: 0 | Status: DISCONTINUED | COMMUNITY
Start: 2021-08-04 | End: 2022-03-09

## 2022-03-09 RX ORDER — LEVOFLOXACIN 750 MG/1
750 TABLET, FILM COATED ORAL
Qty: 3 | Refills: 0 | Status: DISCONTINUED | COMMUNITY
Start: 2021-10-29 | End: 2022-03-09

## 2022-03-09 RX ORDER — CIPROFLOXACIN HYDROCHLORIDE 250 MG/1
250 TABLET, FILM COATED ORAL
Qty: 14 | Refills: 0 | Status: DISCONTINUED | COMMUNITY
Start: 2021-12-17 | End: 2022-03-09

## 2022-03-09 RX ORDER — CIPROFLOXACIN 3 MG/ML
0.3 SOLUTION OPHTHALMIC
Qty: 1 | Refills: 0 | Status: DISCONTINUED | COMMUNITY
End: 2022-03-09

## 2022-03-09 RX ORDER — FIDAXOMICIN 200 MG/1
200 TABLET, FILM COATED ORAL TWICE DAILY
Qty: 20 | Refills: 0 | Status: DISCONTINUED | COMMUNITY
Start: 2021-08-26 | End: 2022-03-09

## 2022-03-11 NOTE — PROGRESS NOTE ADULT - PROBLEM SELECTOR PROBLEM 3
Patient: Lakeisha Saba Date of Service: 3/11/2022   : 1984 MRN: [id number]     Patient presents unaccompanied.  An  is not needed.  Infection prevention recommendations have been followed in regards to COVID.    SUBJECTIVE:     HISTORY OF PRESENT ILLNESS:  Lakeisha Saba is a 37 year old female who presents today for physical.    Was here 3 weeks ago for increased urination and lower back pain. Symptoms resolved after antibiotic and UTI treatment.    Denies headaches, chest pain, sob, dizziness.    Still having bloating. Protonix didn't help but did start a food journal. She has noticed the bloating happens with fried food.    She noticed during the summer that she had little scabs along her back. No itch but she would scratch them and they bled. Then causes scars. Unsure why or what they were but doesn't like the scars.    No sleeping concerns. Mood is good.    Last pap was this past summer. Had history of HPV, just being watched/ monitored now.  Sees gyne, Dr. Cheri Jeffrey.        PAST MEDICAL HISTORY:  Past Medical History:   Diagnosis Date   • Anxiety      Patient Active Problem List   Diagnosis   • Health maintenance examination       MEDICATIONS:  Current Outpatient Medications   Medication Sig   • pantoprazole (Protonix) 20 MG tablet Take 1 tablet by mouth daily.     No current facility-administered medications for this visit.       ALLERGIES:  ALLERGIES:   Allergen Reactions   • Clarithromycin Other (See Comments) and NAUSEA       PAST SURGICAL HISTORY:  Past Surgical History:   Procedure Laterality Date   • Implantable breast prosthesis         FAMILY HISTORY:  Family History   Problem Relation Age of Onset   • Thyroid Mother    • Stroke Father    • Dementia/Alzheimers Maternal Grandmother    • Cancer Maternal Grandfather    • Diabetes Maternal Grandfather    • Myocardial Infarction Paternal Grandfather        SOCIAL HISTORY:  Social History     Tobacco Use   • Smoking status: Never  Smoker   • Smokeless tobacco: Never Used   Vaping Use   • Vaping Use: never used   Substance Use Topics   • Alcohol use: Yes     Comment: moderate drinker.    • Drug use: Yes       Review of Systems   Constitutional: Negative.    HENT: Negative.    Respiratory: Negative.    Cardiovascular: Negative.    Gastrointestinal: Negative.    Genitourinary: Negative.    Musculoskeletal: Negative.    Skin:        Lower back scars   Allergic/Immunologic: Negative.    Neurological: Negative.    Hematological: Negative.    Psychiatric/Behavioral: Negative.          OBJECTIVE:     Visit Vitals  /60   Pulse 70   Temp 98.4 °F (36.9 °C) (Temporal)   Resp 14   Ht 5' 6\" (1.676 m)   Wt 61.2 kg (135 lb)   LMP 02/11/2022   SpO2 98%   BMI 21.79 kg/m²       Wt Readings from Last 1 Encounters:   03/11/22 61.2 kg (135 lb)        Physical Exam  Vitals and nursing note reviewed.   Constitutional:       General: She is not in acute distress.     Appearance: She is not ill-appearing.   HENT:      Head: Normocephalic and atraumatic.   Cardiovascular:      Rate and Rhythm: Normal rate and regular rhythm.      Heart sounds: Normal heart sounds.   Pulmonary:      Effort: Pulmonary effort is normal.      Breath sounds: Normal breath sounds.   Musculoskeletal:         General: Normal range of motion.   Skin:     General: Skin is warm and dry.   Neurological:      Mental Status: She is alert and oriented to person, place, and time.   Psychiatric:         Speech: Speech normal.         Behavior: Behavior normal. Behavior is cooperative.         Thought Content: Thought content normal.           ASSESSMENT AND PLAN:     Diagnoses and all orders for this visit:  Annual physical exam  -     CBC WITH DIFFERENTIAL  -     COMPREHENSIVE METABOLIC PANEL  -     GLYCOHEMOGLOBIN  -     LIPID PANEL WITH REFLEX  -     THYROID STIMULATING HORMONE REFLEX  Generally healthy adult.  Discussed healthy diet, exercise, sleep, lifestyle changes, and stress  management.  Will review labs and adjust plan of care as needed.   Scar tissue  -     SERVICE TO DERMATOLOGY  Unclear etiology of scars on back, possible insect bites or acne type lesions. She would like to see dermatology to assess and provide information on reducing appearance of scars.  Need for Tdap vaccination  -     TETANUS DIPHTHERIA ACELLULAR PERTUSSIS VACC, 11+ YRS (ADACEL); Future  Wants to have, but not today before a busy weekend. Will return next week.  Irritable bowel syndrome without diarrhea  -     SERVICE TO INTEGRATIVE MEDICINE   Continue food log.  Recommend pro-biotic.  Patient does not want to see GI, but would be interested in a more functional medicine route.    Return for annual exam.    The patient indicated understanding and agreed with the plan of care.      Kerry Prado, APRN  3/11/2022   Ureteral stone with hydronephrosis Acute cystitis

## 2022-03-14 ENCOUNTER — RX RENEWAL (OUTPATIENT)
Age: 82
End: 2022-03-14

## 2022-03-17 LAB
ANION GAP SERPL CALC-SCNC: 17 MMOL/L
BUN SERPL-MCNC: 58 MG/DL
CALCIUM SERPL-MCNC: 9.1 MG/DL
CHLORIDE SERPL-SCNC: 106 MMOL/L
CO2 SERPL-SCNC: 19 MMOL/L
CREAT SERPL-MCNC: 2.86 MG/DL
EGFR: 16 ML/MIN/1.73M2
ESTIMATED AVERAGE GLUCOSE: 197 MG/DL
GLUCOSE SERPL-MCNC: 170 MG/DL
HBA1C MFR BLD HPLC: 8.5 %
POTASSIUM SERPL-SCNC: 4.9 MMOL/L
SODIUM SERPL-SCNC: 142 MMOL/L

## 2022-03-21 NOTE — DISCHARGE NOTE PROVIDER - CARE PROVIDER_API CALL
OPERATIVE REPORT  PATIENT NAME: Bernice Sevilla    :  1951  MRN: [id number]  Pt Location: BE OR ROOM 16    SURGERY DATE: 3/21/2022    SURGEON: Omar Felix DO    ASSISTANT: Van Maza PA-C    ADDITIONAL ASSISTANT: [facility] Brian Enriquez)  Urology  [address] [place] [address]  Phone: [phone]  Fax: [phone]  Follow Up Time:

## 2022-03-25 ENCOUNTER — NON-APPOINTMENT (OUTPATIENT)
Age: 82
End: 2022-03-25

## 2022-04-01 ENCOUNTER — APPOINTMENT (OUTPATIENT)
Dept: HOME HEALTH SERVICES | Facility: HOME HEALTH | Age: 82
End: 2022-04-01
Payer: MEDICARE

## 2022-04-01 VITALS
OXYGEN SATURATION: 100 % | DIASTOLIC BLOOD PRESSURE: 62 MMHG | RESPIRATION RATE: 14 BRPM | HEART RATE: 73 BPM | TEMPERATURE: 97 F | SYSTOLIC BLOOD PRESSURE: 110 MMHG

## 2022-04-01 PROCEDURE — 99348 HOME/RES VST EST LOW MDM 30: CPT

## 2022-04-12 NOTE — DISCHARGE NOTE PROVIDER - NSDCFUADDAPPT_GEN_ALL_CORE_FT
Please follow up with your primary care provider within 1 week of discharge from the hospital. If you do not have a primary care provider please follow up with the [facility], call [phone].    Please follow up with the podiatry team within 1 week of discharge from the hospital    Follow up with [facility] [place] [facility] as outpatient [address], [address], [place] [facility] [address], [phone]. Energy (Optional-Please Include Units): 100 kv

## 2022-05-02 NOTE — ED ADULT NURSE NOTE - GENITOURINARY WDL
[FreeTextEntry1] : 76F s/p atrial myxoma resection anderson Arizmendi anxiety, recovering well at home. Follow up visit placed as pt was concerned about a small retained suture poking through MSI, incision healing well, unremarkable. Incision cut at the skin with sterile scissors, pt advised to use vitamin E oil as advised by office ACP and call for any issues or concerns that may arise. Of note metoprolol dosing adjusted by PCP to 25 mg BID as pt was having dizziness mid day. Worsening symptoms reviewed with pt understanding. Mild LE edema, pt endorses eating more over holiday weekend and being on her feet more. Pt wt stable, pt advised to rest and elevate today and update NP regarding tomorrow's wt and VN assessment. Pt denies SOB and exam otherwise unremarkable.  Bladder non-tender and non-distended. Urine clear yellow.

## 2022-05-12 ENCOUNTER — NON-APPOINTMENT (OUTPATIENT)
Age: 82
End: 2022-05-12

## 2022-05-13 ENCOUNTER — NON-APPOINTMENT (OUTPATIENT)
Age: 82
End: 2022-05-13

## 2022-05-20 ENCOUNTER — NON-APPOINTMENT (OUTPATIENT)
Age: 82
End: 2022-05-20

## 2022-05-27 ENCOUNTER — NON-APPOINTMENT (OUTPATIENT)
Age: 82
End: 2022-05-27

## 2022-06-03 ENCOUNTER — NON-APPOINTMENT (OUTPATIENT)
Age: 82
End: 2022-06-03

## 2022-06-17 ENCOUNTER — RX RENEWAL (OUTPATIENT)
Age: 82
End: 2022-06-17

## 2022-06-23 ENCOUNTER — NON-APPOINTMENT (OUTPATIENT)
Age: 82
End: 2022-06-23

## 2022-06-23 DIAGNOSIS — L02.429 FURUNCLE OF LIMB, UNSPECIFIED: ICD-10-CM

## 2022-07-08 ENCOUNTER — RX RENEWAL (OUTPATIENT)
Age: 82
End: 2022-07-08

## 2022-07-28 ENCOUNTER — NON-APPOINTMENT (OUTPATIENT)
Age: 82
End: 2022-07-28

## 2022-08-03 ENCOUNTER — APPOINTMENT (OUTPATIENT)
Dept: HOME HEALTH SERVICES | Facility: HOME HEALTH | Age: 82
End: 2022-08-03

## 2022-09-04 ENCOUNTER — TRANSCRIPTION ENCOUNTER (OUTPATIENT)
Age: 82
End: 2022-09-04

## 2022-09-05 ENCOUNTER — TRANSCRIPTION ENCOUNTER (OUTPATIENT)
Age: 82
End: 2022-09-05

## 2022-09-05 DIAGNOSIS — U07.1 COVID-19: ICD-10-CM

## 2022-09-07 ENCOUNTER — NON-APPOINTMENT (OUTPATIENT)
Age: 82
End: 2022-09-07

## 2022-09-09 ENCOUNTER — NON-APPOINTMENT (OUTPATIENT)
Age: 82
End: 2022-09-09

## 2022-09-12 ENCOUNTER — NON-APPOINTMENT (OUTPATIENT)
Age: 82
End: 2022-09-12

## 2022-09-14 ENCOUNTER — NON-APPOINTMENT (OUTPATIENT)
Age: 82
End: 2022-09-14

## 2022-09-21 ENCOUNTER — APPOINTMENT (OUTPATIENT)
Dept: HOME HEALTH SERVICES | Facility: HOME HEALTH | Age: 82
End: 2022-09-21

## 2022-09-21 DIAGNOSIS — N17.9 ACUTE KIDNEY FAILURE, UNSPECIFIED: ICD-10-CM

## 2022-09-21 DIAGNOSIS — N18.9 ACUTE KIDNEY FAILURE, UNSPECIFIED: ICD-10-CM

## 2022-09-21 DIAGNOSIS — Z86.19 PERSONAL HISTORY OF OTHER INFECTIOUS AND PARASITIC DISEASES: ICD-10-CM

## 2022-09-21 DIAGNOSIS — R19.7 DIARRHEA, UNSPECIFIED: ICD-10-CM

## 2022-09-21 DIAGNOSIS — R19.5 OTHER FECAL ABNORMALITIES: ICD-10-CM

## 2022-09-21 PROCEDURE — 90662 IIV NO PRSV INCREASED AG IM: CPT

## 2022-09-21 PROCEDURE — 99350 HOME/RES VST EST HIGH MDM 60: CPT | Mod: 25

## 2022-09-21 PROCEDURE — G0008: CPT

## 2022-09-21 NOTE — REASON FOR VISIT
[Follow-Up] : a follow-up visit [Formal Caregiver] : formal caregiver [Intercurrent Specialty/Sub-specialty Visits] : the patient has intercurrent specialty/sub-specialty visits [FreeTextEntry3] : podiatry/wound care RN

## 2022-09-21 NOTE — REVIEW OF SYSTEMS
[Negative] : Heme/Lymph [de-identified] : c/o boil under left arm that recently drained, skin now intact  home

## 2022-09-21 NOTE — PHYSICAL EXAM
[No Acute Distress] : no acute distress [Well Nourished] : well nourished [Well Developed] : well developed [Normal Outer Ear/Nose] : the ears and nose were normal in appearance [No JVD] : no jugular venous distention [Supple] : the neck was supple [No Respiratory Distress] : no respiratory distress [Clear to Auscultation] : lungs were clear to auscultation bilaterally [No Accessory Muscle Use] : no accessory muscle use [Normal Rate] : heart rate was normal  [Regular Rhythm] : with a regular rhythm [Normal Bowel Sounds] : normal bowel sounds [Non Tender] : non-tender [Soft] : abdomen soft [Patient Refused] : rectal exam was refused by the patient [Normal Supraclavicular Nodes] : no supraclavicular lymphadenopathy [Normal Post Cervical Nodes] : no posterior cervical lymphadenopathy [Normal Anterior Cervical Nodes] : no anterior cervical lymphadenopathy [No CVA Tenderness] : no ~M costovertebral angle tenderness [No Spinal Tenderness] : no spinal tenderness [Kyphosis] :  kyphosis present [No Joint Swelling] : no joint swelling seen [No Rash] : no rash [Cranial Nerves Intact] : cranial nerves 2-12 were intact [No Motor Deficits] : the motor exam was normal [Oriented x3] : oriented to person, place, and time [Normal Affect] : the affect was normal [Normal Mood] : the mood was normal [Breast Exam Declined] : patient declined to have breast exam done [de-identified] : ambulating with walker,  [de-identified] : poor vision due to retinopathy [de-identified] : no cough today [de-identified] : obese [de-identified] : wide and unsteady gait [de-identified] : under left arm with discoloration secondary to location of bra, report of abscess that was present that drained under left arm, with no s/s of infection.  [de-identified] : neuropathy of BL LE: sensory deficit [de-identified] : s/p amputations, left foot wound bandage c/d/i, s/p right great toe amputation, REFUSING left foot vascular wound exam as "podiatry came"

## 2022-09-21 NOTE — HISTORY OF PRESENT ILLNESS
[Patient] : patient [FreeTextEntry1] : Diabetes with opthalmic, neurologic and vascular complications, debility [FreeTextEntry2] : Patient denies fever, cough, trouble breathing, rash, vomiting or diarrhea. \par Patient has not been in close contact with someone who is COVID positive.\par N95 mask, gloves, eye wear and gown (if indicated) used during visit \par Total face to face time with patient is [45] min.\par \par Patient is a 82 yo female with h/o diabetes c/b retinopathy and neuropathy, PVD s/p LLE angioplasty in 11/2015, s/p angioplasty 2019 with complication of a or ckd ( baseline Cr 1.8-2) s/p multiple toe amputations, HTN, HLD, glaucoma, visual impairment (does not drive) spiral fracture of ankle after fell down basement stairs, debility (walks w walker), morbid obesity being\par \par Pt being seen for followup: Patient is a fair historian\par  \par s/p COVID 2 weeks ago, Cough is improving - mucinex - continue for chest congestion\par Reviewed labs with patient today that are at baseline with the exception of HgA1c 9, with recorded sugars 190-270- plan to increase Novolin from 30 to 34 units in am and contiue Novolin 22 units in the pm, plan to repeat blood work in 3 months \par Pt denies any further black stools, H/H stable, taking pepto bismol periodically\par flu vaccine given\par Under left arm with discoloration secondary to location of bra, report of abscess that was present under left arm that drained, with no s/s of infection. \par left arm boil - skin intact \par \par ____________________\par hospitalizations\par -9/2020 patient admitted for 9 days for hyperglycemia\par -12/20-31/2020 patient admitted to  [facility] for hyperglycemia with DKA with UTI, c/b ARABELLA with right (two stone) ureteral stone with hydronephrosis now resolved s/p stent placement.  Thereafter went to rehab at [facility], d/c 4/29/2021 with new onset skin infection (on keflex), had one interim hospitalization at [facility],  with BL pna

## 2022-09-21 NOTE — COUNSELING
[Improve mobility] : improve mobility [Decrease stress] : decrease stress [Minimize unnecessary interventions] : minimize unnecessary interventions [Maintain functional ability] : maintain functional ability [Discussed disease trajectory with patient/caregiver] : discussed disease trajectory with patient/caregiver [DNR] : Code Status: DNR [Limited] : Treatment Guidelines: Limited [DNI] : Intubation: DNI [[facility] Ver[facility] Date: _____] : [facility]: [unfilled] [_____] : HCP: [unfilled] [Overweight (BMI 25.1 - 29.9)] : overweight - BMI 25.1-29.9 [Weight counseling provided] : Weight counseling provided [Use assistive device to avoid falls] : use assistive device to avoid falls [Vaccinations: _______] : Vaccinations: [unfilled] [FreeTextEntry3] : ADA diet discussed

## 2022-09-29 ENCOUNTER — RX RENEWAL (OUTPATIENT)
Age: 82
End: 2022-09-29

## 2022-10-10 ENCOUNTER — NON-APPOINTMENT (OUTPATIENT)
Age: 82
End: 2022-10-10

## 2022-10-13 ENCOUNTER — RX RENEWAL (OUTPATIENT)
Age: 82
End: 2022-10-13

## 2022-10-14 ENCOUNTER — INPATIENT (INPATIENT)
Facility: HOSPITAL | Age: 82
LOS: 3 days | Discharge: HOME HEALTH | DRG: 638 | End: 2022-10-18
Attending: STUDENT IN AN ORGANIZED HEALTH CARE EDUCATION/TRAINING PROGRAM | Admitting: HOSPITALIST
Payer: MEDICARE

## 2022-10-14 VITALS
TEMPERATURE: 98 F | DIASTOLIC BLOOD PRESSURE: 69 MMHG | RESPIRATION RATE: 18 BRPM | OXYGEN SATURATION: 98 % | HEART RATE: 69 BPM | HEIGHT: 64 IN | WEIGHT: 175.05 LBS | SYSTOLIC BLOOD PRESSURE: 117 MMHG

## 2022-10-14 DIAGNOSIS — E11.621 TYPE 2 DIABETES MELLITUS WITH FOOT ULCER: ICD-10-CM

## 2022-10-14 DIAGNOSIS — Z89.429 ACQUIRED ABSENCE OF OTHER TOE(S), UNSPECIFIED SIDE: Chronic | ICD-10-CM

## 2022-10-14 DIAGNOSIS — Z98.49 CATARACT EXTRACTION STATUS, UNSPECIFIED EYE: Chronic | ICD-10-CM

## 2022-10-14 LAB
ALBUMIN SERPL ELPH-MCNC: 3.1 G/DL — LOW (ref 3.3–5)
ALP SERPL-CCNC: 100 U/L — SIGNIFICANT CHANGE UP (ref 40–120)
ALT FLD-CCNC: 14 U/L — SIGNIFICANT CHANGE UP (ref 10–45)
ANION GAP SERPL CALC-SCNC: 15 MMOL/L — SIGNIFICANT CHANGE UP (ref 5–17)
APTT BLD: 24.6 SEC — LOW (ref 27.5–35.5)
AST SERPL-CCNC: 21 U/L — SIGNIFICANT CHANGE UP (ref 10–40)
BASOPHILS # BLD AUTO: 0.03 K/UL — SIGNIFICANT CHANGE UP (ref 0–0.2)
BASOPHILS NFR BLD AUTO: 0.2 % — SIGNIFICANT CHANGE UP (ref 0–2)
BILIRUB SERPL-MCNC: 0.2 MG/DL — SIGNIFICANT CHANGE UP (ref 0.2–1.2)
BUN SERPL-MCNC: 66 MG/DL — HIGH (ref 7–23)
CALCIUM SERPL-MCNC: 8.8 MG/DL — SIGNIFICANT CHANGE UP (ref 8.4–10.5)
CHLORIDE SERPL-SCNC: 100 MMOL/L — SIGNIFICANT CHANGE UP (ref 96–108)
CO2 SERPL-SCNC: 20 MMOL/L — LOW (ref 22–31)
CREAT SERPL-MCNC: 3.69 MG/DL — HIGH (ref 0.5–1.3)
CRP SERPL-MCNC: 150 MG/L — HIGH (ref 0–4)
DACRYOCYTES BLD QL SMEAR: SLIGHT — SIGNIFICANT CHANGE UP
EGFR: 12 ML/MIN/1.73M2 — LOW
EOSINOPHIL # BLD AUTO: 0.06 K/UL — SIGNIFICANT CHANGE UP (ref 0–0.5)
EOSINOPHIL NFR BLD AUTO: 0.4 % — SIGNIFICANT CHANGE UP (ref 0–6)
GLUCOSE BLDC GLUCOMTR-MCNC: 294 MG/DL — HIGH (ref 70–99)
GLUCOSE SERPL-MCNC: 343 MG/DL — HIGH (ref 70–99)
HCT VFR BLD CALC: 29.2 % — LOW (ref 34.5–45)
HGB BLD-MCNC: 8.9 G/DL — LOW (ref 11.5–15.5)
IMM GRANULOCYTES NFR BLD AUTO: 0.3 % — SIGNIFICANT CHANGE UP (ref 0–0.9)
INR BLD: 1.19 RATIO — HIGH (ref 0.88–1.16)
LYMPHOCYTES # BLD AUTO: 1.48 K/UL — SIGNIFICANT CHANGE UP (ref 1–3.3)
LYMPHOCYTES # BLD AUTO: 10.1 % — LOW (ref 13–44)
MANUAL SMEAR VERIFICATION: SIGNIFICANT CHANGE UP
MCHC RBC-ENTMCNC: 26.7 PG — LOW (ref 27–34)
MCHC RBC-ENTMCNC: 30.5 GM/DL — LOW (ref 32–36)
MCV RBC AUTO: 87.7 FL — SIGNIFICANT CHANGE UP (ref 80–100)
MONOCYTES # BLD AUTO: 0.57 K/UL — SIGNIFICANT CHANGE UP (ref 0–0.9)
MONOCYTES NFR BLD AUTO: 3.9 % — SIGNIFICANT CHANGE UP (ref 2–14)
NEUTROPHILS # BLD AUTO: 12.48 K/UL — HIGH (ref 1.8–7.4)
NEUTROPHILS NFR BLD AUTO: 85.1 % — HIGH (ref 43–77)
NRBC # BLD: 0 /100 WBCS — SIGNIFICANT CHANGE UP (ref 0–0)
OVALOCYTES BLD QL SMEAR: SLIGHT — SIGNIFICANT CHANGE UP
PLAT MORPH BLD: ABNORMAL
PLATELET # BLD AUTO: SIGNIFICANT CHANGE UP K/UL (ref 150–400)
PLATELET CLUMP BLD QL SMEAR: SLIGHT
POIKILOCYTOSIS BLD QL AUTO: SLIGHT — SIGNIFICANT CHANGE UP
POTASSIUM SERPL-MCNC: 4.9 MMOL/L — SIGNIFICANT CHANGE UP (ref 3.5–5.3)
POTASSIUM SERPL-SCNC: 4.9 MMOL/L — SIGNIFICANT CHANGE UP (ref 3.5–5.3)
PROT SERPL-MCNC: 6.9 G/DL — SIGNIFICANT CHANGE UP (ref 6–8.3)
PROTHROM AB SERPL-ACNC: 13.7 SEC — HIGH (ref 10.5–13.4)
RBC # BLD: 3.33 M/UL — LOW (ref 3.8–5.2)
RBC # FLD: 15.8 % — HIGH (ref 10.3–14.5)
RBC BLD AUTO: ABNORMAL
SARS-COV-2 RNA SPEC QL NAA+PROBE: SIGNIFICANT CHANGE UP
SODIUM SERPL-SCNC: 135 MMOL/L — SIGNIFICANT CHANGE UP (ref 135–145)
WBC # BLD: 14.67 K/UL — HIGH (ref 3.8–10.5)
WBC # FLD AUTO: 14.67 K/UL — HIGH (ref 3.8–10.5)

## 2022-10-14 PROCEDURE — 73630 X-RAY EXAM OF FOOT: CPT | Mod: 26,LT

## 2022-10-14 PROCEDURE — 99223 1ST HOSP IP/OBS HIGH 75: CPT | Mod: GC

## 2022-10-14 PROCEDURE — 99285 EMERGENCY DEPT VISIT HI MDM: CPT | Mod: FS

## 2022-10-14 RX ORDER — SODIUM CHLORIDE 9 MG/ML
500 INJECTION INTRAMUSCULAR; INTRAVENOUS; SUBCUTANEOUS ONCE
Refills: 0 | Status: COMPLETED | OUTPATIENT
Start: 2022-10-14 | End: 2022-10-14

## 2022-10-14 RX ORDER — COLLAGENASE CLOSTRIDIUM HIST. 250 UNIT/G
1 OINTMENT (GRAM) TOPICAL
Refills: 0 | Status: DISCONTINUED | OUTPATIENT
Start: 2022-10-14 | End: 2022-10-18

## 2022-10-14 RX ORDER — ACETAMINOPHEN 500 MG
650 TABLET ORAL EVERY 6 HOURS
Refills: 0 | Status: DISCONTINUED | OUTPATIENT
Start: 2022-10-14 | End: 2022-10-18

## 2022-10-14 RX ORDER — VANCOMYCIN HCL 1 G
1000 VIAL (EA) INTRAVENOUS ONCE
Refills: 0 | Status: COMPLETED | OUTPATIENT
Start: 2022-10-14 | End: 2022-10-14

## 2022-10-14 RX ORDER — COLLAGENASE CLOSTRIDIUM HIST. 250 UNIT/G
1 OINTMENT (GRAM) TOPICAL
Refills: 0 | Status: DISCONTINUED | OUTPATIENT
Start: 2022-10-14 | End: 2022-10-14

## 2022-10-14 RX ORDER — LANOLIN ALCOHOL/MO/W.PET/CERES
3 CREAM (GRAM) TOPICAL AT BEDTIME
Refills: 0 | Status: DISCONTINUED | OUTPATIENT
Start: 2022-10-14 | End: 2022-10-18

## 2022-10-14 RX ORDER — ONDANSETRON 8 MG/1
4 TABLET, FILM COATED ORAL EVERY 8 HOURS
Refills: 0 | Status: DISCONTINUED | OUTPATIENT
Start: 2022-10-14 | End: 2022-10-18

## 2022-10-14 RX ORDER — CEFEPIME 1 G/1
1000 INJECTION, POWDER, FOR SOLUTION INTRAMUSCULAR; INTRAVENOUS ONCE
Refills: 0 | Status: COMPLETED | OUTPATIENT
Start: 2022-10-14 | End: 2022-10-14

## 2022-10-14 RX ADMIN — SODIUM CHLORIDE 500 MILLILITER(S): 9 INJECTION INTRAMUSCULAR; INTRAVENOUS; SUBCUTANEOUS at 20:47

## 2022-10-14 RX ADMIN — CEFEPIME 100 MILLIGRAM(S): 1 INJECTION, POWDER, FOR SOLUTION INTRAMUSCULAR; INTRAVENOUS at 19:09

## 2022-10-14 RX ADMIN — Medication 250 MILLIGRAM(S): at 19:39

## 2022-10-14 NOTE — ED PROVIDER NOTE - OBJECTIVE STATEMENT
82 yo female pmhx diabetes s/p L great toe and 3rd digits amputation L foot presents to the ED c/o chronic L heel wound, sent in for IV abx. States she's had this wound to her heel "for years" follows with podiatry who started her on Augmentin 2 days ago for c/f surrounding infection, pt developed rash, taken off it and started on Levaquin. Podiatrist Dr. Bailey Hand saw her at home today and sent pt in for IV abx. Pt denies pain to the foot. Denies fever/chills, n/v/d, cp, sob, numbness/tingling, weakness, dizziness, lightheadedness.  Podiatry: Dr. Bailey Hand 80 yo female pmhx diabetes, CKD s/p L great toe and 3rd digits amputation L foot presents to the ED c/o chronic L heel wound, sent in for IV abx. States she's had this wound to her heel "for years" follows with podiatry who started her on Augmentin 2 days ago for c/f surrounding infection, pt developed rash, taken off it and started on Levaquin. Podiatrist Dr. Bailey Hand saw her at home today and sent pt in for IV abx. Pt denies pain to the foot. Denies fever/chills, n/v/d, cp, sob, numbness/tingling, weakness, dizziness, lightheadedness.  Podiatry: Dr. Bailey Hand

## 2022-10-14 NOTE — H&P ADULT - PROBLEM SELECTOR PLAN 4
Chronic and stable, 2/2 chronic disease    - Ordered iron panel, B12, folate to rule in/out other causes  - Can consider epoetin injection  - outpatient follow up

## 2022-10-14 NOTE — ED ADULT NURSE REASSESSMENT NOTE - NS ED NURSE REASSESS COMMENT FT1
Patient repositioned in stretcher, pillow provided. Comfort and safety measures in place. Updated on plan of care, to be admitted.

## 2022-10-14 NOTE — H&P ADULT - ASSESSMENT
79 yo Female, ambulatory with a walker, w/ DM Type 2 c/b diabetic neuropathy and multiple toes amputations, HTN, HLD, PVD s/p LLE angiogram 2018 , CKD stage 3~4 (Baseline crt ~2), multiple UTI 2/2 obstructive uropathy s/p ureteral stent placement n 12/2020, morbid Obesity, presenting with chronic non-healing ulcer at the left heel for IV antibiotics treatment s/p recent adverse reaction to augmentin and then inadequate response to Levaquin  per pt's private podiatrist. Admitted for inpatient treatment for soft tissue infection, work up for OM ruled out.

## 2022-10-14 NOTE — H&P ADULT - NSHPREVIEWOFSYSTEMS_GEN_ALL_CORE
REVIEW OF SYSTEMS:    CONSTITUTIONAL: No weakness, fevers or chills  EYES: No visual changes; no sclera icterics, no pain or drainage  ENT:  No vertigo or throat pain   NECK: No pain or stiffness  RESPIRATORY: No cough, wheezing, hemoptysis; No shortness of breath  CARDIOVASCULAR: No chest pain or palpitations  GASTROINTESTINAL: No abdominal or epigastric pain. No nausea, vomiting, or hematemesis; No diarrhea or constipation. No melena or hematochezia.  GENITOURINARY: No dysuria, frequency, occasional mild hematuria  NEUROLOGICAL: numbness in both lower legs.  No headache  SKIN: No itching. +RASH on legs  Psych: No anxiety or depression

## 2022-10-14 NOTE — ED ADULT NURSE REASSESSMENT NOTE - NS ED NURSE REASSESS COMMENT FT1
report received from Jeremie RN, pt A&Ox4, pt sheets changed and given clean blankets, pt safety and comfort provided.

## 2022-10-14 NOTE — ED PROVIDER NOTE - PROGRESS NOTE DETAILS
Podiatry consulted, will come see pt. - FREDI HidalgoC Podiatry recommended admission to medicine for IV vanc/cefepime and MRI. Pt endorsed to Dr. Tan for admission. - FREDI HidalgoC

## 2022-10-14 NOTE — H&P ADULT - PROBLEM SELECTOR PLAN 5
Active and not well controlled ( the most recent A1C 8.6 in 2020), c/b diabetic nephropathy and neuropathy    - home lantus regimen: 34 units qam, 22 units qhs. Not on oral DM meds.  On sliding scale.  - Re-check A1C  - Basal-bolus insulin and sliding scale   - Goal < 180 Active and not well controlled ( the most recent A1C 8.6 in 2020), c/b diabetic nephropathy and neuropathy    - home insulin regimen: 34 units NPH qam, 22 units NPH qhs. Not on oral DM meds.  On sliding scale.  - Re-check A1C  - Start home dose of NPH and sliding scale   - Goal < 180

## 2022-10-14 NOTE — ED PROVIDER NOTE - SKIN WOUND TYPE
3.0x2.0 cm ulcer wound with necrotic center noted to medial L heal, +periwound erythema, no pus/drainage.

## 2022-10-14 NOTE — H&P ADULT - HISTORY OF PRESENT ILLNESS
81 yo Female, ambulatory with a walker, w/ DM Type 2 c/b diabetic neuropathy and multiple toes amputations, HTN, HLD, PVD s/p LLE angiogram 2018 , CKD stage 3~4 (Baseline crt ~2), multiple UTI 2/2 obstructive uropathy s/p ureteral stent placement n 12/2020, morbid Obesity, presenting with chronic non-healing ulcer at the left heel for IV antibiotics treatment. Recently, pt was prescribed augmentin for local soft tissue infection but then developed skin rash for two day exposure. Then she was switched to levaquin by her podiatrist who saw her today and recommended IV antibiotics for inadequate response. Pt denied pain in her left foot (because of diabetic neuropathy) but endorsed more pain and less pruritis on her skin rash located at the back and inner side of legs. Otherwise, pt denied fever, chill, N/V, headache, chest pain, constipation, diarrhea, no GI bleeding. Occasionally produces blood tinged urine during UTI episodes but no grossly hematuria. Pt unclear if ureteral stent has been removed or not but denied urinary trouble.     Pt had chronic non-healing wounds in both feet for more than 20 years likely 2/2 DM2, HTN, HLD, and PVD. She regularly follows up with her own podiatrist Dr. Bailey Hand. Per her nephew, her left heel wound was closed two weeks ago but then open again.     At the ED,  81 yo Female, ambulatory with a walker, w/ DM Type 2 c/b diabetic neuropathy and multiple toes amputations, HTN, HLD, PVD s/p LLE angiogram 2018 , CKD stage 3~4 (Baseline crt ~2), multiple UTI 2/2 obstructive uropathy s/p ureteral stent placement n 12/2020, morbid Obesity, presenting with chronic non-healing ulcer at the left heel for IV antibiotics treatment. Recently, pt was prescribed augmentin for local soft tissue infection but then developed skin rash for two day exposure. Then she was switched to levaquin by her podiatrist who saw her today and recommended IV antibiotics for inadequate response. Pt denied pain in her left foot (because of diabetic neuropathy) but endorsed more pain and less pruritis on her skin rash located at the back and inner side of legs. Otherwise, pt denied fever, chill, N/V, headache, chest pain, constipation, diarrhea, no GI bleeding. Occasionally produces blood tinged urine during UTI episodes but no grossly hematuria. Pt unclear if ureteral stent has been removed or not but denied urinary trouble.     Pt had chronic non-healing wounds in both feet for more than 20 years likely 2/2 DM2, HTN, HLD, and PVD. She regularly follows up with her own podiatrist Dr. Bailey Hand. Per her nephew, her left heel wound was closed two weeks ago but then open again.     At the ED, Pt VSS, afebrile, no leukocytosis, elevated Cr 3.69, s/p left foot xray. Podiatry saw pt and stated work-up.  81 yo Female, ambulatory with a walker, w/ DM Type 2 c/b diabetic neuropathy and multiple toes amputations, HTN, HLD, PVD s/p LLE angiogram 2018 , CKD stage 3~4 (Baseline crt ~2), multiple UTI 2/2 obstructive uropathy s/p ureteral stent placement n 12/2020, morbid Obesity, presenting with chronic non-healing ulcer at the left heel for IV antibiotics treatment. Recently, pt was prescribed augmentin for local soft tissue infection but then developed skin rash for two day exposure. Then she was switched to levaquin by her podiatrist who saw her today and recommended IV antibiotics for inadequate response. Pt denied pain in her left foot (because of diabetic neuropathy) but endorsed more pain and less pruritis on her skin rash located at the back and inner side of legs. Otherwise, pt denied fever, chill, N/V, headache, chest pain, constipation, diarrhea, no GI bleeding. Occasionally produces blood tinged urine during UTI episodes but no grossly hematuria. Pt unclear if ureteral stent has been removed or not but denied urinary trouble.     Pt had chronic non-healing wounds in both feet for more than 20 years likely 2/2 DM2, HTN, HLD, and PVD. She regularly follows up with her own podiatrist Dr. Bailey Hand. Per her nephew, her left heel wound was closed two weeks ago but then open again.     At the ED, Pt VSS, afebrile, no leukocytosis, elevated Cr 3.69, s/p left foot xray.  Podiatry saw pt and started work-up. S/P IV Vanco and Cefepime  81 yo Female, ambulatory with a walker, w/ DM Type 2 c/b diabetic neuropathy and multiple toes amputations, HTN, HLD, PVD s/p LLE angiogram 2018 , CKD stage 3~4 (Baseline crt ~2), multiple UTI 2/2 obstructive uropathy s/p ureteral stent placement n 12/2020, morbid Obesity, presenting with chronic non-healing ulcer at the left heel for IV antibiotics treatment. Recently, pt was prescribed augmentin for local soft tissue infection but then developed skin rash for two day exposure. Then she was switched to levaquin by her podiatrist who saw her today and recommended IV antibiotics for inadequate response. Pt denied pain in her left foot (because of diabetic neuropathy) but endorsed more pain and less pruritis on her skin rash located at the back and inner side of legs. Otherwise, pt denied fever, chill, N/V, headache, chest pain, constipation, diarrhea, no GI bleeding. Occasionally produces blood tinged urine during UTI episodes but no grossly hematuria. Pt unclear if ureteral stent has been removed or not but denied urinary trouble.     Pt had chronic non-healing wounds in both feet for more than 20 years likely 2/2 DM2, HTN, HLD, and PVD. She regularly follows up with her own podiatrist Dr. Bailey Hand. Per her nephew, her left heel wound was closed two weeks ago but then open again.     At the ED, Pt VSS, afebrile, no leukocytosis, elevated Cr 3.69, s/p left foot xray.  Podiatry saw pt and started work-up. S/P IV Vanco and Cefepime. 81 yo Female, ambulatory with a walker, w/ DM Type 2 c/b diabetic neuropathy and multiple toes amputations, HTN, HLD, PVD s/p LLE angiogram 2018 , CKD stage 3~4 (Baseline crt ~2), multiple UTI 2/2 obstructive uropathy s/p ureteral stent placement n 12/2020, morbid Obesity, presenting with chronic non-healing ulcer at the left heel for IV antibiotics treatment. Recently, pt was prescribed augmentin for local soft tissue infection but then developed skin rash for two day exposure. Then she was switched to levaquin by her podiatrist who saw her today and recommended IV antibiotics for inadequate response. Pt denied pain in her left foot (because of diabetic neuropathy) but endorsed more pain and less pruritis on her skin rash located at the back and inner side of legs. Otherwise, pt denied fever, chill, N/V, headache, chest pain, constipation, diarrhea, no GI bleeding. Occasionally produces blood tinged urine during UTI episodes but no grossly hematuria. Pt unclear if ureteral stent has been removed or not but denied urinary trouble.     Pt had chronic non-healing wounds in both feet for more than 20 years likely 2/2 DM2, HTN, HLD, and PVD. She regularly follows up with her own podiatrist Dr. Bailey Hand. Per her nephew, her left heel wound was closed two weeks ago but then open again.     At the ED, Pt VSS, afebrile, mild leukocytosis, elevated Cr 3.69, elevated ESR/CRP, s/p left foot xray.  Podiatry saw pt and started work-up. S/P IV Vanco and Cefepime.

## 2022-10-14 NOTE — H&P ADULT - ATTENDING COMMENTS
80 year old female with a PMHx as listed above and significant for peripheral neuropathy secondary to DM Type 2, hx of osteomyelitis s/p amputation of multiple toes amputations, PVD, CKD stage 3~4 (Baseline crt ~2) who presents with chronic non-healing left heel wound. The patient herself was unable to say whether there was any noticed drainage or pain in the region. There was no known trauma. She was seen by her outpatient podiatrist who started the patient on a course of antibiotics for cellulitis. She was initially on augmentin; however, developed drug reaction with erythematous rash on the medial parts of the proximal legs and medial parts of the proximal arms. No associated SOB or fevers/chills. Augmentin was discontinued and the was started on Levaquin instead. She followed up with her podiatrist outpatient and was subsequently sent to the ED for IV abx.      The patient has a prior hx of amputations d/t osteomyelitis years ago, per the patient. There is an abscess culture from 2017 positive for Corynebacterium (?contaminate) but otherwise no positive cultures are available in our system. In regards to the drug reaction, the patient was unsure if she ever took augmentin prior to this incident. Per chart review, the patient was previously treated with ceftriaxone for UTI without developing an adverse reaction.      #acute on chronic left heel wound   #ARABELLA   #hx of insulin dependent diabetes   #hx of PVD   #evaluate for osteomyelitis     Appreciate podiatry evaluation. Will continue with cefepime and vanc for now, as per podiatry. Agree with MRSA PCR and discontinue vancomycin if negative. F/u cultures. F/u MRI. F/u ABIs.  Obtain wound care consult. Continue home diabetes regimen of NPH insulin BID. Endocrine consult. Ok with treating augmentin-induced drug rash with topical steroids. F/u urine studies.      The patient states she has a previously completed MOLST form but could not remember the code status. When asked what her thoughts are now in regards to her code status, she did not want to make a decision without speaking to her son. Per outpatient records, she previously designated DNR and DNI code status. Pls re-address code status this morning. FULL CODE for now, until code status is confirmed with the patient.     Rest of plan as per resident note.

## 2022-10-14 NOTE — H&P ADULT - PROBLEM SELECTOR PLAN 1
Active, likely 2/2 chronic and recurrent diabetic foot ulcer superimposed with soft tissue infection    - Left foot xray: no OM, no gas  - Left foot wound culture taken   - Recommend admit to medicine   - Recommend IV Vanco and Cefepime   - Ordered MUSHTAQ/PVRs, if abnormal recommend vascular consult  - Ordered L foot MRI ordered to r/o OM  - Ordered santyl for L foot wound Active, likely 2/2 chronic and recurrent diabetic foot ulcer superimposed with soft tissue infection and possible OM    - Left foot xray: no OM, no gas  - Left foot wound culture taken   - S/P IV Vanco and Cefepime   - Wound culture and MRSA/MSSA PCR sent  - Continue vanco and cefpime now and later de-escalate pending culture   - Ordered MUSHTAQ/PVRs, if abnormal >> vascular consult  - Ordered L foot MRI ordered to r/o OM  - Ordered santyl for L foot wound  - Podiatry recs appreciated Active, likely 2/2 chronic and recurrent diabetic/pressure/arterial foot ulcer superimposed with soft tissue infection and possible OM    - Left foot xray: no OM, no gas  - Left foot wound culture taken   - S/P IV Vanco and Cefepime   - Wound culture and MRSA/MSSA PCR sent  - Continue vanco and cefpime now and later de-escalate pending culture   - Ordered MUSHTAQ/PVRs, if abnormal >> vascular consult  - Ordered L foot MRI ordered to r/o OM  - Ordered santyl for L foot wound  - Podiatry recs appreciated Active, likely 2/2 chronic and recurrent diabetic/pressure/arterial foot ulcer superimposed with soft tissue infection and possible OM    - Left foot xray: no OM, no gas  - ESR/CRP elevated   - Left foot wound culture taken   - S/P IV Vanco and Cefepime   - Wound culture and MRSA/MSSA PCR sent  - Continue vanco and cefpime now and later de-escalate pending culture   - Ordered MUSHTAQ/PVRs, if abnormal >> vascular consult  - Ordered L foot MRI ordered to r/o OM  - Ordered santyl for L foot wound  - Podiatry recs appreciated

## 2022-10-14 NOTE — H&P ADULT - PROBLEM SELECTOR PLAN 3
Active, likely exanthematous drug eruption from recent exposure to Augmentin vs DRESS vs stasis dermatitis    - afebrile, no oral mucous involvement, no LFT elevation, no eosinophilia  - Avoid penicillin-like medications  - symptom management

## 2022-10-14 NOTE — H&P ADULT - PROBLEM SELECTOR PLAN 9
- DVT prophylaxis: hold until AM platelet results  - Diet: DASH/TLC renal diet  - Code status: DNI or DNI/DNR or DNR (has a form signed)

## 2022-10-14 NOTE — H&P ADULT - NSHPSOCIALHISTORY_GEN_ALL_CORE
Lives by herself at home with 24 hour home aide to help with ADLs. Ambulate by walker at home. Functional status has been stable over the past year.

## 2022-10-14 NOTE — H&P ADULT - NSHPPHYSICALEXAM_GEN_ALL_CORE
VITALS:   T(C): 37.2 (10-14-22 @ 23:57), Max: 37.2 (10-14-22 @ 23:57)  HR: 86 (10-14-22 @ 23:57) (69 - 86)  BP: 190/65 (10-14-22 @ 23:57) (109/57 - 190/65)  RR: 18 (10-14-22 @ 23:57) (16 - 18)  SpO2: 98% (10-14-22 @ 23:57) (98% - 99%)    GENERAL: NAD, lying in bed comfortably, obese  HEAD:  Atraumatic, Normocephalic  EYES: EOMI, PERRLA, conjunctiva and sclera clear  ENT: Moist mucous membranes  NECK: Supple, No JVD  CHEST/LUNG: CTABL; No rales, rhonchi, wheezing, or rubs. Unlabored respirations  HEART: RRR. No M/R/G  ABDOMEN: Soft, nontender, non-distended, normoactive BS. No hepatomegaly  EXTREMITIES:  weak distal peripheral pulses , brisk capillary refill. No clubbing, cyanosis, or edema  NERVOUS SYSTEM:  Alert & Oriented X3, speech clear. No deficits, CN II-XII intact. Normal sensation   MSK: FROM all 4 extremities, full and equal strength  PSYCH: Normal affect, normal speech, normal behavior  SKIN: No rashes or lesions VITALS:   T(C): 37.2 (10-14-22 @ 23:57), Max: 37.2 (10-14-22 @ 23:57)  HR: 86 (10-14-22 @ 23:57) (69 - 86)  BP: 190/65 (10-14-22 @ 23:57) (109/57 - 190/65)  RR: 18 (10-14-22 @ 23:57) (16 - 18)  SpO2: 98% (10-14-22 @ 23:57) (98% - 99%)    GENERAL: NAD, lying in bed comfortably, obese.  HEAD:  Atraumatic, Normocephalic  EYES: EOMI, PERRLA, conjunctiva and sclera clear  ENT: Moist mucous membranes  NECK: Supple, No JVD  CHEST/LUNG: CTABL; No rales, rhonchi, wheezing, or rubs. Unlabored respirations  HEART: RRR. No M/R/G  ABDOMEN: Soft, nontender, prominent, normoactive BS. No hepatomegaly  EXTREMITIES:  weak distal peripheral pulses. No clubbing, cyanosis. 2+ non-pitting edema/swelling bilaterally above ankle.  with left great and 3rd toe amputation. Both feet wrapped around with dressing no active bleeding or drainage.   NERVOUS SYSTEM:  Alert & Oriented X3, speech clear. No deficits, CN II-XII intact. Bilateral lower legs parasthenia.   PSYCH: Normal affect, normal speech, normal behavior  SKIN: diffuse light brown and/or erythematous plaques distributed on the posterior side of thighs extending to intertriginous area.

## 2022-10-14 NOTE — H&P ADULT - NSHPLABSRESULTS_GEN_ALL_CORE
8.9    14.67 )-----------( Clumped    ( 14 Oct 2022 15:53 )             29.2   10-14    135  |  100  |  66<H>  ----------------------------<  343<H>  4.9   |  20<L>  |  3.69<H>    Ca    8.8      14 Oct 2022 15:54    TPro  6.9  /  Alb  3.1<L>  /  TBili  0.2  /  DBili  x   /  AST  21  /  ALT  14  /  AlkPhos  100  10-14    Sedimentation Rate, Erythrocyte (12.20.20 @ 15:17)    Sedimentation Rate, Erythrocyte: 34 mm/hr  C-Reactive Protein, Serum (10.14.22 @ 15:54)    C-Reactive Protein, Serum: 150 mg/L  < from: Xray Foot AP + Lateral + Oblique, Left (10.14.22 @ 17:11) >    FINDINGS:  No acute displaced fracture or dislocation.  Status post remote amputation of the first ray at the level of the   metatarsal distal shaft. Status post remote amputation of the third ray   at the level of the MTP.  Arthrosis most advanced at the talonavicular and first tarsometatarsal   joints.  No definite cortical erosion or periosteal reaction.  Soft tissue ulceration overlying the posterior calcaneal tuberosity. Soft   tissue swelling most prominent along the dorsum of the foot.  Advanced atherosclerosis.    IMPRESSION:  Soft tissue ulceration overlying left posterior calcaneal tuberosity   without definite radiographic evidence for acute osteomyelitis. Correlate   for probe to bone and consider follow-up MRI as clinically indicated.

## 2022-10-14 NOTE — H&P ADULT - PROBLEM SELECTOR PLAN 2
Active, baseline at ~2 over the past year    - Active with Cr 3.7 (baseline ~2), BUN/Cr <20,  likely 2/2 intra-renal and or postrenal etiologies    - Ordered urine analysis, urine urea, urine creatinine   - Ordered US kidney and bladder  - CTM Active with Cr 3.7 (baseline ~2), BUN/Cr <20,  likely 2/2 intra-renal and or postrenal etiologies    - Ordered urine analysis, urine urea, urine creatinine   - Ordered US kidney and bladder  - hold lasix as now for ARABELLA  - CTM

## 2022-10-14 NOTE — ED ADULT NURSE NOTE - OBJECTIVE STATEMENT
81Y female, A&Ox4, PMH of diabetes s/p left great toe and 3rd digits amputation left foot presents to the ED c/o foot pain. upon exam unstageable round wound on medial aspect of left heel. pt sent in by outpatient md for IV antibiotics. Denies fever/chills, n/v/d, cp, sob, numbness/tingling, weakness, dizziness, lightheadedness. 20g iv placed in right AC by RN.

## 2022-10-14 NOTE — H&P ADULT - PROBLEM SELECTOR PLAN 8
Clinically stable with the most recent US studies in 2020 showed MUSHTAQ left < 0.9, s/p revascularization x2     - Ordered MUSHTAQ/PVRs, if abnormal >> vascular consult

## 2022-10-14 NOTE — H&P ADULT - NSHPPOACENTRALVENOUSCATHETER_GEN_ALL_CORE
Patient Education     What Is Impingement Syndrome? Shoulder pain when raising your arm may mean you have impingement syndrome. This is pinching within your shoulder. The problem may have been caused by repeating an overhead motion. In some cases, you may feel a nagging pain even when youâre not using your shoulder. Â   A forceful action repeated day after day without rest can cause a repetitive motion injury (RMI). Shoulder impingement is often due to an [address] Â   Symptoms of impingement  You may feel pain, pinching, or stiffness in your shoulder. Pain often comes with movement. But you may also feel it when youâre not using your shoulder. For example, you may feel pain while trying to sleep. Causes of impingement  Shoulder impingement is often caused by making repeated overhead movements. Constant shoulder use can irritate the tendons and bursa, leading to swelling. Swollen parts of the shoulder take up more room, making the joint space smaller:  Â· BursitisÂ is inflammation of the bursa, a sac of fluid that cushions shoulder parts as they move. The bursa fills up with too much fluid, filling and squeezing the joint space. Â· TendinitisÂ is inflammation of the tendons, fibrous tissues that connect muscle to bone. Â· Bone problems can make impingement worse. The acromion is part of the shoulder bone. It may be flat or hooked. If your acromion is hooked, the joint space may be smaller than normal. This makes you more prone to shoulder problems. Bone spurs (growths on the bone) can also narrow the joint space. [employer] Â© [facility] The [address] [place] [address] [address], [place], [address] All rights reserved. This information is not intended as a substitute for professional medical care. Always follow your healthcare professional's instructions. no

## 2022-10-14 NOTE — ED PROVIDER NOTE - LOWER EXTREMITY EXAM, LEFT
s/p amputation of L great toe and 3rd digit. +3.0 cm x 2.0 cm ulcer wound w/ necrotic center and periwound erythema noted to medial L heel with surrounding ttp. No discharge or drainage. No crepitus. Ful AROM ankle joint. Sensation intact.

## 2022-10-14 NOTE — ED PROVIDER NOTE - NS ED ATTENDING STATEMENT MOD
This was a shared visit with the ALYX. I reviewed and verified the documentation and independently performed the documented:

## 2022-10-14 NOTE — ED PROVIDER NOTE - ATTENDING APP SHARED VISIT CONTRIBUTION OF CARE
Attending MD Almaraz:   I personally have seen and examined this patient.  Physician assistant note reviewed and agree on plan of care and except where noted.  See below for details.     Seen in [facility] 8  Podiatrist Dr. Hand    81F with PMH/PSH including DM, CKD, neuropathy, s/pL hallux and 3rd toe amputations on L presents to the ED with non healing chronic wound on L heel.  Reports has had wound for years and has been following with podiatry who Rx'ed Augmentin and then Levaquin course who sent her in today.  Reports concerned for infection around wound.  Reports rash around wound area, denies new exposures.  Denies numbness, weakness or tingling in extremities. Denies numbness, weakness or tingling in extremities.  Denies chest pain, shortness of breath, abdominal pain, nausea, vomiting, diarrhea, urinary complaints. Denies fevers, chills.    Exam:   General: NAD  HENT: head NCAT, airway patent   Eyes: PERRL  Lungs: lungs CTAB with good inspiratory effort  Cardiac: +S1S2, no obvious m/r/g  GI: abdomen soft with +BS, NT, ND  : no CVAT  MSK: FROM at neck, no tenderness to midline palpation, no stepoffs along length of spine, no calf tenderness, swelling, erythema or warmth  Neuro: moving all extremities spontaneously, sensory grossly intact, no gross neuro deficits  Psych: normal mood and affect   Skin: 3cm wound, with surrounding erythema, no fluctuance no crepitus, L hallux and third toe s/p surgical excisions    A/P: 81F with chronic non healing wound, sent in for abx, will obtian labs and XR for possible OM, will obtain labs, Cx, will need admission for IV abx, will reach out to vet.

## 2022-10-15 ENCOUNTER — NON-APPOINTMENT (OUTPATIENT)
Age: 82
End: 2022-10-15

## 2022-10-15 DIAGNOSIS — I10 ESSENTIAL (PRIMARY) HYPERTENSION: ICD-10-CM

## 2022-10-15 DIAGNOSIS — E11.621 TYPE 2 DIABETES MELLITUS WITH FOOT ULCER: ICD-10-CM

## 2022-10-15 DIAGNOSIS — T78.40XA ALLERGY, UNSPECIFIED, INITIAL ENCOUNTER: ICD-10-CM

## 2022-10-15 DIAGNOSIS — Z00.00 ENCOUNTER FOR GENERAL ADULT MEDICAL EXAMINATION WITHOUT ABNORMAL FINDINGS: ICD-10-CM

## 2022-10-15 DIAGNOSIS — E11.9 TYPE 2 DIABETES MELLITUS WITHOUT COMPLICATIONS: ICD-10-CM

## 2022-10-15 DIAGNOSIS — D64.9 ANEMIA, UNSPECIFIED: ICD-10-CM

## 2022-10-15 DIAGNOSIS — S91.302A UNSPECIFIED OPEN WOUND, LEFT FOOT, INITIAL ENCOUNTER: ICD-10-CM

## 2022-10-15 DIAGNOSIS — N17.9 ACUTE KIDNEY FAILURE, UNSPECIFIED: ICD-10-CM

## 2022-10-15 DIAGNOSIS — I73.9 PERIPHERAL VASCULAR DISEASE, UNSPECIFIED: ICD-10-CM

## 2022-10-15 DIAGNOSIS — D63.8 ANEMIA IN OTHER CHRONIC DISEASES CLASSIFIED ELSEWHERE: ICD-10-CM

## 2022-10-15 DIAGNOSIS — E78.5 HYPERLIPIDEMIA, UNSPECIFIED: ICD-10-CM

## 2022-10-15 LAB
A1C WITH ESTIMATED AVERAGE GLUCOSE RESULT: 9.6 % — HIGH (ref 4–5.6)
ALBUMIN SERPL ELPH-MCNC: 3.1 G/DL — LOW (ref 3.3–5)
ALP SERPL-CCNC: 90 U/L — SIGNIFICANT CHANGE UP (ref 40–120)
ALT FLD-CCNC: 12 U/L — SIGNIFICANT CHANGE UP (ref 10–45)
ANION GAP SERPL CALC-SCNC: 14 MMOL/L — SIGNIFICANT CHANGE UP (ref 5–17)
AST SERPL-CCNC: 16 U/L — SIGNIFICANT CHANGE UP (ref 10–40)
BILIRUB SERPL-MCNC: 0.2 MG/DL — SIGNIFICANT CHANGE UP (ref 0.2–1.2)
BUN SERPL-MCNC: 66 MG/DL — HIGH (ref 7–23)
CALCIUM SERPL-MCNC: 9.1 MG/DL — SIGNIFICANT CHANGE UP (ref 8.4–10.5)
CHLORIDE SERPL-SCNC: 101 MMOL/L — SIGNIFICANT CHANGE UP (ref 96–108)
CO2 SERPL-SCNC: 22 MMOL/L — SIGNIFICANT CHANGE UP (ref 22–31)
CREAT SERPL-MCNC: 3.57 MG/DL — HIGH (ref 0.5–1.3)
EGFR: 12 ML/MIN/1.73M2 — LOW
ESTIMATED AVERAGE GLUCOSE: 229 MG/DL — HIGH (ref 68–114)
FOLATE SERPL-MCNC: >20 NG/ML — SIGNIFICANT CHANGE UP
GLUCOSE BLDC GLUCOMTR-MCNC: 249 MG/DL — HIGH (ref 70–99)
GLUCOSE BLDC GLUCOMTR-MCNC: 265 MG/DL — HIGH (ref 70–99)
GLUCOSE BLDC GLUCOMTR-MCNC: 283 MG/DL — HIGH (ref 70–99)
GLUCOSE BLDC GLUCOMTR-MCNC: 285 MG/DL — HIGH (ref 70–99)
GLUCOSE SERPL-MCNC: 240 MG/DL — HIGH (ref 70–99)
HCT VFR BLD CALC: 27.2 % — LOW (ref 34.5–45)
HGB BLD-MCNC: 8.3 G/DL — LOW (ref 11.5–15.5)
IRON SATN MFR SERPL: 19 UG/DL — LOW (ref 30–160)
IRON SATN MFR SERPL: 9 % — LOW (ref 14–50)
MAGNESIUM SERPL-MCNC: 1.6 MG/DL — SIGNIFICANT CHANGE UP (ref 1.6–2.6)
MCHC RBC-ENTMCNC: 26.7 PG — LOW (ref 27–34)
MCHC RBC-ENTMCNC: 30.5 GM/DL — LOW (ref 32–36)
MCV RBC AUTO: 87.5 FL — SIGNIFICANT CHANGE UP (ref 80–100)
MRSA PCR RESULT.: DETECTED
NRBC # BLD: 0 /100 WBCS — SIGNIFICANT CHANGE UP (ref 0–0)
PHOSPHATE SERPL-MCNC: 3 MG/DL — SIGNIFICANT CHANGE UP (ref 2.5–4.5)
PLATELET # BLD AUTO: 189 K/UL — SIGNIFICANT CHANGE UP (ref 150–400)
POTASSIUM SERPL-MCNC: 4.2 MMOL/L — SIGNIFICANT CHANGE UP (ref 3.5–5.3)
POTASSIUM SERPL-SCNC: 4.2 MMOL/L — SIGNIFICANT CHANGE UP (ref 3.5–5.3)
PROT SERPL-MCNC: 6.7 G/DL — SIGNIFICANT CHANGE UP (ref 6–8.3)
RBC # BLD: 3.11 M/UL — LOW (ref 3.8–5.2)
RBC # FLD: 15.7 % — HIGH (ref 10.3–14.5)
S AUREUS DNA NOSE QL NAA+PROBE: DETECTED
SODIUM SERPL-SCNC: 137 MMOL/L — SIGNIFICANT CHANGE UP (ref 135–145)
TIBC SERPL-MCNC: 207 UG/DL — LOW (ref 220–430)
UIBC SERPL-MCNC: 188 UG/DL — SIGNIFICANT CHANGE UP (ref 110–370)
VIT B12 SERPL-MCNC: >2000 PG/ML — HIGH (ref 232–1245)
WBC # BLD: 12.8 K/UL — HIGH (ref 3.8–10.5)
WBC # FLD AUTO: 12.8 K/UL — HIGH (ref 3.8–10.5)

## 2022-10-15 PROCEDURE — 99284 EMERGENCY DEPT VISIT MOD MDM: CPT

## 2022-10-15 PROCEDURE — 76770 US EXAM ABDO BACK WALL COMP: CPT | Mod: 26

## 2022-10-15 PROCEDURE — 99233 SBSQ HOSP IP/OBS HIGH 50: CPT | Mod: GC

## 2022-10-15 PROCEDURE — 73718 MRI LOWER EXTREMITY W/O DYE: CPT | Mod: 26,LT

## 2022-10-15 RX ORDER — HUMAN INSULIN 100 [IU]/ML
28 INJECTION, SUSPENSION SUBCUTANEOUS
Refills: 0 | Status: DISCONTINUED | OUTPATIENT
Start: 2022-10-15 | End: 2022-10-15

## 2022-10-15 RX ORDER — INSULIN LISPRO 100/ML
VIAL (ML) SUBCUTANEOUS EVERY 6 HOURS
Refills: 0 | Status: DISCONTINUED | OUTPATIENT
Start: 2022-10-15 | End: 2022-10-15

## 2022-10-15 RX ORDER — DEXTROSE 50 % IN WATER 50 %
25 SYRINGE (ML) INTRAVENOUS ONCE
Refills: 0 | Status: DISCONTINUED | OUTPATIENT
Start: 2022-10-15 | End: 2022-10-18

## 2022-10-15 RX ORDER — TAMSULOSIN HYDROCHLORIDE 0.4 MG/1
0.4 CAPSULE ORAL AT BEDTIME
Refills: 0 | Status: DISCONTINUED | OUTPATIENT
Start: 2022-10-15 | End: 2022-10-18

## 2022-10-15 RX ORDER — CALAMINE AND ZINC OXIDE AND PHENOL 160; 10 MG/ML; MG/ML
1 LOTION TOPICAL
Refills: 0 | Status: DISCONTINUED | OUTPATIENT
Start: 2022-10-15 | End: 2022-10-18

## 2022-10-15 RX ORDER — DEXTROSE 50 % IN WATER 50 %
15 SYRINGE (ML) INTRAVENOUS ONCE
Refills: 0 | Status: DISCONTINUED | OUTPATIENT
Start: 2022-10-15 | End: 2022-10-18

## 2022-10-15 RX ORDER — LORATADINE 10 MG/1
10 TABLET ORAL DAILY
Refills: 0 | Status: DISCONTINUED | OUTPATIENT
Start: 2022-10-15 | End: 2022-10-18

## 2022-10-15 RX ORDER — INSULIN LISPRO 100/ML
VIAL (ML) SUBCUTANEOUS
Refills: 0 | Status: DISCONTINUED | OUTPATIENT
Start: 2022-10-15 | End: 2022-10-15

## 2022-10-15 RX ORDER — DEXTROSE 50 % IN WATER 50 %
12.5 SYRINGE (ML) INTRAVENOUS ONCE
Refills: 0 | Status: DISCONTINUED | OUTPATIENT
Start: 2022-10-15 | End: 2022-10-18

## 2022-10-15 RX ORDER — SODIUM CHLORIDE 9 MG/ML
1000 INJECTION, SOLUTION INTRAVENOUS
Refills: 0 | Status: DISCONTINUED | OUTPATIENT
Start: 2022-10-15 | End: 2022-10-18

## 2022-10-15 RX ORDER — GLUCAGON INJECTION, SOLUTION 0.5 MG/.1ML
1 INJECTION, SOLUTION SUBCUTANEOUS ONCE
Refills: 0 | Status: DISCONTINUED | OUTPATIENT
Start: 2022-10-15 | End: 2022-10-18

## 2022-10-15 RX ORDER — VANCOMYCIN HCL 1 G
1000 VIAL (EA) INTRAVENOUS EVERY 24 HOURS
Refills: 0 | Status: DISCONTINUED | OUTPATIENT
Start: 2022-10-15 | End: 2022-10-16

## 2022-10-15 RX ORDER — INSULIN LISPRO 100/ML
VIAL (ML) SUBCUTANEOUS AT BEDTIME
Refills: 0 | Status: DISCONTINUED | OUTPATIENT
Start: 2022-10-15 | End: 2022-10-15

## 2022-10-15 RX ORDER — CEFEPIME 1 G/1
1000 INJECTION, POWDER, FOR SOLUTION INTRAMUSCULAR; INTRAVENOUS EVERY 12 HOURS
Refills: 0 | Status: DISCONTINUED | OUTPATIENT
Start: 2022-10-15 | End: 2022-10-15

## 2022-10-15 RX ORDER — VANCOMYCIN HCL 1 G
1000 VIAL (EA) INTRAVENOUS EVERY 12 HOURS
Refills: 0 | Status: DISCONTINUED | OUTPATIENT
Start: 2022-10-15 | End: 2022-10-15

## 2022-10-15 RX ORDER — HUMAN INSULIN 100 [IU]/ML
22 INJECTION, SUSPENSION SUBCUTANEOUS AT BEDTIME
Refills: 0 | Status: DISCONTINUED | OUTPATIENT
Start: 2022-10-15 | End: 2022-10-15

## 2022-10-15 RX ORDER — INSULIN GLARGINE 100 [IU]/ML
24 INJECTION, SOLUTION SUBCUTANEOUS AT BEDTIME
Refills: 0 | Status: DISCONTINUED | OUTPATIENT
Start: 2022-10-15 | End: 2022-10-16

## 2022-10-15 RX ORDER — LABETALOL HCL 100 MG
100 TABLET ORAL
Refills: 0 | Status: DISCONTINUED | OUTPATIENT
Start: 2022-10-15 | End: 2022-10-18

## 2022-10-15 RX ORDER — HEPARIN SODIUM 5000 [USP'U]/ML
5000 INJECTION INTRAVENOUS; SUBCUTANEOUS EVERY 12 HOURS
Refills: 0 | Status: DISCONTINUED | OUTPATIENT
Start: 2022-10-15 | End: 2022-10-18

## 2022-10-15 RX ORDER — SODIUM BICARBONATE 1 MEQ/ML
650 SYRINGE (ML) INTRAVENOUS
Refills: 0 | Status: DISCONTINUED | OUTPATIENT
Start: 2022-10-15 | End: 2022-10-18

## 2022-10-15 RX ORDER — FAMOTIDINE 10 MG/ML
20 INJECTION INTRAVENOUS DAILY
Refills: 0 | Status: DISCONTINUED | OUTPATIENT
Start: 2022-10-15 | End: 2022-10-18

## 2022-10-15 RX ORDER — HYDROCORTISONE 1 %
1 OINTMENT (GRAM) TOPICAL
Refills: 0 | Status: DISCONTINUED | OUTPATIENT
Start: 2022-10-15 | End: 2022-10-18

## 2022-10-15 RX ORDER — TIMOLOL 0.5 %
1 DROPS OPHTHALMIC (EYE) DAILY
Refills: 0 | Status: DISCONTINUED | OUTPATIENT
Start: 2022-10-15 | End: 2022-10-18

## 2022-10-15 RX ORDER — INSULIN LISPRO 100/ML
6 VIAL (ML) SUBCUTANEOUS
Refills: 0 | Status: DISCONTINUED | OUTPATIENT
Start: 2022-10-15 | End: 2022-10-16

## 2022-10-15 RX ORDER — SODIUM CHLORIDE 9 MG/ML
500 INJECTION INTRAMUSCULAR; INTRAVENOUS; SUBCUTANEOUS ONCE
Refills: 0 | Status: COMPLETED | OUTPATIENT
Start: 2022-10-15 | End: 2022-10-15

## 2022-10-15 RX ORDER — INSULIN LISPRO 100/ML
VIAL (ML) SUBCUTANEOUS AT BEDTIME
Refills: 0 | Status: DISCONTINUED | OUTPATIENT
Start: 2022-10-15 | End: 2022-10-18

## 2022-10-15 RX ORDER — CEFEPIME 1 G/1
1000 INJECTION, POWDER, FOR SOLUTION INTRAMUSCULAR; INTRAVENOUS EVERY 12 HOURS
Refills: 0 | Status: DISCONTINUED | OUTPATIENT
Start: 2022-10-15 | End: 2022-10-16

## 2022-10-15 RX ORDER — INSULIN LISPRO 100/ML
VIAL (ML) SUBCUTANEOUS
Refills: 0 | Status: DISCONTINUED | OUTPATIENT
Start: 2022-10-15 | End: 2022-10-18

## 2022-10-15 RX ORDER — HUMAN INSULIN 100 [IU]/ML
34 INJECTION, SUSPENSION SUBCUTANEOUS
Refills: 0 | Status: DISCONTINUED | OUTPATIENT
Start: 2022-10-15 | End: 2022-10-15

## 2022-10-15 RX ORDER — FUROSEMIDE 40 MG
40 TABLET ORAL DAILY
Refills: 0 | Status: DISCONTINUED | OUTPATIENT
Start: 2022-10-15 | End: 2022-10-15

## 2022-10-15 RX ORDER — HEPARIN SODIUM 5000 [USP'U]/ML
5000 INJECTION INTRAVENOUS; SUBCUTANEOUS EVERY 12 HOURS
Refills: 0 | Status: DISCONTINUED | OUTPATIENT
Start: 2022-10-15 | End: 2022-10-15

## 2022-10-15 RX ORDER — ATORVASTATIN CALCIUM 80 MG/1
10 TABLET, FILM COATED ORAL AT BEDTIME
Refills: 0 | Status: DISCONTINUED | OUTPATIENT
Start: 2022-10-15 | End: 2022-10-18

## 2022-10-15 RX ADMIN — Medication 1 APPLICATION(S): at 05:14

## 2022-10-15 RX ADMIN — Medication 3: at 00:35

## 2022-10-15 RX ADMIN — CEFEPIME 100 MILLIGRAM(S): 1 INJECTION, POWDER, FOR SOLUTION INTRAMUSCULAR; INTRAVENOUS at 18:31

## 2022-10-15 RX ADMIN — Medication 1: at 21:57

## 2022-10-15 RX ADMIN — Medication 1 APPLICATION(S): at 05:15

## 2022-10-15 RX ADMIN — CALAMINE AND ZINC OXIDE AND PHENOL 1 APPLICATION(S): 160; 10 LOTION TOPICAL at 05:14

## 2022-10-15 RX ADMIN — HEPARIN SODIUM 5000 UNIT(S): 5000 INJECTION INTRAVENOUS; SUBCUTANEOUS at 20:04

## 2022-10-15 RX ADMIN — ATORVASTATIN CALCIUM 10 MILLIGRAM(S): 80 TABLET, FILM COATED ORAL at 21:58

## 2022-10-15 RX ADMIN — Medication 3: at 17:57

## 2022-10-15 RX ADMIN — Medication 650 MILLIGRAM(S): at 20:04

## 2022-10-15 RX ADMIN — FAMOTIDINE 20 MILLIGRAM(S): 10 INJECTION INTRAVENOUS at 01:27

## 2022-10-15 RX ADMIN — CALAMINE AND ZINC OXIDE AND PHENOL 1 APPLICATION(S): 160; 10 LOTION TOPICAL at 21:57

## 2022-10-15 RX ADMIN — SODIUM CHLORIDE 500 MILLILITER(S): 9 INJECTION INTRAMUSCULAR; INTRAVENOUS; SUBCUTANEOUS at 10:08

## 2022-10-15 RX ADMIN — Medication 100 MILLIGRAM(S): at 05:51

## 2022-10-15 RX ADMIN — Medication 650 MILLIGRAM(S): at 13:58

## 2022-10-15 RX ADMIN — Medication 1 DROP(S): at 13:59

## 2022-10-15 RX ADMIN — Medication 100 MILLIGRAM(S): at 20:03

## 2022-10-15 RX ADMIN — Medication 1 APPLICATION(S): at 05:33

## 2022-10-15 RX ADMIN — Medication 1 APPLICATION(S): at 21:56

## 2022-10-15 RX ADMIN — Medication 6 UNIT(S): at 17:57

## 2022-10-15 RX ADMIN — Medication 3: at 12:18

## 2022-10-15 RX ADMIN — TAMSULOSIN HYDROCHLORIDE 0.4 MILLIGRAM(S): 0.4 CAPSULE ORAL at 21:58

## 2022-10-15 RX ADMIN — Medication 650 MILLIGRAM(S): at 20:45

## 2022-10-15 RX ADMIN — INSULIN GLARGINE 24 UNIT(S): 100 INJECTION, SOLUTION SUBCUTANEOUS at 21:57

## 2022-10-15 RX ADMIN — Medication 650 MILLIGRAM(S): at 05:15

## 2022-10-15 RX ADMIN — Medication 1 APPLICATION(S): at 21:55

## 2022-10-15 RX ADMIN — CEFEPIME 100 MILLIGRAM(S): 1 INJECTION, POWDER, FOR SOLUTION INTRAMUSCULAR; INTRAVENOUS at 05:36

## 2022-10-15 NOTE — PHYSICAL THERAPY INITIAL EVALUATION ADULT - ASSISTIVE DEVICE FOR TRANSFER: STAND/SIT, REHAB EVAL
Addended by: COLTON AL on: 11/15/2019 04:25 PM     Modules accepted: Orders    
Addended by: COLTON AL on: 11/15/2019 05:00 PM     Modules accepted: Orders    
rolling walker

## 2022-10-15 NOTE — PROVIDER CONTACT NOTE (OTHER) - ASSESSMENT
alert and oriented x4; denies chest pain, SOB or HA . 2 assist in bed. bedrest. incontinence x2 at times. no BM today. no distress noted during shift

## 2022-10-15 NOTE — PROGRESS NOTE ADULT - PROBLEM SELECTOR PLAN 9
- DVT prophylaxis: hold until AM platelet results  - Diet: DASH/TLC renal diet  - Code status: DNI or DNI/DNR or DNR (has a form signed) F: 500cc bolus  E: as needed  N: DASH/TLC renal diet    DVT:    Dispo: pending  Code status: DNI or DNI/DNR or DNR (has a form signed) F: 500cc bolus  E: as needed  N: DASH/TLC renal diet    DVT:     Dispo: pending  Code status: DNI or DNI/DNR or DNR (has a form signed)

## 2022-10-15 NOTE — PHYSICAL THERAPY INITIAL EVALUATION ADULT - IMPAIRED TRANSFERS: SIT/STAND, REHAB EVAL
impaired coordination/impaired motor control/narrow base of support/impaired postural control/decreased ROM/decreased sensation/decreased strength

## 2022-10-15 NOTE — PATIENT PROFILE ADULT - FALL HARM RISK - HARM RISK INTERVENTIONS

## 2022-10-15 NOTE — PROGRESS NOTE ADULT - PROBLEM SELECTOR PLAN 6
Stable and relatively well controlled    - Continue home dose labetalol  - Trend vitals Stable and relatively well controlled  - Continue home dose labetalol  - Trend vitals

## 2022-10-15 NOTE — PHYSICAL THERAPY INITIAL EVALUATION ADULT - BALANCE DISTURBANCE, IDENTIFIED IMPAIRMENT CONTRIBUTE, REHAB EVAL
impaired motor control/pain/impaired postural control/decreased ROM/impaired sensory feedback/decreased strength

## 2022-10-15 NOTE — PROGRESS NOTE ADULT - SUBJECTIVE AND OBJECTIVE BOX
Progress Note    10-14-22 (1d)    Patient is a 81y old  Female who presents with a chief complaint of left foot ulcer (14 Oct 2022 22:03)      Subjective / Overnight Events :  - No acute events overnight.  - Pt seen and examined at bedside.     Additional ROS (if any):    MEDICATIONS  (STANDING):  atorvastatin 10 milliGRAM(s) Oral at bedtime  calamine/zinc oxide Lotion 1 Application(s) Topical two times a day  cefepime   IVPB 1000 milliGRAM(s) IV Intermittent every 12 hours  clobetasol 0.05% Ointment 1 Application(s) Topical two times a day  collagenase Ointment 1 Application(s) Topical two times a day  dextrose 5%. 1000 milliLiter(s) (50 mL/Hr) IV Continuous <Continuous>  dextrose 5%. 1000 milliLiter(s) (100 mL/Hr) IV Continuous <Continuous>  dextrose 50% Injectable 25 Gram(s) IV Push once  dextrose 50% Injectable 12.5 Gram(s) IV Push once  dextrose 50% Injectable 25 Gram(s) IV Push once  famotidine    Tablet 20 milliGRAM(s) Oral daily  glucagon  Injectable 1 milliGRAM(s) IntraMuscular once  hydrocortisone 2.5% Ointment 1 Application(s) Topical two times a day  insulin lispro (ADMELOG) corrective regimen sliding scale   SubCutaneous every 6 hours  insulin lispro (ADMELOG) corrective regimen sliding scale   SubCutaneous three times a day before meals  insulin lispro (ADMELOG) corrective regimen sliding scale   SubCutaneous at bedtime  insulin NPH human recombinant 28 Unit(s) SubCutaneous before breakfast  insulin NPH human recombinant 22 Unit(s) SubCutaneous at bedtime  labetalol 100 milliGRAM(s) Oral two times a day  sodium bicarbonate 650 milliGRAM(s) Oral two times a day  tamsulosin 0.4 milliGRAM(s) Oral at bedtime  timolol 0.5% Solution 1 Drop(s) Both EYES daily  vancomycin  IVPB 1000 milliGRAM(s) IV Intermittent every 24 hours    MEDICATIONS  (PRN):  acetaminophen     Tablet .. 650 milliGRAM(s) Oral every 6 hours PRN Temp greater or equal to 38C (100.4F), Mild Pain (1 - 3)  dextrose Oral Gel 15 Gram(s) Oral once PRN Blood Glucose LESS THAN 70 milliGRAM(s)/deciliter  loratadine 10 milliGRAM(s) Oral daily PRN ITCH  melatonin 3 milliGRAM(s) Oral at bedtime PRN Insomnia  ondansetron Injectable 4 milliGRAM(s) IV Push every 8 hours PRN Nausea and/or Vomiting          PHYSICAL EXAM:  Vital Signs Last 24 Hrs  T(C): 36.9 (15 Oct 2022 04:35), Max: 37.2 (14 Oct 2022 23:57)  T(F): 98.5 (15 Oct 2022 04:35), Max: 98.9 (14 Oct 2022 23:57)  HR: 85 (15 Oct 2022 04:35) (69 - 86)  BP: 116/66 (15 Oct 2022 04:35) (109/57 - 119/65)  BP(mean): 74 (14 Oct 2022 19:49) (74 - 74)  RR: 18 (15 Oct 2022 04:35) (16 - 18)  SpO2: 100% (15 Oct 2022 04:35) (98% - 100%)    Parameters below as of 15 Oct 2022 04:35  Patient On (Oxygen Delivery Method): room air        I&O's Summary      General: NAD, non-toxic appearing   HEENT: PERRLA, EOMi, no scleral icterus  CV: RRR, normal S1 and S2, no m/r/g  Lungs: normal respiratory effort. CTAB, no wheezes, rales, or rhonchi  Abd: soft, nontender, nondistended  Ext: no edema, 2+ peripheral pulses   Pysch: AAOx3, appropriate affect   Neuro: grossly non-focal, moving all extremities spontaneously   Skin: no rashes or lesions     LABS:  CAPILLARY BLOOD GLUCOSE      POCT Blood Glucose.: 294 mg/dL (14 Oct 2022 23:53)                              8.3    12.80 )-----------( 189      ( 15 Oct 2022 06:31 )             27.2       WBC Trend: 12.80<--, 14.67<--  Hb Trend: 8.3<--, 8.9<--    10-15    137  |  101  |  66<H>  ----------------------------<  240<H>  4.2   |  22  |  3.57<H>    Ca    9.1      15 Oct 2022 06:31  Phos  3.0     10-15  Mg     1.6     10-15    TPro  6.7  /  Alb  3.1<L>  /  TBili  0.2  /  DBili  x   /  AST  16  /  ALT  12  /  AlkPhos  90  10-15    PT/INR - ( 14 Oct 2022 16:34 )   PT: 13.7 sec;   INR: 1.19 ratio         PTT - ( 14 Oct 2022 16:34 )  PTT:24.6 sec              RADIOLOGY & ADDITIONAL TESTS: Reviewed

## 2022-10-15 NOTE — PROGRESS NOTE ADULT - SUBJECTIVE AND OBJECTIVE BOX
Patient is a 81y old  Female who presents with a chief complaint of left foot ulcer (15 Oct 2022 09:00)       INTERVAL HPI/OVERNIGHT EVENTS:  Patient seen and evaluated at bedside.  Pt is resting comfortable in NAD. Denies N/V/F/C.    Allergies    Augmentin (Rash)    Intolerances        Vital Signs Last 24 Hrs  T(C): 36.8 (15 Oct 2022 11:31), Max: 37.2 (14 Oct 2022 23:57)  T(F): 98.3 (15 Oct 2022 11:31), Max: 98.9 (14 Oct 2022 23:57)  HR: 76 (15 Oct 2022 11:31) (69 - 86)  BP: 129/70 (15 Oct 2022 11:31) (109/57 - 129/70)  BP(mean): 74 (14 Oct 2022 19:49) (74 - 74)  RR: 18 (15 Oct 2022 11:31) (16 - 18)  SpO2: 99% (15 Oct 2022 11:31) (98% - 100%)    Parameters below as of 15 Oct 2022 11:31  Patient On (Oxygen Delivery Method): room air        LABS:                        8.3    12.80 )-----------( 189      ( 15 Oct 2022 06:31 )             27.2     10-15    137  |  101  |  66<H>  ----------------------------<  240<H>  4.2   |  22  |  3.57<H>    Ca    9.1      15 Oct 2022 06:31  Phos  3.0     10-15  Mg     1.6     10-15    TPro  6.7  /  Alb  3.1<L>  /  TBili  0.2  /  DBili  x   /  AST  16  /  ALT  12  /  AlkPhos  90  10-15    PT/INR - ( 14 Oct 2022 16:34 )   PT: 13.7 sec;   INR: 1.19 ratio         PTT - ( 14 Oct 2022 16:34 )  PTT:24.6 sec    CAPILLARY BLOOD GLUCOSE      POCT Blood Glucose.: 249 mg/dL (15 Oct 2022 09:53)  POCT Blood Glucose.: 294 mg/dL (14 Oct 2022 23:53)      Lower Extremity Physical Exam:  Vascular: DP 1/4, PT 0/4 B/L, CFT <3 seconds B/L, temp gradient warm to cool, B/L  Neuro: Epicritic sensation reduced to the level of digits, B/L.  Musculoskeletal/Ortho: s/p LF partial first ray resection, and 3rd toe amp, s/p R foot partial hallux amputation   Skin: Left medial heel wound to subQ w central eschar, no malodor, no fluctuance, no periwound erythema, no warm to touch, no drainage or pus noted, etiology 2/2 to pressure and DM. R foot with no open wounds and no clinical signs of infection.     RADIOLOGY & ADDITIONAL TESTS:

## 2022-10-15 NOTE — PHYSICAL THERAPY INITIAL EVALUATION ADULT - NSPTDISCHREC_GEN_A_CORE
if home, home PT for bed mobility, transfers, balance, ambulation, and stair training, assistance with all OOB activities./Sub-acute Rehab

## 2022-10-15 NOTE — PHYSICAL THERAPY INITIAL EVALUATION ADULT - IMPAIRMENTS CONTRIBUTING IMPAIRED BED MOBILITY, REHAB EVAL
impaired coordination/impaired motor control/pain/impaired postural control/decreased ROM/decreased sensation/decreased strength

## 2022-10-15 NOTE — PROVIDER CONTACT NOTE (OTHER) - BACKGROUND
81 year old female with pmhx CKD stage 4, PVD,Glaucoma, HTN, diabetic ulcer of Left heel present to ED for chronic non-healing wound and augmentin reaction

## 2022-10-15 NOTE — PHYSICAL THERAPY INITIAL EVALUATION ADULT - PLANNED THERAPY INTERVENTIONS, PT EVAL
Patient will be able to negotiate 1 step with handrail and appropriate AD minAx1 in 2 weeks./balance training/bed mobility training/gait training/ROM/strengthening/transfer training

## 2022-10-15 NOTE — CONSULT NOTE ADULT - ATTENDING COMMENTS
82 yo Female, ambulatory with a walker, w/ DM Type 2 c/b diabetic neuropathy and multiple toes amputations, HTN, HLD, PVD s/p LLE angiogram 2018 , CKD stage 3~4 (Baseline crt ~2), multiple UTI 2/2 obstructive uropathy s/p ureteral stent placement n 12/2020, morbid Obesity, presenting with chronic non-healing ulcer at the left heel for IV antibiotics treatment. Endocrinology consulted for diabetes management. Patient takes Novolin N 34 units in AM and 22 units in PM with ISS. Patient prefers to take NPH rather than once a day basal insulin such as Lantus/Levemir/Basaglar and has an aide at home to help with her medications. Agree with doing Lantus 24 units and Admelog dose 6 units TID based on 80% of her total daily dose of insulin at home. Will follow along and titrate insulin based on patient's blood sugar trends.

## 2022-10-15 NOTE — PROGRESS NOTE ADULT - PROBLEM SELECTOR PLAN 2
Active with Cr 3.7 (baseline ~2), BUN/Cr <20,  likely 2/2 intra-renal and or postrenal etiologies    - Ordered urine analysis, urine urea, urine creatinine   - Ordered US kidney and bladder  - hold lasix as now for ARABELLA  - CTM Active with Cr 3.7 (baseline ~2), BUN/Cr <20,  likely 2/2 intra-renal and or postrenal etiologies  - pending urine analysis, urine urea, urine creatinine   - pending US kidney and bladder  - hold lasix iso ARABELLA

## 2022-10-15 NOTE — CONSULT NOTE ADULT - SUBJECTIVE AND OBJECTIVE BOX
HPI:  80 yo Female, ambulatory with a walker, w/ DM Type 2 c/b diabetic neuropathy and multiple toes amputations, HTN, HLD, PVD s/p LLE angiogram 2018 , CKD stage 3~4 (Baseline crt ~2), multiple UTI 2/2 obstructive uropathy s/p ureteral stent placement n 12/2020, morbid Obesity, presenting with chronic non-healing ulcer at the left heel for IV antibiotics treatment. Recently, pt was prescribed augmentin for local soft tissue infection but then developed skin rash for two day exposure. Then she was switched to levaquin by her podiatrist who saw her today and recommended IV antibiotics for inadequate response. Pt denied pain in her left foot (because of diabetic neuropathy) but endorsed more pain and less pruritis on her skin rash located at the back and inner side of legs. Otherwise, pt denied fever, chill, N/V, headache, chest pain, constipation, diarrhea, no GI bleeding. Occasionally produces blood tinged urine during UTI episodes but no grossly hematuria. Pt unclear if ureteral stent has been removed or not but denied urinary trouble.     Pt had chronic non-healing wounds in both feet for more than 20 years likely 2/2 DM2, HTN, HLD, and PVD. She regularly follows up with her own podiatrist Dr. Bailey Hand. Per her nephew, her left heel wound was closed two weeks ago but then open again.     At the ED, Pt VSS, afebrile, mild leukocytosis, elevated Cr 3.69, elevated ESR/CRP, s/p left foot xray.  Podiatry saw pt and started work-up. S/P IV Vanco and Cefepime. (14 Oct 2022 22:03)    Endocrine consulted for DM2.    DM diagnosis: >30 years ago  Last A1c: No recent value  Endocrinologist: PCP only  Home DM meds: States she uses Novolin-R (rapid acting) 34 units qam and 22 units qhs (not typically a bedtime insulin as it is rapid acting and not long acting. Need to clarify insulin type). Also uses novolog sliding scale before lunch and dinner.  Microvascular complications: + neuropathy, retinopathy with poor vision, nephropathy with CKD  Macrovascular complications: + PAD with multiple amputations  SMBG: Fasting 160-200s, before lunch and dinner in the 200s  Symptoms: No sxs of high BG  Diet at home: Has Healthy Meals delivery, minimizes snacking  Appetite in the hospital: Good appetite  ROS positive for thigh rash after augmentin use and foot wound.    PAST MEDICAL & SURGICAL HISTORY:  Diabetes  type 2      PVD (peripheral vascular disease)      Neuropathy      Diabetic ulcer of heel      Hypertension      Glaucoma      PVD (peripheral vascular disease)      Obesity      Stage 4 chronic kidney disease      Toe amputation status      History of cataract surgery          FAMILY HISTORY:  Family history of CHF (congestive heart failure)    Family hx of lung cancer    No DM.      Social History: No etoh or tobacco use.    Outpatient Medications: Home Medications:  acetaminophen 325 mg oral tablet: 2 tab(s) orally every 6 hours, As Needed - 3) (15 Oct 2022 01:15)  amLODIPine 10 mg oral tablet: 1 tab(s) orally once a day (15 Oct 2022 02:33)  atorvastatin 10 mg oral tablet: 1 tab(s) orally once a day (at bedtime) (15 Oct 2022 02:33)  bisacodyl 10 mg rectal suppository: 1 suppository(ies) rectal once a day, As needed, Constipation (15 Oct 2022 01:15)  collagenase 250 units/g topical ointment: 1 application topically once a day (15 Oct 2022 02:33)  fluconazole 200 mg oral tablet: 1 tab(s) orally once a day from 1/1/21 through 1/8/21 (15 Oct 2022 02:33)  FUROSEMIDE 40MG TAB:  (15 Oct 2022 02:33)  guaiFENesin 100 mg/5 mL oral liquid: 10 milliliter(s) orally every 6 hours, As needed, Cough/chest congestion (15 Oct 2022 01:15)  heparin: 5000 unit(s) subcutaneous every 8 hours for DVT prophylaxis (15 Oct 2022 01:15)  HYDROmorphone 2 mg oral tablet: 1 tab(s) orally every 3 hours, As needed, Moderate Pain (4 - 6) refractory to tylenol (15 Oct 2022 01:15)  ipratropium-albuterol 0.5 mg-2.5 mg/3 mLinhalation solution: 3 milliliter(s) inhaled every 6 hours, As needed, Shortness of Breath and/or Wheezing (15 Oct 2022 02:33)  labetalol 100 mg oral tablet: 1 tab(s) orally 2 times a day (15 Oct 2022 02:33)  LABETALOL 100MG TAB:  (15 Oct 2022 02:33)  menthol-benzocaine 3.6 mg-15 mg mucous membrane lozenge: 1 lozenge orally every 4 hours, As Needed for sore throat (15 Oct 2022 01:15)  NovoLIN N 100 units/mL subcutaneous suspension: 30 unit(s) subcutaneous once a day (at bedtime) (15 Oct 2022 02:33)  NovoLIN N 100 units/mL subcutaneous suspension: 40 unit(s) subcutaneous once a day in AM (15 Oct 2022 02:33)  polyethylene glycol 3350 oral powder for reconstitution: 17 gram(s) orally once a day, As needed, Constipation (15 Oct 2022 02:33)  senna oral tablet: 1 tab(s) orally once a day (15 Oct 2022 02:33)  simethicone 80 mg oral tablet, chewable: 1 tab(s) orally every 8 hours, As needed, Gas (15 Oct 2022 01:15)  sodium bicarbonate 650 mg oral tablet: 1 tab(s) orally 2 times a day (15 Oct 2022 02:33)  tamsulosin 0.4 mg oral capsule: 1 cap(s) orally once a day (at bedtime) (15 Oct 2022 02:33)  timolol maleate 0.5% ophthalmic solution: 1 drop(s) to each affected eye once a day (15 Oct 2022 02:33)      MEDICATIONS  (STANDING):  atorvastatin 10 milliGRAM(s) Oral at bedtime  calamine/zinc oxide Lotion 1 Application(s) Topical two times a day  cefepime   IVPB 1000 milliGRAM(s) IV Intermittent every 12 hours  clobetasol 0.05% Ointment 1 Application(s) Topical two times a day  collagenase Ointment 1 Application(s) Topical two times a day  dextrose 5%. 1000 milliLiter(s) (50 mL/Hr) IV Continuous <Continuous>  dextrose 5%. 1000 milliLiter(s) (100 mL/Hr) IV Continuous <Continuous>  dextrose 50% Injectable 25 Gram(s) IV Push once  dextrose 50% Injectable 12.5 Gram(s) IV Push once  dextrose 50% Injectable 25 Gram(s) IV Push once  famotidine    Tablet 20 milliGRAM(s) Oral daily  glucagon  Injectable 1 milliGRAM(s) IntraMuscular once  hydrocortisone 2.5% Ointment 1 Application(s) Topical two times a day  insulin lispro (ADMELOG) corrective regimen sliding scale   SubCutaneous every 6 hours  insulin lispro (ADMELOG) corrective regimen sliding scale   SubCutaneous three times a day before meals  insulin lispro (ADMELOG) corrective regimen sliding scale   SubCutaneous at bedtime  insulin NPH human recombinant 28 Unit(s) SubCutaneous before breakfast  insulin NPH human recombinant 22 Unit(s) SubCutaneous at bedtime  labetalol 100 milliGRAM(s) Oral two times a day  sodium bicarbonate 650 milliGRAM(s) Oral two times a day  tamsulosin 0.4 milliGRAM(s) Oral at bedtime  timolol 0.5% Solution 1 Drop(s) Both EYES daily  vancomycin  IVPB 1000 milliGRAM(s) IV Intermittent every 24 hours    MEDICATIONS  (PRN):  acetaminophen     Tablet .. 650 milliGRAM(s) Oral every 6 hours PRN Temp greater or equal to 38C (100.4F), Mild Pain (1 - 3)  dextrose Oral Gel 15 Gram(s) Oral once PRN Blood Glucose LESS THAN 70 milliGRAM(s)/deciliter  loratadine 10 milliGRAM(s) Oral daily PRN ITCH  melatonin 3 milliGRAM(s) Oral at bedtime PRN Insomnia  ondansetron Injectable 4 milliGRAM(s) IV Push every 8 hours PRN Nausea and/or Vomiting      Allergies    Augmentin (Rash)    Intolerances      Review of Systems:  Constitutional:  No fever, No chills.  Eye:  No eye pain, No blurring.   Ear/Nose/Mouth/Throat:  No nasal congestion, No sore throat.   Respiratory:  No shortness of breath, No cough.   Cardiovascular:  No chest pain, No palpitations.   Gastrointestinal:  No nausea, No vomiting, No diarrhea.   Genitourinary:  No dysuria, No hematuria.   Endocrine:  No excessive thirst, No polyuria.   Musculoskeletal:  No back pain, No decreased range of motion.   Integumentary: + foot wound and thigh rash  Neurologic:  Alert and oriented X4, No confusion, + numbness.   Additional ROS reviewed and negative except as indicated in HPI.        PHYSICAL EXAM:  VITALS: T(C): 36.8 (10-15-22 @ 11:31)  T(F): 98.3 (10-15-22 @ 11:31), Max: 98.9 (10-14-22 @ 23:57)  HR: 87 (10-15-22 @ 12:00) (69 - 87)  BP: 126/50 (10-15-22 @ 12:00) (109/57 - 129/70)  RR:  (16 - 18)  SpO2:  (97% - 100%)  Wt(kg): --  General: Well-developed female, No acute distress, Speaking full sentences.   Eye:  Extraocular movements are intact, No proptosis or lid lag.   HENT:  Normocephalic.   Neck:  Supple, Non-tender.   Respiratory:  Respirations are non-labored, Symmetric chest wall expansion, Breath sounds are equal.   Cardiovascular:  Normal rate, Regular rhythm, No edema.  Gastrointestinal:  Soft, Non-tender, Non-distended.   Musculoskeletal:  Normal range of motion, No gross joint swelling.   Feet:  No ulcers on right. Right foot cool to touch. Left foot bandaged.  Integumentary:  Warm, dry.  Mental Status Exam:  Orientedx4, Speech clear and coherent.   Neurologic:  Alert, Orientedx4, Normal motor function, No focal deficits, Cranial Nerves II-XII are grossly intact bilaterally.   Psychiatric:  Cooperative, Appropriate mood & affect.    POCT Blood Glucose.: 265 mg/dL (10-15-22 @ 12:10)  POCT Blood Glucose.: 249 mg/dL (10-15-22 @ 09:53)  POCT Blood Glucose.: 294 mg/dL (10-14-22 @ 23:53)                            8.3    12.80 )-----------( 189      ( 15 Oct 2022 06:31 )             27.2       10-15    137  |  101  |  66<H>  ----------------------------<  240<H>  4.2   |  22  |  3.57<H>    eGFR: 12<L>    Ca    9.1      10-15  Mg     1.6     10-15  Phos  3.0     10-15    TPro  6.7  /  Alb  3.1<L>  /  TBili  0.2  /  DBili  x   /  AST  16  /  ALT  12  /  AlkPhos  90  10-15      Thyroid Function Tests:              Radiology:                HPI:  82 yo Female, ambulatory with a walker, w/ DM Type 2 c/b diabetic neuropathy and multiple toes amputations, HTN, HLD, PVD s/p LLE angiogram 2018 , CKD stage 3~4 (Baseline crt ~2), multiple UTI 2/2 obstructive uropathy s/p ureteral stent placement n 12/2020, morbid Obesity, presenting with chronic non-healing ulcer at the left heel for IV antibiotics treatment. Recently, pt was prescribed augmentin for local soft tissue infection but then developed skin rash for two day exposure. Then she was switched to levaquin by her podiatrist who saw her today and recommended IV antibiotics for inadequate response. Pt denied pain in her left foot (because of diabetic neuropathy) but endorsed more pain and less pruritis on her skin rash located at the back and inner side of legs. Otherwise, pt denied fever, chill, N/V, headache, chest pain, constipation, diarrhea, no GI bleeding. Occasionally produces blood tinged urine during UTI episodes but no grossly hematuria. Pt unclear if ureteral stent has been removed or not but denied urinary trouble.     Pt had chronic non-healing wounds in both feet for more than 20 years likely 2/2 DM2, HTN, HLD, and PVD. She regularly follows up with her own podiatrist Dr. Bailey Hand. Per her nephew, her left heel wound was closed two weeks ago but then open again.     At the ED, Pt VSS, afebrile, mild leukocytosis, elevated Cr 3.69, elevated ESR/CRP, s/p left foot xray.  Podiatry saw pt and started work-up. S/P IV Vanco and Cefepime. (14 Oct 2022 22:03)    Endocrine consulted for DM2.    DM diagnosis: >30 years ago  Last A1c: No recent value  Endocrinologist: PCP only  Home DM meds: States she uses Novolin-R (rapid acting) 34 units qam and 22 units qhs (not typically a bedtime insulin as it is rapid acting and not long acting. Need to clarify insulin type. Prior records indicate she uses Novolin N, which would be more appropriate based on her described usage). Also uses novolog sliding scale before lunch and dinner.  Microvascular complications: + neuropathy, retinopathy with poor vision, nephropathy with CKD  Macrovascular complications: + PAD with multiple amputations  SMBG: Fasting 160-200s, before lunch and dinner in the 200s  Symptoms: No sxs of high BG  Diet at home: Has Healthy Meals delivery, minimizes snacking  Appetite in the hospital: Good appetite  ROS positive for thigh rash after augmentin use and foot wound.    PAST MEDICAL & SURGICAL HISTORY:  Diabetes  type 2      PVD (peripheral vascular disease)      Neuropathy      Diabetic ulcer of heel      Hypertension      Glaucoma      PVD (peripheral vascular disease)      Obesity      Stage 4 chronic kidney disease      Toe amputation status      History of cataract surgery          FAMILY HISTORY:  Family history of CHF (congestive heart failure)    Family hx of lung cancer    No DM.      Social History: No etoh or tobacco use.    Outpatient Medications: Home Medications:  acetaminophen 325 mg oral tablet: 2 tab(s) orally every 6 hours, As Needed - 3) (15 Oct 2022 01:15)  amLODIPine 10 mg oral tablet: 1 tab(s) orally once a day (15 Oct 2022 02:33)  atorvastatin 10 mg oral tablet: 1 tab(s) orally once a day (at bedtime) (15 Oct 2022 02:33)  bisacodyl 10 mg rectal suppository: 1 suppository(ies) rectal once a day, As needed, Constipation (15 Oct 2022 01:15)  collagenase 250 units/g topical ointment: 1 application topically once a day (15 Oct 2022 02:33)  fluconazole 200 mg oral tablet: 1 tab(s) orally once a day from 1/1/21 through 1/8/21 (15 Oct 2022 02:33)  FUROSEMIDE 40MG TAB:  (15 Oct 2022 02:33)  guaiFENesin 100 mg/5 mL oral liquid: 10 milliliter(s) orally every 6 hours, As needed, Cough/chest congestion (15 Oct 2022 01:15)  heparin: 5000 unit(s) subcutaneous every 8 hours for DVT prophylaxis (15 Oct 2022 01:15)  HYDROmorphone 2 mg oral tablet: 1 tab(s) orally every 3 hours, As needed, Moderate Pain (4 - 6) refractory to tylenol (15 Oct 2022 01:15)  ipratropium-albuterol 0.5 mg-2.5 mg/3 mLinhalation solution: 3 milliliter(s) inhaled every 6 hours, As needed, Shortness of Breath and/or Wheezing (15 Oct 2022 02:33)  labetalol 100 mg oral tablet: 1 tab(s) orally 2 times a day (15 Oct 2022 02:33)  LABETALOL 100MG TAB:  (15 Oct 2022 02:33)  menthol-benzocaine 3.6 mg-15 mg mucous membrane lozenge: 1 lozenge orally every 4 hours, As Needed for sore throat (15 Oct 2022 01:15)  NovoLIN N 100 units/mL subcutaneous suspension: 30 unit(s) subcutaneous once a day (at bedtime) (15 Oct 2022 02:33)  NovoLIN N 100 units/mL subcutaneous suspension: 40 unit(s) subcutaneous once a day in AM (15 Oct 2022 02:33)  polyethylene glycol 3350 oral powder for reconstitution: 17 gram(s) orally once a day, As needed, Constipation (15 Oct 2022 02:33)  senna oral tablet: 1 tab(s) orally once a day (15 Oct 2022 02:33)  simethicone 80 mg oral tablet, chewable: 1 tab(s) orally every 8 hours, As needed, Gas (15 Oct 2022 01:15)  sodium bicarbonate 650 mg oral tablet: 1 tab(s) orally 2 times a day (15 Oct 2022 02:33)  tamsulosin 0.4 mg oral capsule: 1 cap(s) orally once a day (at bedtime) (15 Oct 2022 02:33)  timolol maleate 0.5% ophthalmic solution: 1 drop(s) to each affected eye once a day (15 Oct 2022 02:33)      MEDICATIONS  (STANDING):  atorvastatin 10 milliGRAM(s) Oral at bedtime  calamine/zinc oxide Lotion 1 Application(s) Topical two times a day  cefepime   IVPB 1000 milliGRAM(s) IV Intermittent every 12 hours  clobetasol 0.05% Ointment 1 Application(s) Topical two times a day  collagenase Ointment 1 Application(s) Topical two times a day  dextrose 5%. 1000 milliLiter(s) (50 mL/Hr) IV Continuous <Continuous>  dextrose 5%. 1000 milliLiter(s) (100 mL/Hr) IV Continuous <Continuous>  dextrose 50% Injectable 25 Gram(s) IV Push once  dextrose 50% Injectable 12.5 Gram(s) IV Push once  dextrose 50% Injectable 25 Gram(s) IV Push once  famotidine    Tablet 20 milliGRAM(s) Oral daily  glucagon  Injectable 1 milliGRAM(s) IntraMuscular once  hydrocortisone 2.5% Ointment 1 Application(s) Topical two times a day  insulin lispro (ADMELOG) corrective regimen sliding scale   SubCutaneous every 6 hours  insulin lispro (ADMELOG) corrective regimen sliding scale   SubCutaneous three times a day before meals  insulin lispro (ADMELOG) corrective regimen sliding scale   SubCutaneous at bedtime  insulin NPH human recombinant 28 Unit(s) SubCutaneous before breakfast  insulin NPH human recombinant 22 Unit(s) SubCutaneous at bedtime  labetalol 100 milliGRAM(s) Oral two times a day  sodium bicarbonate 650 milliGRAM(s) Oral two times a day  tamsulosin 0.4 milliGRAM(s) Oral at bedtime  timolol 0.5% Solution 1 Drop(s) Both EYES daily  vancomycin  IVPB 1000 milliGRAM(s) IV Intermittent every 24 hours    MEDICATIONS  (PRN):  acetaminophen     Tablet .. 650 milliGRAM(s) Oral every 6 hours PRN Temp greater or equal to 38C (100.4F), Mild Pain (1 - 3)  dextrose Oral Gel 15 Gram(s) Oral once PRN Blood Glucose LESS THAN 70 milliGRAM(s)/deciliter  loratadine 10 milliGRAM(s) Oral daily PRN ITCH  melatonin 3 milliGRAM(s) Oral at bedtime PRN Insomnia  ondansetron Injectable 4 milliGRAM(s) IV Push every 8 hours PRN Nausea and/or Vomiting      Allergies    Augmentin (Rash)    Intolerances      Review of Systems:  Constitutional:  No fever, No chills.  Eye:  No eye pain, No blurring.   Ear/Nose/Mouth/Throat:  No nasal congestion, No sore throat.   Respiratory:  No shortness of breath, No cough.   Cardiovascular:  No chest pain, No palpitations.   Gastrointestinal:  No nausea, No vomiting, No diarrhea.   Genitourinary:  No dysuria, No hematuria.   Endocrine:  No excessive thirst, No polyuria.   Musculoskeletal:  No back pain, No decreased range of motion.   Integumentary: + foot wound and thigh rash  Neurologic:  Alert and oriented X4, No confusion, + numbness.   Additional ROS reviewed and negative except as indicated in HPI.        PHYSICAL EXAM:  VITALS: T(C): 36.8 (10-15-22 @ 11:31)  T(F): 98.3 (10-15-22 @ 11:31), Max: 98.9 (10-14-22 @ 23:57)  HR: 87 (10-15-22 @ 12:00) (69 - 87)  BP: 126/50 (10-15-22 @ 12:00) (109/57 - 129/70)  RR:  (16 - 18)  SpO2:  (97% - 100%)  Wt(kg): --  General: Well-developed female, No acute distress, Speaking full sentences.   Eye:  Extraocular movements are intact, No proptosis or lid lag.   HENT:  Normocephalic.   Neck:  Supple, Non-tender.   Respiratory:  Respirations are non-labored, Symmetric chest wall expansion, Breath sounds are equal.   Cardiovascular:  Normal rate, Regular rhythm, No edema.  Gastrointestinal:  Soft, Non-tender, Non-distended.   Musculoskeletal:  Normal range of motion, No gross joint swelling.   Feet:  No ulcers on right. Right foot cool to touch. Left foot bandaged.  Integumentary:  Warm, dry.  Mental Status Exam:  Orientedx4, Speech clear and coherent.   Neurologic:  Alert, Orientedx4, Normal motor function, No focal deficits, Cranial Nerves II-XII are grossly intact bilaterally.   Psychiatric:  Cooperative, Appropriate mood & affect.    POCT Blood Glucose.: 265 mg/dL (10-15-22 @ 12:10)  POCT Blood Glucose.: 249 mg/dL (10-15-22 @ 09:53)  POCT Blood Glucose.: 294 mg/dL (10-14-22 @ 23:53)                            8.3    12.80 )-----------( 189      ( 15 Oct 2022 06:31 )             27.2       10-15    137  |  101  |  66<H>  ----------------------------<  240<H>  4.2   |  22  |  3.57<H>    eGFR: 12<L>    Ca    9.1      10-15  Mg     1.6     10-15  Phos  3.0     10-15    TPro  6.7  /  Alb  3.1<L>  /  TBili  0.2  /  DBili  x   /  AST  16  /  ALT  12  /  AlkPhos  90  10-15      Thyroid Function Tests:              Radiology:

## 2022-10-15 NOTE — PROGRESS NOTE ADULT - ATTENDING COMMENTS
73 y/o F with history notable for DM2 c/b neuropathy, CKD, PVD s/p angiogram, multiple amputations, obstructive nephropathy c/b UTIS, s/p ureteral stent presenting with non heeling ulcer of L heel c/f OM.     #OM  #ARABELLA on CKD  #DM2  #PVD  -Given history, c/f OM. currently on Vanc/cefepime. Pending MRI Foot. MUSHTAQ. Podiatry following.   -if MRSA swab neg, will d/c vanc, lvl tomorrow  -Given h/o obstructions, c/f obstructive etiology of ARABELLA. also possible volume depletion vs progression. s/p IVF, pending urine studies, Renal u/s  -renally dose meds, monitor cr 75 y/o F with history notable for DM2 c/b neuropathy, CKD, PVD s/p angiogram, multiple amputations, obstructive nephropathy c/b UTIS, s/p ureteral stent presenting with non heeling ulcer of L heel c/f OM.     #OM  #ARABELLA on CKD  #DM2  #PVD  -Given history, c/f OM. currently on Vanc/cefepime. Pending MRI Foot. MUSHTAQ. Podiatry following.   -if MRSA swab neg, will d/c vanc, lvl tomorrow  -Given h/o obstructions, c/f obstructive etiology of ARABELLA. also possible volume depletion vs progression. s/p IVF, pending urine studies, Renal u/s  -renally dose meds, monitor cr  -endocrine consulted recs appreciated

## 2022-10-15 NOTE — PROGRESS NOTE ADULT - PROBLEM SELECTOR PLAN 5
Active and not well controlled ( the most recent A1C 8.6 in 2020), c/b diabetic nephropathy and neuropathy    - home insulin regimen: 34 units NPH qam, 22 units NPH qhs. Not on oral DM meds.  On sliding scale.  - Re-check A1C  - Start home dose of NPH and sliding scale   - Goal < 180 Active and not well controlled ( the most recent A1C 8.6 in 2020), c/b diabetic nephropathy and neuropathy  - home insulin regimen: 34 units NPH qam, 22 units NPH qhs. Not on oral DM meds.  On sliding scale.  - Re-check A1C  - Start home dose of NPH and sliding scale   - Goal < 180  - endocrine consult

## 2022-10-15 NOTE — PROGRESS NOTE ADULT - PROBLEM SELECTOR PLAN 8
Clinically stable with the most recent US studies in 2020 showed MUSHTAQ left < 0.9, s/p revascularization x2     - Ordered MUSHTAQ/PVRs, if abnormal >> vascular consult Clinically stable with the most recent US studies in 2020 showed MUSHTAQ left < 0.9, s/p revascularization x2   - Ordered MUSHTAQ/PVRs, if abnormal >> vascular consult

## 2022-10-15 NOTE — PROGRESS NOTE ADULT - PROBLEM SELECTOR PLAN 3
Active, likely exanthematous drug eruption from recent exposure to Augmentin vs DRESS vs stasis dermatitis    - afebrile, no oral mucous involvement, no LFT elevation, no eosinophilia  - Avoid penicillin-like medications  - symptom management Active, likely exanthematous drug eruption from recent exposure to Augmentin vs DRESS vs stasis dermatitis  - afebrile, no oral mucous involvement, no LFT elevation, no eosinophilia  - Avoid penicillin-like medications  - symptom management

## 2022-10-15 NOTE — PHYSICAL THERAPY INITIAL EVALUATION ADULT - GAIT DEVIATIONS NOTED, PT EVAL
decreased gianna/increased time in double stance/decreased velocity of limb motion/decreased step length/decreased stride length/increased stride width/decreased swing-to-stance ratio/decreased weight-shifting ability

## 2022-10-15 NOTE — PROGRESS NOTE ADULT - PROBLEM SELECTOR PLAN 4
Present, unchanged Chronic and stable, 2/2 chronic disease    - Ordered iron panel, B12, folate to rule in/out other causes  - Can consider epoetin injection  - outpatient follow up Chronic and stable, 2/2 chronic disease  - Ordered iron panel, B12, folate to rule in/out other causes  - Can consider epoetin injection  - outpatient follow up

## 2022-10-15 NOTE — PHYSICAL THERAPY INITIAL EVALUATION ADULT - IMPAIRMENTS CONTRIBUTING TO GAIT DEVIATIONS, PT EVAL
impaired coordination/impaired motor control/narrow base of support/pain/impaired postural control/decreased ROM/decreased sensation/impaired sensory feedback/decreased strength

## 2022-10-15 NOTE — PHYSICAL THERAPY INITIAL EVALUATION ADULT - IMPAIRMENTS FOUND, PT EVAL
aerobic capacity/endurance/gait, locomotion, and balance/gross motor/muscle strength/posture/ROM/sensory integrity

## 2022-10-15 NOTE — PROGRESS NOTE ADULT - PROBLEM SELECTOR PLAN 1
Active, likely 2/2 chronic and recurrent diabetic/pressure/arterial foot ulcer superimposed with soft tissue infection and possible OM    - Left foot xray: no OM, no gas  - ESR/CRP elevated   - Left foot wound culture taken   - S/P IV Vanco and Cefepime   - Wound culture and MRSA/MSSA PCR sent  - Continue vanco and cefpime now and later de-escalate pending culture   - Ordered MUSHTAQ/PVRs, if abnormal >> vascular consult  - Ordered L foot MRI ordered to r/o OM  - Ordered santyl for L foot wound  - Podiatry recs appreciated Active, likely 2/2 chronic and recurrent diabetic/pressure/arterial foot ulcer superimposed with soft tissue infection. Left foot xray without concern for OM. Received x1 vanc and cefepime.   - CRP elevated  - Wound culture and MRSA/MSSA PCR sent  - podiatry consult       - c/w vanc and cefepime now f/u culture      - Ordered MUSHTAQ/PVRs, if abnormal >> vascular consult      - Ordered L foot MRI ordered to r/o OM      - santyl for L foot wound      - POD plan local wound care pending L foot MRI/MUSHTAQ/PVRs Active, likely 2/2 chronic and recurrent diabetic/pressure/arterial foot ulcer superimposed with soft tissue infection. Left foot xray without concern for OM. Received x1 vanc and cefepime.   - CRP elevated  - Wound culture and MRSA/MSSA PCR sent  - podiatry consult       - c/w vanc and cefepime now f/u culture, will obtain vanc level tomorrow       - Ordered MUSHTAQ/PVRs, if abnormal >> vascular consult      - Ordered L foot MRI ordered to r/o OM      - santyl for L foot wound      - POD plan local wound care pending L foot MRI/MUSHTAQ/PVRs

## 2022-10-15 NOTE — PHYSICAL THERAPY INITIAL EVALUATION ADULT - ADDITIONAL COMMENTS
Pt. owns a straight cane, rolling walker, shower chair, and transport chair. Pt. reports she has a stair lift in her home. Pt lives in  alone, 1 step to enter. Pt owns a straight cane, rolling walker, shower chair, commode, wheelchair, and stair lift in her home. Pt recently got 24/7 HHA. PTA pt was ambulating with RW and req some assistance with ADLs.

## 2022-10-16 LAB
ANION GAP SERPL CALC-SCNC: 15 MMOL/L — SIGNIFICANT CHANGE UP (ref 5–17)
BUN SERPL-MCNC: 63 MG/DL — HIGH (ref 7–23)
CALCIUM SERPL-MCNC: 9 MG/DL — SIGNIFICANT CHANGE UP (ref 8.4–10.5)
CHLORIDE SERPL-SCNC: 103 MMOL/L — SIGNIFICANT CHANGE UP (ref 96–108)
CO2 SERPL-SCNC: 21 MMOL/L — LOW (ref 22–31)
CREAT SERPL-MCNC: 3.59 MG/DL — HIGH (ref 0.5–1.3)
EGFR: 12 ML/MIN/1.73M2 — LOW
GLUCOSE BLDC GLUCOMTR-MCNC: 106 MG/DL — HIGH (ref 70–99)
GLUCOSE BLDC GLUCOMTR-MCNC: 183 MG/DL — HIGH (ref 70–99)
GLUCOSE BLDC GLUCOMTR-MCNC: 226 MG/DL — HIGH (ref 70–99)
GLUCOSE BLDC GLUCOMTR-MCNC: 226 MG/DL — HIGH (ref 70–99)
GLUCOSE SERPL-MCNC: 220 MG/DL — HIGH (ref 70–99)
HCT VFR BLD CALC: 28.9 % — LOW (ref 34.5–45)
HGB BLD-MCNC: 8.7 G/DL — LOW (ref 11.5–15.5)
MAGNESIUM SERPL-MCNC: 1.6 MG/DL — SIGNIFICANT CHANGE UP (ref 1.6–2.6)
MCHC RBC-ENTMCNC: 26.4 PG — LOW (ref 27–34)
MCHC RBC-ENTMCNC: 30.1 GM/DL — LOW (ref 32–36)
MCV RBC AUTO: 87.6 FL — SIGNIFICANT CHANGE UP (ref 80–100)
NRBC # BLD: 0 /100 WBCS — SIGNIFICANT CHANGE UP (ref 0–0)
PHOSPHATE SERPL-MCNC: 2.9 MG/DL — SIGNIFICANT CHANGE UP (ref 2.5–4.5)
PLATELET # BLD AUTO: 226 K/UL — SIGNIFICANT CHANGE UP (ref 150–400)
POTASSIUM SERPL-MCNC: 4.1 MMOL/L — SIGNIFICANT CHANGE UP (ref 3.5–5.3)
POTASSIUM SERPL-SCNC: 4.1 MMOL/L — SIGNIFICANT CHANGE UP (ref 3.5–5.3)
RBC # BLD: 3.3 M/UL — LOW (ref 3.8–5.2)
RBC # FLD: 15.6 % — HIGH (ref 10.3–14.5)
SARS-COV-2 RNA SPEC QL NAA+PROBE: SIGNIFICANT CHANGE UP
SODIUM SERPL-SCNC: 139 MMOL/L — SIGNIFICANT CHANGE UP (ref 135–145)
WBC # BLD: 9.09 K/UL — SIGNIFICANT CHANGE UP (ref 3.8–10.5)
WBC # FLD AUTO: 9.09 K/UL — SIGNIFICANT CHANGE UP (ref 3.8–10.5)

## 2022-10-16 PROCEDURE — 99233 SBSQ HOSP IP/OBS HIGH 50: CPT | Mod: GC

## 2022-10-16 PROCEDURE — 99232 SBSQ HOSP IP/OBS MODERATE 35: CPT

## 2022-10-16 PROCEDURE — 99223 1ST HOSP IP/OBS HIGH 75: CPT

## 2022-10-16 RX ORDER — INSULIN LISPRO 100/ML
8 VIAL (ML) SUBCUTANEOUS
Refills: 0 | Status: DISCONTINUED | OUTPATIENT
Start: 2022-10-16 | End: 2022-10-17

## 2022-10-16 RX ORDER — CEFEPIME 1 G/1
1000 INJECTION, POWDER, FOR SOLUTION INTRAMUSCULAR; INTRAVENOUS EVERY 24 HOURS
Refills: 0 | Status: DISCONTINUED | OUTPATIENT
Start: 2022-10-16 | End: 2022-10-18

## 2022-10-16 RX ORDER — INSULIN GLARGINE 100 [IU]/ML
26 INJECTION, SOLUTION SUBCUTANEOUS AT BEDTIME
Refills: 0 | Status: DISCONTINUED | OUTPATIENT
Start: 2022-10-16 | End: 2022-10-18

## 2022-10-16 RX ORDER — MUPIROCIN 20 MG/G
1 OINTMENT TOPICAL
Refills: 0 | Status: DISCONTINUED | OUTPATIENT
Start: 2022-10-16 | End: 2022-10-18

## 2022-10-16 RX ORDER — VANCOMYCIN HCL 1 G
1000 VIAL (EA) INTRAVENOUS EVERY 24 HOURS
Refills: 0 | Status: DISCONTINUED | OUTPATIENT
Start: 2022-10-16 | End: 2022-10-16

## 2022-10-16 RX ORDER — POLYETHYLENE GLYCOL 3350 17 G/17G
17 POWDER, FOR SOLUTION ORAL
Refills: 0 | Status: DISCONTINUED | OUTPATIENT
Start: 2022-10-16 | End: 2022-10-18

## 2022-10-16 RX ORDER — MAGNESIUM HYDROXIDE 400 MG/1
30 TABLET, CHEWABLE ORAL DAILY
Refills: 0 | Status: DISCONTINUED | OUTPATIENT
Start: 2022-10-16 | End: 2022-10-18

## 2022-10-16 RX ADMIN — CEFEPIME 100 MILLIGRAM(S): 1 INJECTION, POWDER, FOR SOLUTION INTRAMUSCULAR; INTRAVENOUS at 05:16

## 2022-10-16 RX ADMIN — Medication 6 UNIT(S): at 08:25

## 2022-10-16 RX ADMIN — Medication 1 APPLICATION(S): at 18:56

## 2022-10-16 RX ADMIN — HEPARIN SODIUM 5000 UNIT(S): 5000 INJECTION INTRAVENOUS; SUBCUTANEOUS at 05:09

## 2022-10-16 RX ADMIN — HEPARIN SODIUM 5000 UNIT(S): 5000 INJECTION INTRAVENOUS; SUBCUTANEOUS at 18:37

## 2022-10-16 RX ADMIN — Medication 2: at 08:26

## 2022-10-16 RX ADMIN — Medication 650 MILLIGRAM(S): at 05:09

## 2022-10-16 RX ADMIN — Medication 1 APPLICATION(S): at 18:41

## 2022-10-16 RX ADMIN — CALAMINE AND ZINC OXIDE AND PHENOL 1 APPLICATION(S): 160; 10 LOTION TOPICAL at 05:14

## 2022-10-16 RX ADMIN — ATORVASTATIN CALCIUM 10 MILLIGRAM(S): 80 TABLET, FILM COATED ORAL at 21:25

## 2022-10-16 RX ADMIN — Medication 1: at 16:46

## 2022-10-16 RX ADMIN — Medication 10 MILLIGRAM(S): at 20:49

## 2022-10-16 RX ADMIN — Medication 100 MILLIGRAM(S): at 18:40

## 2022-10-16 RX ADMIN — TAMSULOSIN HYDROCHLORIDE 0.4 MILLIGRAM(S): 0.4 CAPSULE ORAL at 21:25

## 2022-10-16 RX ADMIN — Medication 1 APPLICATION(S): at 05:10

## 2022-10-16 RX ADMIN — CEFEPIME 100 MILLIGRAM(S): 1 INJECTION, POWDER, FOR SOLUTION INTRAMUSCULAR; INTRAVENOUS at 14:15

## 2022-10-16 RX ADMIN — Medication 8 UNIT(S): at 12:19

## 2022-10-16 RX ADMIN — Medication 2: at 12:19

## 2022-10-16 RX ADMIN — Medication 8 UNIT(S): at 16:46

## 2022-10-16 RX ADMIN — Medication 100 MILLIGRAM(S): at 05:09

## 2022-10-16 RX ADMIN — MUPIROCIN 1 APPLICATION(S): 20 OINTMENT TOPICAL at 18:39

## 2022-10-16 RX ADMIN — MAGNESIUM HYDROXIDE 30 MILLILITER(S): 400 TABLET, CHEWABLE ORAL at 16:09

## 2022-10-16 RX ADMIN — Medication 1 APPLICATION(S): at 05:16

## 2022-10-16 RX ADMIN — Medication 1 DROP(S): at 12:24

## 2022-10-16 RX ADMIN — POLYETHYLENE GLYCOL 3350 17 GRAM(S): 17 POWDER, FOR SOLUTION ORAL at 15:21

## 2022-10-16 RX ADMIN — INSULIN GLARGINE 26 UNIT(S): 100 INJECTION, SOLUTION SUBCUTANEOUS at 21:25

## 2022-10-16 RX ADMIN — CALAMINE AND ZINC OXIDE AND PHENOL 1 APPLICATION(S): 160; 10 LOTION TOPICAL at 18:40

## 2022-10-16 RX ADMIN — Medication 650 MILLIGRAM(S): at 18:38

## 2022-10-16 RX ADMIN — FAMOTIDINE 20 MILLIGRAM(S): 10 INJECTION INTRAVENOUS at 12:24

## 2022-10-16 RX ADMIN — Medication 250 MILLIGRAM(S): at 00:52

## 2022-10-16 NOTE — PROGRESS NOTE ADULT - PROBLEM SELECTOR PLAN 9
F: 500cc bolus  E: as needed  N: DASH/TLC renal diet    DVT:     Dispo: pending  Code status: DNI or DNI/DNR or DNR (has a form signed)

## 2022-10-16 NOTE — PROGRESS NOTE ADULT - SUBJECTIVE AND OBJECTIVE BOX
Internal Medicine   Sarahmarleni Ricketts | PGY-2    OVERNIGHT EVENTS:      SUBJECTIVE:       MEDICATIONS  (STANDING):  atorvastatin 10 milliGRAM(s) Oral at bedtime  calamine/zinc oxide Lotion 1 Application(s) Topical two times a day  cefepime   IVPB 1000 milliGRAM(s) IV Intermittent every 12 hours  clobetasol 0.05% Ointment 1 Application(s) Topical two times a day  collagenase Ointment 1 Application(s) Topical two times a day  dextrose 5%. 1000 milliLiter(s) (50 mL/Hr) IV Continuous <Continuous>  dextrose 5%. 1000 milliLiter(s) (100 mL/Hr) IV Continuous <Continuous>  dextrose 50% Injectable 25 Gram(s) IV Push once  dextrose 50% Injectable 12.5 Gram(s) IV Push once  dextrose 50% Injectable 25 Gram(s) IV Push once  famotidine    Tablet 20 milliGRAM(s) Oral daily  glucagon  Injectable 1 milliGRAM(s) IntraMuscular once  heparin   Injectable 5000 Unit(s) SubCutaneous every 12 hours  hydrocortisone 2.5% Ointment 1 Application(s) Topical two times a day  insulin glargine Injectable (LANTUS) 24 Unit(s) SubCutaneous at bedtime  insulin lispro (ADMELOG) corrective regimen sliding scale   SubCutaneous three times a day before meals  insulin lispro (ADMELOG) corrective regimen sliding scale   SubCutaneous at bedtime  insulin lispro Injectable (ADMELOG) 6 Unit(s) SubCutaneous three times a day before meals  labetalol 100 milliGRAM(s) Oral two times a day  sodium bicarbonate 650 milliGRAM(s) Oral two times a day  tamsulosin 0.4 milliGRAM(s) Oral at bedtime  timolol 0.5% Solution 1 Drop(s) Both EYES daily  vancomycin  IVPB 1000 milliGRAM(s) IV Intermittent every 24 hours    MEDICATIONS  (PRN):  acetaminophen     Tablet .. 650 milliGRAM(s) Oral every 6 hours PRN Temp greater or equal to 38C (100.4F), Mild Pain (1 - 3)  dextrose Oral Gel 15 Gram(s) Oral once PRN Blood Glucose LESS THAN 70 milliGRAM(s)/deciliter  loratadine 10 milliGRAM(s) Oral daily PRN ITCH  melatonin 3 milliGRAM(s) Oral at bedtime PRN Insomnia  ondansetron Injectable 4 milliGRAM(s) IV Push every 8 hours PRN Nausea and/or Vomiting        T(F): 98.3 (10-15-22 @ 23:18), Max: 98.3 (10-15-22 @ 11:31)  HR: 74 (10-15-22 @ 23:18) (74 - 87)  BP: 127/41 (10-15-22 @ 23:18) (126/50 - 131/50)  BP(mean): --  RR: 18 (10-15-22 @ 23:18) (18 - 18)  SpO2: 95% (10-15-22 @ 23:18) (95% - 99%)    PHYSICAL EXAM:     GENERAL: NAD, lying in bed comfortably  HEAD:  Atraumatic, Normocephalic  EYES: EOMI, PERRLA, conjunctiva and sclera clear, no nystagmus noted  ENT: Moist mucous membranes,   NECK: Supple, No JVD, trachea midline  CHEST/LUNG: Clear to auscultation bilaterally; No rales, rhonchi, wheezing, or rubs. Unlabored respirations  HEART: Regular rate and rhythm; No murmurs, rubs, or gallops, normal S1/S2  ABDOMEN: normal bowel sounds; Soft, nontender, nondistended, no organomegaly   EXTREMITIES:  2+ Peripheral Pulses, brisk capillary refill. No clubbing, cyanosis, or edema  MSK: No gross deformities noted   Neurological:  A&Ox3, no focal deficits   SKIN: No rashes or lesions  PSYCH: Normal mood, affect     TELEMETRY:    LABS:                        8.3    12.80 )-----------( 189      ( 15 Oct 2022 06:31 )             27.2     10-15    137  |  101  |  66<H>  ----------------------------<  240<H>  4.2   |  22  |  3.57<H>    Ca    9.1      15 Oct 2022 06:31  Phos  3.0     10-15  Mg     1.6     10-15    TPro  6.7  /  Alb  3.1<L>  /  TBili  0.2  /  DBili  x   /  AST  16  /  ALT  12  /  AlkPhos  90  10-15        PT/INR - ( 14 Oct 2022 16:34 )   PT: 13.7 sec;   INR: 1.19 ratio         PTT - ( 14 Oct 2022 16:34 )  PTT:24.6 sec    Creatinine Trend: 3.57<--, 3.69<--  I&O's Summary    BNP    RADIOLOGY & ADDITIONAL STUDIES:             Internal Medicine   Sarahmarleni Ricketts | PGY-2    OVERNIGHT EVENTS: No overnight events      SUBJECTIVE: Denies any acute complaints.      MEDICATIONS  (STANDING):  atorvastatin 10 milliGRAM(s) Oral at bedtime  calamine/zinc oxide Lotion 1 Application(s) Topical two times a day  cefepime   IVPB 1000 milliGRAM(s) IV Intermittent every 12 hours  clobetasol 0.05% Ointment 1 Application(s) Topical two times a day  collagenase Ointment 1 Application(s) Topical two times a day  dextrose 5%. 1000 milliLiter(s) (50 mL/Hr) IV Continuous <Continuous>  dextrose 5%. 1000 milliLiter(s) (100 mL/Hr) IV Continuous <Continuous>  dextrose 50% Injectable 25 Gram(s) IV Push once  dextrose 50% Injectable 12.5 Gram(s) IV Push once  dextrose 50% Injectable 25 Gram(s) IV Push once  famotidine    Tablet 20 milliGRAM(s) Oral daily  glucagon  Injectable 1 milliGRAM(s) IntraMuscular once  heparin   Injectable 5000 Unit(s) SubCutaneous every 12 hours  hydrocortisone 2.5% Ointment 1 Application(s) Topical two times a day  insulin glargine Injectable (LANTUS) 24 Unit(s) SubCutaneous at bedtime  insulin lispro (ADMELOG) corrective regimen sliding scale   SubCutaneous three times a day before meals  insulin lispro (ADMELOG) corrective regimen sliding scale   SubCutaneous at bedtime  insulin lispro Injectable (ADMELOG) 6 Unit(s) SubCutaneous three times a day before meals  labetalol 100 milliGRAM(s) Oral two times a day  sodium bicarbonate 650 milliGRAM(s) Oral two times a day  tamsulosin 0.4 milliGRAM(s) Oral at bedtime  timolol 0.5% Solution 1 Drop(s) Both EYES daily  vancomycin  IVPB 1000 milliGRAM(s) IV Intermittent every 24 hours    MEDICATIONS  (PRN):  acetaminophen     Tablet .. 650 milliGRAM(s) Oral every 6 hours PRN Temp greater or equal to 38C (100.4F), Mild Pain (1 - 3)  dextrose Oral Gel 15 Gram(s) Oral once PRN Blood Glucose LESS THAN 70 milliGRAM(s)/deciliter  loratadine 10 milliGRAM(s) Oral daily PRN ITCH  melatonin 3 milliGRAM(s) Oral at bedtime PRN Insomnia  ondansetron Injectable 4 milliGRAM(s) IV Push every 8 hours PRN Nausea and/or Vomiting        T(F): 98.3 (10-15-22 @ 23:18), Max: 98.3 (10-15-22 @ 11:31)  HR: 74 (10-15-22 @ 23:18) (74 - 87)  BP: 127/41 (10-15-22 @ 23:18) (126/50 - 131/50)  BP(mean): --  RR: 18 (10-15-22 @ 23:18) (18 - 18)  SpO2: 95% (10-15-22 @ 23:18) (95% - 99%)    PHYSICAL EXAM:     General: NAD, non-toxic appearing   HEENT: PERRLA, EOMi, no scleral icterus  CV: RRR, normal S1 and S2, no m/r/g  Lungs: normal respiratory effort. CTAB, no wheezes, rales, or rhonchi  Abd: soft, nontender, nondistended  Ext: no edema, 2+ peripheral pulses   Pysch: AAOx3, appropriate affect   Neuro: grossly non-focal, moving all extremities spontaneously   Skin: no rashes or lesions     TELEMETRY:    LABS:                        8.3    12.80 )-----------( 189      ( 15 Oct 2022 06:31 )             27.2     10-15    137  |  101  |  66<H>  ----------------------------<  240<H>  4.2   |  22  |  3.57<H>    Ca    9.1      15 Oct 2022 06:31  Phos  3.0     10-15  Mg     1.6     10-15    TPro  6.7  /  Alb  3.1<L>  /  TBili  0.2  /  DBili  x   /  AST  16  /  ALT  12  /  AlkPhos  90  10-15        PT/INR - ( 14 Oct 2022 16:34 )   PT: 13.7 sec;   INR: 1.19 ratio         PTT - ( 14 Oct 2022 16:34 )  PTT:24.6 sec    Creatinine Trend: 3.57<--, 3.69<--  I&O's Summary    BNP    RADIOLOGY & ADDITIONAL STUDIES:

## 2022-10-16 NOTE — PROGRESS NOTE ADULT - ATTENDING COMMENTS
73 y/o F with history notable for DM2 c/b neuropathy, CKD, PVD s/p angiogram, multiple amputations, obstructive nephropathy c/b UTIS, s/p ureteral stent presenting with non heeling ulcer of L heel.     #L heel ulcer  #ARABELLA on CKD  #DM2  #PVD    -Given history, c/f OM. MRI neg, podiatry/ID following. Prelim wound culture S aureus, e raffinosus. Was on augmentin->FQ prior to admission (transitioned iso rxn). Currently on cefepime. s/p 2 doses vanc. holding until lvl in am. Pending culture finalization/sensitivities. Bcx pending    -renal u/s neg for obstructive process, Cr stable. Ctm, holding lasix  -renally dose meds, monitor cr  -endocrine consulted recs appreciated, insulin adjusted to 26 qhs, 8tid

## 2022-10-16 NOTE — PROGRESS NOTE ADULT - PROBLEM SELECTOR PLAN 2
Active with Cr 3.7 (baseline ~2), BUN/Cr <20,  likely 2/2 intra-renal and or postrenal etiologies  - pending urine analysis, urine urea, urine creatinine   - pending US kidney and bladder  - hold lasix iso ARABELLA

## 2022-10-16 NOTE — CONSULT NOTE ADULT - SUBJECTIVE AND OBJECTIVE BOX
"HPI:  81 yo Female, ambulatory with a walker, w/ DM Type 2 c/b diabetic neuropathy and multiple toes amputations, HTN, HLD, PVD s/p LLE angiogram 2018 , CKD stage 3~4 (Baseline crt ~2), multiple UTI 2/2 obstructive uropathy s/p ureteral stent placement n 12/2020, morbid Obesity, presenting with chronic non-healing ulcer at the left heel for IV antibiotics treatment. Recently, pt was prescribed augmentin for local soft tissue infection but then developed skin rash for two day exposure. Then she was switched to levaquin by her podiatrist who saw her today and recommended IV antibiotics for inadequate response. Pt denied pain in her left foot (because of diabetic neuropathy) but endorsed more pain and less pruritis on her skin rash located at the back and inner side of legs. Otherwise, pt denied fever, chill, N/V, headache, chest pain, constipation, diarrhea, no GI bleeding. Occasionally produces blood tinged urine during UTI episodes but no grossly hematuria. Pt unclear if ureteral stent has been removed or not but denied urinary trouble.     Pt had chronic non-healing wounds in both feet for more than 20 years likely 2/2 DM2, HTN, HLD, and PVD. She regularly follows up with her own podiatrist Dr. Bailey Hand. Per her nephew, her left heel wound was closed two weeks ago but then open again.     At the ED, Pt VSS, afebrile, mild leukocytosis, elevated Cr 3.69, elevated ESR/CRP, s/p left foot xray.  Podiatry saw pt and started work-up. S/P IV Vanco and Cefepime. (14 Oct 2022 22:03)"    Above reviewed. 81 yo F with DM2, multiple toe amputation, PVD, multiple UTIs with L heel wound  Patient given augmentin as outpatient but had rash, N/V  No fevers, no chills  Presents to hospital because of poor response to PO abx  No chest pain, no cough  No abd pain, no diarrhea, no dysuria, no pyuria  ID called for further eval    PAST MEDICAL & SURGICAL HISTORY:  Diabetes  type 2      PVD (peripheral vascular disease)      Neuropathy      Diabetic ulcer of heel      Hypertension      Glaucoma      PVD (peripheral vascular disease)      Obesity      Stage 4 chronic kidney disease      Toe amputation status      History of cataract surgery      Allergies    Augmentin (Rash)    Intolerances      ANTIMICROBIALS:  cefepime   IVPB 1000 every 12 hours  vancomycin  IVPB 1000 every 24 hours    OTHER MEDS:  acetaminophen     Tablet .. 650 milliGRAM(s) Oral every 6 hours PRN  atorvastatin 10 milliGRAM(s) Oral at bedtime  calamine/zinc oxide Lotion 1 Application(s) Topical two times a day  clobetasol 0.05% Ointment 1 Application(s) Topical two times a day  collagenase Ointment 1 Application(s) Topical two times a day  dextrose 5%. 1000 milliLiter(s) IV Continuous <Continuous>  dextrose 5%. 1000 milliLiter(s) IV Continuous <Continuous>  dextrose 50% Injectable 25 Gram(s) IV Push once  dextrose 50% Injectable 12.5 Gram(s) IV Push once  dextrose 50% Injectable 25 Gram(s) IV Push once  dextrose Oral Gel 15 Gram(s) Oral once PRN  famotidine    Tablet 20 milliGRAM(s) Oral daily  glucagon  Injectable 1 milliGRAM(s) IntraMuscular once  heparin   Injectable 5000 Unit(s) SubCutaneous every 12 hours  hydrocortisone 2.5% Ointment 1 Application(s) Topical two times a day  insulin glargine Injectable (LANTUS) 26 Unit(s) SubCutaneous at bedtime  insulin lispro (ADMELOG) corrective regimen sliding scale   SubCutaneous three times a day before meals  insulin lispro (ADMELOG) corrective regimen sliding scale   SubCutaneous at bedtime  insulin lispro Injectable (ADMELOG) 8 Unit(s) SubCutaneous three times a day before meals  labetalol 100 milliGRAM(s) Oral two times a day  loratadine 10 milliGRAM(s) Oral daily PRN  melatonin 3 milliGRAM(s) Oral at bedtime PRN  mupirocin 2% Nasal 1 Application(s) Both Nostrils two times a day  ondansetron Injectable 4 milliGRAM(s) IV Push every 8 hours PRN  polyethylene glycol 3350 17 Gram(s) Oral two times a day  sodium bicarbonate 650 milliGRAM(s) Oral two times a day  tamsulosin 0.4 milliGRAM(s) Oral at bedtime  timolol 0.5% Solution 1 Drop(s) Both EYES daily    SOCIAL HISTORY: No tobacco, no alcohol, no illicit drugs    FAMILY HISTORY:  Family history of CHF (congestive heart failure)    Family hx of lung cancer    Drug Dosing Weight  Height (cm): 162.6 (14 Oct 2022 14:21)  Weight (kg): 79.4 (14 Oct 2022 14:21)  BMI (kg/m2): 30 (14 Oct 2022 14:21)  BSA (m2): 1.85 (14 Oct 2022 14:21)    PE:    Vital Signs Last 24 Hrs  T(C): 37.1 (16 Oct 2022 09:07), Max: 37.1 (16 Oct 2022 09:07)  T(F): 98.7 (16 Oct 2022 09:07), Max: 98.7 (16 Oct 2022 09:07)  HR: 86 (16 Oct 2022 09:07) (74 - 86)  BP: 115/51 (16 Oct 2022 09:07) (115/51 - 131/50)  RR: 18 (16 Oct 2022 09:07) (18 - 18)  SpO2: 100% (16 Oct 2022 09:07) (95% - 100%)    Gen: AOx3, NAD, non-toxic, pleasant  CV: S1+S2 normal, nontachycardic  Resp: Clear bilat, no resp distress, no crackles/wheezes  Abd: Soft, nontender, +BS  Ext: L heel wound, no purulence, no erythema, chronic appearing wound  : No suprapubic pain  IV/Skin: No thrombophlebitis  Msk: No low back pain, no arthralgias, no joint swelling  Neuro: No sensory deficits, no motor deficits    LABS:                        8.7    9.09  )-----------( 226      ( 16 Oct 2022 08:57 )             28.9     10-16    139  |  103  |  63<H>  ----------------------------<  220<H>  4.1   |  21<L>  |  3.59<H>    Ca    9.0      16 Oct 2022 08:57  Phos  2.9     10-16  Mg     1.6     10-16    TPro  6.7  /  Alb  3.1<L>  /  TBili  0.2  /  DBili  x   /  AST  16  /  ALT  12  /  AlkPhos  90  10-15    MICROBIOLOGY:    MRSA+    RADIOLOGY:    10/15 MR:    IMPRESSION:  No MR evidence of acute osteomyelitis.

## 2022-10-16 NOTE — PROGRESS NOTE ADULT - SUBJECTIVE AND OBJECTIVE BOX
Patient is a 81y old  Female who presents with a chief complaint of left foot ulcer (16 Oct 2022 07:21)       INTERVAL HPI/OVERNIGHT EVENTS:  Patient seen and evaluated at bedside.  Pt is resting comfortable in NAD. Denies N/V/F/C.    Allergies    Augmentin (Rash)    Intolerances        Vital Signs Last 24 Hrs  T(C): 37.1 (16 Oct 2022 09:07), Max: 37.1 (16 Oct 2022 09:07)  T(F): 98.7 (16 Oct 2022 09:07), Max: 98.7 (16 Oct 2022 09:07)  HR: 86 (16 Oct 2022 09:07) (74 - 87)  BP: 115/51 (16 Oct 2022 09:07) (115/51 - 131/50)  BP(mean): --  RR: 18 (16 Oct 2022 09:07) (18 - 18)  SpO2: 100% (16 Oct 2022 09:07) (95% - 100%)    Parameters below as of 16 Oct 2022 09:07  Patient On (Oxygen Delivery Method): room air        LABS:                        8.7    9.09  )-----------( 226      ( 16 Oct 2022 08:57 )             28.9     10-16    139  |  103  |  63<H>  ----------------------------<  220<H>  4.1   |  21<L>  |  3.59<H>    Ca    9.0      16 Oct 2022 08:57  Phos  2.9     10-16  Mg     1.6     10-16    TPro  6.7  /  Alb  3.1<L>  /  TBili  0.2  /  DBili  x   /  AST  16  /  ALT  12  /  AlkPhos  90  10-15    PT/INR - ( 14 Oct 2022 16:34 )   PT: 13.7 sec;   INR: 1.19 ratio         PTT - ( 14 Oct 2022 16:34 )  PTT:24.6 sec    CAPILLARY BLOOD GLUCOSE      POCT Blood Glucose.: 226 mg/dL (16 Oct 2022 08:12)  POCT Blood Glucose.: 283 mg/dL (15 Oct 2022 21:20)  POCT Blood Glucose.: 285 mg/dL (15 Oct 2022 17:54)  POCT Blood Glucose.: 265 mg/dL (15 Oct 2022 12:10)      Lower Extremity Physical Exam:  Vascular: DP 1/4, PT 0/4 B/L, CFT <3 seconds B/L, temp gradient warm to cool, B/L  Neuro: Epicritic sensation reduced to the level of digits, B/L.  Musculoskeletal/Ortho: s/p LF partial first ray resection, and 3rd toe amp, s/p R foot partial hallux amputation   Skin: Left medial heel wound to subQ w central eschar, no malodor, no fluctuance, no periwound erythema, no warm to touch, no drainage or pus noted, etiology 2/2 to pressure and DM. R foot with no open wounds and no clinical signs of infection.     RADIOLOGY & ADDITIONAL TESTS:

## 2022-10-16 NOTE — PROGRESS NOTE ADULT - PROBLEM SELECTOR PLAN 1
Active, likely 2/2 chronic and recurrent diabetic/pressure/arterial foot ulcer superimposed with soft tissue infection. Left foot xray without concern for OM. Received x1 vanc and cefepime.   - CRP elevated  - Wound culture and MRSA/MSSA PCR sent  - podiatry consult       - c/w vanc and cefepime now f/u culture, will obtain vanc level tomorrow       - Ordered MUSHTAQ/PVRs, if abnormal >> vascular consult      - Ordered L foot MRI ordered to r/o OM      - santyl for L foot wound      - POD plan local wound care pending L foot MRI/MUSHTAQ/PVRs

## 2022-10-16 NOTE — PROGRESS NOTE ADULT - PROBLEM SELECTOR PLAN 5
Active and not well controlled ( the most recent A1C 8.6 in 2020), c/b diabetic nephropathy and neuropathy  - home insulin regimen: 34 units NPH qam, 22 units NPH qhs. Not on oral DM meds.  On sliding scale.  - Re-check A1C  - Start home dose of NPH and sliding scale   - Goal < 180  - endocrine consult

## 2022-10-16 NOTE — PROGRESS NOTE ADULT - SUBJECTIVE AND OBJECTIVE BOX
seen earlier today     Chief Complaint: Type 2 Diabetes Mellitus     INTERVAL HX: reports tolerating po , BG above goal past 24 hours , receiving abx in D5 carrier       Review of Systems:  General: As above  Cardiovascular: No chest pain  Respiratory: No SOB  GI: No nausea, vomiting  Endocrine: no  S&Sx of hypoglycemia    Allergies    Augmentin (Rash)    Intolerances      MEDICATIONS  (STANDING):  atorvastatin 10 milliGRAM(s) Oral at bedtime  calamine/zinc oxide Lotion 1 Application(s) Topical two times a day  cefepime   IVPB 1000 milliGRAM(s) IV Intermittent every 12 hours  clobetasol 0.05% Ointment 1 Application(s) Topical two times a day  collagenase Ointment 1 Application(s) Topical two times a day  dextrose 5%. 1000 milliLiter(s) (50 mL/Hr) IV Continuous <Continuous>  dextrose 5%. 1000 milliLiter(s) (100 mL/Hr) IV Continuous <Continuous>  dextrose 50% Injectable 25 Gram(s) IV Push once  dextrose 50% Injectable 12.5 Gram(s) IV Push once  dextrose 50% Injectable 25 Gram(s) IV Push once  famotidine    Tablet 20 milliGRAM(s) Oral daily  glucagon  Injectable 1 milliGRAM(s) IntraMuscular once  heparin   Injectable 5000 Unit(s) SubCutaneous every 12 hours  hydrocortisone 2.5% Ointment 1 Application(s) Topical two times a day  insulin glargine Injectable (LANTUS) 26 Unit(s) SubCutaneous at bedtime  insulin lispro (ADMELOG) corrective regimen sliding scale   SubCutaneous three times a day before meals  insulin lispro (ADMELOG) corrective regimen sliding scale   SubCutaneous at bedtime  insulin lispro Injectable (ADMELOG) 8 Unit(s) SubCutaneous three times a day before meals  labetalol 100 milliGRAM(s) Oral two times a day  mupirocin 2% Nasal 1 Application(s) Both Nostrils two times a day  polyethylene glycol 3350 17 Gram(s) Oral two times a day  sodium bicarbonate 650 milliGRAM(s) Oral two times a day  tamsulosin 0.4 milliGRAM(s) Oral at bedtime  timolol 0.5% Solution 1 Drop(s) Both EYES daily  vancomycin  IVPB 1000 milliGRAM(s) IV Intermittent every 24 hours      atorvastatin   10 milliGRAM(s) Oral (10-15-22 @ 21:58)    insulin glargine Injectable (LANTUS)   24 Unit(s) SubCutaneous (10-15-22 @ 21:57)    insulin lispro (ADMELOG) corrective regimen sliding scale   2 Unit(s) SubCutaneous (10-16-22 @ 08:26)   3  SubCutaneous (10-15-22 @ 17:57)   3  SubCutaneous (10-15-22 @ 12:18)    insulin lispro (ADMELOG) corrective regimen sliding scale   1 Unit(s) SubCutaneous (10-15-22 @ 21:57)    insulin lispro Injectable (ADMELOG)   6 Unit(s) SubCutaneous (10-16-22 @ 08:25)   6 Unit(s) SubCutaneous (10-15-22 @ 17:57)        PHYSICAL EXAM:  VITALS: T(C): 37.1 (10-16-22 @ 09:07)  T(F): 98.7 (10-16-22 @ 09:07), Max: 98.7 (10-16-22 @ 09:07)  HR: 86 (10-16-22 @ 09:07) (74 - 86)  BP: 115/51 (10-16-22 @ 09:07) (115/51 - 131/50)  RR:  (18 - 18)  SpO2:  (95% - 100%)  Wt(kg): --  GENERAL: female laying in bed in NAD obese   Respiratory: Respirations unlabored   Extremities: Warm, chronic LE changes, R toe amps   NEURO: Alert , appropriate       LABS:    POCT Blood Glucose.: 226 mg/dL (10-16-22 @ 11:59)  POCT Blood Glucose.: 226 mg/dL (10-16-22 @ 08:12)  POCT Blood Glucose.: 283 mg/dL (10-15-22 @ 21:20)  POCT Blood Glucose.: 285 mg/dL (10-15-22 @ 17:54)  POCT Blood Glucose.: 265 mg/dL (10-15-22 @ 12:10)  POCT Blood Glucose.: 249 mg/dL (10-15-22 @ 09:53)  POCT Blood Glucose.: 294 mg/dL (10-14-22 @ 23:53)                            8.7    9.09  )-----------( 226      ( 16 Oct 2022 08:57 )             28.9       10-16    139  |  103  |  63<H>  ----------------------------<  220<H>  4.1   |  21<L>  |  3.59<H>    Ca    9.0      16 Oct 2022 08:57  Phos  2.9     10-16  Mg     1.6     10-16    TPro  6.7  /  Alb  3.1<L>  /  TBili  0.2  /  DBili  x   /  AST  16  /  ALT  12  /  AlkPhos  90  10-15      eGFR: 12 mL/min/1.73m2 (16 Oct 2022 08:57)          Thyroid Function Tests:          A1C with Estimated Average Glucose Result: 9.6 % (10-15-22 @ 06:32)      Estimated Average Glucose: 229 mg/dL (10-15-22 @ 06:32)

## 2022-10-16 NOTE — CONSULT NOTE ADULT - ASSESSMENT
81M with L heel wound   - Patient seen and evaluated   - Afebrile, WBC 14.76  - Left medial heel wound to subQ w central eschar, no malodor, no fluctuance, no periwound erythema, no warm to touch, no drainage or pus noted, etiology 2/2 to pressure and DM. R foot with no open wounds and no clinical signs of infection.   - Left foot xray: no OM, no gas  - Left foot wound culture taken   - Recommend admit to medicine   - Recommend IV Vanco and Cefepime   - Ordered MUSHTAQ/PVRs, if abnormal recommend vascular consult  - Ordered L foot MRI ordered to r/o OM  - Ordered santyl for L foot wound    - Pod plan local wound care pending L foot MRI/MUSHTAQ/PVRs  - Discussed with attending 
82 yo Female, ambulatory with a walker, w/ DM Type 2 c/b diabetic neuropathy and multiple toes amputations, HTN, HLD, PVD s/p LLE angiogram 2018 , CKD stage 3~4 (Baseline crt ~2), multiple UTI 2/2 obstructive uropathy s/p ureteral stent placement n 12/2020, morbid Obesity, presenting with chronic non-healing ulcer at the left heel for IV antibiotics treatment. Endocrine consulted for DM2.    T2DM with hyperglycemia  Last A1c: No recent value  Endocrinologist: PCP only  Home DM meds: States she uses Novolin-R (rapid acting) 34 units qam and 22 units qhs (not typically a bedtime insulin as it is rapid acting and not long acting. Need to clarify insulin type). Also uses novolog sliding scale before lunch and dinner.   -Start Lantus 24 units at bedtime. DO NOT HOLD IF NPO.  -Start Admelog 6 units TID pre-meal. HOLD IF NPO.  -Use low dose Admelog correction scale pre-meal  -Use low dose Admelog correction scale at bedtime  -Fingerstick BG before meals and bedtime  -Goal -180  -Carbohydrate consistent diet  -F/u A1c  -RD consult if A1c >9  Discharge plan:  -Likely to discharge patient home on current regimen versus basal/bolus insulin. Final regimen pending clinical course. Patient hesitant to change regimen as she has been on this regimen for years.  -Recommend routine outpatient ophthalmology, podiatry and endocrinology f/u.  -Patient can f/u outpatient with Endocrinology faculty practice [facility]: Endocrinology at [place].   [address] [place] [address], [address]  [place] [address]  Phone: [phone]  Fax: [phone]    HTN  -Management per primary team.    HLD  -On atorvastatin 10mg daily    Ford Montesinos DO, Endocrinology Fellow  For follow-up questions, discharge recommendations, or new consults please call answering service at [phone] (weekdays), [phone] (nights/weekends). For nonurgent matters, please email [email or web address] or [email or web address].
81 yo F with DM2, multiple toe amputation, PVD, multiple UTIs with L heel wound  Leukocytosis, no fever  Chronic L heel wound cared for by podiatry as outpatient  Concern for worsening wound, sent in for further care  Was on augmentin but had allergy, transitioned to levofloxacin  MR to heel without signs OM  BCX, Wound culture pending  Overall, wound infection, leukocytosis, DM ulcer  - Cefepime 1g q 24  - Vanco by level (check daily levels, redose when <15)  - Wound care per podiatry/follow up podiatry  - Final antibiotic plan pending wound culture for guidance  - Low suspicion for OM based on MRI  - Trend WBC to normal    Shade Palmer MD  Contact on TEAMS messaging from 9am - 5pm  From 5pm-9am, on weekends, or if no response call [phone]

## 2022-10-17 ENCOUNTER — TRANSCRIPTION ENCOUNTER (OUTPATIENT)
Age: 82
End: 2022-10-17

## 2022-10-17 LAB
-  AMPICILLIN/SULBACTAM: SIGNIFICANT CHANGE UP
-  AMPICILLIN: SIGNIFICANT CHANGE UP
-  CEFAZOLIN: SIGNIFICANT CHANGE UP
-  CLINDAMYCIN: SIGNIFICANT CHANGE UP
-  DAPTOMYCIN: SIGNIFICANT CHANGE UP
-  ERYTHROMYCIN: SIGNIFICANT CHANGE UP
-  GENTAMICIN: SIGNIFICANT CHANGE UP
-  LINEZOLID: SIGNIFICANT CHANGE UP
-  OXACILLIN: SIGNIFICANT CHANGE UP
-  PENICILLIN: SIGNIFICANT CHANGE UP
-  RIFAMPIN: SIGNIFICANT CHANGE UP
-  TETRACYCLINE: SIGNIFICANT CHANGE UP
-  TETRACYCLINE: SIGNIFICANT CHANGE UP
-  TRIMETHOPRIM/SULFAMETHOXAZOLE: SIGNIFICANT CHANGE UP
-  VANCOMYCIN: SIGNIFICANT CHANGE UP
-  VANCOMYCIN: SIGNIFICANT CHANGE UP
ANION GAP SERPL CALC-SCNC: 14 MMOL/L — SIGNIFICANT CHANGE UP (ref 5–17)
APPEARANCE UR: ABNORMAL
BACTERIA # UR AUTO: ABNORMAL
BILIRUB UR-MCNC: NEGATIVE — SIGNIFICANT CHANGE UP
BUN SERPL-MCNC: 60 MG/DL — HIGH (ref 7–23)
CALCIUM SERPL-MCNC: 9.2 MG/DL — SIGNIFICANT CHANGE UP (ref 8.4–10.5)
CHLORIDE SERPL-SCNC: 105 MMOL/L — SIGNIFICANT CHANGE UP (ref 96–108)
CO2 SERPL-SCNC: 22 MMOL/L — SIGNIFICANT CHANGE UP (ref 22–31)
COLOR SPEC: YELLOW — SIGNIFICANT CHANGE UP
CREAT ?TM UR-MCNC: 64 MG/DL — SIGNIFICANT CHANGE UP
CREAT SERPL-MCNC: 3.48 MG/DL — HIGH (ref 0.5–1.3)
CRP SERPL-MCNC: 83 MG/L — HIGH (ref 0–4)
CULTURE RESULTS: SIGNIFICANT CHANGE UP
DIFF PNL FLD: ABNORMAL
EGFR: 13 ML/MIN/1.73M2 — LOW
EPI CELLS # UR: 1 /HPF — SIGNIFICANT CHANGE UP (ref 0–5)
ERYTHROCYTE [SEDIMENTATION RATE] IN BLOOD: 120 MM/HR — HIGH (ref 0–20)
GLUCOSE BLDC GLUCOMTR-MCNC: 149 MG/DL — HIGH (ref 70–99)
GLUCOSE BLDC GLUCOMTR-MCNC: 178 MG/DL — HIGH (ref 70–99)
GLUCOSE BLDC GLUCOMTR-MCNC: 220 MG/DL — HIGH (ref 70–99)
GLUCOSE BLDC GLUCOMTR-MCNC: 247 MG/DL — HIGH (ref 70–99)
GLUCOSE SERPL-MCNC: 93 MG/DL — SIGNIFICANT CHANGE UP (ref 70–99)
GLUCOSE UR QL: SIGNIFICANT CHANGE UP
HCT VFR BLD CALC: 28.1 % — LOW (ref 34.5–45)
HGB BLD-MCNC: 8.6 G/DL — LOW (ref 11.5–15.5)
HYALINE CASTS # UR AUTO: 0 /LPF — SIGNIFICANT CHANGE UP (ref 0–7)
KETONES UR-MCNC: NEGATIVE — SIGNIFICANT CHANGE UP
LEUKOCYTE ESTERASE UR-ACNC: ABNORMAL
MAGNESIUM SERPL-MCNC: 1.9 MG/DL — SIGNIFICANT CHANGE UP (ref 1.6–2.6)
MCHC RBC-ENTMCNC: 26.6 PG — LOW (ref 27–34)
MCHC RBC-ENTMCNC: 30.6 GM/DL — LOW (ref 32–36)
MCV RBC AUTO: 87 FL — SIGNIFICANT CHANGE UP (ref 80–100)
METHOD TYPE: SIGNIFICANT CHANGE UP
METHOD TYPE: SIGNIFICANT CHANGE UP
NITRITE UR-MCNC: NEGATIVE — SIGNIFICANT CHANGE UP
NRBC # BLD: 0 /100 WBCS — SIGNIFICANT CHANGE UP (ref 0–0)
ORGANISM # SPEC MICROSCOPIC CNT: SIGNIFICANT CHANGE UP
PH UR: 6.5 — SIGNIFICANT CHANGE UP (ref 5–8)
PHOSPHATE SERPL-MCNC: 2.9 MG/DL — SIGNIFICANT CHANGE UP (ref 2.5–4.5)
PLATELET # BLD AUTO: 147 K/UL — LOW (ref 150–400)
POTASSIUM SERPL-MCNC: 4.3 MMOL/L — SIGNIFICANT CHANGE UP (ref 3.5–5.3)
POTASSIUM SERPL-SCNC: 4.3 MMOL/L — SIGNIFICANT CHANGE UP (ref 3.5–5.3)
PROT ?TM UR-MCNC: 125 MG/DL — HIGH (ref 0–12)
PROT UR-MCNC: ABNORMAL
PROT/CREAT UR-RTO: 2 RATIO — HIGH (ref 0–0.2)
RBC # BLD: 3.23 M/UL — LOW (ref 3.8–5.2)
RBC # FLD: 15.8 % — HIGH (ref 10.3–14.5)
RBC CASTS # UR COMP ASSIST: 9 /HPF — HIGH (ref 0–4)
SODIUM SERPL-SCNC: 141 MMOL/L — SIGNIFICANT CHANGE UP (ref 135–145)
SODIUM UR-SCNC: 106 MMOL/L — SIGNIFICANT CHANGE UP
SP GR SPEC: 1.02 — SIGNIFICANT CHANGE UP (ref 1.01–1.02)
SPECIMEN SOURCE: SIGNIFICANT CHANGE UP
UROBILINOGEN FLD QL: SIGNIFICANT CHANGE UP
UUN UR-MCNC: 494 MG/DL — SIGNIFICANT CHANGE UP
VANCOMYCIN TROUGH SERPL-MCNC: 12.4 UG/ML — SIGNIFICANT CHANGE UP (ref 10–20)
WBC # BLD: 8.77 K/UL — SIGNIFICANT CHANGE UP (ref 3.8–10.5)
WBC # FLD AUTO: 8.77 K/UL — SIGNIFICANT CHANGE UP (ref 3.8–10.5)
WBC UR QL: 157 /HPF — HIGH (ref 0–5)

## 2022-10-17 PROCEDURE — 99232 SBSQ HOSP IP/OBS MODERATE 35: CPT

## 2022-10-17 PROCEDURE — 99233 SBSQ HOSP IP/OBS HIGH 50: CPT | Mod: GC

## 2022-10-17 PROCEDURE — 93923 UPR/LXTR ART STDY 3+ LVLS: CPT | Mod: 26

## 2022-10-17 RX ORDER — CHLORHEXIDINE GLUCONATE 213 G/1000ML
1 SOLUTION TOPICAL
Refills: 0 | Status: DISCONTINUED | OUTPATIENT
Start: 2022-10-17 | End: 2022-10-18

## 2022-10-17 RX ORDER — VANCOMYCIN HCL 1 G
1000 VIAL (EA) INTRAVENOUS ONCE
Refills: 0 | Status: COMPLETED | OUTPATIENT
Start: 2022-10-17 | End: 2022-10-17

## 2022-10-17 RX ORDER — INSULIN LISPRO 100/ML
10 VIAL (ML) SUBCUTANEOUS
Refills: 0 | Status: DISCONTINUED | OUTPATIENT
Start: 2022-10-18 | End: 2022-10-18

## 2022-10-17 RX ADMIN — Medication 100 MILLIGRAM(S): at 17:49

## 2022-10-17 RX ADMIN — HEPARIN SODIUM 5000 UNIT(S): 5000 INJECTION INTRAVENOUS; SUBCUTANEOUS at 17:42

## 2022-10-17 RX ADMIN — CEFEPIME 100 MILLIGRAM(S): 1 INJECTION, POWDER, FOR SOLUTION INTRAMUSCULAR; INTRAVENOUS at 16:51

## 2022-10-17 RX ADMIN — INSULIN GLARGINE 26 UNIT(S): 100 INJECTION, SOLUTION SUBCUTANEOUS at 22:23

## 2022-10-17 RX ADMIN — ATORVASTATIN CALCIUM 10 MILLIGRAM(S): 80 TABLET, FILM COATED ORAL at 21:18

## 2022-10-17 RX ADMIN — CHLORHEXIDINE GLUCONATE 1 APPLICATION(S): 213 SOLUTION TOPICAL at 15:34

## 2022-10-17 RX ADMIN — Medication 1 APPLICATION(S): at 17:50

## 2022-10-17 RX ADMIN — Medication 250 MILLIGRAM(S): at 16:15

## 2022-10-17 RX ADMIN — Medication 1 DROP(S): at 15:36

## 2022-10-17 RX ADMIN — Medication 1 APPLICATION(S): at 05:03

## 2022-10-17 RX ADMIN — Medication 1 TABLET(S): at 15:40

## 2022-10-17 RX ADMIN — FAMOTIDINE 20 MILLIGRAM(S): 10 INJECTION INTRAVENOUS at 15:33

## 2022-10-17 RX ADMIN — Medication 650 MILLIGRAM(S): at 05:02

## 2022-10-17 RX ADMIN — Medication 650 MILLIGRAM(S): at 17:35

## 2022-10-17 RX ADMIN — Medication 8 UNIT(S): at 17:37

## 2022-10-17 RX ADMIN — POLYETHYLENE GLYCOL 3350 17 GRAM(S): 17 POWDER, FOR SOLUTION ORAL at 17:49

## 2022-10-17 RX ADMIN — MUPIROCIN 1 APPLICATION(S): 20 OINTMENT TOPICAL at 17:43

## 2022-10-17 RX ADMIN — CALAMINE AND ZINC OXIDE AND PHENOL 1 APPLICATION(S): 160; 10 LOTION TOPICAL at 05:03

## 2022-10-17 RX ADMIN — Medication 1 APPLICATION(S): at 17:35

## 2022-10-17 RX ADMIN — Medication 100 MILLIGRAM(S): at 05:03

## 2022-10-17 RX ADMIN — CALAMINE AND ZINC OXIDE AND PHENOL 1 APPLICATION(S): 160; 10 LOTION TOPICAL at 17:51

## 2022-10-17 RX ADMIN — Medication 1: at 14:00

## 2022-10-17 RX ADMIN — Medication 8 UNIT(S): at 13:59

## 2022-10-17 RX ADMIN — Medication 2: at 17:36

## 2022-10-17 RX ADMIN — HEPARIN SODIUM 5000 UNIT(S): 5000 INJECTION INTRAVENOUS; SUBCUTANEOUS at 05:05

## 2022-10-17 RX ADMIN — MUPIROCIN 1 APPLICATION(S): 20 OINTMENT TOPICAL at 05:09

## 2022-10-17 RX ADMIN — TAMSULOSIN HYDROCHLORIDE 0.4 MILLIGRAM(S): 0.4 CAPSULE ORAL at 21:18

## 2022-10-17 RX ADMIN — Medication 1 APPLICATION(S): at 05:04

## 2022-10-17 NOTE — DIETITIAN INITIAL EVALUATION ADULT - PERTINENT LABORATORY DATA
10-17    141  |  105  |  60<H>  ----------------------------<  93  4.3   |  22  |  3.48<H>    Ca    9.2      17 Oct 2022 11:37  Phos  2.9     10-17  Mg     1.9     10-17    POCT Blood Glucose.: 178 mg/dL (10-17-22 @ 13:56)  A1C with Estimated Average Glucose Result: 9.6 % (10-15-22 @ 06:32)

## 2022-10-17 NOTE — PROGRESS NOTE ADULT - SUBJECTIVE AND OBJECTIVE BOX
Patient is a 81y old  Female who presents with a chief complaint of left foot ulcer (17 Oct 2022 08:03)       INTERVAL HPI/OVERNIGHT EVENTS:  Patient seen and evaluated at bedside.  Pt is resting comfortable in NAD. Denies N/V/F/C.    Allergies    Augmentin (Rash)    Intolerances        Vital Signs Last 24 Hrs  T(C): 37 (17 Oct 2022 08:42), Max: 37.2 (17 Oct 2022 00:29)  T(F): 98.6 (17 Oct 2022 08:42), Max: 98.9 (17 Oct 2022 00:29)  HR: 78 (17 Oct 2022 08:42) (78 - 83)  BP: 110/42 (17 Oct 2022 08:42) (110/42 - 134/51)  BP(mean): --  RR: 18 (17 Oct 2022 08:42) (18 - 18)  SpO2: 100% (17 Oct 2022 08:42) (97% - 100%)    Parameters below as of 17 Oct 2022 08:42  Patient On (Oxygen Delivery Method): room air        LABS:                        8.7    9.09  )-----------( 226      ( 16 Oct 2022 08:57 )             28.9     10-16    139  |  103  |  63<H>  ----------------------------<  220<H>  4.1   |  21<L>  |  3.59<H>    Ca    9.0      16 Oct 2022 08:57  Phos  2.9     10-16  Mg     1.6     10-16          CAPILLARY BLOOD GLUCOSE      POCT Blood Glucose.: 149 mg/dL (17 Oct 2022 07:47)  POCT Blood Glucose.: 106 mg/dL (16 Oct 2022 21:23)  POCT Blood Glucose.: 183 mg/dL (16 Oct 2022 16:33)  POCT Blood Glucose.: 226 mg/dL (16 Oct 2022 11:59)      Lower Extremity Physical Exam:  Vascular: DP 1/4, PT 0/4 B/L, CFT <3 seconds B/L, temp gradient warm to cool, B/L  Neuro: Epicritic sensation reduced to the level of digits, B/L.  Musculoskeletal/Ortho: s/p LF partial first ray resection, and 3rd toe amp, s/p R foot partial hallux amputation   Skin: Left medial heel wound to subQ w central eschar, no malodor, no fluctuance, no periwound erythema however resolving, no warmth to touch, no drainage or pus noted, etiology 2/2 to pressure and DM. R foot with no open wounds and no clinical signs of infection.       RADIOLOGY & ADDITIONAL TESTS:    < from: MR Foot No Cont, Left (10.15.22 @ 16:59) >    ACC: [id number] EXAM:  MR FOOT LT                          PROCEDURE DATE:  10/15/2022          INTERPRETATION:  MRI OF THE LEFT FOOT    CLINICAL INFORMATION: Chronic wound in the left heel in patient with type   2 diabetes. Evaluate for underlying osteomyelitis.  TECHNIQUE: Multiplanar, multisequence MRI was obtained of the LEFT FOOT   focusing on the hindfoot/ankle. The study was performed without the use   of intravenous or intra-articular contrast.  COMPARISON: Left foot radiographs 10/14/2022. Left foot MRI 9/21/2020.    FINDINGS:    SOFT TISSUES: Soft tissue wound in the dorsal aspect of the leg. No   associated abscess. Edema in the subcutaneous fat along the posterior   aspect of the ankle and lateral/dorsal aspect of the foot.  BONE MARROW: No increased STIR signal or loss of T1 hyperintense signal   to suggest acute osteomyelitis. No fracture.    MUSCLES AND TENDONS: Tendinosis of the mid to distal Achilles tendon.   Tendons are otherwise intact. Diffuse muscle atrophy and fatty  infiltration.  SYNOVIUM/ JOINT FLUID: Small subtalar joint effusion.  CARTILAGE AND SUBCHONDRAL BONE: Chondral loss and marginal osteophyte   formation throughout the midfoot, most pronounced at the talonavicular   joint.  LIGAMENTS AND CAPSULAR STRUCTURES: Limited evaluation of the ligaments of   the ankle.        IMPRESSION:  No MR evidence of acute osteomyelitis.    --- End of Report ---            NELI ELLIS MD; Attending Radiologist  This document has been electronically signed. Oct 16 2022 11:22AM    < end of copied text >

## 2022-10-17 NOTE — DIETITIAN INITIAL EVALUATION ADULT - ADD RECOMMEND
1) Continue Consistent Carbohydrate Renal diet as tolerated.  2) Recommend providing Nephro-supriya daily pending no medical contraindications to promote wound healing.  3) Continue to monitor PO intake, weight, labs, skin, GI status, and diet.  4) RD remains available for diet education reinforcement PRN.

## 2022-10-17 NOTE — CHART NOTE - NSCHARTNOTEFT_GEN_A_CORE
OFF unit at time of visit. NPO this am  82 yo Female, ambulatory with a walker, w/ DM Type 2 c/b diabetic neuropathy and multiple toes amputations, HTN, HLD, PVD s/p LLE angiogram 2018 , CKD stage 3~4 (Baseline crt ~2), multiple UTI 2/2 obstructive uropathy s/p ureteral stent placement n 12/2020, morbid Obesity, presenting with chronic non-healing ulcer at the left heel for IV antibiotics treatment. Endocrine consulted for DM2. Prandial hyperglycemia noted in the last 24 hours.     POCT Blood Glucose.: 247 mg/dL (10-17-22 @ 17:19)  POCT Blood Glucose.: 178 mg/dL (10-17-22 @ 13:56)  POCT Blood Glucose.: 149 mg/dL (10-17-22 @ 07:47)  POCT Blood Glucose.: 106 mg/dL (10-16-22 @ 21:23)  POCT Blood Glucose.: 183 mg/dL (10-16-22 @ 16:33)  POCT Blood Glucose.: 226 mg/dL (10-16-22 @ 11:59)  POCT Blood Glucose.: 226 mg/dL (10-16-22 @ 08:12)  POCT Blood Glucose.: 283 mg/dL (10-15-22 @ 21:20)      MEDICATIONS:  atorvastatin 10 milliGRAM(s) Oral at bedtime  cefepime   IVPB 1000 milliGRAM(s) IV Intermittent every 24 hours  hydrocortisone 2.5% Ointment 1 Application(s) Topical two times a day  insulin glargine Injectable (LANTUS) 26 Unit(s) SubCutaneous at bedtime  insulin lispro (ADMELOG) corrective regimen sliding scale   SubCutaneous three times a day before meals  insulin lispro (ADMELOG) corrective regimen sliding scale   SubCutaneous at bedtime  insulin lispro Injectable (ADMELOG) 8 Unit(s) SubCutaneous three times a day before meals    PLAN:  -Test BG ac and hs  -C/w Lantus 26 units at bedtime. DO NOT HOLD IF NPO.  -increase Admelog 10 units TID pre-meal. HOLD IF NPO.  -C/w low dose Admelog correction scale pre-meal and at bedtime  -RD consult  Discharge plan:  Will be determined according to BG/insulin needs/PO intake and steroid therapy at time of discharge. Fozia insulin basal/bolus versus 70/30 versus home regimen with adjustments to doses suince pt has elevated A1C levels  -Recommend routine outpatient ophthalmology, podiatry and endocrinology f/u.  -Patient can f/u outpatient with Endocrinology faculty practice [facility]: Endocrinology at [place].   [address], [address]  [place] [address]  Phone: [phone]  Fax: [phone]

## 2022-10-17 NOTE — DIETITIAN INITIAL EVALUATION ADULT - REASON FOR ADMISSION
Type 2 diabetes mellitus with foot ulcer    Per chart, pt is a 82 yo F "ambulatory with a walker, w/ DM Type 2 c/b diabetic neuropathy and multiple toes amputations, HTN, HLD, PVD s/p LLE angiogram 2018 , CKD stage 3~4 (Baseline crt ~2), multiple UTI 2/2 obstructive uropathy s/p ureteral stent placement n 12/2020, morbid Obesity, presenting with chronic non-healing ulcer at the left heel."

## 2022-10-17 NOTE — CHART NOTE - NSCHARTNOTEFT_GEN_A_CORE
Wound Care Team Note:    Request for wound care consult for foot wounds received. Wound care consult referred to Podiatrist, Dr Field. Will defer to podiatry for management. Please refer any questions/concerns to Podiatry. Will not follow.    Carolina Sales, NP-C, [facility] [id number]

## 2022-10-17 NOTE — PROGRESS NOTE ADULT - ATTENDING COMMENTS
73 y/o F with history notable for DM2 c/b neuropathy, CKD, PVD s/p angiogram, multiple amputations, obstructive nephropathy c/b UTIS, s/p ureteral stent presenting with non heeling ulcer of L heel.     #L heel ulcer  #ARABELLA on CKD  #DM2  #PVD    -Given history, c/f OM. MRI neg, podiatry/ID following. Prelim wound culture S aureus, e raffinosus. Was on augmentin->FQ prior to admission (transitioned iso rxn). Currently on cefepime. s/p 2 doses vanc. holding until lvl. Pending culture finalization/sensitivities. Bcx pending    -renal u/s neg for obstructive process, Cr stable. Ctm, holding lasix  -renally dose meds, monitor cr  -endocrine consulted recs appreciated    Dispo: pending sensitivies and final abx plan, QUAN

## 2022-10-17 NOTE — DIETITIAN INITIAL EVALUATION ADULT - PERTINENT MEDS FT
MEDICATIONS  (STANDING):  atorvastatin 10 milliGRAM(s) Oral at bedtime  calamine/zinc oxide Lotion 1 Application(s) Topical two times a day  cefepime   IVPB 1000 milliGRAM(s) IV Intermittent every 24 hours  chlorhexidine 2% Cloths 1 Application(s) Topical <User Schedule>  clobetasol 0.05% Ointment 1 Application(s) Topical two times a day  collagenase Ointment 1 Application(s) Topical two times a day  dextrose 5%. 1000 milliLiter(s) (50 mL/Hr) IV Continuous <Continuous>  dextrose 5%. 1000 milliLiter(s) (100 mL/Hr) IV Continuous <Continuous>  dextrose 50% Injectable 25 Gram(s) IV Push once  dextrose 50% Injectable 12.5 Gram(s) IV Push once  dextrose 50% Injectable 25 Gram(s) IV Push once  famotidine    Tablet 20 milliGRAM(s) Oral daily  glucagon  Injectable 1 milliGRAM(s) IntraMuscular once  heparin   Injectable 5000 Unit(s) SubCutaneous every 12 hours  hydrocortisone 2.5% Ointment 1 Application(s) Topical two times a day  insulin glargine Injectable (LANTUS) 26 Unit(s) SubCutaneous at bedtime  insulin lispro (ADMELOG) corrective regimen sliding scale   SubCutaneous three times a day before meals  insulin lispro (ADMELOG) corrective regimen sliding scale   SubCutaneous at bedtime  insulin lispro Injectable (ADMELOG) 8 Unit(s) SubCutaneous three times a day before meals  labetalol 100 milliGRAM(s) Oral two times a day  mupirocin 2% Nasal 1 Application(s) Both Nostrils two times a day  polyethylene glycol 3350 17 Gram(s) Oral two times a day  sodium bicarbonate 650 milliGRAM(s) Oral two times a day  tamsulosin 0.4 milliGRAM(s) Oral at bedtime  timolol 0.5% Solution 1 Drop(s) Both EYES daily  vancomycin  IVPB 1000 milliGRAM(s) IV Intermittent once    MEDICATIONS  (PRN):  acetaminophen     Tablet .. 650 milliGRAM(s) Oral every 6 hours PRN Temp greater or equal to 38C (100.4F), Mild Pain (1 - 3)  dextrose Oral Gel 15 Gram(s) Oral once PRN Blood Glucose LESS THAN 70 milliGRAM(s)/deciliter  loratadine 10 milliGRAM(s) Oral daily PRN ITCH  magnesium hydroxide Suspension 30 milliLiter(s) Oral daily PRN Constipation  melatonin 3 milliGRAM(s) Oral at bedtime PRN Insomnia  ondansetron Injectable 4 milliGRAM(s) IV Push every 8 hours PRN Nausea and/or Vomiting

## 2022-10-17 NOTE — DISCHARGE NOTE PROVIDER - NSDCMRMEDTOKEN_GEN_ALL_CORE_FT
acetaminophen 325 mg oral tablet: 2 tab(s) orally every 6 hours, As Needed - 3)  amLODIPine 10 mg oral tablet: 1 tab(s) orally once a day  atorvastatin 10 mg oral tablet: 1 tab(s) orally once a day (at bedtime)  bisacodyl 10 mg rectal suppository: 1 suppository(ies) rectal once a day, As needed, Constipation  collagenase 250 units/g topical ointment: 1 application topically once a day  fluconazole 200 mg oral tablet: 1 tab(s) orally once a day from 1/1/21 through 1/8/21  FUROSEMIDE 40MG TAB:   guaiFENesin 100 mg/5 mL oral liquid: 10 milliliter(s) orally every 6 hours, As needed, Cough/chest congestion  heparin: 5000 unit(s) subcutaneous every 8 hours for DVT prophylaxis  HYDROmorphone 2 mg oral tablet: 1 tab(s) orally every 3 hours, As needed, Moderate Pain (4 - 6) refractory to tylenol  ipratropium-albuterol 0.5 mg-2.5 mg/3 mLinhalation solution: 3 milliliter(s) inhaled every 6 hours, As needed, Shortness of Breath and/or Wheezing  labetalol 100 mg oral tablet: 1 tab(s) orally 2 times a day  LABETALOL 100MG TAB:   menthol-benzocaine 3.6 mg-15 mg mucous membrane lozenge: 1 lozenge orally every 4 hours, As Needed for sore throat  NovoLIN N 100 units/mL subcutaneous suspension: 30 unit(s) subcutaneous once a day (at bedtime)  NovoLIN N 100 units/mL subcutaneous suspension: 40 unit(s) subcutaneous once a day in AM  polyethylene glycol 3350 oral powder for reconstitution: 17 gram(s) orally once a day, As needed, Constipation  senna oral tablet: 1 tab(s) orally once a day  simethicone 80 mg oral tablet, chewable: 1 tab(s) orally every 8 hours, As needed, Gas  sodium bicarbonate 650 mg oral tablet: 1 tab(s) orally 2 times a day  tamsulosin 0.4 mg oral capsule: 1 cap(s) orally once a day (at bedtime)  timolol maleate 0.5% ophthalmic solution: 1 drop(s) to each affected eye once a day   acetaminophen 325 mg oral tablet: 2 tab(s) orally every 6 hours, As Needed - 3)  amLODIPine 10 mg oral tablet: 1 tab(s) orally once a day  atorvastatin 10 mg oral tablet: 1 tab(s) orally once a day (at bedtime)  bisacodyl 10 mg rectal suppository: 1 suppository(ies) rectal once a day, As needed, Constipation  collagenase 250 units/g topical ointment: 1 application topically once a day  fluconazole 200 mg oral tablet: 1 tab(s) orally once a day from 1/1/21 through 1/8/21  FUROSEMIDE 40MG TAB:   guaiFENesin 100 mg/5 mL oral liquid: 10 milliliter(s) orally every 6 hours, As needed, Cough/chest congestion  heparin: 5000 unit(s) subcutaneous every 8 hours for DVT prophylaxis  HYDROmorphone 2 mg oral tablet: 1 tab(s) orally every 3 hours, As needed, Moderate Pain (4 - 6) refractory to tylenol  ipratropium-albuterol 0.5 mg-2.5 mg/3 mLinhalation solution: 3 milliliter(s) inhaled every 6 hours, As needed, Shortness of Breath and/or Wheezing  labetalol 100 mg oral tablet: 1 tab(s) orally 2 times a day  LABETALOL 100MG TAB:   NovoLIN N 100 units/mL subcutaneous suspension: 30 unit(s) subcutaneous once a day (at bedtime)  NovoLIN N 100 units/mL subcutaneous suspension: 40 unit(s) subcutaneous once a day in AM  polyethylene glycol 3350 oral powder for reconstitution: 17 gram(s) orally once a day, As needed, Constipation  senna oral tablet: 1 tab(s) orally once a day  simethicone 80 mg oral tablet, chewable: 1 tab(s) orally every 8 hours, As needed, Gas  sodium bicarbonate 650 mg oral tablet: 1 tab(s) orally 2 times a day  tamsulosin 0.4 mg oral capsule: 1 cap(s) orally once a day (at bedtime)  timolol maleate 0.5% ophthalmic solution: 1 drop(s) to each affected eye once a day   acetaminophen 325 mg oral tablet: 2 tab(s) orally every 6 hours, As Needed - 3)  atorvastatin 10 mg oral tablet: 1 tab(s) orally once a day (at bedtime)  Bactrim  mg-160 mg oral tablet: 1 milligram(s) orally 2 times a day   bisacodyl 10 mg rectal suppository: 1 suppository(ies) rectal once a day, As needed, Constipation  calamine topical lotion: 1 application topically 2 times a day  clobetasol 0.05% topical ointment: 1 application topically 2 times a day  collagenase 250 units/g topical ointment: 1 application topically once a day  famotidine 20 mg oral tablet: 1 tab(s) orally once a day  hydrocortisone 2.5% topical ointment: 1 application topically 2 times a day  labetalol 100 mg oral tablet: 1 tab(s) orally 2 times a day  mupirocin 2% topical cream: 1 application topically 3 times a day  NovoLIN N 100 units/mL subcutaneous suspension: 38 unit(s) subcutaneous once a day (at bedtime)  NovoLIN N 100 units/mL subcutaneous suspension: 24 unit(s) subcutaneous once a day  NovoLIN N 100 units/mL subcutaneous suspension: 38 unit(s) subcutaneous once a day, am  NovoLIN N 100 units/mL subcutaneous suspension: 24 unit(s) subcutaneous once a day (at bedtime)  polyethylene glycol 3350 oral powder for reconstitution: 17 gram(s) orally once a day, As needed, Constipation  senna oral tablet: 1 tab(s) orally once a day  simethicone 80 mg oral tablet, chewable: 1 tab(s) orally every 8 hours, As needed, Gas  sodium bicarbonate 650 mg oral tablet: 1 tab(s) orally 2 times a day  tamsulosin 0.4 mg oral capsule: 1 cap(s) orally once a day (at bedtime)  timolol maleate 0.5% ophthalmic solution: 1 drop(s) to each affected eye once a day   acetaminophen 325 mg oral tablet: 2 tab(s) orally every 6 hours, As Needed - 3)  atorvastatin 10 mg oral tablet: 1 tab(s) orally once a day (at bedtime)  Bactrim  mg-160 mg oral tablet: 1 milligram(s) orally 2 times a day   bisacodyl 10 mg rectal suppository: 1 suppository(ies) rectal once a day, As needed, Constipation  calamine topical lotion: 1 application topically 2 times a day  clobetasol 0.05% topical ointment: 1 application topically 2 times a day  collagenase 250 units/g topical ointment: 1 application topically once a day  famotidine 20 mg oral tablet: 1 tab(s) orally once a day  hydrocortisone 2.5% topical ointment: 1 application topically 2 times a day  labetalol 100 mg oral tablet: 1 tab(s) orally 2 times a day  mupirocin 2% topical cream: 1 application topically 3 times a day  NovoLIN N 100 units/mL subcutaneous suspension: 38 unit(s) subcutaneous once a day, am  NovoLIN N 100 units/mL subcutaneous suspension: 24 unit(s) subcutaneous once a day (at bedtime)  polyethylene glycol 3350 oral powder for reconstitution: 17 gram(s) orally once a day, As needed, Constipation  senna oral tablet: 1 tab(s) orally once a day  simethicone 80 mg oral tablet, chewable: 1 tab(s) orally every 8 hours, As needed, Gas  sodium bicarbonate 650 mg oral tablet: 1 tab(s) orally 2 times a day  tamsulosin 0.4 mg oral capsule: 1 cap(s) orally once a day (at bedtime)  timolol maleate 0.5% ophthalmic solution: 1 drop(s) to each affected eye once a day   acetaminophen 325 mg oral tablet: 2 tab(s) orally every 6 hours, As Needed - 3)  atorvastatin 10 mg oral tablet: 1 tab(s) orally once a day (at bedtime)  Bactrim  mg-160 mg oral tablet: 1 milligram(s) orally 2 times a day   bisacodyl 10 mg rectal suppository: 1 suppository(ies) rectal once a day, As needed, Constipation  calamine topical lotion: 1 application topically 2 times a day  clobetasol 0.05% topical ointment: 1 application topically 2 times a day  collagenase 250 units/g topical ointment: 1 application topically once a day  famotidine 20 mg oral tablet: 1 tab(s) orally once a day  hydrocortisone 2.5% topical ointment: 1 application topically 2 times a day  labetalol 100 mg oral tablet: 1 tab(s) orally 2 times a day  mupirocin 2% topical cream: 1 application topically 3 times a day  NovoLIN N 100 units/mL subcutaneous suspension: 38 unit(s) subcutaneous once a day, am  NovoLIN N 100 units/mL subcutaneous suspension: 24 unit(s) subcutaneous once a day (at bedtime)  NovoLIN N 100 units/mL subcutaneous suspension: 2 unit(s) subcutaneous 3 times a day (before meals) for 251-300, 4 units for 200-250, 6 units for 251-300,  8 units for 301-350, 10 units for 351-400, call PCP/Endocrine for &gt;400  polyethylene glycol 3350 oral powder for reconstitution: 17 gram(s) orally once a day, As needed, Constipation  senna oral tablet: 1 tab(s) orally once a day  simethicone 80 mg oral tablet, chewable: 1 tab(s) orally every 8 hours, As needed, Gas  sodium bicarbonate 650 mg oral tablet: 1 tab(s) orally 2 times a day  tamsulosin 0.4 mg oral capsule: 1 cap(s) orally once a day (at bedtime)  timolol maleate 0.5% ophthalmic solution: 1 drop(s) to each affected eye once a day   acetaminophen 325 mg oral tablet: 2 tab(s) orally every 6 hours, As Needed - 3)  atorvastatin 10 mg oral tablet: 1 tab(s) orally once a day (at bedtime)  Bactrim  mg-160 mg oral tablet: 1 milligram(s) orally 2 times a day   bisacodyl 10 mg rectal suppository: 1 suppository(ies) rectal once a day, As needed, Constipation  calamine topical lotion: 1 application topically 2 times a day  clobetasol 0.05% topical ointment: 1 application topically 2 times a day  collagenase 250 units/g topical ointment: 1 application topically once a day  famotidine 20 mg oral tablet: 1 tab(s) orally once a day  hydrocortisone 2.5% topical ointment: 1 application topically 2 times a day  labetalol 100 mg oral tablet: 1 tab(s) orally 2 times a day  mupirocin 2% topical cream: 1 application topically 3 times a day  NovoLIN N 100 units/mL subcutaneous suspension: 38 unit(s) subcutaneous once a day, am  NovoLIN N 100 units/mL subcutaneous suspension: 24 unit(s) subcutaneous once a day (at bedtime)  NovoLIN N 100 units/mL subcutaneous suspension: 2 unit(s) subcutaneous 3 times a day (before meals) for 251-300, 4 units for 200-250, 6 units for 251-400, call PCP/Endocrine for &gt;400  polyethylene glycol 3350 oral powder for reconstitution: 17 gram(s) orally once a day, As needed, Constipation  senna oral tablet: 1 tab(s) orally once a day  simethicone 80 mg oral tablet, chewable: 1 tab(s) orally every 8 hours, As needed, Gas  sodium bicarbonate 650 mg oral tablet: 1 tab(s) orally 2 times a day  tamsulosin 0.4 mg oral capsule: 1 cap(s) orally once a day (at bedtime)  timolol maleate 0.5% ophthalmic solution: 1 drop(s) to each affected eye once a day

## 2022-10-17 NOTE — PROGRESS NOTE ADULT - SUBJECTIVE AND OBJECTIVE BOX
CC: F/U wound infection    Saw/spoke to patient. No fevers, no chills. No new complaints.    Allergies  Augmentin (Rash)    ANTIMICROBIALS:  cefepime   IVPB 1000 every 24 hours  vancomycin  IVPB 1000 once    PE:    Vital Signs Last 24 Hrs  T(C): 37 (17 Oct 2022 08:42), Max: 37.2 (17 Oct 2022 00:29)  T(F): 98.6 (17 Oct 2022 08:42), Max: 98.9 (17 Oct 2022 00:29)  HR: 78 (17 Oct 2022 08:42) (78 - 83)  BP: 110/42 (17 Oct 2022 08:42) (110/42 - 134/51)  RR: 18 (17 Oct 2022 08:42) (18 - 18)  SpO2: 100% (17 Oct 2022 08:42) (97% - 100%)    Gen: AOx3, NAD, non-toxic  CV: Nontachycardic  Resp: Breathing comfortably, RA  Abd: Soft, nontender  IV/Skin: No thrombophlebitis    LABS:                        8.6    8.77  )-----------( 147      ( 17 Oct 2022 11:37 )             28.1     10    141  |  105  |  60<H>  ----------------------------<  93  4.3   |  22  |  3.48<H>    Ca    9.2      17 Oct 2022 11:37  Phos  2.9     10-17  Mg     1.9     10-17    Urinalysis Basic - ( 17 Oct 2022 03:29 )    Color: Yellow / Appearance: Slightly Turbid / S.017 / pH: x  Gluc: x / Ketone: Negative  / Bili: Negative / Urobili: <2 mg/dL   Blood: x / Protein: 100 mg/dL / Nitrite: Negative   Leuk Esterase: Large / RBC: 9 /HPF /  /HPF   Sq Epi: x / Non Sq Epi: 1 /HPF / Bacteria: Moderate    MICROBIOLOGY:  Vancomycin Level, Trough: 12.4 ug/mL (10-17-22 @ 11:40)    .Blood Blood-Peripheral  10-16-22   No growth to date.  --  --    .Abscess L foot wound culture  10-14-22   Numerous Methicillin Resistant Staphylococcus aureus  Numerous Enterococcus raffinosus Susceptibility to follow.  --  Methicillin resistant Staphylococcus aureus    RADIOLOGY:    10/15 MR:    IMPRESSION:  No MR evidence of acute osteomyelitis.

## 2022-10-17 NOTE — PROGRESS NOTE ADULT - PROBLEM SELECTOR PLAN 9
F: 500cc bolus  E: as needed  N: DASH/TLC renal diet    DVT:     Dispo: pending  Code status: DNI or DNI/DNR or DNR (has a form signed) F: 500cc bolus  E: as needed  N: DASH/TLC renal diet    DVT:     Dispo: pending  Code status: DNI or DNI/DNR or DNR (has a form signed)    Care signed out to BECKY Eller

## 2022-10-17 NOTE — DIETITIAN INITIAL EVALUATION ADULT - OTHER INFO
Per pt, UBW: ~200 pounds - pt reports this is a rough estimate, unsure of recent wt but denies any recent wt loss or gain.   Dosing wt: 175 pounds   Wt hx per chart: 237 pounds (12/29/20), 250 pounds (9/19/20), 260 pounds (4/3/17).   Wt trending downwards noted. Possible 62 lb (26%) wt loss x 2 years. Possible wt fluctuations in setting of noted edema. RD to continue to monitor weight trends as able.   Meds: lantus (26 units), admelog (8 units), SSI, atorvastatin, zofran.

## 2022-10-17 NOTE — DISCHARGE NOTE PROVIDER - CARE PROVIDER_API CALL
Anne Welch (DPM)  Surgery  [address], [address]  [place] [address]  Phone: [phone]  Fax: [phone]  Established Patient  Follow Up Time: 1 week   Anne Welch (DPM)  Surgery  [address] [facility], [address]  [place] [address]  Phone: [phone]  Fax: [phone]  Established Patient  Follow Up Time: 1 week    Shade Palmer)  Infectious Disease; Internal Medicine  [address] [place] [address]  Phone: [phone]  Fax: [phone]  Follow Up Time:     Bushra Barnett ()  Internal Medicine  [address] [place] [address], [address]  [place] [address]  Phone: [phone]  Fax: [phone]  Follow Up Time:    Anne Welch (DPM)  Surgery  [address] [facility], [address]  [place] [address]  Phone: [phone]  Fax: [phone]  Established Patient  Follow Up Time: 1 week    Shade Palmer)  Infectious Disease; Internal Medicine  [address] [place] [address]  Phone: [phone]  Fax: [phone]  Follow Up Time:     Bushra Barnett (DO)  Internal Medicine  [address] [facility], [address]  [place] [address]  Phone: [phone]  Fax: [phone]  Follow Up Time:     Endocrinology,   Endocrinology at [place].   [address]  [place] [address]  Phone: [phone]  Fax: [phone]  Phone: (   )    -  Fax: (   )    -  Follow Up Time:

## 2022-10-17 NOTE — PROGRESS NOTE ADULT - SUBJECTIVE AND OBJECTIVE BOX
Renan Flynn MD  Internal Medicine, PGY-2  Please Contact via TEAMS    SUBJECTIVE / OVERNIGHT EVENTS:  - Pt seen and examined at bedside  - KAYLA    MEDICATIONS  (STANDING):  atorvastatin 10 milliGRAM(s) Oral at bedtime  calamine/zinc oxide Lotion 1 Application(s) Topical two times a day  cefepime   IVPB 1000 milliGRAM(s) IV Intermittent every 24 hours  clobetasol 0.05% Ointment 1 Application(s) Topical two times a day  collagenase Ointment 1 Application(s) Topical two times a day  dextrose 5%. 1000 milliLiter(s) (50 mL/Hr) IV Continuous <Continuous>  dextrose 5%. 1000 milliLiter(s) (100 mL/Hr) IV Continuous <Continuous>  dextrose 50% Injectable 25 Gram(s) IV Push once  dextrose 50% Injectable 12.5 Gram(s) IV Push once  dextrose 50% Injectable 25 Gram(s) IV Push once  famotidine    Tablet 20 milliGRAM(s) Oral daily  glucagon  Injectable 1 milliGRAM(s) IntraMuscular once  heparin   Injectable 5000 Unit(s) SubCutaneous every 12 hours  hydrocortisone 2.5% Ointment 1 Application(s) Topical two times a day  insulin glargine Injectable (LANTUS) 26 Unit(s) SubCutaneous at bedtime  insulin lispro (ADMELOG) corrective regimen sliding scale   SubCutaneous three times a day before meals  insulin lispro (ADMELOG) corrective regimen sliding scale   SubCutaneous at bedtime  insulin lispro Injectable (ADMELOG) 8 Unit(s) SubCutaneous three times a day before meals  labetalol 100 milliGRAM(s) Oral two times a day  mupirocin 2% Nasal 1 Application(s) Both Nostrils two times a day  polyethylene glycol 3350 17 Gram(s) Oral two times a day  sodium bicarbonate 650 milliGRAM(s) Oral two times a day  tamsulosin 0.4 milliGRAM(s) Oral at bedtime  timolol 0.5% Solution 1 Drop(s) Both EYES daily    MEDICATIONS  (PRN):  acetaminophen     Tablet .. 650 milliGRAM(s) Oral every 6 hours PRN Temp greater or equal to 38C (100.4F), Mild Pain (1 - 3)  dextrose Oral Gel 15 Gram(s) Oral once PRN Blood Glucose LESS THAN 70 milliGRAM(s)/deciliter  loratadine 10 milliGRAM(s) Oral daily PRN ITCH  magnesium hydroxide Suspension 30 milliLiter(s) Oral daily PRN Constipation  melatonin 3 milliGRAM(s) Oral at bedtime PRN Insomnia  ondansetron Injectable 4 milliGRAM(s) IV Push every 8 hours PRN Nausea and/or Vomiting        10-16-22 @ 07:01  -  10-17-22 @ 07:00  --------------------------------------------------------  IN: 275 mL / OUT: 1000 mL / NET: -725 mL        PHYSICAL EXAM:  Vital Signs Last 24 Hrs  T(C): 37.2 (17 Oct 2022 00:29), Max: 37.2 (17 Oct 2022 00:29)  T(F): 98.9 (17 Oct 2022 00:29), Max: 98.9 (17 Oct 2022 00:29)  HR: 83 (17 Oct 2022 00:29) (83 - 86)  BP: 134/51 (17 Oct 2022 00:29) (115/51 - 134/51)  BP(mean): --  RR: 18 (17 Oct 2022 00:29) (18 - 18)  SpO2: 97% (17 Oct 2022 00:29) (97% - 100%)    Parameters below as of 17 Oct 2022 00:29  Patient On (Oxygen Delivery Method): room air        CAPILLARY BLOOD GLUCOSE      POCT Blood Glucose.: 149 mg/dL (17 Oct 2022 07:47)  POCT Blood Glucose.: 106 mg/dL (16 Oct 2022 21:23)  POCT Blood Glucose.: 183 mg/dL (16 Oct 2022 16:33)  POCT Blood Glucose.: 226 mg/dL (16 Oct 2022 11:59)  POCT Blood Glucose.: 226 mg/dL (16 Oct 2022 08:12)    I&O's Summary    16 Oct 2022 07:01  -  17 Oct 2022 07:00  --------------------------------------------------------  IN: 275 mL / OUT: 1000 mL / NET: -725 mL        CONSTITUTIONAL: NAD, well-developed  RESPIRATORY: Normal respiratory effort; lungs are clear to auscultation bilaterally  CARDIOVASCULAR: Regular rate and rhythm, normal S1 and S2, no murmur/rub/gallop; No lower extremity edema; Peripheral pulses are 2+ bilaterally  ABDOMEN: Nontender to palpation, normoactive bowel sounds, no rebound/guarding; No hepatosplenomegaly  MUSCLOSKELETAL: no clubbing or cyanosis of digits; no joint swelling or tenderness to palpation  PSYCH: A+O to person, place, and time; affect appropriate    LABS:                        8.7    9.09  )-----------( 226      ( 16 Oct 2022 08:57 )             28.9     10-16    139  |  103  |  63<H>  ----------------------------<  220<H>  4.1   |  21<L>  |  3.59<H>    Ca    9.0      16 Oct 2022 08:57  Phos  2.9     10-16  Mg     1.6     10-16                Culture - Abscess with Gram Stain (collected 14 Oct 2022 19:00)  Source: .Abscess L foot wound culture  Preliminary Report (16 Oct 2022 16:13):    Numerous Staphylococcus aureus    Numerous Enterococcus raffinosus        IMAGING:    [X] All pertinent imaging reviewed by me No Renan Flynn MD  Internal Medicine, PGY-2  Please Contact via TEAMS    SUBJECTIVE / OVERNIGHT EVENTS:  - Pt seen and examined at bedside  - NAEON  - Patient feels otherwise well minimal pain on L foot.     MEDICATIONS  (STANDING):  atorvastatin 10 milliGRAM(s) Oral at bedtime  calamine/zinc oxide Lotion 1 Application(s) Topical two times a day  cefepime   IVPB 1000 milliGRAM(s) IV Intermittent every 24 hours  clobetasol 0.05% Ointment 1 Application(s) Topical two times a day  collagenase Ointment 1 Application(s) Topical two times a day  dextrose 5%. 1000 milliLiter(s) (50 mL/Hr) IV Continuous <Continuous>  dextrose 5%. 1000 milliLiter(s) (100 mL/Hr) IV Continuous <Continuous>  dextrose 50% Injectable 25 Gram(s) IV Push once  dextrose 50% Injectable 12.5 Gram(s) IV Push once  dextrose 50% Injectable 25 Gram(s) IV Push once  famotidine    Tablet 20 milliGRAM(s) Oral daily  glucagon  Injectable 1 milliGRAM(s) IntraMuscular once  heparin   Injectable 5000 Unit(s) SubCutaneous every 12 hours  hydrocortisone 2.5% Ointment 1 Application(s) Topical two times a day  insulin glargine Injectable (LANTUS) 26 Unit(s) SubCutaneous at bedtime  insulin lispro (ADMELOG) corrective regimen sliding scale   SubCutaneous three times a day before meals  insulin lispro (ADMELOG) corrective regimen sliding scale   SubCutaneous at bedtime  insulin lispro Injectable (ADMELOG) 8 Unit(s) SubCutaneous three times a day before meals  labetalol 100 milliGRAM(s) Oral two times a day  mupirocin 2% Nasal 1 Application(s) Both Nostrils two times a day  polyethylene glycol 3350 17 Gram(s) Oral two times a day  sodium bicarbonate 650 milliGRAM(s) Oral two times a day  tamsulosin 0.4 milliGRAM(s) Oral at bedtime  timolol 0.5% Solution 1 Drop(s) Both EYES daily    MEDICATIONS  (PRN):  acetaminophen     Tablet .. 650 milliGRAM(s) Oral every 6 hours PRN Temp greater or equal to 38C (100.4F), Mild Pain (1 - 3)  dextrose Oral Gel 15 Gram(s) Oral once PRN Blood Glucose LESS THAN 70 milliGRAM(s)/deciliter  loratadine 10 milliGRAM(s) Oral daily PRN ITCH  magnesium hydroxide Suspension 30 milliLiter(s) Oral daily PRN Constipation  melatonin 3 milliGRAM(s) Oral at bedtime PRN Insomnia  ondansetron Injectable 4 milliGRAM(s) IV Push every 8 hours PRN Nausea and/or Vomiting        10-16-22 @ 07:01  -  10-17-22 @ 07:00  --------------------------------------------------------  IN: 275 mL / OUT: 1000 mL / NET: -725 mL        PHYSICAL EXAM:  Vital Signs Last 24 Hrs  T(C): 37.2 (17 Oct 2022 00:29), Max: 37.2 (17 Oct 2022 00:29)  T(F): 98.9 (17 Oct 2022 00:29), Max: 98.9 (17 Oct 2022 00:29)  HR: 83 (17 Oct 2022 00:29) (83 - 86)  BP: 134/51 (17 Oct 2022 00:29) (115/51 - 134/51)  BP(mean): --  RR: 18 (17 Oct 2022 00:29) (18 - 18)  SpO2: 97% (17 Oct 2022 00:29) (97% - 100%)    Parameters below as of 17 Oct 2022 00:29  Patient On (Oxygen Delivery Method): room air        CAPILLARY BLOOD GLUCOSE      POCT Blood Glucose.: 149 mg/dL (17 Oct 2022 07:47)  POCT Blood Glucose.: 106 mg/dL (16 Oct 2022 21:23)  POCT Blood Glucose.: 183 mg/dL (16 Oct 2022 16:33)  POCT Blood Glucose.: 226 mg/dL (16 Oct 2022 11:59)  POCT Blood Glucose.: 226 mg/dL (16 Oct 2022 08:12)    I&O's Summary    16 Oct 2022 07:01  -  17 Oct 2022 07:00  --------------------------------------------------------  IN: 275 mL / OUT: 1000 mL / NET: -725 mL        CONSTITUTIONAL: NAD, well-developed  RESPIRATORY: Normal respiratory effort; lungs are clear to auscultation bilaterally  CARDIOVASCULAR: Regular rate and rhythm, normal S1 and S2, no murmur/rub/gallop; No lower extremity edema; Peripheral pulses are 2+ bilaterally  ABDOMEN: Nontender to palpation, normoactive bowel sounds, no rebound/guarding; No hepatosplenomegaly  MUSCLOSKELETAL: no clubbing or cyanosis of digits; no joint swelling or tenderness to palpation; L foot bandaged   PSYCH: A+O to person, place, and time; affect appropriate    LABS:                        8.7    9.09  )-----------( 226      ( 16 Oct 2022 08:57 )             28.9     10-16    139  |  103  |  63<H>  ----------------------------<  220<H>  4.1   |  21<L>  |  3.59<H>    Ca    9.0      16 Oct 2022 08:57  Phos  2.9     10-16  Mg     1.6     10-16                Culture - Abscess with Gram Stain (collected 14 Oct 2022 19:00)  Source: .Abscess L foot wound culture  Preliminary Report (16 Oct 2022 16:13):    Numerous Staphylococcus aureus    Numerous Enterococcus raffinosus        IMAGING:    [X] All pertinent imaging reviewed by me

## 2022-10-17 NOTE — DISCHARGE NOTE PROVIDER - CARE PROVIDERS DIRECT ADDRESSES
,DirectAddress_Unknown ,DirectAddress_Unknown,[email or web address],DirectAddress_Unknown ,DirectAddress_Unknown,[email or web address],DirectAddress_Unknown,DirectAddress_Unknown

## 2022-10-17 NOTE — DISCHARGE NOTE PROVIDER - NSDCCPCAREPLAN_GEN_ALL_CORE_FT
PRINCIPAL DISCHARGE DIAGNOSIS  Diagnosis: DM foot ulcer  Assessment and Plan of Treatment: non healing wound, now is better, complete po Bactrim x 6 days as ordered, f/up with Podiatry      SECONDARY DISCHARGE DIAGNOSES  Diagnosis: HTN (hypertension)  Assessment and Plan of Treatment: controlled, cont current home meds    Diagnosis: DM2 (diabetes mellitus, type 2)  Assessment and Plan of Treatment: controlled, cont insulin as ordered, f/up with endocrine    Diagnosis: Anemia  Assessment and Plan of Treatment: anemia, f/up with PCP and need to f/up h/h    Diagnosis: Allergic drug reaction  Assessment and Plan of Treatment: resolved    Diagnosis: Acute kidney injury superimposed on CKD  Assessment and Plan of Treatment: stable, f/up with Renal and PCP     PRINCIPAL DISCHARGE DIAGNOSIS  Diagnosis: DM foot ulcer  Assessment and Plan of Treatment: non healing wound, now is better, complete po Bactrim x 6 days as ordered, f/up with Podiatry      SECONDARY DISCHARGE DIAGNOSES  Diagnosis: Acute kidney injury superimposed on CKD  Assessment and Plan of Treatment: stable, f/up with Renal and PCP    Diagnosis: Allergic drug reaction  Assessment and Plan of Treatment: resolved    Diagnosis: Anemia  Assessment and Plan of Treatment: anemia, f/up with PCP and need to f/up h/h    Diagnosis: DM2 (diabetes mellitus, type 2)  Assessment and Plan of Treatment: controlled, cont insulin as ordered, f/up with endocrine  adjusted Novolin N doses to  38 units in the AM , 24 units qhs to improve glycemic control at home, continue novolog scale AC (2 units for BG >150, 4 units 200-250... etc); You must continue to eat three meals a day on this regimen to prevent hypoglycemia   -call PCP/Endocrine if BG >400, <70 or persistently over 200  -Recommend routine outpatient ophthalmology, podiatry and endocrinology follow up  -Follow up outpatient with Endocrinology faculty practice [facility] Physician    Diagnosis: HTN (hypertension)  Assessment and Plan of Treatment: controlled, cont current home meds     PRINCIPAL DISCHARGE DIAGNOSIS  Diagnosis: DM foot ulcer  Assessment and Plan of Treatment: non healing wound, now is better, complete po Bactrim x 6 days as ordered, f/up with Podiatry      SECONDARY DISCHARGE DIAGNOSES  Diagnosis: HTN (hypertension)  Assessment and Plan of Treatment: controlled, cont current home meds    Diagnosis: DM2 (diabetes mellitus, type 2)  Assessment and Plan of Treatment: controlled, cont insulin as ordered, f/up with endocrine  adjusted Novolin N doses to  38 units in the AM , 24 units qhs to improve glycemic control at home, continue novolog scale AC (2 units for BG >150, 4 units 200-250... etc); You must continue to eat three meals a day on this regimen to prevent hypoglycemia   -call PCP/Endocrine if BG >400, <70 or persistently over 200  -Recommend routine outpatient ophthalmology, podiatry and endocrinology follow up  -Follow up outpatient with Endocrinology faculty practice [facility] Physician    Diagnosis: Anemia  Assessment and Plan of Treatment: anemia, f/up with PCP and need to f/up h/h    Diagnosis: Allergic drug reaction  Assessment and Plan of Treatment: resolved    Diagnosis: Acute kidney injury superimposed on CKD  Assessment and Plan of Treatment: stable, f/up with Renal and PCP     PRINCIPAL DISCHARGE DIAGNOSIS  Diagnosis: DM foot ulcer  Assessment and Plan of Treatment: non healing wound, now is better, complete or Bactrim x 6 days as ordered, follow up with podiatry outpatient      SECONDARY DISCHARGE DIAGNOSES  Diagnosis: Acute kidney injury superimposed on CKD  Assessment and Plan of Treatment: stable, follow up with your neprologist and your pcp within 1-2 weeks of discharge. Follow up with your pcp to determine when to resume your lasix which was not given in the hospital due to your kidney function    Diagnosis: Allergic drug reaction  Assessment and Plan of Treatment: resolved    Diagnosis: Anemia  Assessment and Plan of Treatment: anemia. follow up with your pcp to monitor your hemoglobin levels and determine why your blood count is low and monitor your blood counts    Diagnosis: DM2 (diabetes mellitus, type 2)  Assessment and Plan of Treatment: controlled, continue insulin as ordered, f/up with endocrine  adjusted Novolin N doses to 38 units in the AM, 24 units qhs to improve glycemic control at home, continue novolog scale AC (2 units for BG >150, 4 units 200-250, 6 units for 251-400. You must continue to eat three meals a day on this regimen to prevent hypoglycemia   -call PCP/Endocrine if BG >400, <70 or persistently over 200  -Recommend routine outpatient ophthalmology, podiatry and endocrinology follow up  -Follow up outpatient with Endocrinology faculty practice [facility] Physician    Diagnosis: HTN (hypertension)  Assessment and Plan of Treatment: controlled, continue current home meds

## 2022-10-17 NOTE — DIETITIAN INITIAL EVALUATION ADULT - NSICDXPASTMEDICALHX_GEN_ALL_CORE_FT
PAST MEDICAL HISTORY:  Diabetes type 2    Diabetic ulcer of heel     Glaucoma     Hypertension     Neuropathy     Obesity     PVD (peripheral vascular disease)     PVD (peripheral vascular disease)     Stage 4 chronic kidney disease

## 2022-10-17 NOTE — DIETITIAN INITIAL EVALUATION ADULT - REASON
Pt with no overt signs of muscle wasting or fat loss. Pt reports good appetite and PO intake PTA and in house; therefore, not an indicator.

## 2022-10-17 NOTE — DISCHARGE NOTE PROVIDER - PROVIDER TOKENS
PROVIDER:[TOKEN:[[id number]],FOLLOWUP:[1 week],ESTABLISHEDPATIENT:[T]] PROVIDER:[TOKEN:[[id number]],FOLLOWUP:[1 week],ESTABLISHEDPATIENT:[T]],PROVIDER:[TOKEN:[[id number]]],PROVIDER:[TOKEN:[[id number]]] PROVIDER:[TOKEN:[[id number]],FOLLOWUP:[1 week],ESTABLISHEDPATIENT:[T]],PROVIDER:[TOKEN:[[id number]]],PROVIDER:[TOKEN:[[id number]]],FREE:[LAST:[Endocrinology],PHONE:[(   )    -],FAX:[(   )    -],ADDRESS:[Endocrinology at [place].   [address] [place] [address]  Phone: [phone]  Fax: [phone]]]

## 2022-10-17 NOTE — DISCHARGE NOTE PROVIDER - NSDCFUADDAPPT_GEN_ALL_CORE_FT
Podiatry Discharge Instructions:  Follow up: Please follow up with Dr. Welch within 1 week of discharge from the hospital, please call [phone] for appointment and discuss that you recently were seen in the hospital.  Wound Care: Please apply santyl, 4x4 gauze and stephany to left foot daily.  Weight bearing: Please weight bear as tolerated in a surgical shoe to left foot  Antibiotics: Please continue as instructed.

## 2022-10-17 NOTE — DIETITIAN INITIAL EVALUATION ADULT - PERSON TAUGHT/METHOD
Briefly educated pt on Nutrition Therapy for T2DM, pt not fully interested at this time as she had just returned to floor from vascular at time of visit. Reinforced pairing carbohydrates with protein and portion sizes. Encouraged checking fingersticks regularly. Pt made aware RD remains available./verbal instruction/patient instructed

## 2022-10-17 NOTE — DIETITIAN INITIAL EVALUATION ADULT - NSFNSADHERENCEPTAFT_GEN_A_CORE
Pt reports she "tries to" follow consistent carbohydrate diet at home.   Pt reports taking insulin at home and checks fingersticks 3x/day with BG usually ~200s mg/dl.  A1c 9.6% (10/15/22) - indicates poor glycemic control.  Pt reports she "tries to" follow consistent carbohydrate diet at home.   Pt reports taking insulin at home and checks fingersticks 3x/day with BG usually ~200s mg/dl.  A1c 9.6% (10/15/22) - indicates poor glycemic control. Per previous RD note (12/29/20), pt with A1c 8.6% and "limited interest in implementing diet changes for DM."

## 2022-10-17 NOTE — DIETITIAN INITIAL EVALUATION ADULT - OTHER CALCULATIONS
Fluid needs deferred to team.   Estimated protein needs with consideration for pt with CKD stage ~3-4 per chart.

## 2022-10-17 NOTE — DISCHARGE NOTE PROVIDER - HOSPITAL COURSE
HPI:      81 yo Female, ambulatory with a walker, w/ DM Type 2 c/b diabetic neuropathy and multiple toes amputations, HTN, HLD, PVD s/p LLE angiogram 2018 , CKD stage 3~4 (Baseline crt ~2), multiple UTI 2/2 obstructive uropathy s/p ureteral stent placement n 12/2020, morbid Obesity, presenting with chronic non-healing ulcer at the left heel for IV antibiotics treatment. Recently, pt was prescribed augmentin for local soft tissue infection but then developed skin rash for two day exposure. Then she was switched to levaquin by her podiatrist who saw her today and recommended IV antibiotics for inadequate response. Pt denied pain in her left foot (because of diabetic neuropathy) but endorsed more pain and less pruritis on her skin rash located at the back and inner side of legs. Otherwise, pt denied fever, chill, N/V, headache, chest pain, constipation, diarrhea, no GI bleeding. Occasionally produces blood tinged urine during UTI episodes but no grossly hematuria. Pt unclear if ureteral stent has been removed or not but denied urinary trouble.     Pt had chronic non-healing wounds in both feet for more than 20 years likely 2/2 DM2, HTN, HLD, and PVD. She regularly follows up with her own podiatrist Dr. Bailey Hand. Per her nephew, her left heel wound was closed two weeks ago but then open again.     At the ED, Pt VSS, afebrile, mild leukocytosis, elevated Cr 3.69, elevated ESR/CRP, s/p left foot xray.  Podiatry saw pt and started work-up. S/P IV Vanco and Cefepime.      Hospital Course:    Podiatry eval L heel wound w/ wound cx obtained + MRSA +E raffinosus. ID was consulted recommended continuing vanc + cefe. MRI obtained - osteo. PT evaluated recommending QUAN. Given Cx, ID recommended continuing IV vanc+cefepime in patient and transition to bactrim on dc. HPI:      81 yo Female, ambulatory with a walker, w/ DM Type 2 c/b diabetic neuropathy and multiple toes amputations, HTN, HLD, PVD s/p LLE angiogram 2018 , CKD stage 3~4 (Baseline crt ~2), multiple UTI 2/2 obstructive uropathy s/p ureteral stent placement n 12/2020, morbid Obesity, presenting with chronic non-healing ulcer at the left heel for IV antibiotics treatment. Recently, pt was prescribed augmentin for local soft tissue infection but then developed skin rash for two day exposure. Then she was switched to levaquin by her podiatrist who saw her today and recommended IV antibiotics for inadequate response. Pt denied pain in her left foot (because of diabetic neuropathy) but endorsed more pain and less pruritis on her skin rash located at the back and inner side of legs. Otherwise, pt denied fever, chill, N/V, headache, chest pain, constipation, diarrhea, no GI bleeding. Occasionally produces blood tinged urine during UTI episodes but no grossly hematuria. Pt unclear if ureteral stent has been removed or not but denied urinary trouble.     Pt had chronic non-healing wounds in both feet for more than 20 years likely 2/2 DM2, HTN, HLD, and PVD. She regularly follows up with her own podiatrist Dr. Bailey Hand. Per her nephew, her left heel wound was closed two weeks ago but then open again.     At the ED, Pt VSS, afebrile, mild leukocytosis, elevated Cr 3.69, elevated ESR/CRP, s/p left foot xray.  Podiatry saw pt and started work-up. S/P IV Vanco and Cefepime. She will go home with po Bactrim ds 1 tab bid for 6 days  Medically cleared for discharge home today, spoke to Attending, EDDIE HPI:79 yo Female, ambulatory with a walker, w/ DM Type 2 c/b diabetic neuropathy and multiple toes amputations, HTN, HLD, PVD s/p LLE angiogram 2018 , CKD stage 3~4 (Baseline crt ~2), multiple UTI 2/2 obstructive uropathy s/p ureteral stent placement n 12/2020, morbid Obesity, presenting with chronic non-healing ulcer at the left heel for IV antibiotics treatment. Recently, pt was prescribed augmentin for local soft tissue infection but then developed skin rash for two day exposure. Then she was switched to levaquin by her podiatrist who saw her today and recommended IV antibiotics for inadequate response. Pt denied pain in her left foot (because of diabetic neuropathy) but endorsed more pain and less pruritis on her skin rash located at the back and inner side of legs. Otherwise, pt denied fever, chill, N/V, headache, chest pain, constipation, diarrhea, no GI bleeding. Occasionally produces blood tinged urine during UTI episodes but no grossly hematuria. Pt unclear if ureteral stent has been removed or not but denied urinary trouble.     Pt had chronic non-healing wounds in both feet for more than 20 years likely 2/2 DM2, HTN, HLD, and PVD. She regularly follows up with her own podiatrist Dr. Bailey Hand. Per her nephew, her left heel wound was closed two weeks ago but then open again.     At the ED, Pt VSS, afebrile, mild leukocytosis, elevated Cr 3.69, elevated ESR/CRP, s/p left foot xray.  Podiatry saw pt and started work-up. S/P IV Vanco and Cefepime. She will go home with po Bactrim ds 1 tab bid for 6 days per ID. Endocrinology consulted for T2DM, discharge recs appreciated.   Medically cleared for discharge home today, spoke to Attending, EDDIE

## 2022-10-17 NOTE — DISCHARGE NOTE PROVIDER - NPI NUMBER (FOR [employer]YS[employer] USE ONLY) :
[[id number]] [[id number]],[[id number]] [[id number]],[UNKNOWN]

## 2022-10-18 ENCOUNTER — TRANSCRIPTION ENCOUNTER (OUTPATIENT)
Age: 82
End: 2022-10-18

## 2022-10-18 VITALS
TEMPERATURE: 99 F | RESPIRATION RATE: 18 BRPM | SYSTOLIC BLOOD PRESSURE: 141 MMHG | DIASTOLIC BLOOD PRESSURE: 59 MMHG | OXYGEN SATURATION: 100 %

## 2022-10-18 LAB
ANION GAP SERPL CALC-SCNC: 15 MMOL/L — SIGNIFICANT CHANGE UP (ref 5–17)
BUN SERPL-MCNC: 60 MG/DL — HIGH (ref 7–23)
CALCIUM SERPL-MCNC: 9.1 MG/DL — SIGNIFICANT CHANGE UP (ref 8.4–10.5)
CHLORIDE SERPL-SCNC: 104 MMOL/L — SIGNIFICANT CHANGE UP (ref 96–108)
CO2 SERPL-SCNC: 20 MMOL/L — LOW (ref 22–31)
CREAT SERPL-MCNC: 3.23 MG/DL — HIGH (ref 0.5–1.3)
EGFR: 14 ML/MIN/1.73M2 — LOW
GLUCOSE BLDC GLUCOMTR-MCNC: 174 MG/DL — HIGH (ref 70–99)
GLUCOSE BLDC GLUCOMTR-MCNC: 252 MG/DL — HIGH (ref 70–99)
GLUCOSE BLDC GLUCOMTR-MCNC: 286 MG/DL — HIGH (ref 70–99)
GLUCOSE SERPL-MCNC: 245 MG/DL — HIGH (ref 70–99)
POTASSIUM SERPL-MCNC: 4.5 MMOL/L — SIGNIFICANT CHANGE UP (ref 3.5–5.3)
POTASSIUM SERPL-SCNC: 4.5 MMOL/L — SIGNIFICANT CHANGE UP (ref 3.5–5.3)
SODIUM SERPL-SCNC: 139 MMOL/L — SIGNIFICANT CHANGE UP (ref 135–145)
VANCOMYCIN TROUGH SERPL-MCNC: 18.6 UG/ML — SIGNIFICANT CHANGE UP (ref 10–20)

## 2022-10-18 PROCEDURE — 99239 HOSP IP/OBS DSCHRG MGMT >30: CPT

## 2022-10-18 PROCEDURE — U0003: CPT

## 2022-10-18 PROCEDURE — 82607 VITAMIN B-12: CPT

## 2022-10-18 PROCEDURE — 93923 UPR/LXTR ART STDY 3+ LVLS: CPT

## 2022-10-18 PROCEDURE — 97161 PT EVAL LOW COMPLEX 20 MIN: CPT

## 2022-10-18 PROCEDURE — 87186 SC STD MICRODIL/AGAR DIL: CPT

## 2022-10-18 PROCEDURE — 96374 THER/PROPH/DIAG INJ IV PUSH: CPT

## 2022-10-18 PROCEDURE — 83735 ASSAY OF MAGNESIUM: CPT

## 2022-10-18 PROCEDURE — 81001 URINALYSIS AUTO W/SCOPE: CPT

## 2022-10-18 PROCEDURE — 80202 ASSAY OF VANCOMYCIN: CPT

## 2022-10-18 PROCEDURE — 99232 SBSQ HOSP IP/OBS MODERATE 35: CPT

## 2022-10-18 PROCEDURE — 87077 CULTURE AEROBIC IDENTIFY: CPT

## 2022-10-18 PROCEDURE — 85730 THROMBOPLASTIN TIME PARTIAL: CPT

## 2022-10-18 PROCEDURE — 73718 MRI LOWER EXTREMITY W/O DYE: CPT

## 2022-10-18 PROCEDURE — 85025 COMPLETE CBC W/AUTO DIFF WBC: CPT

## 2022-10-18 PROCEDURE — 87640 STAPH A DNA AMP PROBE: CPT

## 2022-10-18 PROCEDURE — 36415 COLL VENOUS BLD VENIPUNCTURE: CPT

## 2022-10-18 PROCEDURE — 84100 ASSAY OF PHOSPHORUS: CPT

## 2022-10-18 PROCEDURE — 87040 BLOOD CULTURE FOR BACTERIA: CPT

## 2022-10-18 PROCEDURE — 85610 PROTHROMBIN TIME: CPT

## 2022-10-18 PROCEDURE — 84300 ASSAY OF URINE SODIUM: CPT

## 2022-10-18 PROCEDURE — 82746 ASSAY OF FOLIC ACID SERUM: CPT

## 2022-10-18 PROCEDURE — 87070 CULTURE OTHR SPECIMN AEROBIC: CPT

## 2022-10-18 PROCEDURE — 73630 X-RAY EXAM OF FOOT: CPT

## 2022-10-18 PROCEDURE — 84156 ASSAY OF PROTEIN URINE: CPT

## 2022-10-18 PROCEDURE — 82962 GLUCOSE BLOOD TEST: CPT

## 2022-10-18 PROCEDURE — U0005: CPT

## 2022-10-18 PROCEDURE — 85652 RBC SED RATE AUTOMATED: CPT

## 2022-10-18 PROCEDURE — 80048 BASIC METABOLIC PNL TOTAL CA: CPT

## 2022-10-18 PROCEDURE — 87205 SMEAR GRAM STAIN: CPT

## 2022-10-18 PROCEDURE — 85027 COMPLETE CBC AUTOMATED: CPT

## 2022-10-18 PROCEDURE — 83550 IRON BINDING TEST: CPT

## 2022-10-18 PROCEDURE — 86140 C-REACTIVE PROTEIN: CPT

## 2022-10-18 PROCEDURE — 99285 EMERGENCY DEPT VISIT HI MDM: CPT

## 2022-10-18 PROCEDURE — 80053 COMPREHEN METABOLIC PANEL: CPT

## 2022-10-18 PROCEDURE — 76770 US EXAM ABDO BACK WALL COMP: CPT

## 2022-10-18 PROCEDURE — 83540 ASSAY OF IRON: CPT

## 2022-10-18 PROCEDURE — 87641 MR-STAPH DNA AMP PROBE: CPT

## 2022-10-18 PROCEDURE — 83036 HEMOGLOBIN GLYCOSYLATED A1C: CPT

## 2022-10-18 PROCEDURE — 84540 ASSAY OF URINE/UREA-N: CPT

## 2022-10-18 PROCEDURE — 82570 ASSAY OF URINE CREATININE: CPT

## 2022-10-18 RX ORDER — MUPIROCIN 20 MG/G
1 OINTMENT TOPICAL
Qty: 1 | Refills: 0
Start: 2022-10-18 | End: 2022-10-27

## 2022-10-18 RX ORDER — HUMAN INSULIN 100 [IU]/ML
24 INJECTION, SUSPENSION SUBCUTANEOUS
Qty: 0 | Refills: 0 | DISCHARGE

## 2022-10-18 RX ORDER — CALAMINE AND ZINC OXIDE AND PHENOL 160; 10 MG/ML; MG/ML
1 LOTION TOPICAL
Qty: 5 | Refills: 0
Start: 2022-10-18 | End: 2022-11-16

## 2022-10-18 RX ORDER — FAMOTIDINE 10 MG/ML
1 INJECTION INTRAVENOUS
Qty: 0 | Refills: 0 | DISCHARGE
Start: 2022-10-18

## 2022-10-18 RX ORDER — HYDROCORTISONE 1 %
1 OINTMENT (GRAM) TOPICAL
Qty: 1 | Refills: 0
Start: 2022-10-18 | End: 2022-11-16

## 2022-10-18 RX ORDER — HUMAN INSULIN 100 [IU]/ML
38 INJECTION, SUSPENSION SUBCUTANEOUS
Qty: 0 | Refills: 0 | DISCHARGE

## 2022-10-18 RX ORDER — HYDROCORTISONE 1 %
1 OINTMENT (GRAM) TOPICAL
Qty: 14 | Refills: 0
Start: 2022-10-18 | End: 2022-10-24

## 2022-10-18 RX ORDER — AZTREONAM 2 G
1 VIAL (EA) INJECTION
Qty: 12 | Refills: 0
Start: 2022-10-18 | End: 2022-10-23

## 2022-10-18 RX ORDER — HUMAN INSULIN 100 [IU]/ML
40 INJECTION, SUSPENSION SUBCUTANEOUS
Qty: 0 | Refills: 0 | DISCHARGE

## 2022-10-18 RX ORDER — HUMAN INSULIN 100 [IU]/ML
2 INJECTION, SUSPENSION SUBCUTANEOUS
Qty: 0 | Refills: 0 | DISCHARGE

## 2022-10-18 RX ORDER — TIMOLOL 0.5 %
1 DROPS OPHTHALMIC (EYE)
Qty: 0 | Refills: 0 | DISCHARGE
Start: 2022-10-18

## 2022-10-18 RX ORDER — FUROSEMIDE 40 MG
0 TABLET ORAL
Qty: 0 | Refills: 0 | DISCHARGE

## 2022-10-18 RX ORDER — ENOXAPARIN SODIUM 100 MG/ML
24 INJECTION SUBCUTANEOUS
Qty: 2 | Refills: 0
Start: 2022-10-18 | End: 2022-11-16

## 2022-10-18 RX ORDER — HUMAN INSULIN 100 [IU]/ML
30 INJECTION, SUSPENSION SUBCUTANEOUS
Qty: 0 | Refills: 0 | DISCHARGE

## 2022-10-18 RX ORDER — FAMOTIDINE 10 MG/ML
1 INJECTION INTRAVENOUS
Qty: 30 | Refills: 0
Start: 2022-10-18 | End: 2022-11-16

## 2022-10-18 RX ORDER — LABETALOL HCL 100 MG
0 TABLET ORAL
Qty: 0 | Refills: 0 | DISCHARGE

## 2022-10-18 RX ADMIN — Medication 100 MILLIGRAM(S): at 17:32

## 2022-10-18 RX ADMIN — Medication 1 DROP(S): at 12:25

## 2022-10-18 RX ADMIN — Medication 1 APPLICATION(S): at 06:15

## 2022-10-18 RX ADMIN — Medication 10 UNIT(S): at 16:47

## 2022-10-18 RX ADMIN — CALAMINE AND ZINC OXIDE AND PHENOL 1 APPLICATION(S): 160; 10 LOTION TOPICAL at 17:25

## 2022-10-18 RX ADMIN — MUPIROCIN 1 APPLICATION(S): 20 OINTMENT TOPICAL at 17:25

## 2022-10-18 RX ADMIN — CEFEPIME 100 MILLIGRAM(S): 1 INJECTION, POWDER, FOR SOLUTION INTRAMUSCULAR; INTRAVENOUS at 12:26

## 2022-10-18 RX ADMIN — Medication 10 UNIT(S): at 12:21

## 2022-10-18 RX ADMIN — FAMOTIDINE 20 MILLIGRAM(S): 10 INJECTION INTRAVENOUS at 12:23

## 2022-10-18 RX ADMIN — Medication 1 TABLET(S): at 12:23

## 2022-10-18 RX ADMIN — Medication 1 APPLICATION(S): at 17:25

## 2022-10-18 RX ADMIN — Medication 3: at 08:30

## 2022-10-18 RX ADMIN — Medication 1: at 16:47

## 2022-10-18 RX ADMIN — Medication 10 UNIT(S): at 08:30

## 2022-10-18 RX ADMIN — Medication 650 MILLIGRAM(S): at 17:32

## 2022-10-18 RX ADMIN — HEPARIN SODIUM 5000 UNIT(S): 5000 INJECTION INTRAVENOUS; SUBCUTANEOUS at 06:11

## 2022-10-18 RX ADMIN — CALAMINE AND ZINC OXIDE AND PHENOL 1 APPLICATION(S): 160; 10 LOTION TOPICAL at 06:23

## 2022-10-18 RX ADMIN — MUPIROCIN 1 APPLICATION(S): 20 OINTMENT TOPICAL at 06:13

## 2022-10-18 RX ADMIN — Medication 650 MILLIGRAM(S): at 06:21

## 2022-10-18 RX ADMIN — Medication 3: at 12:21

## 2022-10-18 RX ADMIN — CHLORHEXIDINE GLUCONATE 1 APPLICATION(S): 213 SOLUTION TOPICAL at 09:05

## 2022-10-18 RX ADMIN — Medication 100 MILLIGRAM(S): at 06:20

## 2022-10-18 RX ADMIN — Medication 1 APPLICATION(S): at 09:05

## 2022-10-18 RX ADMIN — Medication 1 APPLICATION(S): at 06:28

## 2022-10-18 NOTE — DISCHARGE NOTE NURSING/CASE MANAGEMENT/SOCIAL WORK - PATIENT PORTAL LINK FT
You can access the FollowMyHealth Patient Portal offered by [facility] by registering at the following website: [email or web address]. By joining [facility]’s FollowMyHealth portal, you will also be able to view your health information using other applications (apps) compatible with our system.

## 2022-10-18 NOTE — PROGRESS NOTE ADULT - REASON FOR ADMISSION
left foot ulcer

## 2022-10-18 NOTE — PROGRESS NOTE ADULT - PROBLEM SELECTOR PLAN 5
Active and not well controlled ( the most recent A1C 8.6 in 2020), c/b diabetic nephropathy and neuropathy  - home insulin regimen: 34 units NPH qam, 22 units NPH qhs. Not on oral DM meds.  On sliding scale  - Re-check A1C 9.6 10/15  - Start home dose of NPH and sliding scale   - Goal < 180  - endocrine recs appreciated, will discharge with Novolin N 38 units in the AM, 24 units qhs to improve glycemic control at home, can continue novolog scale AC (2 units for BG >150, 4 units 200-250)  - discussed with daughter in law Tayler  - f/u house calls and also provided endocrinology info for outpt follow up

## 2022-10-18 NOTE — PROGRESS NOTE ADULT - SUBJECTIVE AND OBJECTIVE BOX
CC: F/U wound infection    Saw/spoke to patient. Unchanged. No new events.    Allergies  Augmentin (Rash)    ANTIMICROBIALS:  cefepime   IVPB 1000 every 24 hours    PE:    Vital Signs Last 24 Hrs  T(C): 37.2 (18 Oct 2022 09:48), Max: 37.4 (17 Oct 2022 23:11)  T(F): 99 (18 Oct 2022 09:48), Max: 99.4 (17 Oct 2022 23:11)  HR: 85 (18 Oct 2022 09:11) (81 - 87)  BP: 114/45 (18 Oct 2022 09:11) (114/45 - 144/43)  RR: 20 (18 Oct 2022 09:11) (18 - 20)  SpO2: 99% (18 Oct 2022 09:11) (94% - 99%)    Gen: AOx3, NAD, non-toxic  CV: Nontachycardic  Resp: Breathing comfortably, RA  Abd: Soft, nontender  IV/Skin: No thrombophlebitis    LABS:                        8.6    8.77  )-----------( 147      ( 17 Oct 2022 11:37 )             28.1     10-18    139  |  104  |  60<H>  ----------------------------<  245<H>  4.5   |  20<L>  |  3.23<H>    Ca    9.1      18 Oct 2022 07:17  Phos  2.9     10-17  Mg     1.9     10-17    Urinalysis Basic - ( 17 Oct 2022 03:29 )    Color: Yellow / Appearance: Slightly Turbid / S.017 / pH: x  Gluc: x / Ketone: Negative  / Bili: Negative / Urobili: <2 mg/dL   Blood: x / Protein: 100 mg/dL / Nitrite: Negative   Leuk Esterase: Large / RBC: 9 /HPF /  /HPF   Sq Epi: x / Non Sq Epi: 1 /HPF / Bacteria: Moderate    MICROBIOLOGY:  Vancomycin Level, Trough: 18.6 ug/mL (10-18-22 @ 07:20)    .Blood Blood-Peripheral  10-16-22   No growth to date.  --  --    .Abscess L foot wound culture  10-14-22   Numerous Methicillin Resistant Staphylococcus aureus  Numerous Enterococcus raffinosus  Numerous Bacteroides fragilis "Susceptibilities not performed"  --  Methicillin resistant Staphylococcus aureus  Enterococcus raffinosus    (otherwise reviewed)    RADIOLOGY:    10/15 MR:    IMPRESSION:  No MR evidence of acute osteomyelitis.

## 2022-10-18 NOTE — DISCHARGE NOTE NURSING/CASE MANAGEMENT/SOCIAL WORK - NSDCVIVACCINE_GEN_ALL_CORE_FT
Triage Note: pt arrives via EMS from [facility] for a fever. Pt received tylenol PTA and EMS reports fever of 100.3F. Pt is at baseline with GCS of 11.
No Vaccines Administered.

## 2022-10-18 NOTE — PROGRESS NOTE ADULT - SUBJECTIVE AND OBJECTIVE BOX
seen earlier today     Chief Complaint: Type 2 Diabetes Mellitus     INTERVAL HX: reports tolerating po discharge home today , reports she feels well & wants to continue her current home regimen does not want to change to once a day basal       Review of Systems:  General: As above  Cardiovascular: No chest pain  Respiratory: No SOB  GI: No nausea, vomiting  Endocrine: no  S&Sx of hypoglycemia    Allergies    Augmentin (Rash)    Intolerances      MEDICATIONS  (STANDING):  atorvastatin 10 milliGRAM(s) Oral at bedtime  calamine/zinc oxide Lotion 1 Application(s) Topical two times a day  cefepime   IVPB 1000 milliGRAM(s) IV Intermittent every 24 hours  chlorhexidine 2% Cloths 1 Application(s) Topical <User Schedule>  clobetasol 0.05% Ointment 1 Application(s) Topical two times a day  collagenase Ointment 1 Application(s) Topical two times a day  dextrose 5%. 1000 milliLiter(s) (50 mL/Hr) IV Continuous <Continuous>  dextrose 5%. 1000 milliLiter(s) (100 mL/Hr) IV Continuous <Continuous>  dextrose 50% Injectable 25 Gram(s) IV Push once  dextrose 50% Injectable 12.5 Gram(s) IV Push once  dextrose 50% Injectable 25 Gram(s) IV Push once  famotidine    Tablet 20 milliGRAM(s) Oral daily  glucagon  Injectable 1 milliGRAM(s) IntraMuscular once  heparin   Injectable 5000 Unit(s) SubCutaneous every 12 hours  hydrocortisone 2.5% Ointment 1 Application(s) Topical two times a day  insulin glargine Injectable (LANTUS) 26 Unit(s) SubCutaneous at bedtime  insulin lispro (ADMELOG) corrective regimen sliding scale   SubCutaneous three times a day before meals  insulin lispro (ADMELOG) corrective regimen sliding scale   SubCutaneous at bedtime  insulin lispro Injectable (ADMELOG) 10 Unit(s) SubCutaneous three times a day before meals  labetalol 100 milliGRAM(s) Oral two times a day  mupirocin 2% Nasal 1 Application(s) Both Nostrils two times a day  Nephro-supriya 1 Tablet(s) Oral daily  polyethylene glycol 3350 17 Gram(s) Oral two times a day  sodium bicarbonate 650 milliGRAM(s) Oral two times a day  tamsulosin 0.4 milliGRAM(s) Oral at bedtime  timolol 0.5% Solution 1 Drop(s) Both EYES daily      atorvastatin   10 milliGRAM(s) Oral (10-17-22 @ 21:18)    insulin glargine Injectable (LANTUS)   26 Unit(s) SubCutaneous (10-17-22 @ 22:23)    insulin lispro (ADMELOG) corrective regimen sliding scale   3 Unit(s) SubCutaneous (10-18-22 @ 12:21)   3  SubCutaneous (10-18-22 @ 08:30)   2 Unit(s) SubCutaneous (10-17-22 @ 17:36)   1 Unit(s) SubCutaneous (10-17-22 @ 14:00)    insulin lispro Injectable (ADMELOG)   8 Unit(s) SubCutaneous (10-17-22 @ 17:37)   8 Unit(s) SubCutaneous (10-17-22 @ 13:59)    insulin lispro Injectable (ADMELOG)   10 Unit(s) SubCutaneous (10-18-22 @ 12:21)   10 Unit(s) SubCutaneous (10-18-22 @ 08:30)        PHYSICAL EXAM:  VITALS: T(C): 37.2 (10-18-22 @ 09:48)  T(F): 99 (10-18-22 @ 09:48), Max: 99.4 (10-17-22 @ 23:11)  HR: 85 (10-18-22 @ 09:11) (81 - 87)  BP: 114/45 (10-18-22 @ 09:11) (114/45 - 144/43)  RR:  (18 - 20)  SpO2:  (94% - 99%)  Wt(kg): --  GENERAL: female laying in bed in NAD  Respiratory: Respirations unlabored   Extremities: Warm, LE chronic changes Right toe  amps   NEURO: Alert , appropriate       LABS:    POCT Blood Glucose.: 286 mg/dL (10-18-22 @ 11:46)  POCT Blood Glucose.: 252 mg/dL (10-18-22 @ 08:13)  POCT Blood Glucose.: 220 mg/dL (10-17-22 @ 21:48)  POCT Blood Glucose.: 247 mg/dL (10-17-22 @ 17:19)  POCT Blood Glucose.: 178 mg/dL (10-17-22 @ 13:56)  POCT Blood Glucose.: 149 mg/dL (10-17-22 @ 07:47)  POCT Blood Glucose.: 106 mg/dL (10-16-22 @ 21:23)  POCT Blood Glucose.: 183 mg/dL (10-16-22 @ 16:33)  POCT Blood Glucose.: 226 mg/dL (10-16-22 @ 11:59)  POCT Blood Glucose.: 226 mg/dL (10-16-22 @ 08:12)  POCT Blood Glucose.: 283 mg/dL (10-15-22 @ 21:20)  POCT Blood Glucose.: 285 mg/dL (10-15-22 @ 17:54)                            8.6    8.77  )-----------( 147      ( 17 Oct 2022 11:37 )             28.1       10-18    139  |  104  |  60<H>  ----------------------------<  245<H>  4.5   |  20<L>  |  3.23<H>    Ca    9.1      18 Oct 2022 07:17  Phos  2.9     10-17  Mg     1.9     10-17        eGFR: 14 mL/min/1.73m2 (18 Oct 2022 07:17)          Thyroid Function Tests:          A1C with Estimated Average Glucose Result: 9.6 % (10-15-22 @ 06:32)      Estimated Average Glucose: 229 mg/dL (10-15-22 @ 06:32)

## 2022-10-18 NOTE — PROGRESS NOTE ADULT - PROBLEM SELECTOR PROBLEM 3
DM2 (diabetes mellitus, type 2)
DM2 (diabetes mellitus, type 2)
Allergic drug reaction

## 2022-10-18 NOTE — DISCHARGE NOTE NURSING/CASE MANAGEMENT/SOCIAL WORK - NSDCPEFALRISK_GEN_ALL_CORE
For information on Fall & Injury Prevention, visit: [email or web address] OR  [email or web address] OR  https://www.cdc.gov/steadi/patient.html

## 2022-10-18 NOTE — DISCHARGE NOTE NURSING/CASE MANAGEMENT/SOCIAL WORK - BRAND OF COVID-19 VACCINATION
Kit pt states she got ina vaccine but does  not remember a date and does not know of anyone to find out the date/Other

## 2022-10-18 NOTE — PROGRESS NOTE ADULT - PROBLEM SELECTOR PLAN 2
Active with Cr 3.7 (baseline ~2), BUN/Cr <20,  likely 2/2 intra-renal and or postrenal etiologies  - renal u/s neg for obstructive process, Cr stable  - hold lasix iso ARABELLA

## 2022-10-18 NOTE — PROGRESS NOTE ADULT - PROBLEM SELECTOR PLAN 9
F: 500cc bolus  E: as needed  N: DASH/TLC renal diet    DVT: HSQ    Dispo: PT recommended QUAN but patient wants to go home  Code status: DNI or DNI/DNR or DNR (has a form signed)    Discussed care with daughter in law Tayler, ACP team

## 2022-10-18 NOTE — DISCHARGE NOTE NURSING/CASE MANAGEMENT/SOCIAL WORK - NSDCFUADDAPPT_GEN_ALL_CORE_FT
Podiatry Discharge Instructions:  Follow up: Please follow up with Dr. Welch within 1 week of discharge from the hospital, please call [phone] for appointment and discuss that you recently were seen in the hospital.  Wound Care: Please apply santyl, 4x4 gauze and stephany to left foot daily.  Weight bearing: Please weight bear as tolerated in a surgical shoe to left foot  Antibiotics: Please continue as instructed.

## 2022-10-18 NOTE — PROGRESS NOTE ADULT - PROBLEM SELECTOR PROBLEM 1
HTN (hypertension)
HTN (hypertension)
Non-healing open wound of left heel

## 2022-10-18 NOTE — PROGRESS NOTE ADULT - SUBJECTIVE AND OBJECTIVE BOX
PROGRESS NOTE:   Authoted by Dr. Chris Gonsalves MD, Available on MS Teams    Patient is a 81y old  Female who presents with a chief complaint of left foot ulcer (18 Oct 2022 13:30)      SUBJECTIVE / OVERNIGHT EVENTS: Patient feels okay. No chest pain or shortness of breath. No nausea, vomiting, abdominal pain, or diarrhea.     ADDITIONAL REVIEW OF SYSTEMS:    MEDICATIONS  (STANDING):  atorvastatin 10 milliGRAM(s) Oral at bedtime  calamine/zinc oxide Lotion 1 Application(s) Topical two times a day  cefepime   IVPB 1000 milliGRAM(s) IV Intermittent every 24 hours  chlorhexidine 2% Cloths 1 Application(s) Topical <User Schedule>  clobetasol 0.05% Ointment 1 Application(s) Topical two times a day  collagenase Ointment 1 Application(s) Topical two times a day  dextrose 5%. 1000 milliLiter(s) (50 mL/Hr) IV Continuous <Continuous>  dextrose 5%. 1000 milliLiter(s) (100 mL/Hr) IV Continuous <Continuous>  dextrose 50% Injectable 25 Gram(s) IV Push once  dextrose 50% Injectable 12.5 Gram(s) IV Push once  dextrose 50% Injectable 25 Gram(s) IV Push once  famotidine    Tablet 20 milliGRAM(s) Oral daily  glucagon  Injectable 1 milliGRAM(s) IntraMuscular once  heparin   Injectable 5000 Unit(s) SubCutaneous every 12 hours  hydrocortisone 2.5% Ointment 1 Application(s) Topical two times a day  insulin glargine Injectable (LANTUS) 26 Unit(s) SubCutaneous at bedtime  insulin lispro (ADMELOG) corrective regimen sliding scale   SubCutaneous three times a day before meals  insulin lispro (ADMELOG) corrective regimen sliding scale   SubCutaneous at bedtime  insulin lispro Injectable (ADMELOG) 10 Unit(s) SubCutaneous three times a day before meals  labetalol 100 milliGRAM(s) Oral two times a day  mupirocin 2% Nasal 1 Application(s) Both Nostrils two times a day  Nephro-supriya 1 Tablet(s) Oral daily  polyethylene glycol 3350 17 Gram(s) Oral two times a day  sodium bicarbonate 650 milliGRAM(s) Oral two times a day  tamsulosin 0.4 milliGRAM(s) Oral at bedtime  timolol 0.5% Solution 1 Drop(s) Both EYES daily    MEDICATIONS  (PRN):  acetaminophen     Tablet .. 650 milliGRAM(s) Oral every 6 hours PRN Temp greater or equal to 38C (100.4F), Mild Pain (1 - 3)  dextrose Oral Gel 15 Gram(s) Oral once PRN Blood Glucose LESS THAN 70 milliGRAM(s)/deciliter  loratadine 10 milliGRAM(s) Oral daily PRN ITCH  magnesium hydroxide Suspension 30 milliLiter(s) Oral daily PRN Constipation  melatonin 3 milliGRAM(s) Oral at bedtime PRN Insomnia  ondansetron Injectable 4 milliGRAM(s) IV Push every 8 hours PRN Nausea and/or Vomiting      CAPILLARY BLOOD GLUCOSE      POCT Blood Glucose.: 286 mg/dL (18 Oct 2022 11:46)  POCT Blood Glucose.: 252 mg/dL (18 Oct 2022 08:13)  POCT Blood Glucose.: 220 mg/dL (17 Oct 2022 21:48)  POCT Blood Glucose.: 247 mg/dL (17 Oct 2022 17:19)    I&O's Summary    17 Oct 2022 07:  -  18 Oct 2022 07:00  --------------------------------------------------------  IN: 350 mL / OUT: 600 mL / NET: -250 mL    18 Oct 2022 07:01  -  18 Oct 2022 14:49  --------------------------------------------------------  IN: 320 mL / OUT: 0 mL / NET: 320 mL        PHYSICAL EXAM:  Vital Signs Last 24 Hrs  T(C): 37.2 (18 Oct 2022 09:48), Max: 37.4 (17 Oct 2022 23:11)  T(F): 99 (18 Oct 2022 09:48), Max: 99.4 (17 Oct 2022 23:11)  HR: 85 (18 Oct 2022 09:11) (81 - 87)  BP: 114/45 (18 Oct 2022 09:11) (114/45 - 144/43)  BP(mean): --  RR: 20 (18 Oct 2022 09:11) (18 - 20)  SpO2: 99% (18 Oct 2022 09:11) (94% - 99%)    Parameters below as of 18 Oct 2022 06:26  Patient On (Oxygen Delivery Method): room air      CONSTITUTIONAL: NAD, well-developed  RESPIRATORY: Normal respiratory effort; lungs are clear to auscultation bilaterally  CARDIOVASCULAR: Regular rate and rhythm, normal S1 and S2, no murmur/rub/gallop; No lower extremity edema  ABDOMEN: Nontender to palpation, normoactive bowel sounds, no rebound/guarding  MUSCLOSKELETAL: no clubbing or cyanosis of digits; no joint swelling or tenderness to palpation; L foot bandaged   PSYCH: A+O to person, place, and time; affect appropriate    LABS:                        8.6    8.77  )-----------( 147      ( 17 Oct 2022 11:37 )             28.1     10-18    139  |  104  |  60<H>  ----------------------------<  245<H>  4.5   |  20<L>  |  3.23<H>    Ca    9.1      18 Oct 2022 07:17  Phos  2.9     10-17  Mg     1.9     10-17            Urinalysis Basic - ( 17 Oct 2022 03:29 )    Color: Yellow / Appearance: Slightly Turbid / S.017 / pH: x  Gluc: x / Ketone: Negative  / Bili: Negative / Urobili: <2 mg/dL   Blood: x / Protein: 100 mg/dL / Nitrite: Negative   Leuk Esterase: Large / RBC: 9 /HPF /  /HPF   Sq Epi: x / Non Sq Epi: 1 /HPF / Bacteria: Moderate        Culture - Blood (collected 16 Oct 2022 08:58)  Source: .Blood Blood-Peripheral  Preliminary Report (17 Oct 2022 13:02):    No growth to date.

## 2022-10-18 NOTE — PROGRESS NOTE ADULT - PROBLEM SELECTOR PLAN 1
L heel wound likely 2/2 chronic and recurrent diabetic/pressure/arterial foot ulcer superimposed with soft tissue infection. Left foot xray without concern for OM. Received x1 vanc and cefepime.   - CRP elevated  - Wound culture and MRSA/MSSA PCR sent  - podiatry consult       - c/w vanc and cefepime now f/u culture      - switched to bactrim for discharge to complete total 10 days of abx      - MUSHTAQ/PVR lower extremity arterial vascular disease is NOT identified      - Ordered L foot MRI ordered to r/o OM      - santyl for L foot wound      - POD plan local wound care pending L foot MRI/MUSHTAQ/PVRs

## 2022-10-18 NOTE — PROGRESS NOTE ADULT - PROBLEM SELECTOR PLAN 8
Clinically stable with the most recent US studies in 2020 showed MUSHTAQ left < 0.9, s/p revascularization x2   - MUSHTAQ/PVR lower extremity arterial vascular disease is NOT identified

## 2022-10-18 NOTE — PROGRESS NOTE ADULT - PROBLEM SELECTOR PROBLEM 2
HLD (hyperlipidemia)
HLD (hyperlipidemia)
Acute kidney injury superimposed on CKD

## 2022-10-18 NOTE — PROGRESS NOTE ADULT - ASSESSMENT
79 yo F with DM2, multiple toe amputation, PVD, multiple UTIs with L heel wound  Leukocytosis, no fever  Chronic L heel wound cared for by podiatry as outpatient  Concern for worsening wound, sent in for further care  Was on augmentin but had allergy, transitioned to levofloxacin  MR to heel without signs OM  BCX, Wound culture MRSA and Enterococcus--doubt contributions from enterococcus  Overall, wound infection, leukocytosis, DM ulcer  - Cefepime 1g q 24  - Vanco by level (check daily levels, redose when <15)  - When DC planning, would send out on Bactrim DS 1 tab q 12 (monitor CrCl while admitted to ensure no significant changes at time of discharge)  - Wound care per podiatry/follow up podiatry  - Low suspicion for OM based on MRI  - Trend WBC to normal    Shade Palmer MD  Contact on TEAMS messaging from 9am - 5pm  From 5pm-9am, on weekends, or if no response call [phone]
79 yo F with DM2, multiple toe amputation, PVD, multiple UTIs with L heel wound  Leukocytosis, no fever  Chronic L heel wound cared for by podiatry as outpatient  Concern for worsening wound, sent in for further care  Was on augmentin but had allergy, transitioned to levofloxacin  MR to heel without signs OM  BCX, Wound culture MRSA and Enterococcus--doubt contributions from enterococcus  Overall, wound infection, leukocytosis, DM ulcer  - Cefepime 1g q 24  - Vanco by level (check daily levels, redose when <15)  - When DC planning, would send out on Bactrim DS 1 tab q 12 to complete a total of 10 days including IV days (monitor CrCl while admitted to ensure no significant changes at time of discharge)  - Wound care per podiatry/follow up podiatry  - Low suspicion for OM based on MRI  - Trend WBC to normal    Shade Palmer MD  Contact on TEAMS messaging from 9am - 5pm  From 5pm-9am, on weekends, or if no response call [phone]
81M with L heel wound   - Patient seen and evaluated   - Afebrile, WBC 12.8  - Left medial heel wound to subQ w central eschar, no malodor, no fluctuance, no periwound erythema, no warm to touch, no drainage or pus noted, etiology 2/2 to pressure and DM. R foot with no open wounds and no clinical signs of infection.   - Left foot xray: no OM, no gas  - Left foot wound culture pending  - Continue IV Vanco and Cefepime   - MUSHTAQ/PVRs pending, if abnormal recommend vascular consult  - L foot MRI pending  - Pod plan local wound care pending L foot MRI/MUSHTAQ/PVRs  - Discussed with attending 
80 yo Female, ambulatory with a walker, w/ DM Type 2 c/b diabetic neuropathy and multiple toes amputations, HTN, HLD, PVD s/p LLE angiogram 2018 , CKD stage 3~4 (Baseline crt ~2), multiple UTI 2/2 obstructive uropathy s/p ureteral stent placement n 12/2020, morbid Obesity, presenting with chronic non-healing ulcer at the left heel for IV antibiotics treatment. Endocrine consulted for DM2.    T2DM with hyperglycemia  Last A1c: 9.6  Endocrinologist: PCP only  Home DM meds: NovoLIN N 34 units sq qAM , 22 units sq qHS + NovoLOG Moderate correction scale (2 units for BG >150, 4 units BG >200 etc....)   -increase Lantus 26 units at bedtime. DO NOT HOLD IF NPO.  -increase Admelog 8 units TID pre-meal. HOLD IF NPO.  -Use low dose Admelog correction scale pre-meal  -Use low dose Admelog correction scale at bedtime  -Fingerstick BG before meals and bedtime  -Goal -180  -Carbohydrate consistent diet  -RD consult  - change abx carrier as able , currently in D5  Discharge plan:  -Likely to discharge patient home on current regimen versus basal/bolus insulin. Final regimen pending clinical course. Patient hesitant to change regimen as she has been on this regimen for years. Aides help with medications   -Recommend routine outpatient ophthalmology, podiatry and endocrinology f/u.  -Patient can f/u outpatient with Endocrinology faculty practice [facility]: Endocrinology at [place].   [address] [place] [address], [address]  [place] [address]  Phone: [phone]  Fax: [phone]    HTN  -Management per primary team.    HLD  -On atorvastatin 10mg daily    Discussed with patient and primary  team  Dr Ricketts via teams   Contact via Microsoft Teams during business hours  On evenings and weekends, please call [phone] or page endocrine fellow on call.   Email: [email or web address]   Please note that this patient may be followed by different provider tomorrow.    greater than 50% of the encounter was spent counseling and/or coordination of care.  26 minutes spent on total encounter; The necessity of the time spent during the encounter on this date of service was due to development of plan of care/coordination of care/glycemic control through review of labs, blood glucose values and vital signs. 
81M with L heel wound     - Patient seen and evaluated   - Afebrile, leukocytosis resolved  - Left medial heel wound to subQ w central eschar, no malodor, no fluctuance, periwound erythema resolvnig   - Cont daily local wound care with Santyl/ DSD  - L foot MR negative for acute OM or abscess   - Left foot wound Cx prelim staph/E. raffinosus  - ID recs appreciated  - Stable for discharge from pod standpoint pending final ID recommendations  - to f/u with Dr. Ruiz or Dr. Welch within 7 days of discharge [phone]  
81M with L heel wound   - Patient seen and evaluated   - Afebrile, no leukocytosis  - Left medial heel wound to subQ w central eschar, no malodor, no fluctuance, no periwound erythema, no warm to touch, no drainage or pus noted, etiology 2/2 to pressure and DM. R foot with no open wounds and no clinical signs of infection.   - Left foot xray: no OM, no gas  - Left foot wound culture pending  - Continue IV Vanco and Cefepime   - L foot MRI shows no OM  - recommend ID consult  - Pod stable for discharge on PO antibiotics per ID recs  - Discussed with attending   
82 yo Female, ambulatory with a walker, w/ DM Type 2 c/b diabetic neuropathy and multiple toes amputations, HTN, HLD, PVD s/p LLE angiogram 2018 , CKD stage 3~4 (Baseline crt ~2), multiple UTI 2/2 obstructive uropathy s/p ureteral stent placement n 12/2020, morbid Obesity, presenting with chronic non-healing ulcer at the left heel for IV antibiotics treatment. Endocrine consulted for DM2.    T2DM with hyperglycemia  Last A1c: 9.6  Endocrinologist: PCP only  Home DM meds: NovoLIN N 34 units sq qAM , 22 units sq qHS + NovoLOG Moderate correction scale (2 units for BG >150, 4 units BG >200 etc....)   -increase Lantus 26 units at bedtime. DO NOT HOLD IF NPO.  -increase Admelog 8 units TID pre-meal. HOLD IF NPO.  -Use low dose Admelog correction scale pre-meal  -Use low dose Admelog correction scale at bedtime  -Fingerstick BG before meals and bedtime  -Goal -180  -Carbohydrate consistent diet  -RD consult  - change abx carrier as able , currently in D5  Discharge plan:  -Recommend changing to basal bolus (glargine & lispro) Pt is NOT amenable to changing regimen at this time Patient hesitant to change regimen as she has been on this regimen for years. Aides help with medications    f/u with PCP for further dose adjustments   TDD over past 24 hours 62 units with BG above goal, recommend adjusting Novolin N doses to  38 units in the AM , 24 units qhs to improve glycemic control at home, can continue novolog scale AC (2 units for BG >150, 4 units 200-250... etc); pt must continue to eat three meals a day on this regimen to prevent hypoglycemia   -call PCP/Endocrine if BG >400, <70 or persistently over 200  -Recommend routine outpatient ophthalmology, podiatry and endocrinology f/u.  -Patient can f/u outpatient with Endocrinology faculty practice [facility]: Endocrinology at [place].   [address] [place] [address], [address]  [place] [address]  Phone: [phone]  Fax: [phone]    HTN  -Management per primary team.    HLD  -On atorvastatin 10mg daily    Discussed with patient and primary  team  Dr Nayeem  Contact via Microsoft Teams during business hours  On evenings and weekends, please call [phone] or page endocrine fellow on call.   Email: [email or web address]   Please note that this patient may be followed by different provider tomorrow.    greater than 50% of the encounter was spent counseling and/or coordination of care.  27 minutes spent on total encounter; The necessity of the time spent during the encounter on this date of service was due to development of plan of care/coordination of care/glycemic control through review of labs, blood glucose values and vital signs. 
79 yo Female, ambulatory with a walker, w/ DM Type 2 c/b diabetic neuropathy and multiple toes amputations, HTN, HLD, PVD s/p LLE angiogram 2018 , CKD stage 3~4 (Baseline crt ~2), multiple UTI 2/2 obstructive uropathy s/p ureteral stent placement n 12/2020, morbid Obesity, presenting with chronic non-healing ulcer at the left heel for IV antibiotics treatment s/p recent adverse reaction to augmentin and then inadequate response to Levaquin  per pt's private podiatrist. Admitted for inpatient treatment for soft tissue infection, work up for OM ruled out.   
80F ambulatory with a walker, w/ DM Type 2 c/b diabetic neuropathy and multiple toes amputations, HTN, HLD, PVD s/p LLE angiogram 2018 , CKD stage 3~4 (Baseline crt ~2), multiple UTI 2/2 obstructive uropathy s/p ureteral stent placement n 12/2020, morbid Obesity, presenting with chronic non-healing ulcer at the left heel for IV antibiotics treatment s/p recent adverse reaction to augmentin and then inadequate response to Levaquin  per pt's private podiatrist. Admitted for inpatient treatment for soft tissue infection, work up for OM ruled out.   
81 yo Female, ambulatory with a walker, w/ DM Type 2 c/b diabetic neuropathy and multiple toes amputations, HTN, HLD, PVD s/p LLE angiogram 2018 , CKD stage 3~4 (Baseline crt ~2), multiple UTI 2/2 obstructive uropathy s/p ureteral stent placement n 12/2020, morbid Obesity, presenting with chronic non-healing ulcer at the left heel for IV antibiotics treatment s/p recent adverse reaction to augmentin and then inadequate response to Levaquin  per pt's private podiatrist. Admitted for inpatient treatment for soft tissue infection, work up for OM ruled out.   
81 yo Female, ambulatory with a walker, w/ DM Type 2 c/b diabetic neuropathy and multiple toes amputations, HTN, HLD, PVD s/p LLE angiogram 2018 , CKD stage 3~4 (Baseline crt ~2), multiple UTI 2/2 obstructive uropathy s/p ureteral stent placement n 12/2020, morbid Obesity, presenting with chronic non-healing ulcer at the left heel for IV antibiotics treatment s/p recent adverse reaction to augmentin and then inadequate response to Levaquin  per pt's private podiatrist. Admitted for inpatient treatment for soft tissue infection, work up for OM ruled out.

## 2022-10-19 ENCOUNTER — NON-APPOINTMENT (OUTPATIENT)
Age: 82
End: 2022-10-19

## 2022-10-20 ENCOUNTER — APPOINTMENT (OUTPATIENT)
Dept: HOME HEALTH SERVICES | Facility: HOME HEALTH | Age: 82
End: 2022-10-20

## 2022-10-20 VITALS
OXYGEN SATURATION: 99 % | HEART RATE: 74 BPM | DIASTOLIC BLOOD PRESSURE: 60 MMHG | RESPIRATION RATE: 18 BRPM | SYSTOLIC BLOOD PRESSURE: 120 MMHG | TEMPERATURE: 97.7 F

## 2022-10-20 DIAGNOSIS — K21.9 GASTRO-ESOPHAGEAL REFLUX DISEASE W/OUT ESOPHAGITIS: ICD-10-CM

## 2022-10-20 PROBLEM — N18.4 CHRONIC KIDNEY DISEASE, STAGE 4 (SEVERE): Chronic | Status: ACTIVE | Noted: 2022-10-15

## 2022-10-20 PROCEDURE — 99496 TRANSJ CARE MGMT HIGH F2F 7D: CPT

## 2022-10-20 RX ORDER — FUROSEMIDE 40 MG/1
40 TABLET ORAL
Qty: 30 | Refills: 2 | Status: COMPLETED | COMMUNITY
Start: 2021-02-18 | End: 2022-10-20

## 2022-10-20 RX ORDER — GUAIFENESIN 600 MG/1
600 TABLET, EXTENDED RELEASE ORAL
Qty: 14 | Refills: 0 | Status: COMPLETED | COMMUNITY
Start: 2022-09-16 | End: 2022-10-20

## 2022-10-20 RX ORDER — BENZONATATE 100 MG/1
100 CAPSULE ORAL 3 TIMES DAILY
Qty: 30 | Refills: 1 | Status: COMPLETED | COMMUNITY
Start: 2022-09-05 | End: 2022-10-20

## 2022-10-21 LAB
CULTURE RESULTS: SIGNIFICANT CHANGE UP
SPECIMEN SOURCE: SIGNIFICANT CHANGE UP

## 2022-10-23 ENCOUNTER — LABORATORY RESULT (OUTPATIENT)
Age: 82
End: 2022-10-23

## 2022-10-25 ENCOUNTER — NON-APPOINTMENT (OUTPATIENT)
Age: 82
End: 2022-10-25

## 2022-10-25 DIAGNOSIS — Z79.899 OTHER LONG TERM (CURRENT) DRUG THERAPY: ICD-10-CM

## 2022-10-25 PROBLEM — K21.9 GERD (GASTROESOPHAGEAL REFLUX DISEASE): Status: ACTIVE | Noted: 2022-10-25

## 2022-10-25 RX ORDER — NYSTATIN 100000 [USP'U]/G
100000 POWDER TOPICAL
Qty: 60 | Refills: 0 | Status: COMPLETED | COMMUNITY
Start: 2021-02-25 | End: 2022-10-25

## 2022-10-25 RX ORDER — SODIUM POLYSTYRENE SULFONATE 15 G/60ML
15 SUSPENSION ORAL; RECTAL
Qty: 1 | Refills: 0 | Status: COMPLETED | COMMUNITY
Start: 2019-04-26 | End: 2022-10-25

## 2022-10-25 RX ORDER — NYSTATIN 100000 [USP'U]/G
100000 CREAM TOPICAL
Qty: 15 | Refills: 0 | Status: COMPLETED | COMMUNITY
Start: 2021-02-25 | End: 2022-10-25

## 2022-10-25 RX ORDER — ZINC OXIDE 200 MG/G
20 OINTMENT TOPICAL
Qty: 1 | Refills: 4 | Status: COMPLETED | COMMUNITY
Start: 2021-08-26 | End: 2022-10-25

## 2022-10-25 NOTE — PHYSICAL EXAM
[No Acute Distress] : no acute distress [Well Nourished] : well nourished [Well Developed] : well developed [Normal Outer Ear/Nose] : the ears and nose were normal in appearance [No JVD] : no jugular venous distention [Supple] : the neck was supple [No Respiratory Distress] : no respiratory distress [Clear to Auscultation] : lungs were clear to auscultation bilaterally [No Accessory Muscle Use] : no accessory muscle use [Normal Rate] : heart rate was normal  [Regular Rhythm] : with a regular rhythm [Breast Exam Declined] : patient declined to have breast exam done [Normal Bowel Sounds] : normal bowel sounds [Non Tender] : non-tender [Soft] : abdomen soft [Patient Refused] : rectal exam was refused by the patient [Normal Supraclavicular Nodes] : no supraclavicular lymphadenopathy [Normal Post Cervical Nodes] : no posterior cervical lymphadenopathy [Normal Anterior Cervical Nodes] : no anterior cervical lymphadenopathy [No CVA Tenderness] : no ~M costovertebral angle tenderness [No Spinal Tenderness] : no spinal tenderness [Kyphosis] :  kyphosis present [No Joint Swelling] : no joint swelling seen [Cranial Nerves Intact] : cranial nerves 2-12 were intact [No Motor Deficits] : the motor exam was normal [Oriented x3] : oriented to person, place, and time [Normal Affect] : the affect was normal [Normal Mood] : the mood was normal [de-identified] : ambulating with walker,  [de-identified] : poor vision due to retinopathy [de-identified] : no cough today [de-identified] : obese [de-identified] : wide and unsteady gait [de-identified] : mild generalized redness secondary to Augmentin allergy  [de-identified] : neuropathy of BL LE: sensory deficit [de-identified] : s/p amputations, left foot wound bandage c/d/i, s/p right great toe amputation, REFUSING left foot vascular wound exam as "podiatry came"

## 2022-10-25 NOTE — REVIEW OF SYSTEMS
[Negative] : Heme/Lymph [Skin Rash] : skin rash [de-identified] : generalized redness subsiding secondary to allergy to Augmentin in the hospital

## 2022-10-25 NOTE — REASON FOR VISIT
[Follow-Up] : a follow-up visit [Formal Caregiver] : formal caregiver [Intercurrent Specialty/Sub-specialty Visits] : the patient has intercurrent specialty/sub-specialty visits [Pre-Visit Preparation] : pre-visit preparation was done [FreeTextEntry2] : Review chart  [FreeTextEntry3] : podiatry/wound care RN

## 2022-10-25 NOTE — HISTORY OF PRESENT ILLNESS
[Patient] : patient [FreeTextEntry1] : Diabetes with opthalmic, neurologic and vascular complications, debility [FreeTextEntry2] : Patient denies fever, cough, trouble breathing, rash, vomiting or diarrhea. \par Patient has not been in close contact with someone who is COVID positive.\par N95 mask, gloves, eye wear and gown (if indicated) used during visit \par Total face to face time with patient is [45] min.\par \par Patient is a 80 yo female with h/o diabetes c/b retinopathy and neuropathy, PVD s/p LLE angioplasty in 11/2015, s/p angioplasty 2019 with complication of a or ckd ( baseline Cr 1.8-2) s/p multiple toe amputations, HTN, HLD, glaucoma, visual impairment (does not drive) spiral fracture of ankle after fell down basement stairs, debility (walks w walker), morbid obesity being\par \par Pt being seen for followup: Patient is a fair historian\par \par D/C from Hospital on 10/19/2022\par Pt was Allergic to Augmentin had rashes all over sent home with Calamine and hydrocortisone\par wound to left foot heel santyl, 4x4 gauze, cling wrap, continue with wound care\par Clobetasol for eczema\par Lasix 40 mgs was d/c'd in the hospital secondary to elevated creat, plan to start lasix 20 mgs daily and repeat bmp on 10/28/2022\par Pt to f/u with podiatry this weekend\par \par \par ____________________\par hospitalizations\par -9/2020 patient admitted for 9 days for hyperglycemia\par -12/20-31/2020 patient admitted to  [facility] for hyperglycemia with DKA with UTI, c/b ARABELLA with right (two stone) ureteral stone with hydronephrosis now resolved s/p stent placement.  Thereafter went to rehab at [facility], d/c 4/29/2021 with new onset skin infection (on keflex), had one interim hospitalization at [place],  with BL pna

## 2022-10-25 NOTE — HEALTH RISK ASSESSMENT
[HRA Reviewed] : Health risk assessments reviewed [Independent] : feeding [Some assistance needed] : using telephone [Full assistance needed] : managing finances

## 2022-10-25 NOTE — COUNSELING
[Improve mobility] : improve mobility [Decrease stress] : decrease stress [Minimize unnecessary interventions] : minimize unnecessary interventions [Maintain functional ability] : maintain functional ability [Discussed disease trajectory with patient/caregiver] : discussed disease trajectory with patient/caregiver [DNR] : Code Status: DNR [Limited] : Treatment Guidelines: Limited [DNI] : Intubation: DNI [[facility]: _____[facility]last [facility]: [unfilled] [_____] : HCP: [unfilled] [Overweight (BMI 25.1 - 29.9)] : overweight - BMI 25.1-29.9 [Weight counseling provided] : Weight counseling provided [Use assistive device to avoid falls] : use assistive device to avoid falls [Vaccinations: _______] : Vaccinations: [unfilled] [FreeTextEntry3] : ADA diet discussed

## 2022-10-28 ENCOUNTER — LABORATORY RESULT (OUTPATIENT)
Age: 82
End: 2022-10-28

## 2022-10-31 ENCOUNTER — NON-APPOINTMENT (OUTPATIENT)
Age: 82
End: 2022-10-31

## 2022-11-04 ENCOUNTER — LABORATORY RESULT (OUTPATIENT)
Age: 82
End: 2022-11-04

## 2022-11-07 ENCOUNTER — NON-APPOINTMENT (OUTPATIENT)
Age: 82
End: 2022-11-07

## 2022-11-07 ENCOUNTER — RX RENEWAL (OUTPATIENT)
Age: 82
End: 2022-11-07

## 2022-11-23 ENCOUNTER — NON-APPOINTMENT (OUTPATIENT)
Age: 82
End: 2022-11-23

## 2022-11-29 ENCOUNTER — LABORATORY RESULT (OUTPATIENT)
Age: 82
End: 2022-11-29

## 2022-11-30 ENCOUNTER — LABORATORY RESULT (OUTPATIENT)
Age: 82
End: 2022-11-30

## 2022-12-01 ENCOUNTER — NON-APPOINTMENT (OUTPATIENT)
Age: 82
End: 2022-12-01

## 2022-12-02 ENCOUNTER — APPOINTMENT (OUTPATIENT)
Dept: HOME HEALTH SERVICES | Facility: HOME HEALTH | Age: 82
End: 2022-12-02

## 2022-12-13 ENCOUNTER — APPOINTMENT (OUTPATIENT)
Dept: HOME HEALTH SERVICES | Facility: HOME HEALTH | Age: 82
End: 2022-12-13

## 2022-12-13 VITALS
HEART RATE: 76 BPM | SYSTOLIC BLOOD PRESSURE: 120 MMHG | RESPIRATION RATE: 18 BRPM | OXYGEN SATURATION: 98 % | DIASTOLIC BLOOD PRESSURE: 70 MMHG | TEMPERATURE: 97.6 F

## 2022-12-13 DIAGNOSIS — Z87.448 PERSONAL HISTORY OF OTHER DISEASES OF URINARY SYSTEM: ICD-10-CM

## 2022-12-13 DIAGNOSIS — E66.2 MORBID (SEVERE) OBESITY WITH ALVEOLAR HYPOVENTILATION: ICD-10-CM

## 2022-12-13 DIAGNOSIS — H66.92 OTITIS MEDIA, UNSPECIFIED, LEFT EAR: ICD-10-CM

## 2022-12-13 DIAGNOSIS — E11.10 TYPE 2 DIABETES MELLITUS WITH KETOACIDOSIS WITHOUT COMA: ICD-10-CM

## 2022-12-13 PROCEDURE — 99349 HOME/RES VST EST MOD MDM 40: CPT

## 2022-12-13 NOTE — REVIEW OF SYSTEMS
[Skin Rash] : skin rash [Negative] : Heme/Lymph [Earache] : earache [de-identified] : linear raised skin under left arm

## 2022-12-13 NOTE — COUNSELING
[Improve mobility] : improve mobility [Decrease stress] : decrease stress [Minimize unnecessary interventions] : minimize unnecessary interventions [Maintain functional ability] : maintain functional ability [Discussed disease trajectory with patient/caregiver] : discussed disease trajectory with patient/caregiver [DNR] : Code Status: DNR [Limited] : Treatment Guidelines: Limited [DNI] : Intubation: DNI [[facility]: _____[facility] Com[facility]/last verification date: [unfilled] [_____] : HCP: [unfilled] [Overweight (BMI 25.1 - 29.9)] : overweight - BMI 25.1-29.9 [Weight counseling provided] : Weight counseling provided [Use assistive device to avoid falls] : use assistive device to avoid falls [Vaccinations: _______] : Vaccinations: [unfilled] [FreeTextEntry3] : ADA diet discussed

## 2022-12-13 NOTE — HEALTH RISK ASSESSMENT
[Independent] : feeding [Some assistance needed] : using telephone [Full assistance needed] : managing finances [HRA Reviewed] : Health risk assessments reviewed [No falls in past year] : Patient reported no falls in the past year

## 2022-12-13 NOTE — PHYSICAL EXAM
[No Acute Distress] : no acute distress [Well Nourished] : well nourished [Well Developed] : well developed [Normal Outer Ear/Nose] : the ears and nose were normal in appearance [No JVD] : no jugular venous distention [Supple] : the neck was supple [No Respiratory Distress] : no respiratory distress [Clear to Auscultation] : lungs were clear to auscultation bilaterally [No Accessory Muscle Use] : no accessory muscle use [Normal Rate] : heart rate was normal  [Regular Rhythm] : with a regular rhythm [Breast Exam Declined] : patient declined to have breast exam done [Normal Bowel Sounds] : normal bowel sounds [Non Tender] : non-tender [Soft] : abdomen soft [Patient Refused] : rectal exam was refused by the patient [Normal Supraclavicular Nodes] : no supraclavicular lymphadenopathy [Normal Post Cervical Nodes] : no posterior cervical lymphadenopathy [Normal Anterior Cervical Nodes] : no anterior cervical lymphadenopathy [No CVA Tenderness] : no ~M costovertebral angle tenderness [No Spinal Tenderness] : no spinal tenderness [Kyphosis] :  kyphosis present [No Joint Swelling] : no joint swelling seen [Cranial Nerves Intact] : cranial nerves 2-12 were intact [No Motor Deficits] : the motor exam was normal [Oriented x3] : oriented to person, place, and time [Normal Affect] : the affect was normal [Normal Mood] : the mood was normal [de-identified] : ambulating with walker [de-identified] : poor vision due to retinopathy [de-identified] : no cough today [de-identified] : obese [de-identified] : wide and unsteady gait [de-identified] : Linear raised area under left arm, left foot wound being followed by podiatry and wound care [de-identified] : neuropathy 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

## 2022-12-13 NOTE — REASON FOR VISIT
[Follow-Up] : a follow-up visit [Formal Caregiver] : formal caregiver [Pre-Visit Preparation] : pre-visit preparation was done [Intercurrent Specialty/Sub-specialty Visits] : the patient has intercurrent specialty/sub-specialty visits [FreeTextEntry2] : Chart Review [FreeTextEntry3] : Podiatry/wound

## 2022-12-13 NOTE — HISTORY OF PRESENT ILLNESS
[Patient] : patient [FreeTextEntry1] : Diabetes with opthalmic, neurologic and vascular complications, debility [FreeTextEntry2] : Patient denies fever, cough, trouble breathing, rash, vomiting or diarrhea. 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 Pt seen alert/awake sitting in chair in no acute distress\par -Linear raised skin under right arm (along where bra stray would be located), reddened, not warm to touch - triamcinolone cream ordered, encouraged to f/u with dermatology if no improvement \par - Pt with c/o of left ear ache and fullness, wax removed, ear infection left ear probable - zpack ordered\par - ordered f/u blood work (CBC, BMP)  for 12/20/2022\par - home care nurse following wound to left foot heel continue santyl, 4x4 gauze, cling wrap, continue with wound care, continue to f/u with podiatry this weekend\par \par

## 2022-12-20 ENCOUNTER — LABORATORY RESULT (OUTPATIENT)
Age: 82
End: 2022-12-20

## 2022-12-20 NOTE — ED ADULT NURSE NOTE - NS ED NURSE RECORD ANOTHER HT AND WT
Addended by: ARIELLA SCHULZ on: 12/20/2022 10:49 AM     Modules accepted: Orders    
Addended by: MILTON KINGSLEY on: 12/19/2022 03:35 PM     Modules accepted: Orders    
Addended by: MILTON KINGSLEY on: 12/19/2022 05:04 PM     Modules accepted: Orders    
No

## 2022-12-21 DIAGNOSIS — N39.0 URINARY TRACT INFECTION, SITE NOT SPECIFIED: ICD-10-CM

## 2022-12-21 DIAGNOSIS — R42 DIZZINESS AND GIDDINESS: ICD-10-CM

## 2022-12-27 ENCOUNTER — LABORATORY RESULT (OUTPATIENT)
Age: 82
End: 2022-12-27

## 2022-12-29 ENCOUNTER — NON-APPOINTMENT (OUTPATIENT)
Age: 82
End: 2022-12-29

## 2023-01-01 ENCOUNTER — NON-APPOINTMENT (OUTPATIENT)
Age: 83
End: 2023-01-01

## 2023-01-01 ENCOUNTER — APPOINTMENT (OUTPATIENT)
Dept: HOME HEALTH SERVICES | Facility: HOME HEALTH | Age: 83
End: 2023-01-01
Payer: MEDICARE

## 2023-01-01 ENCOUNTER — TRANSCRIPTION ENCOUNTER (OUTPATIENT)
Age: 83
End: 2023-01-01

## 2023-01-01 ENCOUNTER — APPOINTMENT (OUTPATIENT)
Dept: HOME HEALTH SERVICES | Facility: HOME HEALTH | Age: 83
End: 2023-01-01

## 2023-01-01 ENCOUNTER — MED ADMIN CHARGE (OUTPATIENT)
Age: 83
End: 2023-01-01

## 2023-01-01 VITALS
DIASTOLIC BLOOD PRESSURE: 76 MMHG | HEIGHT: 64 IN | RESPIRATION RATE: 16 BRPM | OXYGEN SATURATION: 97 % | TEMPERATURE: 96.8 F | HEART RATE: 66 BPM | SYSTOLIC BLOOD PRESSURE: 130 MMHG

## 2023-01-01 VITALS
DIASTOLIC BLOOD PRESSURE: 60 MMHG | OXYGEN SATURATION: 96 % | HEART RATE: 62 BPM | SYSTOLIC BLOOD PRESSURE: 102 MMHG | TEMPERATURE: 97 F | RESPIRATION RATE: 16 BRPM

## 2023-01-01 VITALS
DIASTOLIC BLOOD PRESSURE: 70 MMHG | OXYGEN SATURATION: 98 % | RESPIRATION RATE: 17 BRPM | HEART RATE: 66 BPM | TEMPERATURE: 97.1 F | SYSTOLIC BLOOD PRESSURE: 140 MMHG

## 2023-01-01 VITALS — DIASTOLIC BLOOD PRESSURE: 68 MMHG | OXYGEN SATURATION: 97 % | HEART RATE: 74 BPM | SYSTOLIC BLOOD PRESSURE: 128 MMHG

## 2023-01-01 DIAGNOSIS — R53.81 OTHER MALAISE: ICD-10-CM

## 2023-01-01 DIAGNOSIS — D64.9 ANEMIA, UNSPECIFIED: ICD-10-CM

## 2023-01-01 DIAGNOSIS — M86.9 OSTEOMYELITIS, UNSPECIFIED: ICD-10-CM

## 2023-01-01 DIAGNOSIS — I87.2 VENOUS INSUFFICIENCY (CHRONIC) (PERIPHERAL): ICD-10-CM

## 2023-01-01 DIAGNOSIS — L03.119 CELLULITIS OF UNSPECIFIED PART OF LIMB: ICD-10-CM

## 2023-01-01 LAB
ANION GAP SERPL CALC-SCNC: 12 MMOL/L
ANION GAP SERPL CALC-SCNC: 17 MMOL/L
BUN SERPL-MCNC: 38 MG/DL
BUN SERPL-MCNC: 38 MG/DL
CALCIUM SERPL-MCNC: 8.9 MG/DL
CALCIUM SERPL-MCNC: 8.9 MG/DL
CHLORIDE SERPL-SCNC: 108 MMOL/L
CHLORIDE SERPL-SCNC: 109 MMOL/L
CO2 SERPL-SCNC: 21 MMOL/L
CO2 SERPL-SCNC: 24 MMOL/L
CREAT SERPL-MCNC: 2.66 MG/DL
CREAT SERPL-MCNC: 2.72 MG/DL
EGFR: 17 ML/MIN/1.73M2
EGFR: 18 ML/MIN/1.73M2
ESTIMATED AVERAGE GLUCOSE: 157 MG/DL
GLUCOSE SERPL-MCNC: 132 MG/DL
GLUCOSE SERPL-MCNC: 85 MG/DL
HBA1C MFR BLD HPLC: 7.1 %
HCT VFR BLD CALC: 31.9 %
HGB BLD-MCNC: 9.4 G/DL
MCHC RBC-ENTMCNC: 25.7 PG
MCHC RBC-ENTMCNC: 29.5 GM/DL
MCV RBC AUTO: 87.2 FL
PLATELET # BLD AUTO: 137 K/UL
POTASSIUM SERPL-SCNC: 4.4 MMOL/L
POTASSIUM SERPL-SCNC: 4.9 MMOL/L
RBC # BLD: 3.66 M/UL
RBC # FLD: 16.4 %
SODIUM SERPL-SCNC: 145 MMOL/L
SODIUM SERPL-SCNC: 146 MMOL/L
WBC # FLD AUTO: 8.29 K/UL

## 2023-01-01 PROCEDURE — 99349 HOME/RES VST EST MOD MDM 40: CPT

## 2023-01-01 PROCEDURE — G0008: CPT

## 2023-01-01 PROCEDURE — 90662 IIV NO PRSV INCREASED AG IM: CPT

## 2023-01-01 RX ORDER — IPRATROPIUM BROMIDE AND ALBUTEROL SULFATE 2.5; .5 MG/3ML; MG/3ML
0.5-2.5 (3) SOLUTION RESPIRATORY (INHALATION)
Qty: 1 | Refills: 3 | Status: DISCONTINUED | COMMUNITY
Start: 2021-08-25 | End: 2023-01-01

## 2023-01-01 RX ORDER — MECLIZINE HYDROCHLORIDE 12.5 MG/1
12.5 TABLET ORAL 3 TIMES DAILY
Qty: 270 | Refills: 3 | Status: COMPLETED | COMMUNITY
Start: 2022-12-21 | End: 2023-01-01

## 2023-01-01 RX ORDER — OMEGA-3-ACID ETHYL ESTERS CAPSULES 1 G/1
1 CAPSULE, LIQUID FILLED ORAL
Qty: 360 | Refills: 0 | Status: COMPLETED | COMMUNITY
Start: 2019-04-24 | End: 2023-01-01

## 2023-01-01 RX ORDER — SODIUM BICARBONATE 650 MG/1
650 TABLET ORAL
Qty: 180 | Refills: 3 | Status: ACTIVE | COMMUNITY
Start: 2021-05-04

## 2023-01-01 RX ORDER — SULFAMETHOXAZOLE AND TRIMETHOPRIM 400; 80 MG/1; MG/1
400-80 TABLET ORAL TWICE DAILY
Qty: 14 | Refills: 0 | Status: COMPLETED | COMMUNITY
Start: 2023-01-01 | End: 2023-01-01

## 2023-01-01 RX ORDER — ALBUTEROL SULFATE 90 UG/1
108 (90 BASE) AEROSOL, METERED RESPIRATORY (INHALATION)
Qty: 1 | Refills: 0 | Status: DISCONTINUED | COMMUNITY
Start: 2021-08-25 | End: 2023-01-01

## 2023-01-01 RX ORDER — ANORECTAL OINTMENT 15.7; .44; 24; 20.6 G/100G; G/100G; G/100G; G/100G
0.44-20.6 OINTMENT TOPICAL
Qty: 1 | Refills: 3 | Status: ACTIVE | COMMUNITY
Start: 2023-01-01 | End: 1900-01-01

## 2023-01-01 RX ORDER — PEN NEEDLE, DIABETIC 31 GX3/16"
31G X 5 MM NEEDLE, DISPOSABLE MISCELLANEOUS
Qty: 100 | Refills: 3 | Status: ACTIVE | COMMUNITY
Start: 2022-09-29 | End: 1900-01-01

## 2023-01-03 ENCOUNTER — LABORATORY RESULT (OUTPATIENT)
Age: 83
End: 2023-01-03

## 2023-01-05 ENCOUNTER — FORM ENCOUNTER (OUTPATIENT)
Age: 83
End: 2023-01-05

## 2023-01-06 ENCOUNTER — NON-APPOINTMENT (OUTPATIENT)
Age: 83
End: 2023-01-06

## 2023-01-09 ENCOUNTER — LABORATORY RESULT (OUTPATIENT)
Age: 83
End: 2023-01-09

## 2023-01-10 ENCOUNTER — NON-APPOINTMENT (OUTPATIENT)
Age: 83
End: 2023-01-10

## 2023-02-16 NOTE — DISCHARGE NOTE NURSING/CASE MANAGEMENT/SOCIAL WORK - NSDCCRTYPESERV_GEN_ALL_CORE_FT
Impression: Age-related nuclear cataract, bilateral: H25.13. Plan: Patient notes a decline in vision. Exam demonstrates a cataract which may be visually significant. Discussed R/B/A of surgery vs observation. Recommend evaluation for surgery. Discussed that visual prognosis after cataract surgery is unclear given concurrent retinal disease, but the patient can still benefit from evaluation and possible surgery. This is scheduled soon with Dr. Herbert Caitlin then OD.
Impression: Exdtve age-rel mclr degn, right eye, with actv chrdl neovas: H35.3211.
-s/p Avastin last 01/19/2023 Plan: Exam and OCT demonstrate improved IRF, s/p Avastin 4 wks ago. Recommend intravitreal Avastin today and extend to 6 wks. R/B/A discussed and patient elects to proceed. Intravitreal Avastin injection was administered today without complication.   

RTC 6 wks OCT OU; re-eval with possible AVASTIN
Impression: Nexdtve age-rel mclr degn, l eye, adv atrpc with sbfvl invl: H35.1611. Plan: Vision worse today. No evidence of fluid. Atrophy is subfoveal and may be cause of vision decline. Will follow closely.
Sub-Acute Rehab

## 2023-02-24 ENCOUNTER — NON-APPOINTMENT (OUTPATIENT)
Age: 83
End: 2023-02-24

## 2023-02-27 ENCOUNTER — APPOINTMENT (OUTPATIENT)
Dept: HOME HEALTH SERVICES | Facility: HOME HEALTH | Age: 83
End: 2023-02-27
Payer: MEDICARE

## 2023-02-27 VITALS
HEART RATE: 76 BPM | TEMPERATURE: 97.4 F | OXYGEN SATURATION: 98 % | DIASTOLIC BLOOD PRESSURE: 80 MMHG | RESPIRATION RATE: 16 BRPM | SYSTOLIC BLOOD PRESSURE: 120 MMHG

## 2023-02-27 DIAGNOSIS — L30.9 DERMATITIS, UNSPECIFIED: ICD-10-CM

## 2023-02-27 PROCEDURE — 99349 HOME/RES VST EST MOD MDM 40: CPT

## 2023-02-27 RX ORDER — SODIUM POLYSTYRENE SULFONATE 15 G/60ML
15 SUSPENSION ORAL; RECTAL
Qty: 1 | Refills: 0 | Status: COMPLETED | COMMUNITY
Start: 2022-10-25 | End: 2023-02-27

## 2023-02-27 RX ORDER — AZITHROMYCIN 250 MG/1
250 TABLET, FILM COATED ORAL
Qty: 6 | Refills: 0 | Status: COMPLETED | COMMUNITY
Start: 2022-12-13 | End: 2023-02-27

## 2023-02-27 RX ORDER — TRIAMCINOLONE ACETONIDE 1 MG/G
0.1 CREAM TOPICAL 3 TIMES DAILY
Qty: 1 | Refills: 3 | Status: COMPLETED | COMMUNITY
Start: 2022-12-13 | End: 2023-02-27

## 2023-02-27 NOTE — COUNSELING
[Improve mobility] : improve mobility [Decrease stress] : decrease stress [Minimize unnecessary interventions] : minimize unnecessary interventions [Maintain functional ability] : maintain functional ability [Discussed disease trajectory with patient/caregiver] : discussed disease trajectory with patient/caregiver [DNR] : Code Status: DNR [Limited] : Treatment Guidelines: Limited [DNI] : Intubation: DNI [[facility] Date: _____] [facility]/last verification date: [unfilled] [_____] : HCP: [unfilled] [Overweight (BMI 25.1 - 29.9)] : overweight - BMI 25.1-29.9 [Weight counseling provided] : Weight counseling provided [Use assistive device to avoid falls] : use assistive device to avoid falls [Vaccinations: _______] : Vaccinations: [unfilled] [FreeTextEntry3] : ADA diet discussed

## 2023-02-27 NOTE — PHYSICAL EXAM
[No Acute Distress] : no acute distress [Well Nourished] : well nourished [Well Developed] : well developed [Normal Outer Ear/Nose] : the ears and nose were normal in appearance [No JVD] : no jugular venous distention [Supple] : the neck was supple [No Respiratory Distress] : no respiratory distress [Clear to Auscultation] : lungs were clear to auscultation bilaterally [No Accessory Muscle Use] : no accessory muscle use [Normal Rate] : heart rate was normal  [Regular Rhythm] : with a regular rhythm [Breast Exam Declined] : patient declined to have breast exam done [Normal Bowel Sounds] : normal bowel sounds [Non Tender] : non-tender [Soft] : abdomen soft [Patient Refused] : rectal exam was refused by the patient [Normal Supraclavicular Nodes] : no supraclavicular lymphadenopathy [Normal Post Cervical Nodes] : no posterior cervical lymphadenopathy [Normal Anterior Cervical Nodes] : no anterior cervical lymphadenopathy [No CVA Tenderness] : no ~M costovertebral angle tenderness [No Spinal Tenderness] : no spinal tenderness [Kyphosis] :  kyphosis present [No Joint Swelling] : no joint swelling seen [Cranial Nerves Intact] : cranial nerves 2-12 were intact [No Motor Deficits] : the motor exam was normal [Oriented x3] : oriented to person, place, and time [Normal Affect] : the affect was normal [Normal Mood] : the mood was normal [de-identified] : ambulating with walker [de-identified] : poor vision due to retinopathy [de-identified] : no cough today [de-identified] : obese [de-identified] : wide and unsteady gait [de-identified] : Rash under bilateral arm and bilateral upper legs;, left foot wound reported as being followed by podiatry and wound care [de-identified] : neuropathy 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

## 2023-02-27 NOTE — REASON FOR VISIT
[Follow-Up] : a follow-up visit [Formal Caregiver] : formal caregiver [Pre-Visit Preparation] : pre-visit preparation was done [Intercurrent Specialty/Sub-specialty Visits] : the patient has no intercurrent specialty/sub-specialty visits [FreeTextEntry2] : Chart Review

## 2023-02-27 NOTE — HISTORY OF PRESENT ILLNESS
[Patient] : patient [FreeTextEntry1] : Diabetes with opthalmic, neurologic and vascular complications, debility [FreeTextEntry2] : Patient denies fever, cough, trouble breathing, rash, vomiting or diarrhea. 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 Pt seen alert/awake sitting in chair in no acute distress\par - c/o under arm rash - nystatin ordered\par - psoriasis to bilateral upper legs R>L - Triamcinolone cream \par - Pt with c/o of left ear ache and fullness, using Debrox with a little improvement - advised ENT f/u\par - wound to left foot heeled as per pt; continue to f/u with podiatry \par \par

## 2023-02-27 NOTE — HEALTH RISK ASSESSMENT
[Independent] : feeding [Some assistance needed] : using telephone [Full assistance needed] : managing finances [No falls in past year] : Patient reported no falls in the past year [HRA Reviewed] : Health risk assessment reviewed

## 2023-03-12 NOTE — CHRONIC CARE ASSESSMENT
TF rate was increased to 55 ml/hr. He is tolerating it well.    [PPS Score: ____] : Palliative Performance Scale (PPS) Score: [unfilled]

## 2023-03-20 ENCOUNTER — NON-APPOINTMENT (OUTPATIENT)
Age: 83
End: 2023-03-20

## 2023-03-20 DIAGNOSIS — R39.9 UNSPECIFIED SYMPTOMS AND SIGNS INVOLVING THE GENITOURINARY SYSTEM: ICD-10-CM

## 2023-03-22 ENCOUNTER — LABORATORY RESULT (OUTPATIENT)
Age: 83
End: 2023-03-22

## 2023-03-24 RX ORDER — SULFAMETHOXAZOLE AND TRIMETHOPRIM 400; 80 MG/1; MG/1
400-80 TABLET ORAL TWICE DAILY
Qty: 14 | Refills: 0 | Status: COMPLETED | COMMUNITY
Start: 2022-06-23 | End: 2023-03-31

## 2023-04-17 ENCOUNTER — APPOINTMENT (OUTPATIENT)
Dept: HOME HEALTH SERVICES | Facility: HOME HEALTH | Age: 83
End: 2023-04-17

## 2023-04-21 ENCOUNTER — TRANSCRIPTION ENCOUNTER (OUTPATIENT)
Age: 83
End: 2023-04-21

## 2023-04-21 ENCOUNTER — LABORATORY RESULT (OUTPATIENT)
Age: 83
End: 2023-04-21

## 2023-04-21 ENCOUNTER — NON-APPOINTMENT (OUTPATIENT)
Age: 83
End: 2023-04-21

## 2023-04-21 DIAGNOSIS — R30.0 DYSURIA: ICD-10-CM

## 2023-05-03 ENCOUNTER — NON-APPOINTMENT (OUTPATIENT)
Age: 83
End: 2023-05-03

## 2023-05-08 ENCOUNTER — NON-APPOINTMENT (OUTPATIENT)
Age: 83
End: 2023-05-08

## 2023-05-19 ENCOUNTER — NON-APPOINTMENT (OUTPATIENT)
Age: 83
End: 2023-05-19

## 2023-05-19 LAB
APPEARANCE: CLEAR
BACTERIA UR CULT: NORMAL
BACTERIA: ABNORMAL /HPF
BILIRUBIN URINE: NEGATIVE
BLOOD URINE: ABNORMAL
COLOR: YELLOW
GLUCOSE QUALITATIVE U: NEGATIVE
HYALINE CASTS: NORMAL
KETONES URINE: NEGATIVE
LEUKOCYTE ESTERASE URINE: ABNORMAL
MICROSCOPIC-UA: NORMAL
NITRITE URINE: POSITIVE
PH URINE: 6
PROTEIN URINE: ABNORMAL
RED BLOOD CELLS URINE: ABNORMAL /HPF
SPECIFIC GRAVITY URINE: 1.02
UROBILINOGEN URINE: NORMAL
WHITE BLOOD CELLS URINE: ABNORMAL /HPF

## 2023-05-24 ENCOUNTER — APPOINTMENT (OUTPATIENT)
Dept: HOME HEALTH SERVICES | Facility: HOME HEALTH | Age: 83
End: 2023-05-24

## 2023-05-24 ENCOUNTER — NON-APPOINTMENT (OUTPATIENT)
Age: 83
End: 2023-05-24

## 2023-05-24 VITALS
TEMPERATURE: 97.4 F | HEART RATE: 69 BPM | OXYGEN SATURATION: 96 % | SYSTOLIC BLOOD PRESSURE: 110 MMHG | DIASTOLIC BLOOD PRESSURE: 60 MMHG | RESPIRATION RATE: 17 BRPM

## 2023-05-24 RX ORDER — ASPIRIN 81 MG/1
81 TABLET ORAL DAILY
Qty: 30 | Refills: 2 | Status: COMPLETED | COMMUNITY
Start: 2022-10-31 | End: 2023-05-24

## 2023-05-24 NOTE — H&P ADULT. - CLICK TO LAUNCH ORM
Is the patient currently in the state of [place]? YES    Visit mode:VIDEO    If the visit is dropped, the patient can be reconnected by: VIDEO VISIT: Text to cell phone: [phone]    Will anyone else be joining the visit? NO      How would you like to obtain your AVS? MyChart    Are changes needed to the allergy or medication list? NO    Reason for visit: [facility]       .

## 2023-06-01 LAB
ANION GAP SERPL CALC-SCNC: 13 MMOL/L
BUN SERPL-MCNC: 35 MG/DL
CALCIUM SERPL-MCNC: 8.8 MG/DL
CHLORIDE SERPL-SCNC: 108 MMOL/L
CO2 SERPL-SCNC: 22 MMOL/L
CREAT SERPL-MCNC: 2.34 MG/DL
EGFR: 20 ML/MIN/1.73M2
GLUCOSE SERPL-MCNC: 160 MG/DL
POTASSIUM SERPL-SCNC: 4.7 MMOL/L
SODIUM SERPL-SCNC: 143 MMOL/L

## 2023-06-05 ENCOUNTER — NON-APPOINTMENT (OUTPATIENT)
Age: 83
End: 2023-06-05

## 2023-06-12 LAB
ANION GAP SERPL CALC-SCNC: 14 MMOL/L
BUN SERPL-MCNC: 40 MG/DL
CALCIUM SERPL-MCNC: 8.9 MG/DL
CHLORIDE SERPL-SCNC: 110 MMOL/L
CO2 SERPL-SCNC: 23 MMOL/L
CREAT SERPL-MCNC: 2.52 MG/DL
EGFR: 19 ML/MIN/1.73M2
GLUCOSE SERPL-MCNC: 104 MG/DL
POTASSIUM SERPL-SCNC: 4.3 MMOL/L
SODIUM SERPL-SCNC: 147 MMOL/L

## 2023-06-13 ENCOUNTER — NON-APPOINTMENT (OUTPATIENT)
Age: 83
End: 2023-06-13

## 2023-06-15 ENCOUNTER — NON-APPOINTMENT (OUTPATIENT)
Age: 83
End: 2023-06-15

## 2023-06-15 LAB
ESTIMATED AVERAGE GLUCOSE: 189 MG/DL
HBA1C MFR BLD HPLC: 8.2 %

## 2023-07-18 NOTE — HEALTH RISK ASSESSMENT
[Independent] : feeding [Some assistance needed] : using telephone [Full assistance needed] : managing finances [No falls in past year] : Patient reported no falls in the past year [HRA Reviewed] : Health risk assessments reviewed

## 2023-07-21 PROBLEM — I87.2 VENOUS STASIS DERMATITIS OF BOTH LOWER EXTREMITIES: Status: ACTIVE | Noted: 2023-01-01

## 2023-07-21 PROBLEM — M86.9 FOOT OSTEOMYELITIS, RIGHT: Status: RESOLVED | Noted: 2017-05-12 | Resolved: 2023-01-01

## 2023-07-21 NOTE — COUNSELING
[Improve mobility] : improve mobility [Decrease stress] : decrease stress [Minimize unnecessary interventions] : minimize unnecessary interventions [Maintain functional ability] : maintain functional ability [Discussed disease trajectory with patient/caregiver] : discussed disease trajectory with patient/caregiver [DNR] : Code Status: DNR [Limited] : Treatment Guidelines: Limited [DNI] : Intubation: DNI [[facility]: _____] [facility]last [facility] [unfilled] [_____] : HCP: [unfilled] [Overweight (BMI 25.1 - 29.9)] : overweight - BMI 25.1-29.9 [Weight counseling provided] : Weight counseling provided [Vaccinations: _______] : Vaccinations: [unfilled] [Use assistive device to avoid falls] : use assistive device to avoid falls [FreeTextEntry3] : ADA diet discussed

## 2023-07-21 NOTE — HISTORY OF PRESENT ILLNESS
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 Seen today for expedited f/u visit by cross covering provider.\par \par Social History: Has HHA.\par \par Interval History:\par -L earache/fullness: Thankfully ear appears unremarkable, no obstructing wax.\par -Candidal intertrigo: Currently inactive\par -Psoriasis both legs: Currently minimal, topical steroid has worked well previously\par -L heel wound: F/u podiatry as needed\par -R eye stye: Described warm compress with sock & rice\par -CHF: Furosemide 20mg MWF, although up to 4 days per week now as of 3 weeks ago. 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 She voices surprise. Writer reviews full lab history, noting that there was appreciable progression from 35% to ~20% in early 2021. Basically, we continue to monitor, and avoid any potential nephrotoxins with high priority.\par -Venous stasis changes: Present above ankles bilaterally accompanied by edema. Compression stockings ordered today after discussion of elevation, compression, and diuresis being the mainstays of treatment, with diuresis being quite limited due to near ESRD.\par \par DME: BSC, walker\par \par COMPRESSION STOCKING MEASUREMENTS:\par \par BILATERAL:\par Calf length: 18in\par Calf circumference: 16in\par Ankle circumference: 10in\par \par 8-15 mmHg\par \par Written on paper script & given to patient, her drug store sells them but needs specifics.

## 2023-07-21 NOTE — PHYSICAL EXAM
[No Acute Distress] : no acute distress [Well Nourished] : well nourished [Well Developed] : well developed [Normal Outer Ear/Nose] : the ears and nose were normal in appearance [No JVD] : no jugular venous distention [Supple] : the neck was supple [No Respiratory Distress] : no respiratory distress [Clear to Auscultation] : lungs were clear to auscultation bilaterally [No Accessory Muscle Use] : no accessory muscle use [Normal Rate] : heart rate was normal  [Regular Rhythm] : with a regular rhythm [Breast Exam Declined] : patient declined to have breast exam done [Normal Bowel Sounds] : normal bowel sounds [Non Tender] : non-tender [Soft] : abdomen soft [Patient Refused] : rectal exam was refused by the patient [Normal Supraclavicular Nodes] : no supraclavicular lymphadenopathy [Normal Post Cervical Nodes] : no posterior cervical lymphadenopathy [Normal Anterior Cervical Nodes] : no anterior cervical lymphadenopathy [No CVA Tenderness] : no ~M costovertebral angle tenderness [No Spinal Tenderness] : no spinal tenderness [Kyphosis] :  kyphosis present [No Joint Swelling] : no joint swelling seen [Cranial Nerves Intact] : cranial nerves 2-12 were intact [No Motor Deficits] : the motor exam was normal [Oriented x3] : oriented to person, place, and time [Normal Affect] : the affect was normal [Normal Mood] : the mood was normal [de-identified] : ambulating with walker [de-identified] : poor vision due to retinopathy [de-identified] : no cough today [de-identified] : obese [de-identified] : wide and unsteady gait [de-identified] : Notable reddish discoloration above both ankles, no epidermal involvement, significant pitting edema to mid shin. 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

## 2023-07-21 NOTE — REASON FOR VISIT
[Formal Caregiver] : formal caregiver [Pre-Visit Preparation] : pre-visit preparation was done [Acute] : an acute visit [Intercurrent Specialty/Sub-specialty Visits] : the patient has no intercurrent specialty/sub-specialty visits [FreeTextEntry2] : Chart Review

## 2023-08-10 NOTE — REASON FOR VISIT
Discussed with the patient the importance of good control of their blood sugar, blood pressure, cholesterol, diet, exercise, weight, and medication usage under the guidance of their diabetic doctor to prevent/halt diabetic retinopathy. [Follow-Up] : a follow-up visit [Formal Caregiver] : formal caregiver [Pre-Visit Preparation] : pre-visit preparation was done [Intercurrent Specialty/Sub-specialty Visits] : the patient has no intercurrent specialty/sub-specialty visits [FreeTextEntry2] : Chart Review

## 2023-08-10 NOTE — PHYSICAL EXAM
[No Acute Distress] : no acute distress [Well Nourished] : well nourished [Well Developed] : well developed [Normal Outer Ear/Nose] : the ears and nose were normal in appearance [No JVD] : no jugular venous distention [Supple] : the neck was supple [No Respiratory Distress] : no respiratory distress [Clear to Auscultation] : lungs were clear to auscultation bilaterally [No Accessory Muscle Use] : no accessory muscle use [Normal Rate] : heart rate was normal  [Regular Rhythm] : with a regular rhythm [Breast Exam Declined] : patient declined to have breast exam done [Normal Bowel Sounds] : normal bowel sounds [Non Tender] : non-tender [Soft] : abdomen soft [Patient Refused] : rectal exam was refused by the patient [Normal Supraclavicular Nodes] : no supraclavicular lymphadenopathy [Normal Post Cervical Nodes] : no posterior cervical lymphadenopathy [Normal Anterior Cervical Nodes] : no anterior cervical lymphadenopathy [No CVA Tenderness] : no ~M costovertebral angle tenderness [No Spinal Tenderness] : no spinal tenderness [Kyphosis] :  kyphosis present [No Joint Swelling] : no joint swelling seen [Cranial Nerves Intact] : cranial nerves 2-12 were intact [No Motor Deficits] : the motor exam was normal [Oriented x3] : oriented to person, place, and time [Normal Affect] : the affect was normal [Normal Mood] : the mood was normal [de-identified] : ambulating with walker [de-identified] : poor vision due to retinopathy [de-identified] : no cough today [de-identified] : obese [de-identified] : wide and unsteady gait [de-identified] : Rash under bilateral arm and bilateral upper legs;, left foot wound reported as being followed by podiatry and wound care [de-identified] : neuropathy of BL LE: sensory deficit [de-identified] : s/p amputations, left foot wound is healing. Podiatry came to assess it.

## 2023-08-10 NOTE — COUNSELING
[Improve mobility] : improve mobility [Decrease stress] : decrease stress [Minimize unnecessary interventions] : minimize unnecessary interventions [Maintain functional ability] : maintain functional ability [Discussed disease trajectory with patient/caregiver] : discussed disease trajectory with patient/caregiver [DNR] : Code Status: DNR [Limited] : Treatment Guidelines: Limited [DNI] : Intubation: DNI [[facility]: _____] [facility]last [facility]: [unfilled] [_____] : HCP: [unfilled] [Overweight (BMI 25.1 - 29.9)] : overweight - BMI 25.1-29.9 [Weight counseling provided] : Weight counseling provided [Use assistive device to avoid falls] : use assistive device to avoid falls [Vaccinations: _______] : Vaccinations: [unfilled] [FreeTextEntry3] : ADA diet discussed

## 2023-08-10 NOTE — HISTORY OF PRESENT ILLNESS
[Patient] : patient [Patient is stable] : patient is stable [Education provided] : education provided [Medications adjusted] : medication adjusted [FreeTextEntry1] : Diabetes with opthalmic, neurologic and vascular complications, debility [FreeTextEntry2] : PMH: Diabetes type 2 c/b retinopathy and neuropathy, PVD s/p LLE, s/p angioplasty 2019, CKD, s/p multiple toe amputations, HTN, HLD, glaucoma, visual impairment (does not drive) spiral fracture of ankle after fell down basement stairs, debility (walks w walker), morbid obesity, CAD, cellulitis of lower extremities.  Pt being seen for follow up: Patient is a fair historian. Pt seen alert/awake sitting in chair in no acute distress.  Subjective:  -Appetite/weight: appetite is good. -Gait/falls: gait is unstable, patient uses walker. -Pain: denies today. -Sleep: well. -BMs: regular. -Urine: no issues. -Skin: pink, warm, good turgor. Psoriasis to bilateral upper legs R>L - Triamcinolone cream, wound to left foot heeling as per pt; continue to f/u with podiatry.  -DME: walker. -Mood/memory: good mood. -Hospitalizations in the past year: no.    Medical Issues:  -DM 2: monitor BS three times a day, keeps low carb diet, continue Novolin injection 38 U in am and 24 U in PM, Novolog: pt uses insulin sliding scale. -HTN: monitor BP, labetalol 100 mg BID, furosemide 20 mg Mon, Tue, Wed, Fri. -CKD: control DM2, sodium bicarbonate 650 mg BID. -CAD: atorvastatin 10 mg daily. - psoriasis to bilateral upper legs R>L - Triamcinolone cream  - wound to left foot heeled as per pt; continue to f/u with podiatry.

## 2023-08-25 NOTE — PROGRESS NOTE ADULT - PROVIDER SPECIALTY LIST ADULT
Infectious Disease
Podiatry
Infectious Disease
Podiatry
Podiatry
Internal Medicine
Internal Medicine
Endocrinology
Endocrinology
Internal Medicine
Internal Medicine
show

## 2023-11-06 PROBLEM — L03.119 CELLULITIS OF LOWER EXTREMITY, UNSPECIFIED LATERALITY: Status: ACTIVE | Noted: 2023-01-01

## 2024-01-01 ENCOUNTER — APPOINTMENT (OUTPATIENT)
Dept: HOME HEALTH SERVICES | Facility: HOME HEALTH | Age: 84
End: 2024-01-01
Payer: MEDICARE

## 2024-01-01 ENCOUNTER — MED ADMIN CHARGE (OUTPATIENT)
Age: 84
End: 2024-01-01

## 2024-01-01 ENCOUNTER — NON-APPOINTMENT (OUTPATIENT)
Age: 84
End: 2024-01-01

## 2024-01-01 ENCOUNTER — APPOINTMENT (OUTPATIENT)
Dept: HOME HEALTH SERVICES | Facility: HOME HEALTH | Age: 84
End: 2024-01-01

## 2024-01-01 ENCOUNTER — RX RENEWAL (OUTPATIENT)
Age: 84
End: 2024-01-01

## 2024-01-01 ENCOUNTER — LABORATORY RESULT (OUTPATIENT)
Age: 84
End: 2024-01-01

## 2024-01-01 ENCOUNTER — TRANSCRIPTION ENCOUNTER (OUTPATIENT)
Age: 84
End: 2024-01-01

## 2024-01-01 VITALS
SYSTOLIC BLOOD PRESSURE: 128 MMHG | DIASTOLIC BLOOD PRESSURE: 72 MMHG | HEART RATE: 72 BPM | TEMPERATURE: 98.1 F | RESPIRATION RATE: 18 BRPM | OXYGEN SATURATION: 96 %

## 2024-01-01 VITALS
SYSTOLIC BLOOD PRESSURE: 146 MMHG | TEMPERATURE: 97.4 F | DIASTOLIC BLOOD PRESSURE: 71 MMHG | OXYGEN SATURATION: 94 % | HEART RATE: 66 BPM | RESPIRATION RATE: 18 BRPM

## 2024-01-01 VITALS
HEART RATE: 70 BPM | TEMPERATURE: 97.6 F | OXYGEN SATURATION: 97 % | DIASTOLIC BLOOD PRESSURE: 60 MMHG | RESPIRATION RATE: 17 BRPM | SYSTOLIC BLOOD PRESSURE: 126 MMHG

## 2024-01-01 VITALS
SYSTOLIC BLOOD PRESSURE: 122 MMHG | OXYGEN SATURATION: 98 % | TEMPERATURE: 97.9 F | DIASTOLIC BLOOD PRESSURE: 60 MMHG | RESPIRATION RATE: 18 BRPM | HEART RATE: 65 BPM

## 2024-01-01 VITALS
DIASTOLIC BLOOD PRESSURE: 68 MMHG | OXYGEN SATURATION: 97 % | TEMPERATURE: 97.5 F | RESPIRATION RATE: 18 BRPM | HEART RATE: 66 BPM | SYSTOLIC BLOOD PRESSURE: 149 MMHG

## 2024-01-01 VITALS
TEMPERATURE: 97.8 F | OXYGEN SATURATION: 95 % | HEART RATE: 67 BPM | DIASTOLIC BLOOD PRESSURE: 55 MMHG | SYSTOLIC BLOOD PRESSURE: 104 MMHG | RESPIRATION RATE: 18 BRPM

## 2024-01-01 DIAGNOSIS — J06.9 ACUTE UPPER RESPIRATORY INFECTION, UNSPECIFIED: ICD-10-CM

## 2024-01-01 DIAGNOSIS — S98.132A COMPLETE TRAUMATIC AMPUTATION OF ONE LEFT LESSER TOE, INITIAL ENCOUNTER: ICD-10-CM

## 2024-01-01 DIAGNOSIS — I10 ESSENTIAL (PRIMARY) HYPERTENSION: ICD-10-CM

## 2024-01-01 DIAGNOSIS — R23.8 OTHER SKIN CHANGES: ICD-10-CM

## 2024-01-01 DIAGNOSIS — E11.51 TYPE 2 DIABETES MELLITUS WITH DIABETIC PERIPHERAL ANGIOPATHY W/OUT GANGRENE: ICD-10-CM

## 2024-01-01 DIAGNOSIS — E11.3299 TYPE 2 DIABETES MELLITUS WITH MILD NONPROLIFERATIVE DIABETIC RETINOPATHY WITHOUT MACULAR EDEMA, UNSPECIFIED EYE: ICD-10-CM

## 2024-01-01 DIAGNOSIS — B37.2 CANDIDIASIS OF SKIN AND NAIL: ICD-10-CM

## 2024-01-01 DIAGNOSIS — Z87.2 PERSONAL HISTORY OF DISEASES OF THE SKIN AND SUBCUTANEOUS TISSUE: ICD-10-CM

## 2024-01-01 DIAGNOSIS — J30.9 ALLERGIC RHINITIS, UNSPECIFIED: ICD-10-CM

## 2024-01-01 DIAGNOSIS — N18.4 TYPE 2 DIABETES MELLITUS WITH DIABETIC CHRONIC KIDNEY DISEASE: ICD-10-CM

## 2024-01-01 DIAGNOSIS — R05.9 COUGH, UNSPECIFIED: ICD-10-CM

## 2024-01-01 DIAGNOSIS — I70.0 ATHEROSCLEROSIS OF AORTA: ICD-10-CM

## 2024-01-01 DIAGNOSIS — N20.1 CALCULUS OF URETER: ICD-10-CM

## 2024-01-01 DIAGNOSIS — I25.10 ATHEROSCLEROTIC HEART DISEASE OF NATIVE CORONARY ARTERY W/OUT ANGINA PECTORIS: ICD-10-CM

## 2024-01-01 DIAGNOSIS — I50.1 LEFT VENTRICULAR FAILURE, UNSPECIFIED: ICD-10-CM

## 2024-01-01 DIAGNOSIS — E16.2 HYPOGLYCEMIA, UNSPECIFIED: ICD-10-CM

## 2024-01-01 DIAGNOSIS — Z71.89 OTHER SPECIFIED COUNSELING: ICD-10-CM

## 2024-01-01 DIAGNOSIS — E11.49 TYPE 2 DIABETES MELLITUS WITH OTHER DIABETIC NEUROLOGICAL COMPLICATION: ICD-10-CM

## 2024-01-01 DIAGNOSIS — Z87.898 PERSONAL HISTORY OF OTHER SPECIFIED CONDITIONS: ICD-10-CM

## 2024-01-01 DIAGNOSIS — I73.9 PERIPHERAL VASCULAR DISEASE, UNSPECIFIED: ICD-10-CM

## 2024-01-01 DIAGNOSIS — R21 RASH AND OTHER NONSPECIFIC SKIN ERUPTION: ICD-10-CM

## 2024-01-01 DIAGNOSIS — I27.20 PULMONARY HYPERTENSION, UNSPECIFIED: ICD-10-CM

## 2024-01-01 DIAGNOSIS — E11.22 TYPE 2 DIABETES MELLITUS WITH DIABETIC CHRONIC KIDNEY DISEASE: ICD-10-CM

## 2024-01-01 DIAGNOSIS — L40.9 PSORIASIS, UNSPECIFIED: ICD-10-CM

## 2024-01-01 DIAGNOSIS — Z96.0 PRESENCE OF UROGENITAL IMPLANTS: ICD-10-CM

## 2024-01-01 DIAGNOSIS — J02.9 ACUTE PHARYNGITIS, UNSPECIFIED: ICD-10-CM

## 2024-01-01 DIAGNOSIS — Z86.39 PERSONAL HISTORY OF OTHER ENDOCRINE, NUTRITIONAL AND METABOLIC DISEASE: ICD-10-CM

## 2024-01-01 DIAGNOSIS — R60.0 LOCALIZED EDEMA: ICD-10-CM

## 2024-01-01 DIAGNOSIS — R05.1 ACUTE COUGH: ICD-10-CM

## 2024-01-01 DIAGNOSIS — L97.529 NON-PRESSURE CHRONIC ULCER OF OTHER PART OF LEFT FOOT WITH UNSPECIFIED SEVERITY: ICD-10-CM

## 2024-01-01 LAB
ANION GAP SERPL CALC-SCNC: 14 MMOL/L
ANION GAP SERPL CALC-SCNC: 14 MMOL/L
BUN SERPL-MCNC: 42 MG/DL
BUN SERPL-MCNC: 43 MG/DL
CALCIUM SERPL-MCNC: 8.8 MG/DL
CALCIUM SERPL-MCNC: 9 MG/DL
CHLORIDE SERPL-SCNC: 104 MMOL/L
CHLORIDE SERPL-SCNC: 107 MMOL/L
CO2 SERPL-SCNC: 23 MMOL/L
CO2 SERPL-SCNC: 24 MMOL/L
CREAT SERPL-MCNC: 2.71 MG/DL
CREAT SERPL-MCNC: 2.87 MG/DL
EGFR: 16 ML/MIN/1.73M2
EGFR: 17 ML/MIN/1.73M2
GLUCOSE SERPL-MCNC: 105 MG/DL
GLUCOSE SERPL-MCNC: 299 MG/DL
POTASSIUM SERPL-SCNC: 3.9 MMOL/L
POTASSIUM SERPL-SCNC: 4.4 MMOL/L
SODIUM SERPL-SCNC: 143 MMOL/L
SODIUM SERPL-SCNC: 144 MMOL/L

## 2024-01-01 PROCEDURE — 99350 HOME/RES VST EST HIGH MDM 60: CPT

## 2024-01-01 PROCEDURE — G0009: CPT

## 2024-01-01 PROCEDURE — 90677 PCV20 VACCINE IM: CPT

## 2024-01-01 PROCEDURE — 99349 HOME/RES VST EST MOD MDM 40: CPT | Mod: 25

## 2024-01-01 PROCEDURE — 99497 ADVNCD CARE PLAN 30 MIN: CPT

## 2024-01-01 PROCEDURE — 99349 HOME/RES VST EST MOD MDM 40: CPT

## 2024-01-01 RX ORDER — INSULIN ASPART 100 [IU]/ML
100 INJECTION, SOLUTION INTRAVENOUS; SUBCUTANEOUS
Qty: 15 | Refills: 11 | Status: ACTIVE | COMMUNITY
Start: 2021-05-20

## 2024-01-01 RX ORDER — FLUTICASONE PROPIONATE AND SALMETEROL 50; 250 UG/1; UG/1
250-50 POWDER RESPIRATORY (INHALATION) TWICE DAILY
Qty: 1 | Refills: 11 | Status: ACTIVE | COMMUNITY
Start: 2024-01-01

## 2024-01-01 RX ORDER — CHLORHEXIDINE GLUCONATE 4 %
325 (65 FE) LIQUID (ML) TOPICAL DAILY
Qty: 90 | Refills: 3 | Status: ACTIVE | COMMUNITY
Start: 2017-05-12 | End: 1900-01-01

## 2024-01-01 RX ORDER — NYSTATIN 100000 [USP'U]/G
100000 CREAM TOPICAL 3 TIMES DAILY
Qty: 1 | Refills: 11 | Status: ACTIVE | COMMUNITY
Start: 2023-02-27

## 2024-01-01 RX ORDER — LABETALOL HYDROCHLORIDE 100 MG/1
100 TABLET, FILM COATED ORAL
Qty: 180 | Refills: 3 | Status: ACTIVE | COMMUNITY
Start: 2021-02-18 | End: 1900-01-01

## 2024-01-01 RX ORDER — PEN NEEDLE, DIABETIC 31 GX3/16"
31G X 5 MM NEEDLE, DISPOSABLE MISCELLANEOUS
Qty: 100 | Refills: 3 | Status: ACTIVE | COMMUNITY
Start: 2024-01-01

## 2024-01-01 RX ORDER — NYSTATIN 100000 1/G
100000 POWDER TOPICAL
Qty: 60 | Refills: 3 | Status: ACTIVE | COMMUNITY
Start: 2022-03-14 | End: 1900-01-01

## 2024-01-01 RX ORDER — CLOTRIMAZOLE AND BETAMETHASONE DIPROPIONATE 10; .5 MG/G; MG/G
1-0.05 CREAM TOPICAL TWICE DAILY
Qty: 1 | Refills: 0 | Status: ACTIVE | COMMUNITY
Start: 2024-01-01 | End: 1900-01-01

## 2024-01-01 RX ORDER — BENZONATATE 100 MG/1
100 CAPSULE ORAL 3 TIMES DAILY
Qty: 42 | Refills: 0 | Status: ACTIVE | COMMUNITY
Start: 2024-01-01

## 2024-01-01 RX ORDER — GUAIFENESIN 600 MG/1
600 TABLET, EXTENDED RELEASE ORAL
Qty: 28 | Refills: 0 | Status: ACTIVE | COMMUNITY
Start: 2024-01-01

## 2024-01-01 RX ORDER — ALBUTEROL SULFATE 90 UG/1
108 (90 BASE) INHALANT RESPIRATORY (INHALATION) EVERY 6 HOURS
Qty: 1 | Refills: 0 | Status: ACTIVE | COMMUNITY
Start: 2024-01-01

## 2024-01-01 RX ORDER — FAMOTIDINE 20 MG/1
20 TABLET, FILM COATED ORAL DAILY
Qty: 90 | Refills: 3 | Status: COMPLETED | COMMUNITY
Start: 2022-10-25 | End: 2024-01-01

## 2024-01-01 RX ORDER — BLOOD SUGAR DIAGNOSTIC
STRIP MISCELLANEOUS
Qty: 1 | Refills: 5 | Status: ACTIVE | COMMUNITY
Start: 2021-05-10

## 2024-01-01 RX ORDER — FAMOTIDINE 40 MG/1
40 TABLET, FILM COATED ORAL
Qty: 30 | Refills: 0 | Status: ACTIVE | COMMUNITY
Start: 2024-01-01

## 2024-01-01 RX ORDER — BLOOD-GLUCOSE METER
W/DEVICE KIT MISCELLANEOUS
Qty: 1 | Refills: 0 | Status: ACTIVE | COMMUNITY
Start: 2021-05-10

## 2024-01-01 RX ORDER — TRIAMCINOLONE ACETONIDE 1 MG/G
0.1 CREAM TOPICAL 3 TIMES DAILY
Qty: 1 | Refills: 3 | Status: ACTIVE | COMMUNITY
Start: 2023-02-27 | End: 1900-01-01

## 2024-01-01 RX ORDER — TAMSULOSIN HYDROCHLORIDE 0.4 MG/1
0.4 CAPSULE ORAL
Qty: 90 | Refills: 3 | Status: COMPLETED | COMMUNITY
Start: 2021-02-18 | End: 2024-01-01

## 2024-01-01 RX ORDER — LANCETS
EACH MISCELLANEOUS
Qty: 100 | Refills: 6 | Status: ACTIVE | COMMUNITY
Start: 2021-05-10

## 2024-01-01 RX ORDER — ISOPROPYL ALCOHOL 0.7 ML/ML
SWAB TOPICAL
Qty: 2 | Refills: 2 | Status: ACTIVE | COMMUNITY
Start: 2019-06-20

## 2024-01-01 RX ORDER — PEN NEEDLE, DIABETIC 31 GX3/16"
31G X 5 MM NEEDLE, DISPOSABLE MISCELLANEOUS
Qty: 2 | Refills: 3 | Status: ACTIVE | COMMUNITY
Start: 2021-05-21

## 2024-01-01 RX ORDER — SYRINGE-NEEDLE,INSULIN,0.5 ML 30 G X1/2"
30G X 1/2" SYRINGE, EMPTY DISPOSABLE MISCELLANEOUS
Qty: 3 | Refills: 3 | Status: ACTIVE | COMMUNITY
Start: 2020-12-08

## 2024-01-01 RX ORDER — FLUTICASONE PROPIONATE 50 UG/1
50 SPRAY, METERED NASAL
Qty: 1 | Refills: 0 | Status: ACTIVE | COMMUNITY
Start: 2024-01-01

## 2024-01-01 RX ORDER — FUROSEMIDE 20 MG/1
20 TABLET ORAL
Qty: 90 | Refills: 3 | Status: ACTIVE | COMMUNITY
Start: 2022-11-07 | End: 1900-01-01

## 2024-02-04 PROBLEM — I27.20 PULMONARY HYPERTENSION: Status: ACTIVE | Noted: 2021-09-08

## 2024-02-04 PROBLEM — I70.0 AORTIC ATHEROSCLEROSIS: Status: ACTIVE | Noted: 2019-04-24

## 2024-02-04 PROBLEM — I25.10 CAD (CORONARY ARTERY DISEASE): Status: ACTIVE | Noted: 2022-10-31

## 2024-02-04 NOTE — HEALTH RISK ASSESSMENT
[Independent] : feeding [Some assistance needed] : using telephone [Full assistance needed] : managing finances [No falls in past year] : Patient reported no falls in the past year [HRA Reviewed] : Health risk assessments reviewed [Yes] : The patient does have visual impairment [TimeGetUpGo] : 60

## 2024-02-04 NOTE — PHYSICAL EXAM
[Well Nourished] : well nourished [Well Developed] : well developed [No JVD] : no jugular venous distention [Breast Exam Declined] : patient declined to have breast exam done [Patient Refused] : rectal exam was refused by the patient [Normal Supraclavicular Nodes] : no supraclavicular lymphadenopathy [Normal Post Cervical Nodes] : no posterior cervical lymphadenopathy [Normal Anterior Cervical Nodes] : no anterior cervical lymphadenopathy [No CVA Tenderness] : no ~M costovertebral angle tenderness [No Joint Swelling] : no joint swelling seen [Cranial Nerves Intact] : cranial nerves 2-12 were intact [No Motor Deficits] : the motor exam was normal [de-identified] : no cough today [No Acute Distress] : no acute distress [Normal Sclera/Conjunctiva] : normal sclera/conjunctiva [Normal Outer Ear/Nose] : the ears and nose were normal in appearance [Supple] : the neck was supple [No Respiratory Distress] : no respiratory distress [Clear to Auscultation] : lungs were clear to auscultation bilaterally [No Accessory Muscle Use] : no accessory muscle use [Normal Rate] : heart rate was normal  [Regular Rhythm] : with a regular rhythm [Normal S1, S2] : normal S1 and S2 [Normal Bowel Sounds] : normal bowel sounds [Non Tender] : non-tender [Soft] : abdomen soft [No Spinal Tenderness] : no spinal tenderness [Kyphosis] :  kyphosis present [No Clubbing, Cyanosis] : no clubbing  or cyanosis of the fingernails [Oriented x3] : oriented to person, place, and time [Normal Affect] : the affect was normal [Normal Mood] : the mood was normal [de-identified] : elderly obese female sitting in chair in NAD [de-identified] : poor vision 2/2 retinopathy [de-identified] : 3+ edema to LE with some skin weeping [de-identified] : obese [de-identified] : wide based unsteady gait [de-identified] : several circular psoriatic lesions on lower extremities, rash under R breast with discrete lesions (likely fungal) [de-identified] : neuropathy of BL LE: sensory deficit [de-identified] : S/p amputations. L foot wrapped, did not want to remove dressing. Wearing appropriate footwear. As per HHA wound is almost completely healed/gone.

## 2024-02-04 NOTE — HISTORY OF PRESENT ILLNESS
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  Interval events: podiatry visit  Pt being seen for follow up: Patient is a fair historian. Pt seen alert/awake sitting in chair in no acute distress. Blood work was ordered: A1C, CBC, CMP. PT was requested for patient.  Subjective:  -Appetite/weight: appetite is good. -Gait/falls: gait is unstable, patient uses walker. No falls -Pain: denies today. -Sleep: well. -BMs: Good, sometimes gets loose -Urine: no issues. sometimes has accidents - on water pills - functional incontinence -Skin: pink, warm, good turgor. L inner heel wound is almost healed. Psoriasis to bilateral upper legs R>L - clotrimazole cream, not helping.  -DME: walker. -Mood/memory: good mood. -Hospitalizations in the past year: no.    Medical Issues:  -DM 2: monitor BS three times a day, keeps low carb diet, continue Novolin injection 38 U in am and 22 U in PM, Novolog: pt uses insulin sliding scale. Last A1C at goal -HTN/CHF: labetalol 100 mg BID, furosemide 20 mg Mon, Tue, Wed, Fri. does not have a BP machine -CKD: control DM2, sodium bicarbonate 650 mg BID. Iron daily.  -CAD/HLD: atorvastatin 10 mg daily. - psoriasis to bilateral upper legs R>L - using clobetasol cream - wound to left foot alsmost heeled as per pt; continue to f/u with podiatry.  - Vit D and Ca daily, B12 daily, vit D daily

## 2024-02-04 NOTE — REASON FOR VISIT
[Follow-Up] : a follow-up visit [Formal Caregiver] : formal caregiver [Pre-Visit Preparation] : pre-visit preparation was done [Intercurrent Specialty/Sub-specialty Visits] : the patient has no intercurrent specialty/sub-specialty visits [FreeTextEntry2] : Chart Review [FreeTextEntry1] : follow up of chronic conditions, Diabetes

## 2024-02-04 NOTE — COUNSELING
[Improve mobility] : improve mobility [Decrease stress] : decrease stress [Minimize unnecessary interventions] : minimize unnecessary interventions [Maintain functional ability] : maintain functional ability [Discussed disease trajectory with patient/caregiver] : discussed disease trajectory with patient/caregiver [DNR] : Code Status: DNR [Limited] : Treatment Guidelines: Limited [DNI] : Intubation: DNI [Last Ver[facility] Date: _____] : [facility]last verification date: [unfilled] [_____] : HCP: [unfilled] [Overweight (BMI 25.1 - 29.9)] : overweight - BMI 25.1-29.9 [Weight counseling provided] : Weight counseling provided [Vaccinations: _______] : Vaccinations: [unfilled] [FreeTextEntry3] : ADA diet discussed [Obese -  ( BMI  >29.9 )] : obese - ( BMI  >29.9 ) [Mediterranean diet recommended] : Mediterranean diet recommended [Continue diet as tolerated] : continue diet as tolerated based on goals of care [Non - Smoker] : non-smoker [Smoke/CO Detectors] : smoke/CO detectors [Use grab bars] : use grab bars [Use assistive device to avoid falls] : use assistive device to avoid falls [Remove clutter and unsafe carpeting to avoid falls] : remove clutter and unsafe carpeting to avoid falls [] : abdominal aortic ultrasound [Date: ___] : foot exam completed [unfilled] [Decrease hospital use] : decrease hospital use [Annual Discussion and review of: ___] : Annual discussion and review of [unfilled]

## 2024-03-20 NOTE — H&P ADULT - NSICDXPASTMEDICALHX_GEN_ALL_CORE_FT
Schedule Induction: Date: 4/10 Time: early AM Method: Pit Indication: elective GBS: unknown   PAST MEDICAL HISTORY:  Diabetes type 2    Diabetic ulcer of heel     Glaucoma     Hypertension     Neuropathy     Obesity     PVD (peripheral vascular disease)     PVD (peripheral vascular disease)     Stage 4 chronic kidney disease

## 2024-03-22 PROBLEM — R21 RASH: Status: ACTIVE | Noted: 2022-12-13

## 2024-03-22 PROBLEM — R60.0 BILATERAL LEG EDEMA: Status: ACTIVE | Noted: 2023-05-24

## 2024-03-22 PROBLEM — B37.2 INTERTRIGINOUS CANDIDIASIS: Status: RESOLVED | Noted: 2021-05-04 | Resolved: 2024-01-01

## 2024-03-22 PROBLEM — Z96.0 S/P URETERAL STENT PLACEMENT: Status: RESOLVED | Noted: 2021-05-04 | Resolved: 2024-01-01

## 2024-03-22 PROBLEM — Z87.898 HISTORY OF SHORTNESS OF BREATH: Status: RESOLVED | Noted: 2021-08-23 | Resolved: 2024-01-01

## 2024-03-22 PROBLEM — R23.8 SKIN IRRITATION: Status: RESOLVED | Noted: 2021-08-26 | Resolved: 2024-01-01

## 2024-03-22 PROBLEM — I50.1 PULMONARY EDEMA WITH CONGESTIVE HEART FAILURE: Status: ACTIVE | Noted: 2021-05-04

## 2024-03-22 PROBLEM — Z87.2 HISTORY OF FURUNCLE: Status: RESOLVED | Noted: 2021-05-04 | Resolved: 2024-01-01

## 2024-03-22 PROBLEM — Z86.39 HISTORY OF HYPERKALEMIA: Status: RESOLVED | Noted: 2019-04-26 | Resolved: 2024-01-01

## 2024-03-22 PROBLEM — N20.1 RIGHT URETERAL STONE: Status: RESOLVED | Noted: 2021-05-04 | Resolved: 2024-01-01

## 2024-03-22 NOTE — REASON FOR VISIT
[Follow-Up] : a follow-up visit [Formal Caregiver] : formal caregiver [Pre-Visit Preparation] : pre-visit preparation was done [Intercurrent Specialty/Sub-specialty Visits] : the patient has no intercurrent specialty/sub-specialty visits [FreeTextEntry1] : follow up of chronic conditions, Diabetes [FreeTextEntry2] : Chart Review

## 2024-03-22 NOTE — LETTER HEADER
Awake [Care Plan reviewed and provided to patient/caregiver] : Care plan reviewed and provided to patient/caregiver [Care Plan reviewed every ___ weeks] : Care plan reviewed every [unfilled] weeks [Care Plan managed/Care coordinated by: ___] : Care plan managed/Care coordinated by: [unfilled] [Patient/Caregiver agrees to have other providers send summary of their care to this office] : Patient/caregiver agrees to have other providers send summary of their care to this office

## 2024-03-22 NOTE — HISTORY OF PRESENT ILLNESS
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  Interval events: saw podiatrist. Had sudden onset blurry vision, declined ED visit when offered.   Pt being seen for follow up: Patient is a fair historian. Pt seen alert/awake sitting in chair in no acute distress. Reviewed bloodwork from last visit PT was requested for patient last visit, not getting  Subjective:  -Appetite/weight: appetite is good. -Gait/falls: gait is unstable, patient uses walker. No falls -Pain: denies today. -Sleep: well. -BMs: Good, sometimes gets loose -Urine: no issues. sometimes has accidents - on water pills - functional incontinence.  -Skin: pink, warm, good turgor. L inner heel wound is almost healed. Psoriasis to bilateral upper legs R>L - clotrimazole cream, not helping. Breast rash has resolved. New pimple that popped on bottom.  -DME: walker. -Mood/memory: good mood most of the time - vision: new blurry vision. Had recommended to go to ED but did not want to, tried to get in touch with ophtho, waiting for call back.     Medical Issues:  -DM 2: monitor BS three times a day, keeps low carb diet, continue Novolin injection 38 U in am and 22 U in PM, Novolog: pt uses insulin sliding scale. Last A1C at goal -HTN/CHF: labetalol 100 mg BID, furosemide 20 mg daily. have a bp machine - usually 120/70 -CKD: control DM2, sodium bicarbonate 650 mg BID. Iron daily.  -CAD/HLD: atorvastatin 10 mg daily. - psoriasis to bilateral upper legs R>L - using clobetasol cream - wound to left foot alsmost heeled as per pt; continue to f/u with podiatry.  - Vit D and Ca daily, B12 daily

## 2024-03-22 NOTE — PHYSICAL EXAM
[No Acute Distress] : no acute distress [Normal Sclera/Conjunctiva] : normal sclera/conjunctiva [Normal Outer Ear/Nose] : the ears and nose were normal in appearance [Supple] : the neck was supple [No Respiratory Distress] : no respiratory distress [Clear to Auscultation] : lungs were clear to auscultation bilaterally [No Accessory Muscle Use] : no accessory muscle use [Normal Rate] : heart rate was normal  [Regular Rhythm] : with a regular rhythm [Normal S1, S2] : normal S1 and S2 [Normal Bowel Sounds] : normal bowel sounds [Non Tender] : non-tender [Soft] : abdomen soft [No Spinal Tenderness] : no spinal tenderness [Kyphosis] :  kyphosis present [No Clubbing, Cyanosis] : no clubbing  or cyanosis of the fingernails [Oriented x3] : oriented to person, place, and time [Normal Affect] : the affect was normal [Normal Mood] : the mood was normal [de-identified] : elderly obese female sitting in chair in NAD [de-identified] : 2+ edema to b/l LE , no more skin weeping [de-identified] : poor vision 2/2 retinopathy [de-identified] : several circular psoriatic lesions on lower extremities. small area on buttock with small opening ("pimple that popped) not red/warm [de-identified] : wide based unsteady gait [de-identified] : obese [de-identified] : S/p amputations. L foot wrapped, did not want to remove dressing. Wearing appropriate footwear. As per HHA wound is almost completely healed/gone.  [de-identified] : neuropathy of BL LE: sensory deficit

## 2024-03-22 NOTE — COUNSELING
[Obese -  ( BMI  >29.9 )] : obese - ( BMI  >29.9 ) [Mediterranean diet recommended] : Mediterranean diet recommended [Continue diet as tolerated] : continue diet as tolerated based on goals of care [Non - Smoker] : non-smoker [Smoke/CO Detectors] : smoke/CO detectors [Use grab bars] : use grab bars [Use assistive device to avoid falls] : use assistive device to avoid falls [Remove clutter and unsafe carpeting to avoid falls] : remove clutter and unsafe carpeting to avoid falls [] : abdominal aortic ultrasound [Date: ___] : foot exam completed [unfilled] [Improve mobility] : improve mobility [Decrease hospital use] : decrease hospital use [Minimize unnecessary interventions] : minimize unnecessary interventions [Maintain functional ability] : maintain functional ability [Discussed disease trajectory with patient/caregiver] : discussed disease trajectory with patient/caregiver [Annual Discussion and review of: ___] : Annual discussion and review of [unfilled] [DNR] : Code Status: DNR [DNI] : Intubation: DNI [Limited] : Treatment Guidelines: Limited [[facility] _____[facility]last [facility]: [unfilled] [_____] : HCP: [unfilled]

## 2024-03-22 NOTE — HEALTH RISK ASSESSMENT
[HRA Reviewed] : Health risk assessment reviewed [Independent] : feeding [Some assistance needed] : using telephone [Full assistance needed] : managing finances [No falls in past year] : Patient reported no falls in the past year [Yes] : The patient does have visual impairment [TimeGetUpGo] : 60

## 2024-04-15 PROBLEM — J02.9 SORE THROAT: Status: ACTIVE | Noted: 2024-01-01 | Resolved: 2024-01-01

## 2024-04-17 PROBLEM — J06.9 URI, ACUTE: Status: ACTIVE | Noted: 2024-01-01 | Resolved: 2024-01-01

## 2024-04-17 PROBLEM — E11.22 CKD STAGE 4 DUE TO TYPE 2 DIABETES MELLITUS: Status: ACTIVE | Noted: 2023-01-01

## 2024-04-17 PROBLEM — E16.2 HYPOGLYCEMIA: Status: ACTIVE | Noted: 2024-01-01

## 2024-04-17 PROBLEM — E11.51 TYPE 2 DIABETES WITH PERIPHERAL CIRCULATORY DISORDER, CONTROLLED: Status: ACTIVE | Noted: 2017-11-03

## 2024-04-17 PROBLEM — R05.1 ACUTE COUGH: Status: ACTIVE | Noted: 2024-01-01

## 2024-04-17 NOTE — COUNSELING
[Obese -  ( BMI  >29.9 )] : obese - ( BMI  >29.9 ) [Mediterranean diet recommended] : Mediterranean diet recommended [Continue diet as tolerated] : continue diet as tolerated based on goals of care [Non - Smoker] : non-smoker [Smoke/CO Detectors] : smoke/CO detectors [Use grab bars] : use grab bars [Use assistive device to avoid falls] : use assistive device to avoid falls [Remove clutter and unsafe carpeting to avoid falls] : remove clutter and unsafe carpeting to avoid falls [] : abdominal aortic ultrasound [Date: ___] : foot exam completed [unfilled] [Improve mobility] : improve mobility [Decrease hospital use] : decrease hospital use [Minimize unnecessary interventions] : minimize unnecessary interventions [Maintain functional ability] : maintain functional ability [Discussed disease trajectory with patient/caregiver] : discussed disease trajectory with patient/caregiver [Annual Discussion and review of: ___] : Annual discussion and review of [unfilled] [DNR] : Code Status: DNR [Limited] : Treatment Guidelines: Limited [DNI] : Intubation: DNI [[facility]: [facility]last [facility] date: [unfilled] [_____] : HCP: [unfilled]

## 2024-04-17 NOTE — HEALTH RISK ASSESSMENT
[HRA Reviewed] : Health risk assessment reviewed [Independent] : feeding [Some assistance needed] : using telephone [Full assistance needed] : managing finances [No falls in past year] : Patient reported no falls in the past year [Yes] : The patient does have visual impairment [TimeGetUpGo] : 50

## 2024-04-17 NOTE — REVIEW OF SYSTEMS
[Earache] : earache [Skin Rash] : skin rash [Negative] : Heme/Lymph [FreeTextEntry2] : as per HPI [FreeTextEntry4] : as per HPI [de-identified] : as per HPI

## 2024-04-17 NOTE — PHYSICAL EXAM
[No Acute Distress] : no acute distress [Normal Sclera/Conjunctiva] : normal sclera/conjunctiva [Normal Outer Ear/Nose] : the ears and nose were normal in appearance [Supple] : the neck was supple [No Respiratory Distress] : no respiratory distress [Clear to Auscultation] : lungs were clear to auscultation bilaterally [No Accessory Muscle Use] : no accessory muscle use [Normal Rate] : heart rate was normal  [Regular Rhythm] : with a regular rhythm [Normal S1, S2] : normal S1 and S2 [Normal Bowel Sounds] : normal bowel sounds [Non Tender] : non-tender [Soft] : abdomen soft [No Spinal Tenderness] : no spinal tenderness [Kyphosis] :  kyphosis present [No Clubbing, Cyanosis] : no clubbing  or cyanosis of the fingernails [Oriented x3] : oriented to person, place, and time [Normal Affect] : the affect was normal [Normal Mood] : the mood was normal [de-identified] : elderly obese female sitting in chair in NAD [de-identified] : poor vision 2/2 retinopathy [de-identified] : 2+ edema to b/l LE [de-identified] : obese [de-identified] : wide based unsteady gait [de-identified] : several circular psoriatic lesions on lower extremities. small area on buttock with small opening ("pimple that popped) not red/warm [de-identified] : neuropathy of BL LE: sensory deficit [de-identified] : S/p amputations. L foot wrapped, did not want to remove dressing. Wearing appropriate footwear. As per HHA wound is almost completely healed/gone.

## 2024-04-17 NOTE — HISTORY OF PRESENT ILLNESS
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  Interval events: podiatrist coming soon.   Pt being seen for follow up: Patient is a fair historian. Pt seen sitting on commode with top of commode closed, having difficulty walking to chair. Feeling sick since sunday. Feels weak and is coughing. "I have a cold". tried mucinex which did not help. tried tylenol. CXR negative, viral panel scheduled for this afternoon.  Sugars have been on lower side since decreased appetite. If less than 80 drinks apple juice. During visit took BSG which was 43. Given apple juice x2 and peanut butter and crackers, which raised bsg to 82.  Reviewed bloodwork from last visit  Subjective:  -Appetite/weight: No appetite while sick.  -Gait/falls: gait is unstable, patient uses walker. feeling extra weak right now. No falls -Pain: denies today. -Sleep: well. -BMs: Good, sometimes gets loose -Urine: no issues. sometimes has accidents - on water pills - functional incontinence.  -Skin: pink, warm, good turgor. L inner heel wound is almost healed. Psoriasis to bilateral upper legs R>L - clotrimazole cream, not helping. Breast rash has resolved. -DME: walker. -Mood/memory: good mood most of the time - vision: blurry vision. Had recommended to go to ED but did not want to, tried to get in touch with ophtho, waiting for call back.     Medical Issues:  -DM 2: monitor BS three times a day, keeps low carb diet, continue Novolin injection 38 U in am and 22 U in PM, Novolog: pt uses insulin sliding scale. Last A1C at goal. See above for acute events.  -HTN/CHF: labetalol 100 mg BID, furosemide 20 mg daily. have a bp machine - usually 120/70 -CKD: control DM2, sodium bicarbonate 650 mg BID. Iron daily.  -CAD/HLD: atorvastatin 10 mg daily. - psoriasis to bilateral upper legs R>L - using clobetasol cream - wound to left foot alsmost heeled as per pt; continue to f/u with podiatry.  - Vit D and Ca daily, B12 daily

## 2024-04-22 PROBLEM — R05.9 COUGH: Status: ACTIVE | Noted: 2024-01-01

## 2024-04-29 PROBLEM — J30.9 ALLERGIC RHINITIS: Status: ACTIVE | Noted: 2024-01-01

## 2024-05-16 NOTE — PACU DISCHARGE NOTE - PAIN:
Pt had large BM following Fleets Enema.     Hesham Leach, RN  05/16/24 0037    
Controlled with current regime

## 2024-10-29 NOTE — ED PROVIDER NOTE - RESPIRATORY, MLM
Occupational Therapy    Visit Type: treatment  SUBJECTIVE  \"I'm a little slow to get started\"    Pain   RN informed on pain level.    Location: abdominal pain, did not rate    OBJECTIVE     Cognitive Status   Level of Consciousness   - drowsy  Orientation    - Oriented to: person and place   - Disoriented to: time  Functional Communication   - Forms of Communication: delayed responses  Attention Span    - Attention impairment: distractibility, fatigue and reduced memory  Following Direction   - follows one step commands with repetition and follows one step commands with increased time  Executive Function    - Sequencing: impaired   - Problem Solving: impaired   - Planning: impaired  Memory   - - decreased recall of recent events  Safety Awareness/Insight   - decreased awareness of need for assistance and decreased awareness of need for safety  Awareness of Deficits   - decreased awareness of deficits  Patient with delayed processing noted this date, patient thought today was Saturday, took patient over 12 minutes to look through breakfast menu with patient fixated on reading through condiment options prior to deciding on what to eat for breakfast. Required assist to order.  Cues for sequencing through Activities of Daily Living this date and problem solving, increased cues and redirection required compared to previous sessions- medical team updated regarding above    Patient Activity Tolerance: 1 to 1 activity to rest         Bed Mobility  - Supine to sit: supervision, with verbal cues, with demonstration  Increased time for sequencing, cues, bed rail   Transfers  Assistive devices: none  - Sit to stand: supervision, stand by assist  - Stand to sit: supervision, stand by assist  - Toilet: supervision  Supervision for safety, patient using environment for occasional steady       Functional Ambulation  - Assistance: contact guard/touching/steadying assist, stand by assist and supervision  - Assistive device: none  -  Distance (ft):50; 50  2 episodes of minor loss of balance requiring hands on steady assist. Cues for way finding back to room, supervision overall   Activities of Daily Living (ADLs)  Grooming/Oral Hygiene:   - Grooming assist: supervision and with verbal cues       - Oral hygiene assist: supervision and with verbal cues  - Position: standing at sink  - Assist needed for: set up, verbal cueing, supervision/safety and increased time to complete  Upper Body Dressing:  - Assist: supervision, set up and with verbal cues  - Assist needed for: set up, verbal cueing and supervision/safety  Lower Body Dressing:   - Assist: supervision, stand by assist, set up, contact guard/touching/steadying assist and with verbal cues  - Footwear:       - Assistance: supervision, stand by assist and set up  - Assist needed for: verbal cueing, increased time to complete, supervision/safety and steadying  Toileting:   - Toilet transfer:        - Assist: supervision, stand by assist and with verbal cues       - Equipment: grab bar use  - Assist: supervision, stand by assist and with verbal cues  - Equipment: grab bar use  Tub Transfer:   - Assist: contact guard/touching/steadying assist, with verbal cues and with tactile cues  Bathing:   - Assist: supervision, stand by assist, set up and with verbal cues  - Position: standing in shower  - Assist needed for: set up, supervision/safety, increased time to complete and verbal cueing  Hands on steady assist for tub step over and safety. Cues throughout for awareness, delayed processing and safety. Patient required cues to doff underpants and socks prior to getting into the shower \"Oh I forgot\" cues to complete LE dressing/doffing in stance as patient heavily relying on UE support on wall and grab bar to try and steady herself              Education:   - Present and ready to learn: patient  Education provided during session:  - Results of above outlined education: Verbalizes understanding and  Needs reinforcement    ASSESSMENT   Progress: slow progress  Interfering components: medical status limitations, decreased activity tolerance, mood/coping, cognitive deficits and decreased insight into deficit    Discharge needs based on today's assessment:  - Current level of function: slightly below baseline level of function  - Therapy needs at discharge: outpatient therapy 1-3 times per week  - Activities of daily living (ADLs) requiring support at discharge: bathing, ambulation, transfers, dressing and toileting  - Instrumental activities of daily living (IADLs) requiring support at discharge: home management and health/medication management  - Impairments that require further therapy intervention: activity tolerance, safety awareness, pain, balance, cognition and executive functioning  Patient with increased confusion noted, delatyed problem solving and processing. increased cues and physical assist required this date. RN and MD updated regarding above  AM-PAC  - Prior Level of Function: IND/MOD I ([facility] 22-24)       Key: MOD A=moderate assistance, IND/MOD I=independent/modified independent  - Generalized Current Level of Function     - Current Self-Cares: 20       Scoring Key= >21 Modified Independent; 20-21 Supervision; 18-19 Minimal assist; 13-17 Moderate assist; 9-12 Max assist; <9 Total assist      PLAN (while hospitalized)  Suggestions for next session as indicated: Re-assess cognition, awareness, memory. Balance with item retrieval for Activities of Daily Living's, safety     OT Frequency: 3-5 x per week      PT/OT Mobility Equipment for Discharge: 10/26:  declined device today, reported has 4ww at home but it's too heavy to carry in/out of home so considering keeping devicein car  PT/OT ADL Equipment for Discharge: continue to assess  for tub need options  Agreement to plan and goals: patient agrees with goals and treatment plan      GOALS  Review Date: 11/1/2024  Long Term Goals: (to be met by time of  discharge from hospital)  Lower body dressing: Patient will complete lower body dressing modified independent.  Status: progressing/ongoing  Tub/shower transfer: Patient will complete tub/shower transfer with modified independent.   Home setting transfer: Patient will complete home setting transfers with modified independent.   Status: progressing/ongoing  Item retrieval: Patient will complete item retrieval modified independent.   Home program: patient independent with home program as instructed.     Documented in the chart in the following areas: Assessment/Plan.    Patient at End of Session:   Location: in chair  Safety measures: alarm system in place/re-engaged and call light within reach  Handoff to: nurse      Therapy procedure time and total treatment time can be found documented on the Time Entry flowsheet   Breath sounds clear and equal bilaterally.

## 2024-12-20 NOTE — CONSULT NOTE ADULT - SUBJECTIVE AND OBJECTIVE BOX
Podiatry pager #: [phone]/ [phone]    Patient is a 81y old  Female who presents with a chief complaint of L heel wound    HPI:  82 yo female pmhx diabetes, CKD s/p L great toe and 3rd digits amputation L foot presents to the ED c/o chronic L heel wound, sent in for IV abx. States she's had this wound to her heel "for years" follows with podiatry who started her on Augmentin 2 days ago for c/f surrounding infection, pt developed rash, taken off it and started on Levaquin. Podiatrist Dr. Bailey Hand saw her at home today and sent pt in for IV abx. Pt denies pain to the foot. Denies fever/chills, n/v/d, cp, sob, numbness/tingling, weakness, dizziness, lightheadedness.  Podiatry: Dr. Bailey Hand81 yo female pmhx diabetes, CKD s/p L great toe and 3rd digits amputation L foot presents to the ED c/o chronic L heel wound, sent in for IV abx. States she's had this wound to her heel "for years" follows with podiatry who started her on Augmentin 2 days ago for c/f surrounding infection, pt developed rash, taken off it and started on Levaquin. Podiatrist Dr. Bailey Hand saw her at home today and sent pt in for IV abx. Pt denies pain to the foot. Denies fever/chills, n/v/d, cp, sob, numbness/tingling, weakness, dizziness, lightheadedness.  Podiatry: Dr. Bailey Hand    PAST MEDICAL & SURGICAL HISTORY:  Diabetes  type 2      PVD (peripheral vascular disease)      Neuropathy      Diabetic ulcer of heel      Hypertension      Glaucoma      PVD (peripheral vascular disease)      Obesity      Toe amputation status      History of cataract surgery          MEDICATIONS  (STANDING):  sodium chloride 0.9% Bolus 500 milliLiter(s) IV Bolus once    MEDICATIONS  (PRN):      Allergies    No Known Allergies    Intolerances        VITALS:    Vital Signs Last 24 Hrs  T(C): 36.5 (14 Oct 2022 19:49), Max: 36.6 (14 Oct 2022 16:53)  T(F): 97.7 (14 Oct 2022 19:49), Max: 97.9 (14 Oct 2022 16:53)  HR: 79 (14 Oct 2022 19:49) (69 - 79)  BP: 109/57 (14 Oct 2022 19:49) (109/57 - 118/50)  BP(mean): 74 (14 Oct 2022 19:49) (74 - 74)  RR: 18 (14 Oct 2022 19:49) (16 - 18)  SpO2: 99% (14 Oct 2022 19:49) (98% - 99%)    Parameters below as of 14 Oct 2022 19:49  Patient On (Oxygen Delivery Method): room air        LABS:                          8.9    14.67 )-----------( Clumped    ( 14 Oct 2022 15:53 )             29.2       10-14    135  |  100  |  66<H>  ----------------------------<  343<H>  4.9   |  20<L>  |  3.69<H>    Ca    8.8      14 Oct 2022 15:54    TPro  6.9  /  Alb  3.1<L>  /  TBili  0.2  /  DBili  x   /  AST  21  /  ALT  14  /  AlkPhos  100  10-14      CAPILLARY BLOOD GLUCOSE          PT/INR - ( 14 Oct 2022 16:34 )   PT: 13.7 sec;   INR: 1.19 ratio         PTT - ( 14 Oct 2022 16:34 )  PTT:24.6 sec    LOWER EXTREMITY PHYSICAL EXAM:    Lower Extremity Physical Exam:  Vascular: DP 1/4, PT 0/4 B/L, CFT <3 seconds B/L, temp gradient warm to cool, B/L  Neuro: Epicritic sensation reduced to the level of digits, B/L.  Musculoskeletal/Ortho: s/p LF partial first ray resection, and 3rd toe amp, s/p R foot partial hallux amputation   Skin: Left medial heel wound to subQ w central eschar, no malodor, no fluctuance, no periwound erythema, no warm to touch, no drainage or pus noted, etiology 2/2 to pressure and DM. R foot with no open wounds and no clinical signs of infection.     RADIOLOGY & ADDITIONAL STUDIES:    < from: Xray Foot AP + Lateral + Oblique, Left (10.14.22 @ 17:11) >    ACC: [id number] EXAM:  XR FOOT COMP MIN 3 VIEWS LT                          PROCEDURE DATE:  10/14/2022          INTERPRETATION:  CLINICAL INFORMATION: Heel wound.    COMPARISON: Radiographs dated 12/20/2020.    TECHNIQUE: 3 views of the left foot.    FINDINGS:  No acute displaced fracture or dislocation.  Status post remote amputation of the first ray at the level of the   metatarsal distal shaft. Status post remote amputation of the third ray   at the level of the MTP.  Arthrosis most advanced at the talonavicular and first tarsometatarsal   joints.  No definite cortical erosion or periosteal reaction.  Soft tissue ulceration overlying the posterior calcaneal tuberosity. Soft   tissue swelling most prominent along the dorsum of the foot.  Advanced atherosclerosis.    IMPRESSION:  Soft tissue ulceration overlying left posterior calcaneal tuberosity   without definite radiographic evidence for acute osteomyelitis. Correlate   for probe to bone and consider follow-up MRI as clinically indicated.    --- End of Report ---            LARRY RAMIREZ MD; Attending Radiologist  This document has been electronically signed. Oct 14 2022  5:20PM    < end of copied text >   Detail Level: Detailed X Size Of Lesion In Cm (Optional): 0

## 2024-12-30 NOTE — PHYSICAL THERAPY INITIAL EVALUATION ADULT - MANUAL MUSCLE TESTING RESULTS, REHAB EVAL
Medication: atorvastatin passed protocol.   Last office visit date: 8/27/24  Next appointment scheduled?: Yes   Number of refills given: 1   bilateral UE and LE grossly 3/5

## 2025-03-21 NOTE — ED PROVIDER NOTE - VASCULAR COMPROMISE
EM PGY-2/Yovanny DO: Received call from radiologist discussing high-grade small bowel obstruction.  Surgery paged at this time, discussed case, will see patient.  Awaiting recs patient will be admitted.
no vascular compromise/pulses full and equal bilaterally

## 2025-05-12 NOTE — DIETITIAN INITIAL EVALUATION ADULT. - ENERGY INTAKE
"Reason For Visit:   Chief Complaint   Patient presents with    Consult     Displaced fracture of distal phalanx of left thumb       Primary MD: Ольга Houston  Ref. MD: Ambreen    Age: 78 year old    ?  No      Ht 1.676 m (5' 6\")   Wt 97.5 kg (215 lb)   BMI 34.70 kg/m        Pain Assessment  Patient Currently in Pain: No    Hand Dominance Evaluation  Hand Dominance: Right          QuickDASH Assessment       No data to display                   Current Outpatient Medications   Medication Sig Dispense Refill    acetaminophen (TYLENOL) 650 MG CR tablet Take 2 tablets (1,300 mg) by mouth every 8 hours as needed for mild pain or pain 270 tablet 3    amLODIPine (NORVASC) 5 MG tablet Take 1 tablet (5 mg) by mouth daily. 90 tablet 3    aspirin 81 MG EC tablet Take 1 tablet (81 mg) by mouth daily. 90 tablet 3    famotidine (PEPCID) 20 MG tablet TAKE ONE TABLET BY MOUTH TWICE DAILY AS NEEDED 180 tablet 1    hydroCHLOROthiazide 12.5 MG tablet Take 1 tablet (12.5 mg) by mouth daily. 90 tablet 3    Lidocaine (LIDOCARE) 4 % Patch Place 1 patch onto the skin every 24 hours To prevent lidocaine toxicity, patient should be patch free for 12 hrs daily. 14 patch 0    loratadine (CLARITIN) 10 MG tablet Take one tab twice per day. (Patient taking differently: Take 10 mg by mouth 2 times daily.) 180 tablet 3    losartan (COZAAR) 100 MG tablet Take 1 tablet (100 mg) by mouth daily. 90 tablet 0    methocarbamol (ROBAXIN) 500 MG tablet Take 1 tablet (500 mg) by mouth 3 times daily as needed for muscle spasms 15 tablet 0    metoprolol succinate ER (TOPROL XL) 50 MG 24 hr tablet Take 3 tablets (150 mg) by mouth daily. 270 tablet 3    Multiple Vitamins-Minerals (PRESERVISION AREDS 2 PO) Take 1 capsule by mouth 2 times daily.      nitroGLYcerin (NITROSTAT) 0.4 MG sublingual tablet For chest pain place 1 tablet under the tongue every 5 minutes for 3 doses. If symptoms persist 5 minutes after 1st dose call 911. 12 tablet 0    " oxyCODONE (ROXICODONE) 5 MG tablet Take 1 tablet (5 mg) by mouth every 6 hours as needed for severe pain or breakthrough pain. 6 tablet 0    pantoprazole (PROTONIX) 40 MG EC tablet TAKE 1 TABLET DAILY 90 tablet 0    senna-docusate (SENOKOT-S/PERICOLACE) 8.6-50 MG tablet Take 2 tablets by mouth 2 times daily as needed for constipation      sulfamethoxazole-trimethoprim (BACTRIM DS) 800-160 MG tablet Take 1 tablet by mouth 2 times daily. 6 tablet 0       Allergies   Allergen Reactions    Blood Transfusion Related (Informational Only) Other (See Comments)     Patient has a history of a clinically significant antibody against RBC antigens.  A delay in compatible RBCs may occur.    Chantix [Varenicline]      suicidal    Influenza Virus Vaccine H5n1      Muscle aches dizzy, nauseated  Light headed    Morphine Sulfate      Sensitivity when in hospital    Omeprazole Magnesium Nausea     Intolerance      Vioxx     Cetirizine-Pseudoephedrine Er Rash    Niacin Itching and Rash    Zetia [Ezetimibe] Other (See Comments)     Muscle pain       Danay Martinez, ATC     Good (>75%)

## 2025-07-17 NOTE — DISCHARGE NOTE ADULT - NS AS DC PROVIDER CONTACT Y/N MULTI
Had discussion with her about use of GLP-1.  I have encouraged her to consider using Mounjaro for better diabetes control.  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  She will give this consideration and possibly reach out to her endocrinologist for this.   Yes